# Patient Record
Sex: MALE | Race: WHITE | NOT HISPANIC OR LATINO | Employment: FULL TIME | URBAN - METROPOLITAN AREA
[De-identification: names, ages, dates, MRNs, and addresses within clinical notes are randomized per-mention and may not be internally consistent; named-entity substitution may affect disease eponyms.]

---

## 2017-01-27 ENCOUNTER — ALLSCRIPTS OFFICE VISIT (OUTPATIENT)
Dept: OTHER | Facility: OTHER | Age: 52
End: 2017-01-27

## 2017-02-22 ENCOUNTER — APPOINTMENT (INPATIENT)
Dept: NEUROLOGY | Facility: HOSPITAL | Age: 52
DRG: 101 | End: 2017-02-22
Payer: COMMERCIAL

## 2017-02-22 ENCOUNTER — HOSPITAL ENCOUNTER (INPATIENT)
Facility: HOSPITAL | Age: 52
LOS: 2 days | Discharge: LEFT BEFORE DISCHARGE | DRG: 101 | End: 2017-02-24
Attending: EMERGENCY MEDICINE | Admitting: INTERNAL MEDICINE
Payer: COMMERCIAL

## 2017-02-22 ENCOUNTER — APPOINTMENT (EMERGENCY)
Dept: RADIOLOGY | Facility: HOSPITAL | Age: 52
DRG: 101 | End: 2017-02-22
Payer: COMMERCIAL

## 2017-02-22 DIAGNOSIS — I10 HYPERTENSION: ICD-10-CM

## 2017-02-22 DIAGNOSIS — R56.9 SEIZURE (HCC): Primary | ICD-10-CM

## 2017-02-22 DIAGNOSIS — E83.42 HYPOMAGNESEMIA: ICD-10-CM

## 2017-02-22 PROBLEM — N17.9 AKI (ACUTE KIDNEY INJURY) (HCC): Status: ACTIVE | Noted: 2017-02-22

## 2017-02-22 PROBLEM — F17.200 NICOTINE DEPENDENCE: Status: ACTIVE | Noted: 2017-02-22

## 2017-02-22 PROBLEM — E78.5 DYSLIPIDEMIA: Status: ACTIVE | Noted: 2017-02-22

## 2017-02-22 LAB
ALBUMIN SERPL BCP-MCNC: 4 G/DL (ref 3.5–5)
ALP SERPL-CCNC: 55 U/L (ref 46–116)
ALT SERPL W P-5'-P-CCNC: 23 U/L (ref 12–78)
AMPHETAMINES SERPL QL SCN: NEGATIVE
ANION GAP SERPL CALCULATED.3IONS-SCNC: 13 MMOL/L (ref 4–13)
APAP SERPL-MCNC: <2 UG/ML (ref 10–30)
APTT PPP: 23 SECONDS (ref 24–36)
AST SERPL W P-5'-P-CCNC: 35 U/L (ref 5–45)
BARBITURATES UR QL: NEGATIVE
BASOPHILS # BLD AUTO: 0.1 THOUSANDS/ΜL (ref 0–0.1)
BASOPHILS NFR BLD AUTO: 1 % (ref 0–1)
BENZODIAZ UR QL: NEGATIVE
BILIRUB SERPL-MCNC: 0.4 MG/DL (ref 0.2–1)
BILIRUB UR QL STRIP: NEGATIVE
BUN SERPL-MCNC: 10 MG/DL (ref 5–25)
CALCIUM SERPL-MCNC: 7.6 MG/DL (ref 8.3–10.1)
CHLORIDE SERPL-SCNC: 101 MMOL/L (ref 100–108)
CK MB SERPL-MCNC: 2.7 NG/ML (ref 0–5)
CK MB SERPL-MCNC: <1 % (ref 0–2.5)
CK SERPL-CCNC: 366 U/L (ref 39–308)
CLARITY UR: CLEAR
CO2 SERPL-SCNC: 26 MMOL/L (ref 21–32)
COCAINE UR QL: NEGATIVE
COLOR UR: YELLOW
CREAT SERPL-MCNC: 1.52 MG/DL (ref 0.6–1.3)
EOSINOPHIL # BLD AUTO: 0.2 THOUSAND/ΜL (ref 0–0.61)
EOSINOPHIL NFR BLD AUTO: 2 % (ref 0–6)
ERYTHROCYTE [DISTWIDTH] IN BLOOD BY AUTOMATED COUNT: 15.8 % (ref 11.6–15.1)
GFR SERPL CREATININE-BSD FRML MDRD: 48 ML/MIN/1.73SQ M
GLUCOSE SERPL-MCNC: 101 MG/DL (ref 65–140)
GLUCOSE SERPL-MCNC: 89 MG/DL (ref 65–140)
GLUCOSE UR STRIP-MCNC: NEGATIVE MG/DL
HCT VFR BLD AUTO: 42.5 % (ref 42–52)
HGB BLD-MCNC: 14.1 G/DL (ref 14–18)
HGB UR QL STRIP.AUTO: NEGATIVE
INR PPP: 1.18 (ref 0.86–1.16)
KETONES UR STRIP-MCNC: NEGATIVE MG/DL
LACTATE SERPL-SCNC: 1.7 MMOL/L (ref 0.5–2)
LACTATE SERPL-SCNC: 4.3 MMOL/L (ref 0.5–2)
LEUKOCYTE ESTERASE UR QL STRIP: NEGATIVE
LIPASE SERPL-CCNC: 258 U/L (ref 73–393)
LYMPHOCYTES # BLD AUTO: 1.5 THOUSANDS/ΜL (ref 0.6–4.47)
LYMPHOCYTES NFR BLD AUTO: 20 % (ref 14–44)
MAGNESIUM SERPL-MCNC: 0.4 MG/DL (ref 1.6–2.6)
MAGNESIUM SERPL-MCNC: 2.5 MG/DL (ref 1.6–2.6)
MCH RBC QN AUTO: 30.1 PG (ref 27–31)
MCHC RBC AUTO-ENTMCNC: 33 G/DL (ref 31.4–37.4)
MCV RBC AUTO: 91 FL (ref 82–98)
METHADONE UR QL: NEGATIVE
MONOCYTES # BLD AUTO: 0.5 THOUSAND/ΜL (ref 0.17–1.22)
MONOCYTES NFR BLD AUTO: 6 % (ref 4–12)
NEUTROPHILS # BLD AUTO: 5.1 THOUSANDS/ΜL (ref 1.85–7.62)
NEUTS SEG NFR BLD AUTO: 70 % (ref 43–75)
NITRITE UR QL STRIP: NEGATIVE
NRBC BLD AUTO-RTO: 0 /100 WBCS
OPIATES UR QL SCN: POSITIVE
PCP UR QL: NEGATIVE
PH UR STRIP.AUTO: 8 [PH] (ref 5–9)
PLATELET # BLD AUTO: 231 THOUSANDS/UL (ref 130–400)
PMV BLD AUTO: 7.1 FL (ref 8.9–12.7)
POTASSIUM SERPL-SCNC: 4.8 MMOL/L (ref 3.5–5.3)
PROT SERPL-MCNC: 7.1 G/DL (ref 6.4–8.2)
PROT UR STRIP-MCNC: NEGATIVE MG/DL
PROTHROMBIN TIME: 12.5 SECONDS (ref 9.4–11.7)
RBC # BLD AUTO: 4.67 MILLION/UL (ref 4.7–6.1)
SALICYLATES SERPL-MCNC: <3 MG/DL (ref 3–20)
SODIUM SERPL-SCNC: 140 MMOL/L (ref 136–145)
SP GR UR STRIP.AUTO: 1.01 (ref 1–1.03)
THC UR QL: POSITIVE
TROPONIN I SERPL-MCNC: <0.02 NG/ML
TSH SERPL DL<=0.05 MIU/L-ACNC: 1.72 UIU/ML (ref 0.36–3.74)
UROBILINOGEN UR QL STRIP.AUTO: 0.2 E.U./DL
WBC # BLD AUTO: 7.3 THOUSAND/UL (ref 4.8–10.8)

## 2017-02-22 PROCEDURE — 96372 THER/PROPH/DIAG INJ SC/IM: CPT

## 2017-02-22 PROCEDURE — 71020 HB CHEST X-RAY 2VW FRONTAL&LATL: CPT

## 2017-02-22 PROCEDURE — 85610 PROTHROMBIN TIME: CPT | Performed by: EMERGENCY MEDICINE

## 2017-02-22 PROCEDURE — 82948 REAGENT STRIP/BLOOD GLUCOSE: CPT

## 2017-02-22 PROCEDURE — G0480 DRUG TEST DEF 1-7 CLASSES: HCPCS | Performed by: EMERGENCY MEDICINE

## 2017-02-22 PROCEDURE — 96375 TX/PRO/DX INJ NEW DRUG ADDON: CPT

## 2017-02-22 PROCEDURE — 83605 ASSAY OF LACTIC ACID: CPT | Performed by: EMERGENCY MEDICINE

## 2017-02-22 PROCEDURE — 70450 CT HEAD/BRAIN W/O DYE: CPT

## 2017-02-22 PROCEDURE — 96365 THER/PROPH/DIAG IV INF INIT: CPT

## 2017-02-22 PROCEDURE — 84484 ASSAY OF TROPONIN QUANT: CPT | Performed by: EMERGENCY MEDICINE

## 2017-02-22 PROCEDURE — 82550 ASSAY OF CK (CPK): CPT | Performed by: EMERGENCY MEDICINE

## 2017-02-22 PROCEDURE — 80329 ANALGESICS NON-OPIOID 1 OR 2: CPT | Performed by: EMERGENCY MEDICINE

## 2017-02-22 PROCEDURE — 83735 ASSAY OF MAGNESIUM: CPT | Performed by: NURSE PRACTITIONER

## 2017-02-22 PROCEDURE — 81003 URINALYSIS AUTO W/O SCOPE: CPT | Performed by: EMERGENCY MEDICINE

## 2017-02-22 PROCEDURE — 93005 ELECTROCARDIOGRAM TRACING: CPT | Performed by: EMERGENCY MEDICINE

## 2017-02-22 PROCEDURE — 80307 DRUG TEST PRSMV CHEM ANLYZR: CPT | Performed by: EMERGENCY MEDICINE

## 2017-02-22 PROCEDURE — 99285 EMERGENCY DEPT VISIT HI MDM: CPT

## 2017-02-22 PROCEDURE — 85025 COMPLETE CBC W/AUTO DIFF WBC: CPT | Performed by: EMERGENCY MEDICINE

## 2017-02-22 PROCEDURE — 36415 COLL VENOUS BLD VENIPUNCTURE: CPT | Performed by: EMERGENCY MEDICINE

## 2017-02-22 PROCEDURE — 83690 ASSAY OF LIPASE: CPT | Performed by: INTERNAL MEDICINE

## 2017-02-22 PROCEDURE — 83735 ASSAY OF MAGNESIUM: CPT | Performed by: EMERGENCY MEDICINE

## 2017-02-22 PROCEDURE — 84443 ASSAY THYROID STIM HORMONE: CPT | Performed by: EMERGENCY MEDICINE

## 2017-02-22 PROCEDURE — A9270 NON-COVERED ITEM OR SERVICE: HCPCS | Performed by: NURSE PRACTITIONER

## 2017-02-22 PROCEDURE — 85730 THROMBOPLASTIN TIME PARTIAL: CPT | Performed by: EMERGENCY MEDICINE

## 2017-02-22 PROCEDURE — 95819 EEG AWAKE AND ASLEEP: CPT

## 2017-02-22 PROCEDURE — 82553 CREATINE MB FRACTION: CPT | Performed by: EMERGENCY MEDICINE

## 2017-02-22 PROCEDURE — 96361 HYDRATE IV INFUSION ADD-ON: CPT

## 2017-02-22 PROCEDURE — 96367 TX/PROPH/DG ADDL SEQ IV INF: CPT

## 2017-02-22 PROCEDURE — 80053 COMPREHEN METABOLIC PANEL: CPT | Performed by: EMERGENCY MEDICINE

## 2017-02-22 RX ORDER — POTASSIUM CHLORIDE 1500 MG/1
20 TABLET, FILM COATED, EXTENDED RELEASE ORAL EVERY OTHER DAY
COMMUNITY
Start: 2016-10-04 | End: 2020-02-25

## 2017-02-22 RX ORDER — FENOFIBRATE 145 MG/1
145 TABLET, COATED ORAL DAILY
COMMUNITY
Start: 2012-03-06 | End: 2018-03-05 | Stop reason: SDUPTHER

## 2017-02-22 RX ORDER — ONDANSETRON 2 MG/ML
4 INJECTION INTRAMUSCULAR; INTRAVENOUS EVERY 6 HOURS PRN
Status: DISCONTINUED | OUTPATIENT
Start: 2017-02-22 | End: 2017-02-24 | Stop reason: HOSPADM

## 2017-02-22 RX ORDER — METOPROLOL SUCCINATE 100 MG/1
100 TABLET, EXTENDED RELEASE ORAL DAILY
COMMUNITY
Start: 2011-12-16 | End: 2018-03-05 | Stop reason: SDUPTHER

## 2017-02-22 RX ORDER — NICOTINE POLACRILEX 4 MG/1
20 GUM, CHEWING ORAL DAILY
Status: ON HOLD | COMMUNITY
Start: 2013-02-21 | End: 2017-02-22

## 2017-02-22 RX ORDER — LORAZEPAM 2 MG/ML
1 INJECTION INTRAMUSCULAR EVERY 4 HOURS PRN
Status: DISCONTINUED | OUTPATIENT
Start: 2017-02-22 | End: 2017-02-24 | Stop reason: HOSPADM

## 2017-02-22 RX ORDER — MORPHINE SULFATE 2 MG/ML
2 INJECTION, SOLUTION INTRAMUSCULAR; INTRAVENOUS ONCE
Status: COMPLETED | OUTPATIENT
Start: 2017-02-22 | End: 2017-02-22

## 2017-02-22 RX ORDER — ASPIRIN 81 MG/1
81 TABLET, CHEWABLE ORAL DAILY
Status: DISCONTINUED | OUTPATIENT
Start: 2017-02-23 | End: 2017-02-24 | Stop reason: HOSPADM

## 2017-02-22 RX ORDER — MAGNESIUM SULFATE HEPTAHYDRATE 40 MG/ML
2 INJECTION, SOLUTION INTRAVENOUS ONCE
Status: COMPLETED | OUTPATIENT
Start: 2017-02-22 | End: 2017-02-22

## 2017-02-22 RX ORDER — THIAMINE HYDROCHLORIDE 100 MG/ML
100 INJECTION, SOLUTION INTRAMUSCULAR; INTRAVENOUS ONCE
Status: COMPLETED | OUTPATIENT
Start: 2017-02-22 | End: 2017-02-22

## 2017-02-22 RX ORDER — MORPHINE SULFATE 4 MG/ML
4 INJECTION, SOLUTION INTRAMUSCULAR; INTRAVENOUS ONCE
Status: COMPLETED | OUTPATIENT
Start: 2017-02-22 | End: 2017-02-22

## 2017-02-22 RX ORDER — MAGNESIUM SULFATE HEPTAHYDRATE 40 MG/ML
2 INJECTION, SOLUTION INTRAVENOUS ONCE
Status: DISCONTINUED | OUTPATIENT
Start: 2017-02-23 | End: 2017-02-22

## 2017-02-22 RX ORDER — ALLOPURINOL 300 MG/1
300 TABLET ORAL DAILY
Status: DISCONTINUED | OUTPATIENT
Start: 2017-02-23 | End: 2017-02-24 | Stop reason: HOSPADM

## 2017-02-22 RX ORDER — FENOFIBRATE 145 MG/1
145 TABLET, COATED ORAL DAILY
Status: DISCONTINUED | OUTPATIENT
Start: 2017-02-23 | End: 2017-02-22

## 2017-02-22 RX ORDER — QUETIAPINE FUMARATE 50 MG/1
5 TABLET, FILM COATED ORAL
Status: ON HOLD | COMMUNITY
Start: 2012-04-16 | End: 2022-05-07 | Stop reason: ALTCHOICE

## 2017-02-22 RX ORDER — NICOTINE 21 MG/24HR
1 PATCH, TRANSDERMAL 24 HOURS TRANSDERMAL DAILY
Status: DISCONTINUED | OUTPATIENT
Start: 2017-02-22 | End: 2017-02-24 | Stop reason: HOSPADM

## 2017-02-22 RX ORDER — ACETAMINOPHEN 325 MG/1
650 TABLET ORAL EVERY 6 HOURS PRN
Status: DISCONTINUED | OUTPATIENT
Start: 2017-02-22 | End: 2017-02-24 | Stop reason: HOSPADM

## 2017-02-22 RX ORDER — AMLODIPINE BESYLATE 10 MG/1
10 TABLET ORAL
Status: DISCONTINUED | OUTPATIENT
Start: 2017-02-22 | End: 2017-02-24 | Stop reason: HOSPADM

## 2017-02-22 RX ORDER — METOPROLOL SUCCINATE 100 MG/1
100 TABLET, EXTENDED RELEASE ORAL DAILY
Status: DISCONTINUED | OUTPATIENT
Start: 2017-02-23 | End: 2017-02-24 | Stop reason: HOSPADM

## 2017-02-22 RX ORDER — MORPHINE SULFATE 2 MG/ML
2 INJECTION, SOLUTION INTRAMUSCULAR; INTRAVENOUS EVERY 6 HOURS PRN
Status: DISCONTINUED | OUTPATIENT
Start: 2017-02-22 | End: 2017-02-24 | Stop reason: HOSPADM

## 2017-02-22 RX ORDER — SODIUM CHLORIDE 9 MG/ML
75 INJECTION, SOLUTION INTRAVENOUS CONTINUOUS
Status: DISCONTINUED | OUTPATIENT
Start: 2017-02-22 | End: 2017-02-22

## 2017-02-22 RX ORDER — OXYCODONE HYDROCHLORIDE AND ACETAMINOPHEN 5; 325 MG/1; MG/1
1 TABLET ORAL EVERY 6 HOURS PRN
Status: DISCONTINUED | OUTPATIENT
Start: 2017-02-22 | End: 2017-02-24 | Stop reason: HOSPADM

## 2017-02-22 RX ORDER — PANTOPRAZOLE SODIUM 40 MG/1
40 TABLET, DELAYED RELEASE ORAL
Status: DISCONTINUED | OUTPATIENT
Start: 2017-02-23 | End: 2017-02-22

## 2017-02-22 RX ORDER — CALCIUM CARBONATE/VITAMIN D3 500-10/5ML
400 LIQUID (ML) ORAL 2 TIMES DAILY
Status: ON HOLD | COMMUNITY
Start: 2015-05-01 | End: 2022-05-07 | Stop reason: ALTCHOICE

## 2017-02-22 RX ORDER — ALLOPURINOL 300 MG/1
300 TABLET ORAL DAILY
Status: DISCONTINUED | OUTPATIENT
Start: 2017-02-22 | End: 2017-02-22

## 2017-02-22 RX ADMIN — MORPHINE SULFATE 2 MG: 2 INJECTION, SOLUTION INTRAMUSCULAR; INTRAVENOUS at 11:04

## 2017-02-22 RX ADMIN — THIAMINE HYDROCHLORIDE 100 MG: 100 INJECTION, SOLUTION INTRAMUSCULAR; INTRAVENOUS at 09:58

## 2017-02-22 RX ADMIN — SODIUM CHLORIDE 150 ML/HR: 0.9 INJECTION, SOLUTION INTRAVENOUS at 11:52

## 2017-02-22 RX ADMIN — MORPHINE SULFATE 4 MG: 4 INJECTION, SOLUTION INTRAMUSCULAR; INTRAVENOUS at 12:21

## 2017-02-22 RX ADMIN — MAGNESIUM SULFATE HEPTAHYDRATE 2 G: 40 INJECTION, SOLUTION INTRAVENOUS at 23:02

## 2017-02-22 RX ADMIN — MAGNESIUM SULFATE HEPTAHYDRATE 2 G: 40 INJECTION, SOLUTION INTRAVENOUS at 11:34

## 2017-02-22 RX ADMIN — NICOTINE 1 PATCH: 14 PATCH TRANSDERMAL at 15:28

## 2017-02-22 RX ADMIN — MAGNESIUM SULFATE HEPTAHYDRATE 2 G: 40 INJECTION, SOLUTION INTRAVENOUS at 18:24

## 2017-02-22 RX ADMIN — CALCIUM GLUCONATE 1 G: 94 INJECTION, SOLUTION INTRAVENOUS at 19:00

## 2017-02-22 RX ADMIN — AMLODIPINE BESYLATE 10 MG: 10 TABLET ORAL at 21:14

## 2017-02-22 RX ADMIN — FOLIC ACID 1 MG: 5 INJECTION, SOLUTION INTRAMUSCULAR; INTRAVENOUS; SUBCUTANEOUS at 10:03

## 2017-02-22 RX ADMIN — MORPHINE SULFATE 2 MG: 2 INJECTION, SOLUTION INTRAMUSCULAR; INTRAVENOUS at 19:42

## 2017-02-22 RX ADMIN — SODIUM CHLORIDE 150 ML/HR: 0.9 INJECTION, SOLUTION INTRAVENOUS at 15:30

## 2017-02-22 RX ADMIN — SODIUM CHLORIDE 1000 ML: 0.9 INJECTION, SOLUTION INTRAVENOUS at 09:49

## 2017-02-22 RX ADMIN — LEVETIRACETAM 1000 MG: 100 INJECTION, SOLUTION INTRAVENOUS at 10:56

## 2017-02-22 RX ADMIN — OXYCODONE HYDROCHLORIDE AND ACETAMINOPHEN 1 TABLET: 5; 325 TABLET ORAL at 15:28

## 2017-02-22 RX ADMIN — OXYCODONE HYDROCHLORIDE AND ACETAMINOPHEN 1 TABLET: 5; 325 TABLET ORAL at 21:36

## 2017-02-23 ENCOUNTER — APPOINTMENT (INPATIENT)
Dept: RADIOLOGY | Facility: HOSPITAL | Age: 52
DRG: 101 | End: 2017-02-23
Payer: COMMERCIAL

## 2017-02-23 PROBLEM — M62.82 RHABDOMYOLYSIS: Status: ACTIVE | Noted: 2017-02-23

## 2017-02-23 PROBLEM — E87.20 LACTIC ACIDOSIS: Status: ACTIVE | Noted: 2017-02-23

## 2017-02-23 PROBLEM — E87.2 LACTIC ACIDOSIS: Status: ACTIVE | Noted: 2017-02-23

## 2017-02-23 PROBLEM — E83.51 HYPOCALCEMIA: Status: ACTIVE | Noted: 2017-02-23

## 2017-02-23 LAB
ANION GAP SERPL CALCULATED.3IONS-SCNC: 11 MMOL/L (ref 4–13)
BUN SERPL-MCNC: 8 MG/DL (ref 5–25)
CALCIUM SERPL-MCNC: 7.6 MG/DL (ref 8.3–10.1)
CHLORIDE SERPL-SCNC: 101 MMOL/L (ref 100–108)
CHOLEST SERPL-MCNC: 203 MG/DL (ref 50–200)
CK MB SERPL-MCNC: 2 % (ref 0–2.5)
CK MB SERPL-MCNC: 31.2 NG/ML (ref 0–5)
CK SERPL-CCNC: 1586 U/L (ref 39–308)
CO2 SERPL-SCNC: 25 MMOL/L (ref 21–32)
CREAT SERPL-MCNC: 1.04 MG/DL (ref 0.6–1.3)
ERYTHROCYTE [DISTWIDTH] IN BLOOD BY AUTOMATED COUNT: 15.3 % (ref 11.6–15.1)
GFR SERPL CREATININE-BSD FRML MDRD: >60 ML/MIN/1.73SQ M
GLUCOSE SERPL-MCNC: 90 MG/DL (ref 65–140)
HCT VFR BLD AUTO: 41.9 % (ref 42–52)
HDLC SERPL-MCNC: 43 MG/DL (ref 40–60)
HGB BLD-MCNC: 13.8 G/DL (ref 14–18)
LDLC SERPL CALC-MCNC: 125 MG/DL (ref 0–100)
MAGNESIUM SERPL-MCNC: 1.8 MG/DL (ref 1.6–2.6)
MAGNESIUM SERPL-MCNC: 1.9 MG/DL (ref 1.6–2.6)
MCH RBC QN AUTO: 30.1 PG (ref 27–31)
MCHC RBC AUTO-ENTMCNC: 33.1 G/DL (ref 31.4–37.4)
MCV RBC AUTO: 91 FL (ref 82–98)
PLATELET # BLD AUTO: 210 THOUSANDS/UL (ref 130–400)
PMV BLD AUTO: 7.4 FL (ref 8.9–12.7)
POTASSIUM SERPL-SCNC: 3.4 MMOL/L (ref 3.5–5.3)
RBC # BLD AUTO: 4.59 MILLION/UL (ref 4.7–6.1)
SODIUM SERPL-SCNC: 137 MMOL/L (ref 136–145)
TRIGL SERPL-MCNC: 174 MG/DL
WBC # BLD AUTO: 6.9 THOUSAND/UL (ref 4.8–10.8)

## 2017-02-23 PROCEDURE — 83735 ASSAY OF MAGNESIUM: CPT | Performed by: NURSE PRACTITIONER

## 2017-02-23 PROCEDURE — 85027 COMPLETE CBC AUTOMATED: CPT | Performed by: NURSE PRACTITIONER

## 2017-02-23 PROCEDURE — A9270 NON-COVERED ITEM OR SERVICE: HCPCS | Performed by: NURSE PRACTITIONER

## 2017-02-23 PROCEDURE — G8978 MOBILITY CURRENT STATUS: HCPCS

## 2017-02-23 PROCEDURE — 82553 CREATINE MB FRACTION: CPT | Performed by: INTERNAL MEDICINE

## 2017-02-23 PROCEDURE — A9270 NON-COVERED ITEM OR SERVICE: HCPCS | Performed by: STUDENT IN AN ORGANIZED HEALTH CARE EDUCATION/TRAINING PROGRAM

## 2017-02-23 PROCEDURE — 82550 ASSAY OF CK (CPK): CPT | Performed by: INTERNAL MEDICINE

## 2017-02-23 PROCEDURE — 70553 MRI BRAIN STEM W/O & W/DYE: CPT

## 2017-02-23 PROCEDURE — A9585 GADOBUTROL INJECTION: HCPCS | Performed by: INTERNAL MEDICINE

## 2017-02-23 PROCEDURE — 97161 PT EVAL LOW COMPLEX 20 MIN: CPT

## 2017-02-23 PROCEDURE — 94760 N-INVAS EAR/PLS OXIMETRY 1: CPT

## 2017-02-23 PROCEDURE — 80048 BASIC METABOLIC PNL TOTAL CA: CPT | Performed by: NURSE PRACTITIONER

## 2017-02-23 PROCEDURE — 80061 LIPID PANEL: CPT | Performed by: NURSE PRACTITIONER

## 2017-02-23 PROCEDURE — G8979 MOBILITY GOAL STATUS: HCPCS

## 2017-02-23 PROCEDURE — 94640 AIRWAY INHALATION TREATMENT: CPT

## 2017-02-23 RX ORDER — QUETIAPINE FUMARATE 25 MG/1
50 TABLET, FILM COATED ORAL ONCE AS NEEDED
Status: COMPLETED | OUTPATIENT
Start: 2017-02-23 | End: 2017-02-23

## 2017-02-23 RX ORDER — ZOLPIDEM TARTRATE 5 MG/1
5 TABLET ORAL ONCE AS NEEDED
Status: DISCONTINUED | OUTPATIENT
Start: 2017-02-23 | End: 2017-02-23

## 2017-02-23 RX ORDER — LORAZEPAM 2 MG/ML
1 INJECTION INTRAMUSCULAR
Status: DISCONTINUED | OUTPATIENT
Start: 2017-02-23 | End: 2017-02-24 | Stop reason: HOSPADM

## 2017-02-23 RX ORDER — SODIUM CHLORIDE 9 MG/ML
150 INJECTION, SOLUTION INTRAVENOUS CONTINUOUS
Status: DISCONTINUED | OUTPATIENT
Start: 2017-02-23 | End: 2017-02-24 | Stop reason: HOSPADM

## 2017-02-23 RX ORDER — POTASSIUM CHLORIDE 20 MEQ/1
40 TABLET, EXTENDED RELEASE ORAL ONCE
Status: COMPLETED | OUTPATIENT
Start: 2017-02-23 | End: 2017-02-23

## 2017-02-23 RX ORDER — ALBUTEROL SULFATE 2.5 MG/3ML
2.5 SOLUTION RESPIRATORY (INHALATION)
Status: DISCONTINUED | OUTPATIENT
Start: 2017-02-23 | End: 2017-02-24 | Stop reason: HOSPADM

## 2017-02-23 RX ADMIN — ALBUTEROL SULFATE 2.5 MG: 2.5 SOLUTION RESPIRATORY (INHALATION) at 13:39

## 2017-02-23 RX ADMIN — ASPIRIN 81 MG 81 MG: 81 TABLET ORAL at 08:14

## 2017-02-23 RX ADMIN — Medication 800 MG: at 12:37

## 2017-02-23 RX ADMIN — POTASSIUM CHLORIDE 40 MEQ: 1500 TABLET, EXTENDED RELEASE ORAL at 10:43

## 2017-02-23 RX ADMIN — OXYCODONE HYDROCHLORIDE AND ACETAMINOPHEN 1 TABLET: 5; 325 TABLET ORAL at 08:30

## 2017-02-23 RX ADMIN — Medication 800 MG: at 22:01

## 2017-02-23 RX ADMIN — OXYCODONE HYDROCHLORIDE AND ACETAMINOPHEN 1 TABLET: 5; 325 TABLET ORAL at 17:00

## 2017-02-23 RX ADMIN — OXYCODONE HYDROCHLORIDE AND ACETAMINOPHEN 1 TABLET: 5; 325 TABLET ORAL at 23:14

## 2017-02-23 RX ADMIN — GADOBUTROL 7 ML: 604.72 INJECTION INTRAVENOUS at 09:25

## 2017-02-23 RX ADMIN — ALBUTEROL SULFATE 2.5 MG: 2.5 SOLUTION RESPIRATORY (INHALATION) at 20:41

## 2017-02-23 RX ADMIN — SODIUM CHLORIDE 150 ML/HR: 0.9 INJECTION, SOLUTION INTRAVENOUS at 12:35

## 2017-02-23 RX ADMIN — MORPHINE SULFATE 2 MG: 2 INJECTION, SOLUTION INTRAMUSCULAR; INTRAVENOUS at 12:40

## 2017-02-23 RX ADMIN — ALLOPURINOL 300 MG: 300 TABLET ORAL at 08:14

## 2017-02-23 RX ADMIN — ENOXAPARIN SODIUM 40 MG: 40 INJECTION SUBCUTANEOUS at 08:21

## 2017-02-23 RX ADMIN — METOPROLOL SUCCINATE 100 MG: 100 TABLET, FILM COATED, EXTENDED RELEASE ORAL at 08:14

## 2017-02-23 RX ADMIN — LORAZEPAM 1 MG: 2 INJECTION INTRAMUSCULAR; INTRAVENOUS at 08:14

## 2017-02-23 RX ADMIN — QUETIAPINE FUMARATE 50 MG: 25 TABLET, FILM COATED ORAL at 22:00

## 2017-02-23 RX ADMIN — Medication 1 TABLET: at 16:53

## 2017-02-23 RX ADMIN — AMLODIPINE BESYLATE 10 MG: 10 TABLET ORAL at 22:01

## 2017-02-24 VITALS
WEIGHT: 201.06 LBS | SYSTOLIC BLOOD PRESSURE: 132 MMHG | TEMPERATURE: 98.3 F | DIASTOLIC BLOOD PRESSURE: 75 MMHG | RESPIRATION RATE: 18 BRPM | HEART RATE: 80 BPM | OXYGEN SATURATION: 98 % | BODY MASS INDEX: 28.78 KG/M2 | HEIGHT: 70 IN

## 2017-02-24 LAB
ATRIAL RATE: 92 BPM
P AXIS: 36 DEGREES
PR INTERVAL: 152 MS
QRS AXIS: -1 DEGREES
QRSD INTERVAL: 72 MS
QT INTERVAL: 336 MS
QTC INTERVAL: 415 MS
T WAVE AXIS: 11 DEGREES
VENTRICULAR RATE: 92 BPM

## 2017-02-24 PROCEDURE — A9270 NON-COVERED ITEM OR SERVICE: HCPCS | Performed by: NURSE PRACTITIONER

## 2017-02-24 RX ADMIN — METOPROLOL SUCCINATE 100 MG: 100 TABLET, FILM COATED, EXTENDED RELEASE ORAL at 09:06

## 2017-02-24 RX ADMIN — Medication 1 TABLET: at 09:06

## 2017-02-24 RX ADMIN — Medication 800 MG: at 09:06

## 2017-02-24 RX ADMIN — ASPIRIN 81 MG 81 MG: 81 TABLET ORAL at 09:06

## 2017-02-24 RX ADMIN — ALLOPURINOL 300 MG: 300 TABLET ORAL at 09:06

## 2017-02-27 ENCOUNTER — GENERIC CONVERSION - ENCOUNTER (OUTPATIENT)
Dept: OTHER | Facility: OTHER | Age: 52
End: 2017-02-27

## 2017-03-17 ENCOUNTER — ALLSCRIPTS OFFICE VISIT (OUTPATIENT)
Dept: OTHER | Facility: OTHER | Age: 52
End: 2017-03-17

## 2017-03-17 DIAGNOSIS — M25.561 PAIN IN RIGHT KNEE: ICD-10-CM

## 2017-07-27 ENCOUNTER — ALLSCRIPTS OFFICE VISIT (OUTPATIENT)
Dept: OTHER | Facility: OTHER | Age: 52
End: 2017-07-27

## 2018-01-09 NOTE — MISCELLANEOUS
History of Present Illness  TCM Communication [facility]: The patient is being contacted for follow-up after hospitalization and 2/27/17 B  [facility]  AND [facility] Left message for patient to call for ZANDRA  Communication performed and completed by      Active Problems    1  Acute left otitis media (382 9) (H66 92)   2  Anxiety (300 00) (F41 9)   3  Arthropathy (716 90) (M12 9)   4  Back muscle spasm (724 8) (M62 830)   5  Backache (724 5) (M54 9)   6  Benign essential hypertension (401 1) (I10)   7  Bilateral knee pain (719 46) (M25 561,M25 562)   8  Bone Cyst (733 29)   9  Carpal tunnel syndrome, unspecified laterality (354 0) (G56 00)   10  Cervical radiculopathy (723 4) (M54 12)   11  Chronic low back pain (724 2,338 29) (M54 5,G89 29)   12  Chronic pain syndrome (338 4) (G89 4)   13  Chronic pain syndrome (338 4) (G89 4)   14  Elbow pain, unspecified laterality (719 42) (M25 529)   15  Esophageal reflux (530 81) (K21 9)   16  Essential hypertension (401 9) (I10)   17  Gout (274 9) (M10 9)   18  Hypokalemia (276 8) (E87 6)   19  Hypomagnesemia (275 2) (E83 42)   20  Impaired fasting glucose (790 21) (R73 01)   21  Ingrowing toenail (703 0) (L60 0)   22  Knee Sprain (844 9)   23  Lateral epicondylitis, unspecified laterality (726 32) (M77 10)   24  Long term current use of opiate analgesic (V58 69) (Z79 891)   25  Mixed hyperlipidemia (272 2) (E78 2)   26  Muscle cramps (729 82) (R25 2)   27  Neuralgia of right lower extremity (355 8) (G57 91)   28  Nicotine dependence (305 1) (F17 200)   29  Nocturnal muscle cramps (729 82) (R25 2)   30  Olecranon bursitis, unspecified laterality (726 33) (M70 20)   31  Open wound of lip without complication (294 03) (A66 785G)   32  Opioid Abuse - Continuous (305 51)   33  Opioid dependence (304 00) (F11 20)   34  Organic impotence (607 84) (N52 9)   35  Osteoarthritis of knee (715 36) (M17 9)   36  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)   37   Sprain, thumb (842 10) (Y49 391A)    Past Medical History    1  Abrasion On Trunk (911 0)   2  History of Acute bronchitis with bronchospasm (466 0) (J20 9)   3  Acute upper respiratory infection (465 9) (J06 9)   4  Biceps tendonitis, unspecified laterality (726 12) (M75 20)   5  Bronchiectasis (494)   6  Chronic Renal Failure (585 9)   7  Contusion Of The Knee With Intact Skin Surface (924 11)   8  Crush Injury (V760)   9  Generalized anxiety disorder (300 02) (F41 1)   10  History of acute bronchitis (V12 69) (Z87 09)   11  History of bursitis (V13 59) (Z87 39)   12  History of chest pain (V13 89) (Z87 898)   13  History of diverticulitis of colon (V12 79) (Z87 19)   14  History of viral infection (V12 09) (Z86 19)   15  History of Lateral epicondylitis, unspecified laterality (726 32) (M77 10)   16  History of Lyme disease (088 81) (A69 20)   17  Nonvenomous Insect Bite Of Trunk (911 4)   18  History of Open Wound Of The Finger (883 0)   19  History of Open Wound Of The Foot (892 0)   20  Otitis media, unspecified laterality   21  History of Pneumonia (V12 61)   22  History of Thoracic Sprain (847 1)    Surgical History    1  History of Excision Of The Olecranon Bursa   2  History of Knee Arthroscopy    Family History  Mother    1  Family history of Colon Cancer (V16 0)    Social History    · Being A Social Drinker   · History of Current Every Day Smoker (305 1)    Current Meds   1  Allopurinol 300 MG Oral Tablet; 1 every day; Therapy: 17WVA6710 to (Last Rx:03Sti7377)  Requested for: 49NSU7820 Ordered   2  AmLODIPine Besylate 10 MG Oral Tablet; 1 Every Day At Bedtime; Therapy: 53WKE4864 to (Last Rx:31Oct2016)  Requested for: 31Oct2016 Ordered   3  Atorvastatin Calcium 40 MG Oral Tablet; TAKE ONE TABLET BY MOUTH EVERY DAY AT   BEDTIME; Therapy: 13TGW4939 to (Evaluate:60Pmu2376)  Requested for: 44CNB5324; Last   Rx:78Qon6713 Ordered   4  Azithromycin 250 MG Oral Tablet;  Take 2 tablets today, then 1 tablet daily for 4 days; Therapy: 77WPN9151 to (Last TI:87ADP1527)  Requested for: 24IAU5580 Ordered   5  Fenofibrate 145 MG Oral Tablet; TAKE 1 TABLET DAILY; Therapy: 19NAK0684 to (Evaluate:28Jun2017)  Requested for: 44BNE4779; Last   Rx:30Nov2016 Ordered   6  LORazepam 1 MG Oral Tablet; take 2 hours prior to MRI; Therapy: 68SGG4529 to (Last Ada Fermo)  Requested for: 03Nqt6902 Ordered   7  Magnesium Oxide 400 (240 Mg) MG Oral Tablet; Take 1 tablet twice daily; Therapy: 46ZKD3526 to (Dhruv Mor)  Requested for: 04Oct2016; Last   Rx:46Anz8585 Ordered   8  Metoprolol Succinate  MG Oral Tablet Extended Release 24 Hour; TAKE 1 AND   1/2 TABLETS DAILY; Therapy: 91QQX4050 to (Last Rx:79Cgc8722)  Requested for: 05FED9639 Ordered   9  Omeprazole 20 MG Oral Capsule Delayed Release; TAKE ONE CAPSULE BY MOUTH   EVERY DAY; Therapy: 16Jmr0444 to (Evaluate:08Apr2017)  Requested for: 32KAX0692; Last   Rx:22Hnf9227 Ordered   10  Potassium Chloride ER 20 MEQ Oral Tablet Extended Release; TAKE TABLET Daily; Therapy: 38JCL3576 to (Last Rx:04Oct2016) Ordered   11  QUEtiapine Fumarate 50 MG Oral Tablet; One at Bedtime; Therapy: 66Wjc1929 to (Last Rx:30Nov2016)  Requested for: 28HAI5539 Ordered   12  TiZANidine HCl - 4 MG Oral Capsule; TAKE 1 CAPSULE Every twelve hours; Therapy: 28YWW9914 to (Evaluate:14Oct2016)  Requested for: 37EKH4715; Last    Rx:21Qzk1710 Ordered   13  Viagra 100 MG Oral Tablet; TAKE ONE TABLET BY MOUTH DAILY AT BEDTIME AS    DIRECTED; Therapy: 20BDX6822 to (Last Rx:30Hyg3507)  Requested for: 06MYO4501 Ordered    Allergies    1  Codeine   2  Hydrocodone-Acetaminophen CAPS   3   Penicillins    Signatures   Electronically signed by : CARMELITA Arevalo; Feb 27 2017  2:09PM EST                       (Author)    Electronically signed by : YUE Man ; Feb 27 2017  3:48PM EST                       (Author)

## 2018-01-10 NOTE — MISCELLANEOUS
Signatures   Electronically signed by : Melvin Arvizu, ; Mar  7 2016  1:45PM EST                       (Author)    Electronically signed by : YUE Bright ; Mar  7 2016  2:46PM EST                       (Author)

## 2018-01-13 VITALS
TEMPERATURE: 98.5 F | RESPIRATION RATE: 18 BRPM | SYSTOLIC BLOOD PRESSURE: 130 MMHG | BODY MASS INDEX: 29.2 KG/M2 | HEART RATE: 100 BPM | DIASTOLIC BLOOD PRESSURE: 82 MMHG | WEIGHT: 204 LBS | HEIGHT: 70 IN

## 2018-01-14 VITALS
HEIGHT: 70 IN | BODY MASS INDEX: 27.03 KG/M2 | RESPIRATION RATE: 16 BRPM | TEMPERATURE: 98.7 F | HEART RATE: 98 BPM | WEIGHT: 188.8 LBS | SYSTOLIC BLOOD PRESSURE: 122 MMHG | DIASTOLIC BLOOD PRESSURE: 70 MMHG

## 2018-01-14 VITALS
RESPIRATION RATE: 16 BRPM | HEART RATE: 72 BPM | HEIGHT: 70 IN | SYSTOLIC BLOOD PRESSURE: 110 MMHG | DIASTOLIC BLOOD PRESSURE: 70 MMHG | WEIGHT: 201 LBS | TEMPERATURE: 97.4 F | BODY MASS INDEX: 28.77 KG/M2

## 2018-01-14 NOTE — MISCELLANEOUS
Signatures   Electronically signed by : Boom Corral, ; Feb 11 2016  2:02PM EST                       (Author)    Electronically signed by : YUE Caldera ; Feb 11 2016  3:55PM EST                       (Author)
22

## 2018-01-16 NOTE — MISCELLANEOUS
Message   Recorded as Task   Date: 01/22/2016 10:09 AM, Created By: Shelbie Boeck   Task Name: Care Coordination   Assigned To: Luisa Nuñez   Regarding Patient: Eric Buchanan, Status: Active   Comment:    Shelbie Boeck - 22 Jan 2016 10:09 AM     TASK CREATED  UDT shows the presence of marijuana and alcohol  This is an opioid treatment agreement violation  Remind patient that he cannot be on these substances while taking opioid medications  Alyce Westfall - 25 Jan 2016 11:06 AM     TASK EDITED  LMOM to return call  Alyce Westfall - 28 Jan 2016 9:53 AM     TASK EDITED  2nd attempt to reach pt, lmom to return call  Alyce Westfall - 01 Feb 2016 4:54 PM     TASK EDITED  Please send an unable to reach the pt letter  Thank you    can't reach you letter sent to patients address  Active Problems    1  Anxiety (300 00) (F41 9)   2  Back muscle spasm (724 8) (M62 830)   3  Backache (724 5) (M54 9)   4  Benign essential hypertension (401 1) (I10)   5  Bilateral knee pain (719 46) (M25 561,M25 562)   6  Bone Cyst (733 29)   7  Carpal tunnel syndrome, unspecified laterality (354 0) (G56 00)   8  Cervical radiculopathy (723 4) (M54 12)   9  Chronic low back pain (724 2,338 29) (M54 5,G89 29)   10  Chronic pain syndrome (338 4) (G89 4)   11  Chronic pain syndrome (338 4) (G89 4)   12  Elbow pain, unspecified laterality (719 42) (M25 529)   13  Esophageal reflux (530 81) (K21 9)   14  Essential hypertension (401 9) (I10)   15  Gout (274 9) (M10 9)   16  Hypomagnesemia (275 2) (E83 42)   17  Impaired fasting glucose (790 21) (R73 01)   18  Ingrowing toenail (703 0) (L60 0)   19  Knee Sprain (844 9)   20  Lateral epicondylitis, unspecified laterality (726 32) (M77 10)   21  Long term current use of opiate analgesic (V58 69) (Z79 891)   22  Mixed hyperlipidemia (272 2) (E78 2)   23  Muscle cramps (729 82) (R25 2)   24  Neuralgia of right lower extremity (355 8) (G57 91)   25   Nicotine dependence (305  1) (F17 200)   26  Olecranon bursitis, unspecified laterality (726 33) (M70 20)   27  Open wound of lip without complication (095 20) (K80 483X)   28  Opioid Abuse - Continuous (305 51)   29  Opioid dependence (304 00) (F11 20)   30  Organic impotence (607 84) (N52 8)   31  Osteoarthritis of knee (715 36) (M17 9)   32  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)   33  Sprain, thumb (842 10) (S63 609A)    Current Meds   1  Allopurinol 300 MG Oral Tablet; 1 every day; Therapy: 14YBC9179 to (Last Rx:25Bmq8309)  Requested for: 23Nim4568 Ordered   2  AmLODIPine Besylate 10 MG Oral Tablet (Norvasc); 1 Every Day At Bedtime; Therapy: 55QVM8981 to (Last Trinity Mccartney)  Requested for: 84Psi5792 Ordered   3  Atorvastatin Calcium 40 MG Oral Tablet (Lipitor); TAKE ONE TABLET BY MOUTH EVERY   DAY AT BEDTIME; Therapy: 98BCI0494 to (Saurabh Yakov)  Requested for: 55Gik5605; Last   Rx:58Pgn7177 Ordered   4  Fenofibrate 145 MG Oral Tablet (Tricor); TAKE 1 TABLET DAILY; Therapy: 69NWO5516 to (Presley Norris)  Requested for: 46KPZ6530; Last   Rx:06Xzb9947 Ordered   5  LORazepam 1 MG Oral Tablet (Ativan); take 2 hours prior to MRI; Therapy: 82UKC3105 to (Last Trinity Mccartney)  Requested for: 97Mke1077 Ordered   6  Magnesium Oxide 400 (240 Mg) MG Oral Tablet; Take 1 tablet twice daily; Therapy: 60IEB1001 to (Saurabh Yakov)  Requested for: 58UVU2688; Last   Rx:54Dhz3093 Ordered   7  Metoprolol Succinate  MG Oral Tablet Extended Release 24 Hour; TAKE 1 AND   1/2 TABLETS DAILY; Therapy: 74CCF2415 to (Last Rx:58Won7954)  Requested for: 41Fla0709 Ordered   8  Omeprazole 20 MG Oral Capsule Delayed Release; TAKE ONE CAPSULE BY MOUTH   EVERY DAY; Therapy: 21Rrc0633 to (Evaluate:69Gog8609)  Requested for: 56WSW4660; Last   Rx:22Oct2015 Ordered   9  Oxycodone-Acetaminophen 5-325 MG Oral Tablet; TAKE 1 TABLET 3 times daily PRN   pain; Therapy: 27MDY6252 to (Evaluate:76Fxn6438);  Last Rx:82Xjx4613 Ordered   10  QUEtiapine Fumarate 50 MG Oral Tablet (SEROquel); One at Bedtime; Therapy: 77Dnq4054 to (Last Rx:24Cgj6607)  Requested for: 63AKG0616 Ordered   11  Viagra 100 MG Oral Tablet; TAKE ONE TABLET BY MOUTH DAILY AT BEDTIME AS    DIRECTED; Therapy: 99VCZ0959 to (Last Rx:52Dzl1445)  Requested for: 61SGK2857 Ordered    Allergies    1  Codeine   2  Hydrocodone-Acetaminophen CAPS   3   Penicillins    Signatures   Electronically signed by : Abe Swain, ; Feb 2 2016 11:19AM EST                       (Author)

## 2018-01-18 NOTE — MISCELLANEOUS
Message  I spoke with Елена Kovacs RE: critically low mag  He is presently in ER getting IV replacement      Signatures   Electronically signed by : CARMELITA Wells;  Aug 25 2016 12:55PM EST                       (Author)

## 2018-02-02 DIAGNOSIS — I10 ESSENTIAL HYPERTENSION, BENIGN: Primary | ICD-10-CM

## 2018-02-02 RX ORDER — METOPROLOL SUCCINATE 100 MG/1
100 TABLET, EXTENDED RELEASE ORAL DAILY
Qty: 90 TABLET | Refills: 0 | OUTPATIENT
Start: 2018-02-02 | End: 2018-05-03

## 2018-02-02 RX ORDER — AMLODIPINE BESYLATE 10 MG/1
10 TABLET ORAL
Refills: 0 | OUTPATIENT
Start: 2018-02-02

## 2018-02-02 RX ORDER — ATORVASTATIN CALCIUM 40 MG/1
40 TABLET, FILM COATED ORAL DAILY
Qty: 30 TABLET | Refills: 0 | OUTPATIENT
Start: 2018-02-02

## 2018-03-05 DIAGNOSIS — I10 ESSENTIAL HYPERTENSION: Primary | ICD-10-CM

## 2018-03-05 DIAGNOSIS — M10.9 GOUT, UNSPECIFIED CAUSE, UNSPECIFIED CHRONICITY, UNSPECIFIED SITE: ICD-10-CM

## 2018-03-05 DIAGNOSIS — E78.5 DYSLIPIDEMIA: ICD-10-CM

## 2018-03-05 RX ORDER — METOPROLOL SUCCINATE 100 MG/1
100 TABLET, EXTENDED RELEASE ORAL DAILY
Qty: 30 TABLET | Refills: 0 | Status: SHIPPED | OUTPATIENT
Start: 2018-03-05

## 2018-03-05 RX ORDER — ALLOPURINOL 300 MG/1
300 TABLET ORAL DAILY
Qty: 30 TABLET | Refills: 0 | Status: ON HOLD | OUTPATIENT
Start: 2018-03-05 | End: 2022-05-07 | Stop reason: ALTCHOICE

## 2018-03-05 RX ORDER — FENOFIBRATE 145 MG/1
145 TABLET, COATED ORAL DAILY
Qty: 30 TABLET | Refills: 0 | Status: SHIPPED | OUTPATIENT
Start: 2018-03-05

## 2018-03-05 NOTE — TELEPHONE ENCOUNTER
Kaylin:  Patient is completely out of his medication and his wife asked if you could just call in a month's worth  She is making an appointment with me today for his visit

## 2018-03-06 RX ORDER — AMLODIPINE BESYLATE 10 MG/1
10 TABLET ORAL
Qty: 90 TABLET | Refills: 0 | OUTPATIENT
Start: 2018-03-06

## 2020-02-25 ENCOUNTER — HOSPITAL ENCOUNTER (EMERGENCY)
Facility: HOSPITAL | Age: 55
Discharge: LEFT BEFORE DISCHARGE | End: 2020-02-25
Attending: EMERGENCY MEDICINE | Admitting: EMERGENCY MEDICINE
Payer: COMMERCIAL

## 2020-02-25 ENCOUNTER — APPOINTMENT (EMERGENCY)
Dept: RADIOLOGY | Facility: HOSPITAL | Age: 55
End: 2020-02-25
Payer: COMMERCIAL

## 2020-02-25 VITALS
HEART RATE: 90 BPM | SYSTOLIC BLOOD PRESSURE: 156 MMHG | BODY MASS INDEX: 26.89 KG/M2 | DIASTOLIC BLOOD PRESSURE: 90 MMHG | TEMPERATURE: 98.4 F | OXYGEN SATURATION: 96 % | RESPIRATION RATE: 16 BRPM | WEIGHT: 187.4 LBS

## 2020-02-25 DIAGNOSIS — R07.9 CHEST PAIN: Primary | ICD-10-CM

## 2020-02-25 LAB
ALBUMIN SERPL BCP-MCNC: 3.9 G/DL (ref 3.5–5)
ALP SERPL-CCNC: 68 U/L (ref 46–116)
ALT SERPL W P-5'-P-CCNC: 30 U/L (ref 12–78)
ANION GAP SERPL CALCULATED.3IONS-SCNC: 11 MMOL/L (ref 4–13)
APTT PPP: 26 SECONDS (ref 25–32)
AST SERPL W P-5'-P-CCNC: 39 U/L (ref 5–45)
BASOPHILS # BLD AUTO: 0.04 THOUSANDS/ΜL (ref 0–0.1)
BASOPHILS NFR BLD AUTO: 1 % (ref 0–1)
BILIRUB SERPL-MCNC: 0.4 MG/DL (ref 0.2–1)
BUN SERPL-MCNC: 11 MG/DL (ref 5–25)
CALCIUM SERPL-MCNC: 8.5 MG/DL (ref 8.3–10.1)
CHLORIDE SERPL-SCNC: 106 MMOL/L (ref 100–108)
CO2 SERPL-SCNC: 24 MMOL/L (ref 21–32)
CREAT SERPL-MCNC: 0.87 MG/DL (ref 0.6–1.3)
EOSINOPHIL # BLD AUTO: 0.05 THOUSAND/ΜL (ref 0–0.61)
EOSINOPHIL NFR BLD AUTO: 1 % (ref 0–6)
ERYTHROCYTE [DISTWIDTH] IN BLOOD BY AUTOMATED COUNT: 14.8 % (ref 11.6–15.1)
GFR SERPL CREATININE-BSD FRML MDRD: 96 ML/MIN/1.73SQ M
GLUCOSE SERPL-MCNC: 86 MG/DL (ref 65–140)
HCT VFR BLD AUTO: 46.2 % (ref 36.5–49.3)
HGB BLD-MCNC: 15.1 G/DL (ref 12–17)
IMM GRANULOCYTES # BLD AUTO: 0.02 THOUSAND/UL (ref 0–0.2)
IMM GRANULOCYTES NFR BLD AUTO: 0 % (ref 0–2)
INR PPP: 0.94 (ref 0.91–1.09)
LYMPHOCYTES # BLD AUTO: 1.43 THOUSANDS/ΜL (ref 0.6–4.47)
LYMPHOCYTES NFR BLD AUTO: 32 % (ref 14–44)
MCH RBC QN AUTO: 26.1 PG (ref 26.8–34.3)
MCHC RBC AUTO-ENTMCNC: 32.7 G/DL (ref 31.4–37.4)
MCV RBC AUTO: 80 FL (ref 82–98)
MONOCYTES # BLD AUTO: 0.46 THOUSAND/ΜL (ref 0.17–1.22)
MONOCYTES NFR BLD AUTO: 10 % (ref 4–12)
NEUTROPHILS # BLD AUTO: 2.49 THOUSANDS/ΜL (ref 1.85–7.62)
NEUTS SEG NFR BLD AUTO: 56 % (ref 43–75)
NRBC BLD AUTO-RTO: 0 /100 WBCS
PLATELET # BLD AUTO: 205 THOUSANDS/UL (ref 149–390)
PMV BLD AUTO: 9.4 FL (ref 8.9–12.7)
POTASSIUM SERPL-SCNC: 3.8 MMOL/L (ref 3.5–5.3)
PROT SERPL-MCNC: 6.9 G/DL (ref 6.4–8.2)
PROTHROMBIN TIME: 10.1 SECONDS (ref 9.8–12)
RBC # BLD AUTO: 5.79 MILLION/UL (ref 3.88–5.62)
SODIUM SERPL-SCNC: 141 MMOL/L (ref 136–145)
TROPONIN I SERPL-MCNC: <0.02 NG/ML
WBC # BLD AUTO: 4.49 THOUSAND/UL (ref 4.31–10.16)

## 2020-02-25 PROCEDURE — 99285 EMERGENCY DEPT VISIT HI MDM: CPT | Performed by: EMERGENCY MEDICINE

## 2020-02-25 PROCEDURE — 36415 COLL VENOUS BLD VENIPUNCTURE: CPT | Performed by: EMERGENCY MEDICINE

## 2020-02-25 PROCEDURE — 85730 THROMBOPLASTIN TIME PARTIAL: CPT | Performed by: EMERGENCY MEDICINE

## 2020-02-25 PROCEDURE — 80053 COMPREHEN METABOLIC PANEL: CPT | Performed by: EMERGENCY MEDICINE

## 2020-02-25 PROCEDURE — 71045 X-RAY EXAM CHEST 1 VIEW: CPT

## 2020-02-25 PROCEDURE — 85610 PROTHROMBIN TIME: CPT | Performed by: EMERGENCY MEDICINE

## 2020-02-25 PROCEDURE — 94640 AIRWAY INHALATION TREATMENT: CPT

## 2020-02-25 PROCEDURE — 94640 AIRWAY INHALATION TREATMENT: CPT | Performed by: EMERGENCY MEDICINE

## 2020-02-25 PROCEDURE — 85025 COMPLETE CBC W/AUTO DIFF WBC: CPT | Performed by: EMERGENCY MEDICINE

## 2020-02-25 PROCEDURE — 84484 ASSAY OF TROPONIN QUANT: CPT | Performed by: EMERGENCY MEDICINE

## 2020-02-25 PROCEDURE — 99285 EMERGENCY DEPT VISIT HI MDM: CPT

## 2020-02-25 PROCEDURE — 93005 ELECTROCARDIOGRAM TRACING: CPT

## 2020-02-25 RX ORDER — NITROGLYCERIN 0.4 MG/1
0.4 TABLET SUBLINGUAL ONCE
Status: COMPLETED | OUTPATIENT
Start: 2020-02-25 | End: 2020-02-25

## 2020-02-25 RX ORDER — ASPIRIN 325 MG
325 TABLET ORAL ONCE
Status: COMPLETED | OUTPATIENT
Start: 2020-02-25 | End: 2020-02-25

## 2020-02-25 RX ORDER — ALBUTEROL SULFATE 2.5 MG/3ML
2.5 SOLUTION RESPIRATORY (INHALATION) ONCE
Status: COMPLETED | OUTPATIENT
Start: 2020-02-25 | End: 2020-02-25

## 2020-02-25 RX ORDER — IPRATROPIUM BROMIDE AND ALBUTEROL SULFATE 2.5; .5 MG/3ML; MG/3ML
3 SOLUTION RESPIRATORY (INHALATION) ONCE
Status: COMPLETED | OUTPATIENT
Start: 2020-02-25 | End: 2020-02-25

## 2020-02-25 RX ADMIN — NITROGLYCERIN 1 INCH: 20 OINTMENT TOPICAL at 11:52

## 2020-02-25 RX ADMIN — ASPIRIN 325 MG: 325 TABLET, FILM COATED ORAL at 11:15

## 2020-02-25 RX ADMIN — NITROGLYCERIN 0.4 MG: 0.4 TABLET SUBLINGUAL at 11:16

## 2020-02-25 RX ADMIN — ALBUTEROL SULFATE 2.5 MG: 2.5 SOLUTION RESPIRATORY (INHALATION) at 11:52

## 2020-02-25 RX ADMIN — IPRATROPIUM BROMIDE AND ALBUTEROL SULFATE 3 ML: .5; 3 SOLUTION RESPIRATORY (INHALATION) at 11:26

## 2020-02-25 NOTE — ED PROVIDER NOTES
History  Chief Complaint   Patient presents with    Chest Pain     States started with left breast pain that radiates into back and neck   Took a baby aspirin yesterday morning  Heart attack a year ago and had a stroke at that time too  Patient smells of alcohol, states last drink last night      Patient states he has a history of an MI, still smokes  He states he was at rest last night watching TV when he developed pain in the left chest radiating to the shoulder and back  Was associated with mild shortness of breath  The patient did not seek any medical attention until this morning  He arrives awake alert still complaining of pain in the chest   He did not take any normal meds including aspirin yet  Patient states he has no stents does not think he got catheterized          Prior to Admission Medications   Prescriptions Last Dose Informant Patient Reported? Taking? Magnesium Oxide 400 MG CAPS 2/24/2020 at Unknown time  Yes Yes   Sig: Take 400 mg by mouth 2 (two) times a day   QUEtiapine (SEROquel) 50 mg tablet Not Taking at Unknown time  Yes No   Sig: Take 5 mg by mouth daily at bedtime as needed   allopurinol (ZYLOPRIM) 300 mg tablet Not Taking at Unknown time  No No   Sig: Take 1 tablet (300 mg total) by mouth daily   Patient not taking: Reported on 2/25/2020   amLODIPine (NORVASC) 10 mg tablet 2/24/2020 at Unknown time  Yes Yes   Sig: Take 10 mg by mouth daily at bedtime  aspirin 81 MG tablet 2/24/2020 at Unknown time  Yes Yes   Sig: Take 81 mg by mouth daily  fenofibrate (TRICOR) 145 mg tablet 2/24/2020 at Unknown time  No Yes   Sig: Take 1 tablet (145 mg total) by mouth daily   metoprolol succinate (TOPROL-XL) 100 mg 24 hr tablet 2/24/2020 at Unknown time  No Yes   Sig: Take 1 tablet (100 mg total) by mouth daily   omeprazole (PriLOSEC) 20 mg delayed release capsule 2/24/2020 at Unknown time  Yes Yes   Sig: Take 20 mg by mouth daily        Facility-Administered Medications: None       Past Medical History:   Diagnosis Date    Biceps tendonitis, unspecified laterality 09/17/2007    Bone cyst 11/22/2011    Bronchiectasis ([address] ) 10/02/2006    Bursitis 11/17/2005    Chest pain 04/29/2009    Chronic renal failure 11/22/2011    Diverticulitis of colon 10/09/2007    Generalized anxiety disorder 06/08/2006    GERD (gastroesophageal reflux disease)     Gout     Hyperlipidemia     Hypertension     Lateral epicondylitis, unspecified elbow     Last Assessed: 11/29/2013    Low magnesium levels     Lyme disease 11/09/2009    Pneumonia     Last Assessed: 1/7/2013       Past Surgical History:   Procedure Laterality Date    KNEE ARTHROSCOPY      Therapeutic    OLECRANON BURSA EXCISION         Family History   Problem Relation Age of Onset    Cancer Mother         Colon     I have reviewed and agree with the history as documented  E-Cigarette/Vaping    E-Cigarette Use Never User      E-Cigarette/Vaping Substances     Social History     Tobacco Use    Smoking status: Current Every Day Smoker     Packs/day: 0 50    Smokeless tobacco: Never Used    Tobacco comment: cigarette nicotine dependence   Substance Use Topics    Alcohol use: Yes     Alcohol/week: 6 0 standard drinks     Types: 6 Glasses of wine per week     Frequency: 4 or more times a week     Drinks per session: 3 or 4     Binge frequency: Monthly     Comment: per week    Drug use: No       Review of Systems   Constitutional: Negative for chills and fever  HENT: Negative for congestion and sore throat  Eyes: Negative for visual disturbance  Respiratory: Positive for shortness of breath and wheezing  Negative for cough  Cardiovascular: Positive for chest pain  Negative for palpitations and leg swelling  Gastrointestinal: Negative for abdominal pain and vomiting  Genitourinary: Negative for dysuria  Musculoskeletal: Positive for arthralgias and back pain  Skin: Negative for rash     Neurological: Negative for weakness and headaches  Hematological: Does not bruise/bleed easily  Psychiatric/Behavioral: Negative for confusion  All other systems reviewed and are negative  Physical Exam  Physical Exam   Constitutional: He is oriented to person, place, and time  He appears well-developed and well-nourished  HENT:   Head: Normocephalic and atraumatic  Eyes: EOM are normal    Neck: Normal range of motion  Neck supple  Cardiovascular: Normal rate, regular rhythm, intact distal pulses and normal pulses  No murmur heard  Pulmonary/Chest: He has wheezes in the right upper field, the right middle field, the right lower field, the left upper field, the left middle field and the left lower field  Abdominal: Soft  Bowel sounds are normal    Musculoskeletal: Normal range of motion  Right lower leg: Normal  He exhibits no edema  Left lower leg: Normal  He exhibits no edema  Neurological: He is alert and oriented to person, place, and time  Skin: Skin is warm and dry  Capillary refill takes less than 2 seconds  Psychiatric: He has a normal mood and affect  His behavior is normal    Nursing note and vitals reviewed        Vital Signs  ED Triage Vitals [02/25/20 1056]   Temperature Pulse Respirations Blood Pressure SpO2   98 4 °F (36 9 °C) 89 18 (!) 207/103 97 %      Temp Source Heart Rate Source Patient Position - Orthostatic VS BP Location FiO2 (%)   Oral Monitor Standing Left arm --      Pain Score       7           Vitals:    02/25/20 1121 02/25/20 1128 02/25/20 1135 02/25/20 1145   BP: 155/87 133/66 123/53 149/76   Pulse: 90 81 91 86   Patient Position - Orthostatic VS: Lying Sitting Sitting          Visual Acuity      ED Medications  Medications   aspirin tablet 325 mg (325 mg Oral Given 2/25/20 1115)   nitroglycerin (NITROSTAT) SL tablet 0 4 mg (0 4 mg Sublingual Given 2/25/20 1116)   ipratropium-albuterol (DUO-NEB) 0 5-2 5 mg/3 mL inhalation solution 3 mL (3 mL Nebulization Given 2/25/20 1126)   albuterol inhalation solution 2 5 mg (2 5 mg Nebulization Given 2/25/20 1152)   nitroglycerin (NITRO-BID) 2 % TD ointment 1 inch (1 inch Topical Given 2/25/20 1152)       Diagnostic Studies  Results Reviewed     Procedure Component Value Units Date/Time    Troponin I [[id number]]  (Normal) Collected:  02/25/20 1114    Lab Status:  Final result Specimen:  Blood from Arm, Right Updated:  02/25/20 1150     Troponin I <0 02 ng/mL     Comprehensive metabolic panel [[id number]] Collected:  02/25/20 1114    Lab Status:  Final result Specimen:  Blood from Arm, Right Updated:  02/25/20 1148     Sodium 141 mmol/L      Potassium 3 8 mmol/L      Chloride 106 mmol/L      CO2 24 mmol/L      ANION GAP 11 mmol/L      BUN 11 mg/dL      Creatinine 0 87 mg/dL      Glucose 86 mg/dL      Calcium 8 5 mg/dL      AST 39 U/L      ALT 30 U/L      Alkaline Phosphatase 68 U/L      Total Protein 6 9 g/dL      Albumin 3 9 g/dL      Total Bilirubin 0 40 mg/dL      eGFR 96 ml/min/1 73sq m     Narrative:       [facility] guidelines for Chronic Kidney Disease (CKD):     Stage 1 with normal or high GFR (GFR > 90 mL/min/1 73 square meters)    Stage 2 Mild CKD (GFR = 60-89 mL/min/1 73 square meters)    Stage 3A Moderate CKD (GFR = 45-59 mL/min/1 73 square meters)    Stage 3B Moderate CKD (GFR = 30-44 mL/min/1 73 square meters)    Stage 4 Severe CKD (GFR = 15-29 mL/min/1 73 square meters)    Stage 5 End Stage CKD (GFR <15 mL/min/1 73 square meters)  Note: GFR calculation is accurate only with a steady state creatinine    APTT [[id number]]  (Normal) Collected:  02/25/20 1114    Lab Status:  Final result Specimen:  Blood from Arm, Right Updated:  02/25/20 1145     PTT 26 seconds     Protime-INR [[id number]]  (Normal) Collected:  02/25/20 1114    Lab Status:  Final result Specimen:  Blood from Arm, Right Updated:  02/25/20 1145     Protime 10 1 seconds      INR 0 94    CBC and differential [[id number]]  (Abnormal) Collected:  02/25/20 1114 Lab Status:  Final result Specimen:  Blood from Arm, Right Updated:  02/25/20 1130     WBC 4 49 Thousand/uL      RBC 5 79 Million/uL      Hemoglobin 15 1 g/dL      Hematocrit 46 2 %      MCV 80 fL      MCH 26 1 pg      MCHC 32 7 g/dL      RDW 14 8 %      MPV 9 4 fL      Platelets 809 Thousands/uL      nRBC 0 /100 WBCs      Neutrophils Relative 56 %      Immat GRANS % 0 %      Lymphocytes Relative 32 %      Monocytes Relative 10 %      Eosinophils Relative 1 %      Basophils Relative 1 %      Neutrophils Absolute 2 49 Thousands/µL      Immature Grans Absolute 0 02 Thousand/uL      Lymphocytes Absolute 1 43 Thousands/µL      Monocytes Absolute 0 46 Thousand/µL      Eosinophils Absolute 0 05 Thousand/µL      Basophils Absolute 0 04 Thousands/µL                  XR chest 1 view portable   Final Result by Gabby Banks MD (02/25 1148)      No acute cardiopulmonary disease  Workstation performed: [id number]                    Procedures  ECG 12 Lead Documentation Only  Date/Time: 2/25/2020 10:55 AM  Performed by: Tran Cole MD  Authorized by: Tran Cole MD     Indications / Diagnosis:  Chest pain  ECG reviewed by me, the ED Provider: yes    Patient location:  ED  Interpretation:     Interpretation: abnormal    Rate:     ECG rate:  88    ECG rate assessment: normal    Rhythm:     Rhythm: sinus rhythm    Ectopy:     Ectopy: none    QRS:     QRS axis:  Normal    QRS intervals:  Normal  Conduction:     Conduction: abnormal      Abnormal conduction: complete LBBB    ST segments:     ST segments:  Non-specific  T waves:     T waves: normal               ED Course                               MDM  Number of Diagnoses or Management Options  Diagnosis management comments: Patient has a presentation suspicious for CAD, COPD is also likely  Will check cardiac labs, give nebs, aspirin and nitro  Patient has pain relief with nitroglycerin and his lungs sound much better    I have discussed the case with Cardiology and were planning on observation, serial enzymes, pharmacologically enhance nuclear stress test   However patient remembered he has a pressing engagement tonight of super importance  He wants to sign out against my advice  Will try to limit cigarettes  He will contact a cardiologist directly tonight or tomorrow  Patient will return if chest pain returns        Disposition  Final diagnoses:   Chest pain     Time reflects when diagnosis was documented in both MDM as applicable and the Disposition within this note     Time User Action Codes Description Comment    2/25/2020 12:36 PM Luisa Guillaume Add [R07 9] Chest pain       ED Disposition     ED Disposition Condition Date/Time Comment    ALETHA Milan Feb 25, 2020 12:38 PM Date: 2/25/2020  Patient: Gutierrez Mckeon  Admitted: 2/25/2020 10:52 AM  Attending Provider: Ju Howard MD    Gutierrez Mckeon or his authorized caregiver has made the decision for the patient to leave the emergency department against the advi ce of his attending physician  He or his authorized caregiver has been informed and understands the inherent risks, including death, and damage to the heart including heart attack  He or his authorized caregiver has decided to accept the responsibili ty for this decision  Gutierrez Mckeon and all necessary parties have been advised that he may return for further evaluation or treatment  His condition at time of discharge was 1240    Gutierrez Mckeon had current vital signs as follows:  /76    Pulse 86   Temp 98 4 °F (36 9 °C) (Oral)   Resp 12   Wt 85 kg (187 lb 6 4 oz)         Follow-up Information     Follow up With Specialties Details Why Marilu Perez MD Cardiology Schedule an appointment as soon as possible for a visit today  [address]  [phone]            Patient's Medications   Discharge Prescriptions    No medications on file     No discharge procedures on file     PDMP Review     None          ED Provider  Electronically Signed by           Kym Diggs MD  02/25/20 1725

## 2020-02-26 LAB
ATRIAL RATE: 88 BPM
P AXIS: 50 DEGREES
PR INTERVAL: 154 MS
QRS AXIS: 6 DEGREES
QRSD INTERVAL: 130 MS
QT INTERVAL: 394 MS
QTC INTERVAL: 476 MS
T WAVE AXIS: 100 DEGREES
VENTRICULAR RATE: 88 BPM

## 2020-02-26 PROCEDURE — 93010 ELECTROCARDIOGRAM REPORT: CPT | Performed by: INTERNAL MEDICINE

## 2021-08-04 ENCOUNTER — HOSPITAL ENCOUNTER (INPATIENT)
Facility: HOSPITAL | Age: 56
LOS: 5 days | Discharge: HOME HEALTH | DRG: 100 | End: 2021-08-09
Attending: SURGERY | Admitting: INTERNAL MEDICINE
Payer: COMMERCIAL

## 2021-08-04 ENCOUNTER — APPOINTMENT (EMERGENCY)
Dept: RADIOLOGY | Facility: HOSPITAL | Age: 56
DRG: 100 | End: 2021-08-04
Payer: COMMERCIAL

## 2021-08-04 DIAGNOSIS — M10.9: ICD-10-CM

## 2021-08-04 DIAGNOSIS — M25.561 RIGHT KNEE PAIN: ICD-10-CM

## 2021-08-04 DIAGNOSIS — R94.31 PROLONGED Q-T INTERVAL ON ECG: ICD-10-CM

## 2021-08-04 DIAGNOSIS — I44.7 LEFT BUNDLE BRANCH BLOCK: ICD-10-CM

## 2021-08-04 DIAGNOSIS — G92.8 TOXIC METABOLIC ENCEPHALOPATHY: Primary | ICD-10-CM

## 2021-08-04 DIAGNOSIS — G40.909 SEIZURE DISORDER (HCC): ICD-10-CM

## 2021-08-04 LAB
ABO GROUP BLD: NORMAL
ALBUMIN SERPL BCP-MCNC: 3.1 G/DL (ref 3.5–5)
ALP SERPL-CCNC: 136 U/L (ref 46–116)
ALT SERPL W P-5'-P-CCNC: 47 U/L (ref 12–78)
AMPHETAMINES SERPL QL SCN: NEGATIVE
ANION GAP SERPL CALCULATED.3IONS-SCNC: 12 MMOL/L (ref 4–13)
ANION GAP SERPL CALCULATED.3IONS-SCNC: 9 MMOL/L (ref 4–13)
APAP SERPL-MCNC: <2 UG/ML (ref 10–20)
APTT PPP: 27 SECONDS (ref 23–37)
AST SERPL W P-5'-P-CCNC: 85 U/L (ref 5–45)
BACTERIA UR QL AUTO: NORMAL /HPF
BARBITURATES UR QL: NEGATIVE
BASE EXCESS BLDA CALC-SCNC: 1 MMOL/L (ref -2–3)
BASOPHILS # BLD AUTO: 0.01 THOUSANDS/ΜL (ref 0–0.1)
BASOPHILS NFR BLD AUTO: 0 % (ref 0–1)
BENZODIAZ UR QL: POSITIVE
BILIRUB SERPL-MCNC: 0.96 MG/DL (ref 0.2–1)
BILIRUB UR QL STRIP: NEGATIVE
BLD GP AB SCN SERPL QL: NEGATIVE
BUN SERPL-MCNC: 5 MG/DL (ref 5–25)
BUN SERPL-MCNC: 7 MG/DL (ref 5–25)
CALCIUM ALBUM COR SERPL-MCNC: 8.2 MG/DL (ref 8.3–10.1)
CALCIUM SERPL-MCNC: 7.4 MG/DL (ref 8.3–10.1)
CALCIUM SERPL-MCNC: 7.5 MG/DL (ref 8.3–10.1)
CHLORIDE SERPL-SCNC: 104 MMOL/L (ref 100–108)
CHLORIDE SERPL-SCNC: 104 MMOL/L (ref 100–108)
CK MB SERPL-MCNC: 8.4 NG/ML (ref 0–5)
CK MB SERPL-MCNC: <1 % (ref 0–2.5)
CK SERPL-CCNC: 1124 U/L (ref 39–308)
CLARITY UR: CLEAR
CO2 SERPL-SCNC: 23 MMOL/L (ref 21–32)
CO2 SERPL-SCNC: 25 MMOL/L (ref 21–32)
COCAINE UR QL: NEGATIVE
COLOR UR: YELLOW
COLOR, POC: NORMAL
CREAT SERPL-MCNC: 0.72 MG/DL (ref 0.6–1.3)
CREAT SERPL-MCNC: 0.78 MG/DL (ref 0.6–1.3)
EOSINOPHIL # BLD AUTO: 0 THOUSAND/ΜL (ref 0–0.61)
EOSINOPHIL NFR BLD AUTO: 0 % (ref 0–6)
ERYTHROCYTE [DISTWIDTH] IN BLOOD BY AUTOMATED COUNT: 13.7 % (ref 11.6–15.1)
ETHANOL SERPL-MCNC: <3 MG/DL (ref 0–3)
GFR SERPL CREATININE-BSD FRML MDRD: 103 ML/MIN/1.73SQ M
GFR SERPL CREATININE-BSD FRML MDRD: 99 ML/MIN/1.73SQ M
GLUCOSE SERPL-MCNC: 102 MG/DL (ref 65–140)
GLUCOSE SERPL-MCNC: 105 MG/DL (ref 65–140)
GLUCOSE SERPL-MCNC: 107 MG/DL (ref 65–140)
GLUCOSE UR STRIP-MCNC: ABNORMAL MG/DL
HCO3 BLDA-SCNC: 25.3 MMOL/L (ref 24–30)
HCT VFR BLD AUTO: 36.7 % (ref 36.5–49.3)
HCT VFR BLD CALC: 38 % (ref 36.5–49.3)
HGB BLD-MCNC: 13.4 G/DL (ref 12–17)
HGB BLDA-MCNC: 12.9 G/DL (ref 12–17)
HGB UR QL STRIP.AUTO: ABNORMAL
HOLD SPECIMEN: NORMAL
HYALINE CASTS #/AREA URNS LPF: NORMAL /LPF
IMM GRANULOCYTES # BLD AUTO: 0.1 THOUSAND/UL (ref 0–0.2)
IMM GRANULOCYTES NFR BLD AUTO: 1 % (ref 0–2)
INR PPP: 0.91 (ref 0.84–1.19)
KETONES UR STRIP-MCNC: NEGATIVE MG/DL
LACTATE SERPL-SCNC: 1.9 MMOL/L (ref 0.5–2)
LACTATE SERPL-SCNC: 2.1 MMOL/L (ref 0.5–2)
LEUKOCYTE ESTERASE UR QL STRIP: NEGATIVE
LYMPHOCYTES # BLD AUTO: 0.4 THOUSANDS/ΜL (ref 0.6–4.47)
LYMPHOCYTES NFR BLD AUTO: 3 % (ref 14–44)
MCH RBC QN AUTO: 35.4 PG (ref 26.8–34.3)
MCHC RBC AUTO-ENTMCNC: 36.5 G/DL (ref 31.4–37.4)
MCV RBC AUTO: 97 FL (ref 82–98)
METHADONE UR QL: NEGATIVE
MONOCYTES # BLD AUTO: 1 THOUSAND/ΜL (ref 0.17–1.22)
MONOCYTES NFR BLD AUTO: 8 % (ref 4–12)
NEUTROPHILS # BLD AUTO: 10.57 THOUSANDS/ΜL (ref 1.85–7.62)
NEUTS SEG NFR BLD AUTO: 88 % (ref 43–75)
NITRITE UR QL STRIP: NEGATIVE
NON-SQ EPI CELLS URNS QL MICRO: NORMAL /HPF
NRBC BLD AUTO-RTO: 0 /100 WBCS
OPIATES UR QL SCN: NEGATIVE
OXYCODONE+OXYMORPHONE UR QL SCN: NEGATIVE
PCO2 BLD: 27 MMOL/L (ref 21–32)
PCO2 BLD: 38.9 MM HG (ref 42–50)
PCP UR QL: NEGATIVE
PH BLD: 7.42 [PH] (ref 7.3–7.4)
PH UR STRIP.AUTO: 6 [PH] (ref 4.5–8)
PHOSPHATE SERPL-MCNC: 2.8 MG/DL (ref 2.7–4.5)
PLATELET # BLD AUTO: 114 THOUSANDS/UL (ref 149–390)
PMV BLD AUTO: 10.2 FL (ref 8.9–12.7)
PO2 BLD: 47 MM HG (ref 35–45)
POTASSIUM BLD-SCNC: 2.9 MMOL/L (ref 3.5–5.3)
POTASSIUM SERPL-SCNC: 2.8 MMOL/L (ref 3.5–5.3)
POTASSIUM SERPL-SCNC: 4.8 MMOL/L (ref 3.5–5.3)
PROT SERPL-MCNC: 5.9 G/DL (ref 6.4–8.2)
PROT UR STRIP-MCNC: NEGATIVE MG/DL
PROTHROMBIN TIME: 12.3 SECONDS (ref 11.6–14.5)
RBC # BLD AUTO: 3.78 MILLION/UL (ref 3.88–5.62)
RBC #/AREA URNS AUTO: NORMAL /HPF
RH BLD: POSITIVE
SALICYLATES SERPL-MCNC: 3 MG/DL (ref 3–20)
SAO2 % BLD FROM PO2: 84 % (ref 60–85)
SODIUM BLD-SCNC: 142 MMOL/L (ref 136–145)
SODIUM SERPL-SCNC: 138 MMOL/L (ref 136–145)
SODIUM SERPL-SCNC: 139 MMOL/L (ref 136–145)
SP GR UR STRIP.AUTO: 1.01 (ref 1–1.03)
SPECIMEN EXPIRATION DATE: NORMAL
SPECIMEN SOURCE: ABNORMAL
THC UR QL: NEGATIVE
UROBILINOGEN UR QL STRIP.AUTO: 0.2 E.U./DL
WBC # BLD AUTO: 12.08 THOUSAND/UL (ref 4.31–10.16)
WBC #/AREA URNS AUTO: NORMAL /HPF

## 2021-08-04 PROCEDURE — 80179 DRUG ASSAY SALICYLATE: CPT | Performed by: SURGERY

## 2021-08-04 PROCEDURE — 96368 THER/DIAG CONCURRENT INF: CPT

## 2021-08-04 PROCEDURE — 96365 THER/PROPH/DIAG IV INF INIT: CPT

## 2021-08-04 PROCEDURE — 86900 BLOOD TYPING SEROLOGIC ABO: CPT | Performed by: SURGERY

## 2021-08-04 PROCEDURE — 70498 CT ANGIOGRAPHY NECK: CPT

## 2021-08-04 PROCEDURE — 36415 COLL VENOUS BLD VENIPUNCTURE: CPT | Performed by: SURGERY

## 2021-08-04 PROCEDURE — 84132 ASSAY OF SERUM POTASSIUM: CPT

## 2021-08-04 PROCEDURE — 80307 DRUG TEST PRSMV CHEM ANLYZR: CPT | Performed by: SURGERY

## 2021-08-04 PROCEDURE — 85025 COMPLETE CBC W/AUTO DIFF WBC: CPT | Performed by: SURGERY

## 2021-08-04 PROCEDURE — 74177 CT ABD & PELVIS W/CONTRAST: CPT

## 2021-08-04 PROCEDURE — 93005 ELECTROCARDIOGRAM TRACING: CPT

## 2021-08-04 PROCEDURE — 86850 RBC ANTIBODY SCREEN: CPT | Performed by: SURGERY

## 2021-08-04 PROCEDURE — 86901 BLOOD TYPING SEROLOGIC RH(D): CPT | Performed by: SURGERY

## 2021-08-04 PROCEDURE — 80048 BASIC METABOLIC PNL TOTAL CA: CPT | Performed by: EMERGENCY MEDICINE

## 2021-08-04 PROCEDURE — 82947 ASSAY GLUCOSE BLOOD QUANT: CPT

## 2021-08-04 PROCEDURE — 82803 BLOOD GASES ANY COMBINATION: CPT

## 2021-08-04 PROCEDURE — 80053 COMPREHEN METABOLIC PANEL: CPT | Performed by: SURGERY

## 2021-08-04 PROCEDURE — 99285 EMERGENCY DEPT VISIT HI MDM: CPT

## 2021-08-04 PROCEDURE — 96375 TX/PRO/DX INJ NEW DRUG ADDON: CPT

## 2021-08-04 PROCEDURE — 94002 VENT MGMT INPAT INIT DAY: CPT

## 2021-08-04 PROCEDURE — 94760 N-INVAS EAR/PLS OXIMETRY 1: CPT

## 2021-08-04 PROCEDURE — 5A1935Z RESPIRATORY VENTILATION, LESS THAN 24 CONSECUTIVE HOURS: ICD-10-PCS | Performed by: SURGERY

## 2021-08-04 PROCEDURE — 82553 CREATINE MB FRACTION: CPT | Performed by: SURGERY

## 2021-08-04 PROCEDURE — 83605 ASSAY OF LACTIC ACID: CPT | Performed by: SURGERY

## 2021-08-04 PROCEDURE — 99291 CRITICAL CARE FIRST HOUR: CPT | Performed by: INTERNAL MEDICINE

## 2021-08-04 PROCEDURE — 82077 ASSAY SPEC XCP UR&BREATH IA: CPT | Performed by: SURGERY

## 2021-08-04 PROCEDURE — 80143 DRUG ASSAY ACETAMINOPHEN: CPT | Performed by: SURGERY

## 2021-08-04 PROCEDURE — 83735 ASSAY OF MAGNESIUM: CPT | Performed by: STUDENT IN AN ORGANIZED HEALTH CARE EDUCATION/TRAINING PROGRAM

## 2021-08-04 PROCEDURE — 84100 ASSAY OF PHOSPHORUS: CPT | Performed by: EMERGENCY MEDICINE

## 2021-08-04 PROCEDURE — 82550 ASSAY OF CK (CPK): CPT | Performed by: SURGERY

## 2021-08-04 PROCEDURE — 81001 URINALYSIS AUTO W/SCOPE: CPT

## 2021-08-04 PROCEDURE — 85730 THROMBOPLASTIN TIME PARTIAL: CPT | Performed by: SURGERY

## 2021-08-04 PROCEDURE — 85610 PROTHROMBIN TIME: CPT | Performed by: SURGERY

## 2021-08-04 PROCEDURE — 85014 HEMATOCRIT: CPT

## 2021-08-04 PROCEDURE — 71260 CT THORAX DX C+: CPT

## 2021-08-04 PROCEDURE — 96374 THER/PROPH/DIAG INJ IV PUSH: CPT

## 2021-08-04 PROCEDURE — 70496 CT ANGIOGRAPHY HEAD: CPT

## 2021-08-04 PROCEDURE — 99291 CRITICAL CARE FIRST HOUR: CPT | Performed by: SURGERY

## 2021-08-04 PROCEDURE — 84295 ASSAY OF SERUM SODIUM: CPT

## 2021-08-04 RX ORDER — POTASSIUM CHLORIDE 14.9 MG/ML
20 INJECTION INTRAVENOUS ONCE
Status: COMPLETED | OUTPATIENT
Start: 2021-08-04 | End: 2021-08-04

## 2021-08-04 RX ORDER — MAGNESIUM SULFATE HEPTAHYDRATE 40 MG/ML
2 INJECTION, SOLUTION INTRAVENOUS ONCE
Status: COMPLETED | OUTPATIENT
Start: 2021-08-04 | End: 2021-08-05

## 2021-08-04 RX ORDER — PROPOFOL 10 MG/ML
5-50 INJECTION, EMULSION INTRAVENOUS
Status: DISCONTINUED | OUTPATIENT
Start: 2021-08-04 | End: 2021-08-05

## 2021-08-04 RX ORDER — HEPARIN SODIUM 5000 [USP'U]/ML
5000 INJECTION, SOLUTION INTRAVENOUS; SUBCUTANEOUS EVERY 8 HOURS SCHEDULED
Status: DISCONTINUED | OUTPATIENT
Start: 2021-08-04 | End: 2021-08-05

## 2021-08-04 RX ORDER — PANTOPRAZOLE SODIUM 40 MG/1
40 INJECTION, POWDER, FOR SOLUTION INTRAVENOUS
Status: DISCONTINUED | OUTPATIENT
Start: 2021-08-05 | End: 2021-08-05

## 2021-08-04 RX ORDER — FENTANYL CITRATE-0.9 % NACL/PF 10 MCG/ML
50 PLASTIC BAG, INJECTION (ML) INTRAVENOUS CONTINUOUS
Status: DISCONTINUED | OUTPATIENT
Start: 2021-08-04 | End: 2021-08-05

## 2021-08-04 RX ORDER — FENTANYL CITRATE 50 UG/ML
100 INJECTION, SOLUTION INTRAMUSCULAR; INTRAVENOUS ONCE
Status: COMPLETED | OUTPATIENT
Start: 2021-08-04 | End: 2021-08-04

## 2021-08-04 RX ORDER — MIDAZOLAM HYDROCHLORIDE 2 MG/2ML
2 INJECTION, SOLUTION INTRAMUSCULAR; INTRAVENOUS EVERY 4 HOURS PRN
Status: DISCONTINUED | OUTPATIENT
Start: 2021-08-04 | End: 2021-08-06

## 2021-08-04 RX ORDER — PROPOFOL 10 MG/ML
INJECTION, EMULSION INTRAVENOUS
Status: COMPLETED | OUTPATIENT
Start: 2021-08-04 | End: 2021-08-04

## 2021-08-04 RX ORDER — POTASSIUM CHLORIDE 20MEQ/15ML
40 LIQUID (ML) ORAL ONCE
Status: COMPLETED | OUTPATIENT
Start: 2021-08-04 | End: 2021-08-04

## 2021-08-04 RX ORDER — PROPOFOL 10 MG/ML
INJECTION, EMULSION INTRAVENOUS CODE/TRAUMA/SEDATION MEDICATION
Status: COMPLETED | OUTPATIENT
Start: 2021-08-04 | End: 2021-08-04

## 2021-08-04 RX ORDER — SODIUM CHLORIDE, SODIUM GLUCONATE, SODIUM ACETATE, POTASSIUM CHLORIDE, MAGNESIUM CHLORIDE, SODIUM PHOSPHATE, DIBASIC, AND POTASSIUM PHOSPHATE .53; .5; .37; .037; .03; .012; .00082 G/100ML; G/100ML; G/100ML; G/100ML; G/100ML; G/100ML; G/100ML
75 INJECTION, SOLUTION INTRAVENOUS CONTINUOUS
Status: DISCONTINUED | OUTPATIENT
Start: 2021-08-04 | End: 2021-08-05

## 2021-08-04 RX ORDER — FENTANYL CITRATE 50 UG/ML
50 INJECTION, SOLUTION INTRAMUSCULAR; INTRAVENOUS
Status: DISCONTINUED | OUTPATIENT
Start: 2021-08-04 | End: 2021-08-06

## 2021-08-04 RX ADMIN — Medication 50 MCG/HR: at 21:22

## 2021-08-04 RX ADMIN — PROPOFOL 20 MCG/KG/MIN: 10 INJECTION, EMULSION INTRAVENOUS at 19:38

## 2021-08-04 RX ADMIN — PROPOFOL 50 MCG/KG/MIN: 10 INJECTION, EMULSION INTRAVENOUS at 23:40

## 2021-08-04 RX ADMIN — SODIUM CHLORIDE, SODIUM LACTATE, POTASSIUM CHLORIDE, AND CALCIUM CHLORIDE 1000 ML: .6; .31; .03; .02 INJECTION, SOLUTION INTRAVENOUS at 21:04

## 2021-08-04 RX ADMIN — SODIUM CHLORIDE, SODIUM GLUCONATE, SODIUM ACETATE, POTASSIUM CHLORIDE, MAGNESIUM CHLORIDE, SODIUM PHOSPHATE, DIBASIC, AND POTASSIUM PHOSPHATE 125 ML/HR: .53; .5; .37; .037; .03; .012; .00082 INJECTION, SOLUTION INTRAVENOUS at 23:46

## 2021-08-04 RX ADMIN — PROPOFOL 30 MCG/KG/MIN: 10 INJECTION, EMULSION INTRAVENOUS at 21:00

## 2021-08-04 RX ADMIN — IOHEXOL 100 ML: 350 INJECTION, SOLUTION INTRAVENOUS at 20:20

## 2021-08-04 RX ADMIN — Medication 40 MEQ: at 23:40

## 2021-08-04 RX ADMIN — PROPOFOL 80 MG: 10 INJECTION, EMULSION INTRAVENOUS at 19:38

## 2021-08-04 RX ADMIN — POTASSIUM CHLORIDE 20 MEQ: 14.9 INJECTION, SOLUTION INTRAVENOUS at 21:35

## 2021-08-04 RX ADMIN — FENTANYL CITRATE 100 MCG: 50 INJECTION INTRAMUSCULAR; INTRAVENOUS at 20:20

## 2021-08-04 RX ADMIN — MAGNESIUM SULFATE HEPTAHYDRATE 2 G: 40 INJECTION, SOLUTION INTRAVENOUS at 23:45

## 2021-08-04 RX ADMIN — HEPARIN SODIUM 5000 UNITS: 5000 INJECTION INTRAVENOUS; SUBCUTANEOUS at 23:45

## 2021-08-04 RX ADMIN — FENTANYL CITRATE 100 MCG: 50 INJECTION INTRAMUSCULAR; INTRAVENOUS at 23:40

## 2021-08-04 NOTE — H&P
H&P Exam - Trauma   Iain Wakefield 60 y o  male MRN: [id number]  Unit/Bed#: ED 10 Encounter: [id number]    Assessment/Plan   Trauma Alert: Level A  Model of Arrival: Ambulance  Trauma Team: Attending David Garber, Residents Allyson Moraes and Fellow [facility]  Consultants: MICU    Trauma Active Problems: GCS 5 in the field, R eye nonreactive with deviated gaze    Trauma Plan:   #AMS requiring intubation in field  -Traumatic workup negative  -Will perform metabolic workup  -Will admit to MICU for further workup  -Possibly related to seizure, etoh withdrawal     Chief Complaint: Found down in bathroom    History of Present Illness   HPI:  Iain Wakefield is a 60 y o  male who presents with AMS, found down covered in feces near the toilet, GCS5 in the field, intubated for airway protection  Report of R eye nonreactive with rightward gaze deviation  Combative and agitated in the field  No other obvious signs of trauma, ingestion  Mechanism:Other: Found down    Review of Systems   Unable to perform ROS: Intubated       12-point, complete review of systems was reviewed and negative except as stated above  Historical Information   History is unobtainable from the patient due to Intubated  Efforts to obtain history included the following sources: EMS, records  No past medical history on file  No past surgical history on file  Social History   Social History     Substance and Sexual Activity   Alcohol Use Not on file     Social History     Substance and Sexual Activity   Drug Use Not on file     Social History     Tobacco Use   Smoking Status Not on file     No existing history information found  No existing history information found  There is no immunization history on file for this patient    Last Tetanus: unknown, will update   Family History: Could not obtain 2/2 intubation      Meds/Allergies   all current active meds have been reviewed, current meds:   Current Facility-Administered Medications Medication Dose Route Frequency    fentaNYL 1000 mcg in sodium chloride 0 9% 100mL infusion  50 mcg/hr Intravenous Continuous    fentanyl citrate (PF) 100 MCG/2ML 50 mcg  50 mcg Intravenous H8Q PRN    folic acid 1 mg in sodium chloride 0 9 % 50 mL IVPB  1 mg Intravenous Daily    [START ON 0/3/4262] folic acid 1 mg, thiamine (VITAMIN B1) 100 mg in sodium chloride 0 9 % 100 mL IV piggyback   Intravenous Daily    heparin (porcine) subcutaneous injection 5,000 Units  5,000 Units Subcutaneous Q8H [address]    lactated ringers bolus 1,000 mL  1,000 mL Intravenous Once    magnesium sulfate 2 g/50 mL IVPB (premix) 2 g  2 g Intravenous Once    multi-electrolyte (PLASMALYTE-A/ISOLYTE-S PH 7 4) IV solution  100 mL/hr Intravenous Continuous    potassium chloride 20 mEq IVPB (premix)  20 mEq Intravenous Once    potassium chloride oral solution 40 mEq  40 mEq Per NG Tube Once    propofol (DIPRIVAN) 1000 mg in 100 mL infusion (premix)  5-50 mcg/kg/min Intravenous Titrated    thiamine (VITAMIN B1) 100 mg in sodium chloride 0 9 % 50 mL IVPB  100 mg Intravenous Daily   , and PTA meds:   None       Not on File      PHYSICAL EXAM    PE limited by: None    Objective   Vitals:   First set: Temperature: 100 °F (37 8 °C) (08/04/21 1945)  Pulse: (!) 119 (08/04/21 1938)  Respirations: 14 (08/04/21 1938)  Blood Pressure: 151/83 (08/04/21 1938)    Primary Survey:   (A) Airway: Confirmed via ET CO2  (B) Breathing: Confirmed via auscultation  (C) Circulation: Pulses:   carotid  2/4, pedal  2/4, radial  2/4, and femoral  2/4  (D) Disabliity:  Eye Opening:   None = 1, Motor Response: Withdraws to pain = 4 and Verbal Response:  None = 1 for 6T  (E) Expose:  Completed    Secondary Survey: (Click on Physical Exam tab above)  Physical Exam  Vitals and nursing note reviewed  Constitutional:       General: He is not in acute distress  Appearance: He is ill-appearing  He is not diaphoretic  HENT:      Head: Normocephalic and atraumatic  Comments: No obvious signs of head trauma       Right Ear: There is impacted cerumen  Left Ear: There is impacted cerumen  Nose: Nose normal       Mouth/Throat:      Mouth: Mucous membranes are dry  Eyes:      General: No scleral icterus  Comments: Pupils 4 mm and symmetric, minimally reactive   Cardiovascular:      Rate and Rhythm: Regular rhythm  Tachycardia present  Pulmonary:      Effort: Pulmonary effort is normal  No respiratory distress  Breath sounds: No wheezing or rales  Abdominal:      General: Abdomen is flat  There is no distension  Tenderness: There is no abdominal tenderness  Musculoskeletal:         General: Normal range of motion  Cervical back: Normal range of motion  Left lower leg: No edema  Skin:     General: Skin is warm and dry  Comments: Covered in feces  R shin abrasion     Neurological:      Comments: GCS6T   Psychiatric:      Comments: Unable to assess         Invasive Devices     Peripheral Intravenous Line            Peripheral IV 08/04/21 Left Arm <1 day    Peripheral IV 08/04/21 Right Antecubital <1 day          Drain            NG/OG/Enteral Tube 18 Fr Left mouth <1 day    Urethral Catheter Temperature probe 16 Fr  <1 day          Airway            ETT  8 mm <1 day                Lab Results: Results: I have personally reviewed pertinent reports   , BMP/CMP:   Lab Results   Component Value Date    SODIUM 139 08/04/2021    K 2 8 (L) 08/04/2021     08/04/2021    CO2 23 08/04/2021    CO2 27 08/04/2021    BUN 7 08/04/2021    CREATININE 0 78 08/04/2021    GLUCOSE 102 08/04/2021    CALCIUM 7 5 (L) 08/04/2021    AST 85 (H) 08/04/2021    ALT 47 08/04/2021    ALKPHOS 136 (H) 08/04/2021    EGFR 99 08/04/2021   , CBC:   Lab Results   Component Value Date    WBC 12 08 (H) 08/04/2021    HGB 13 4 08/04/2021    HCT 36 7 08/04/2021    MCV 97 08/04/2021     (L) 08/04/2021    MCH 35 4 (H) 08/04/2021    MCHC 36 5 08/04/2021    RDW 13 7 08/04/2021    MPV 10 2 08/04/2021    NRBC 0 08/04/2021   , and Coagulation:   Lab Results   Component Value Date    INR 0 91 08/04/2021     Imaging/EKG Studies: FAST: Trace pericardial effusion, otherwise negative  Other Studies:     No results found      Code Status: Level 1 - Full Code  Advance Directive and Living Will:      Power of :    POLST:

## 2021-08-05 ENCOUNTER — APPOINTMENT (INPATIENT)
Dept: NON INVASIVE DIAGNOSTICS | Facility: HOSPITAL | Age: 56
DRG: 100 | End: 2021-08-05
Payer: COMMERCIAL

## 2021-08-05 PROBLEM — G92.8 TOXIC METABOLIC ENCEPHALOPATHY: Status: ACTIVE | Noted: 2021-08-05

## 2021-08-05 PROBLEM — F10.11 HISTORY OF ALCOHOL ABUSE: Chronic | Status: ACTIVE | Noted: 2021-08-05

## 2021-08-05 PROBLEM — G92.8 TOXIC METABOLIC ENCEPHALOPATHY: Status: RESOLVED | Noted: 2021-08-05 | Resolved: 2021-08-05

## 2021-08-05 PROBLEM — R94.31 PROLONGED Q-T INTERVAL ON ECG: Status: ACTIVE | Noted: 2021-08-05

## 2021-08-05 PROBLEM — G40.909 SEIZURE DISORDER (HCC): Chronic | Status: ACTIVE | Noted: 2021-08-05

## 2021-08-05 PROBLEM — I61.9 ICH (INTRACEREBRAL HEMORRHAGE) (HCC): Status: ACTIVE | Noted: 2021-08-05

## 2021-08-05 PROBLEM — G93.41 ACUTE METABOLIC ENCEPHALOPATHY: Status: ACTIVE | Noted: 2021-08-05

## 2021-08-05 PROBLEM — Z86.79 HISTORY OF CEREBRAL HEMORRHAGE: Chronic | Status: ACTIVE | Noted: 2021-08-05

## 2021-08-05 PROBLEM — M62.82 RHABDOMYOLYSIS: Status: ACTIVE | Noted: 2021-08-05

## 2021-08-05 PROBLEM — G40.919 BREAKTHROUGH SEIZURE (HCC): Status: ACTIVE | Noted: 2021-08-05

## 2021-08-05 LAB
ABO GROUP BLD: NORMAL
ALBUMIN SERPL BCP-MCNC: 2.4 G/DL (ref 3.5–5)
ALBUMIN SERPL BCP-MCNC: 2.6 G/DL (ref 3.5–5)
ALP SERPL-CCNC: 105 U/L (ref 46–116)
ALP SERPL-CCNC: 110 U/L (ref 46–116)
ALT SERPL W P-5'-P-CCNC: 38 U/L (ref 12–78)
ALT SERPL W P-5'-P-CCNC: 40 U/L (ref 12–78)
ANION GAP SERPL CALCULATED.3IONS-SCNC: 11 MMOL/L (ref 4–13)
ANION GAP SERPL CALCULATED.3IONS-SCNC: 13 MMOL/L (ref 4–13)
ANION GAP SERPL CALCULATED.3IONS-SCNC: 6 MMOL/L (ref 4–13)
ARTERIAL PATENCY WRIST A: YES
AST SERPL W P-5'-P-CCNC: 76 U/L (ref 5–45)
AST SERPL W P-5'-P-CCNC: 77 U/L (ref 5–45)
ATRIAL RATE: 90 BPM
BASE EXCESS BLDA CALC-SCNC: 2.9 MMOL/L
BASOPHILS # BLD AUTO: 0.02 THOUSANDS/ΜL (ref 0–0.1)
BASOPHILS NFR BLD AUTO: 0 % (ref 0–1)
BILIRUB SERPL-MCNC: 1.2 MG/DL (ref 0.2–1)
BILIRUB SERPL-MCNC: 1.29 MG/DL (ref 0.2–1)
BUN SERPL-MCNC: 4 MG/DL (ref 5–25)
BUN SERPL-MCNC: 5 MG/DL (ref 5–25)
BUN SERPL-MCNC: 5 MG/DL (ref 5–25)
CALCIUM ALBUM COR SERPL-MCNC: 7.8 MG/DL (ref 8.3–10.1)
CALCIUM ALBUM COR SERPL-MCNC: 8 MG/DL (ref 8.3–10.1)
CALCIUM SERPL-MCNC: 6.5 MG/DL (ref 8.3–10.1)
CALCIUM SERPL-MCNC: 6.7 MG/DL (ref 8.3–10.1)
CALCIUM SERPL-MCNC: 6.9 MG/DL (ref 8.3–10.1)
CHLORIDE SERPL-SCNC: 103 MMOL/L (ref 100–108)
CHLORIDE SERPL-SCNC: 104 MMOL/L (ref 100–108)
CHLORIDE SERPL-SCNC: 106 MMOL/L (ref 100–108)
CK MB SERPL-MCNC: 6.6 NG/ML (ref 0–5)
CK MB SERPL-MCNC: <1 % (ref 0–2.5)
CK SERPL-CCNC: 1394 U/L (ref 39–308)
CO2 SERPL-SCNC: 25 MMOL/L (ref 21–32)
CO2 SERPL-SCNC: 25 MMOL/L (ref 21–32)
CO2 SERPL-SCNC: 26 MMOL/L (ref 21–32)
CREAT SERPL-MCNC: 0.49 MG/DL (ref 0.6–1.3)
CREAT SERPL-MCNC: 0.54 MG/DL (ref 0.6–1.3)
CREAT SERPL-MCNC: 0.61 MG/DL (ref 0.6–1.3)
EOSINOPHIL # BLD AUTO: 0.03 THOUSAND/ΜL (ref 0–0.61)
EOSINOPHIL NFR BLD AUTO: 0 % (ref 0–6)
ERYTHROCYTE [DISTWIDTH] IN BLOOD BY AUTOMATED COUNT: 14.6 % (ref 11.6–15.1)
GFR SERPL CREATININE-BSD FRML MDRD: 110 ML/MIN/1.73SQ M
GFR SERPL CREATININE-BSD FRML MDRD: 116 ML/MIN/1.73SQ M
GFR SERPL CREATININE-BSD FRML MDRD: 120 ML/MIN/1.73SQ M
GLUCOSE SERPL-MCNC: 100 MG/DL (ref 65–140)
GLUCOSE SERPL-MCNC: 82 MG/DL (ref 65–140)
GLUCOSE SERPL-MCNC: 86 MG/DL (ref 65–140)
HCO3 BLDA-SCNC: 26.6 MMOL/L (ref 22–28)
HCT VFR BLD AUTO: 33.9 % (ref 36.5–49.3)
HGB BLD-MCNC: 12.4 G/DL (ref 12–17)
HOROWITZ INDEX BLDA+IHG-RTO: 40 MM[HG]
IMM GRANULOCYTES # BLD AUTO: 0.17 THOUSAND/UL (ref 0–0.2)
IMM GRANULOCYTES NFR BLD AUTO: 2 % (ref 0–2)
LYMPHOCYTES # BLD AUTO: 1.34 THOUSANDS/ΜL (ref 0.6–4.47)
LYMPHOCYTES NFR BLD AUTO: 15 % (ref 14–44)
MAGNESIUM SERPL-MCNC: 1.8 MG/DL (ref 1.6–2.6)
MAGNESIUM SERPL-MCNC: 2 MG/DL (ref 1.6–2.6)
MAGNESIUM SERPL-MCNC: 2.5 MG/DL (ref 1.6–2.6)
MCH RBC QN AUTO: 37.1 PG (ref 26.8–34.3)
MCHC RBC AUTO-ENTMCNC: 36.6 G/DL (ref 31.4–37.4)
MCV RBC AUTO: 102 FL (ref 82–98)
MONOCYTES # BLD AUTO: 0.51 THOUSAND/ΜL (ref 0.17–1.22)
MONOCYTES NFR BLD AUTO: 6 % (ref 4–12)
NEUTROPHILS # BLD AUTO: 6.74 THOUSANDS/ΜL (ref 1.85–7.62)
NEUTS SEG NFR BLD AUTO: 77 % (ref 43–75)
NRBC BLD AUTO-RTO: 0 /100 WBCS
O2 CT BLDA-SCNC: 16.5 ML/DL (ref 16–23)
OXYHGB MFR BLDA: 96.6 % (ref 94–97)
P AXIS: 60 DEGREES
PCO2 BLDA: 37.6 MM HG (ref 36–44)
PEEP RESPIRATORY: 6 CM[H2O]
PH BLDA: 7.47 [PH] (ref 7.35–7.45)
PHOSPHATE SERPL-MCNC: 2.5 MG/DL (ref 2.7–4.5)
PHOSPHATE SERPL-MCNC: 2.6 MG/DL (ref 2.7–4.5)
PLATELET # BLD AUTO: 97 THOUSANDS/UL (ref 149–390)
PMV BLD AUTO: 10.8 FL (ref 8.9–12.7)
PO2 BLDA: 92 MM HG (ref 75–129)
POTASSIUM SERPL-SCNC: 2.8 MMOL/L (ref 3.5–5.3)
POTASSIUM SERPL-SCNC: 3.5 MMOL/L (ref 3.5–5.3)
POTASSIUM SERPL-SCNC: 3.5 MMOL/L (ref 3.5–5.3)
PR INTERVAL: 148 MS
PROT SERPL-MCNC: 5.1 G/DL (ref 6.4–8.2)
PROT SERPL-MCNC: 5.2 G/DL (ref 6.4–8.2)
QRS AXIS: 34 DEGREES
QRSD INTERVAL: 130 MS
QT INTERVAL: 464 MS
QTC INTERVAL: 567 MS
RBC # BLD AUTO: 3.34 MILLION/UL (ref 3.88–5.62)
RH BLD: POSITIVE
SODIUM SERPL-SCNC: 138 MMOL/L (ref 136–145)
SODIUM SERPL-SCNC: 139 MMOL/L (ref 136–145)
SODIUM SERPL-SCNC: 142 MMOL/L (ref 136–145)
SPECIMEN SOURCE: ABNORMAL
T WAVE AXIS: 94 DEGREES
TROPONIN I SERPL-MCNC: 0.09 NG/ML
VENT AC: 12
VENT- AC: AC
VENTRICULAR RATE: 90 BPM
VT SETTING VENT: 400 ML
WBC # BLD AUTO: 8.81 THOUSAND/UL (ref 4.31–10.16)

## 2021-08-05 PROCEDURE — 84100 ASSAY OF PHOSPHORUS: CPT | Performed by: EMERGENCY MEDICINE

## 2021-08-05 PROCEDURE — 80053 COMPREHEN METABOLIC PANEL: CPT | Performed by: EMERGENCY MEDICINE

## 2021-08-05 PROCEDURE — 36600 WITHDRAWAL OF ARTERIAL BLOOD: CPT

## 2021-08-05 PROCEDURE — 80053 COMPREHEN METABOLIC PANEL: CPT | Performed by: STUDENT IN AN ORGANIZED HEALTH CARE EDUCATION/TRAINING PROGRAM

## 2021-08-05 PROCEDURE — 99291 CRITICAL CARE FIRST HOUR: CPT | Performed by: INTERNAL MEDICINE

## 2021-08-05 PROCEDURE — 94003 VENT MGMT INPAT SUBQ DAY: CPT

## 2021-08-05 PROCEDURE — 93010 ELECTROCARDIOGRAM REPORT: CPT | Performed by: INTERNAL MEDICINE

## 2021-08-05 PROCEDURE — 80048 BASIC METABOLIC PNL TOTAL CA: CPT | Performed by: EMERGENCY MEDICINE

## 2021-08-05 PROCEDURE — 84100 ASSAY OF PHOSPHORUS: CPT | Performed by: STUDENT IN AN ORGANIZED HEALTH CARE EDUCATION/TRAINING PROGRAM

## 2021-08-05 PROCEDURE — 87040 BLOOD CULTURE FOR BACTERIA: CPT | Performed by: PHYSICIAN ASSISTANT

## 2021-08-05 PROCEDURE — 94760 N-INVAS EAR/PLS OXIMETRY 1: CPT

## 2021-08-05 PROCEDURE — NC001 PR NO CHARGE: Performed by: INTERNAL MEDICINE

## 2021-08-05 PROCEDURE — 93306 TTE W/DOPPLER COMPLETE: CPT

## 2021-08-05 PROCEDURE — 85025 COMPLETE CBC W/AUTO DIFF WBC: CPT | Performed by: EMERGENCY MEDICINE

## 2021-08-05 PROCEDURE — 83735 ASSAY OF MAGNESIUM: CPT | Performed by: STUDENT IN AN ORGANIZED HEALTH CARE EDUCATION/TRAINING PROGRAM

## 2021-08-05 PROCEDURE — 83735 ASSAY OF MAGNESIUM: CPT | Performed by: EMERGENCY MEDICINE

## 2021-08-05 PROCEDURE — 99291 CRITICAL CARE FIRST HOUR: CPT | Performed by: PSYCHIATRY & NEUROLOGY

## 2021-08-05 PROCEDURE — C9113 INJ PANTOPRAZOLE SODIUM, VIA: HCPCS | Performed by: EMERGENCY MEDICINE

## 2021-08-05 PROCEDURE — 84484 ASSAY OF TROPONIN QUANT: CPT | Performed by: STUDENT IN AN ORGANIZED HEALTH CARE EDUCATION/TRAINING PROGRAM

## 2021-08-05 PROCEDURE — 82550 ASSAY OF CK (CPK): CPT | Performed by: EMERGENCY MEDICINE

## 2021-08-05 PROCEDURE — 87086 URINE CULTURE/COLONY COUNT: CPT | Performed by: PHYSICIAN ASSISTANT

## 2021-08-05 PROCEDURE — 82805 BLOOD GASES W/O2 SATURATION: CPT | Performed by: EMERGENCY MEDICINE

## 2021-08-05 PROCEDURE — 82553 CREATINE MB FRACTION: CPT | Performed by: EMERGENCY MEDICINE

## 2021-08-05 PROCEDURE — 99232 SBSQ HOSP IP/OBS MODERATE 35: CPT | Performed by: SURGERY

## 2021-08-05 PROCEDURE — 93005 ELECTROCARDIOGRAM TRACING: CPT

## 2021-08-05 RX ORDER — LORAZEPAM 2 MG/ML
0.5 INJECTION INTRAMUSCULAR ONCE AS NEEDED
Status: DISCONTINUED | OUTPATIENT
Start: 2021-08-05 | End: 2021-08-09 | Stop reason: HOSPADM

## 2021-08-05 RX ORDER — LEVETIRACETAM 750 MG/1
1500 TABLET ORAL EVERY 12 HOURS SCHEDULED
Status: DISCONTINUED | OUTPATIENT
Start: 2021-08-05 | End: 2021-08-09 | Stop reason: HOSPADM

## 2021-08-05 RX ORDER — LORAZEPAM 2 MG/ML
1 INJECTION INTRAMUSCULAR ONCE AS NEEDED
Status: COMPLETED | OUTPATIENT
Start: 2021-08-05 | End: 2021-08-07

## 2021-08-05 RX ORDER — MAGNESIUM SULFATE HEPTAHYDRATE 40 MG/ML
2 INJECTION, SOLUTION INTRAVENOUS ONCE
Status: COMPLETED | OUTPATIENT
Start: 2021-08-05 | End: 2021-08-05

## 2021-08-05 RX ORDER — POTASSIUM CHLORIDE 20MEQ/15ML
40 LIQUID (ML) ORAL ONCE
Status: COMPLETED | OUTPATIENT
Start: 2021-08-05 | End: 2021-08-05

## 2021-08-05 RX ORDER — SODIUM CHLORIDE, SODIUM GLUCONATE, SODIUM ACETATE, POTASSIUM CHLORIDE, MAGNESIUM CHLORIDE, SODIUM PHOSPHATE, DIBASIC, AND POTASSIUM PHOSPHATE .53; .5; .37; .037; .03; .012; .00082 G/100ML; G/100ML; G/100ML; G/100ML; G/100ML; G/100ML; G/100ML
1000 INJECTION, SOLUTION INTRAVENOUS ONCE
Status: COMPLETED | OUTPATIENT
Start: 2021-08-05 | End: 2021-08-05

## 2021-08-05 RX ORDER — LEVETIRACETAM 750 MG/1
1500 TABLET ORAL EVERY 12 HOURS SCHEDULED
Qty: 120 TABLET | Refills: 1 | Status: ON HOLD | OUTPATIENT
Start: 2021-08-05 | End: 2022-05-10 | Stop reason: SDUPTHER

## 2021-08-05 RX ADMIN — LEVETIRACETAM 1500 MG: 750 TABLET ORAL at 23:32

## 2021-08-05 RX ADMIN — PROPOFOL 50 MCG/KG/MIN: 10 INJECTION, EMULSION INTRAVENOUS at 07:40

## 2021-08-05 RX ADMIN — FENTANYL CITRATE 50 MCG: 50 INJECTION INTRAMUSCULAR; INTRAVENOUS at 06:49

## 2021-08-05 RX ADMIN — FOLIC ACID 1 MG: 5 INJECTION, SOLUTION INTRAMUSCULAR; INTRAVENOUS; SUBCUTANEOUS at 08:01

## 2021-08-05 RX ADMIN — POTASSIUM CHLORIDE 40 MEQ: 20 SOLUTION ORAL at 08:01

## 2021-08-05 RX ADMIN — SODIUM CHLORIDE, SODIUM GLUCONATE, SODIUM ACETATE, POTASSIUM CHLORIDE, MAGNESIUM CHLORIDE, SODIUM PHOSPHATE, DIBASIC, AND POTASSIUM PHOSPHATE 125 ML/HR: .53; .5; .37; .037; .03; .012; .00082 INJECTION, SOLUTION INTRAVENOUS at 08:21

## 2021-08-05 RX ADMIN — THIAMINE HYDROCHLORIDE 500 MG: 100 INJECTION, SOLUTION INTRAMUSCULAR; INTRAVENOUS at 01:24

## 2021-08-05 RX ADMIN — POTASSIUM PHOSPHATE, MONOBASIC AND POTASSIUM PHOSPHATE, DIBASIC 30 MMOL: 224; 236 INJECTION, SOLUTION, CONCENTRATE INTRAVENOUS at 09:20

## 2021-08-05 RX ADMIN — THIAMINE HYDROCHLORIDE 500 MG: 100 INJECTION, SOLUTION INTRAMUSCULAR; INTRAVENOUS at 09:49

## 2021-08-05 RX ADMIN — PROPOFOL 50 MCG/KG/MIN: 10 INJECTION, EMULSION INTRAVENOUS at 03:44

## 2021-08-05 RX ADMIN — FOLIC ACID 1 MG: 5 INJECTION, SOLUTION INTRAMUSCULAR; INTRAVENOUS; SUBCUTANEOUS at 00:55

## 2021-08-05 RX ADMIN — POTASSIUM PHOSPHATE, MONOBASIC AND POTASSIUM PHOSPHATE, DIBASIC 30 MMOL: 224; 236 INJECTION, SOLUTION, CONCENTRATE INTRAVENOUS at 16:12

## 2021-08-05 RX ADMIN — ENOXAPARIN SODIUM 40 MG: 40 INJECTION SUBCUTANEOUS at 11:44

## 2021-08-05 RX ADMIN — LEVETIRACETAM 2000 MG: 100 INJECTION, SOLUTION INTRAVENOUS at 15:22

## 2021-08-05 RX ADMIN — HEPARIN SODIUM 5000 UNITS: 5000 INJECTION INTRAVENOUS; SUBCUTANEOUS at 05:55

## 2021-08-05 RX ADMIN — SODIUM CHLORIDE, SODIUM GLUCONATE, SODIUM ACETATE, POTASSIUM CHLORIDE, MAGNESIUM CHLORIDE, SODIUM PHOSPHATE, DIBASIC, AND POTASSIUM PHOSPHATE 1000 ML: .53; .5; .37; .037; .03; .012; .00082 INJECTION, SOLUTION INTRAVENOUS at 10:13

## 2021-08-05 RX ADMIN — MAGNESIUM SULFATE HEPTAHYDRATE 2 G: 40 INJECTION, SOLUTION INTRAVENOUS at 07:44

## 2021-08-05 RX ADMIN — PANTOPRAZOLE SODIUM 40 MG: 40 INJECTION, POWDER, FOR SOLUTION INTRAVENOUS at 08:01

## 2021-08-05 RX ADMIN — THIAMINE HYDROCHLORIDE 500 MG: 100 INJECTION, SOLUTION INTRAMUSCULAR; INTRAVENOUS at 17:18

## 2021-08-05 NOTE — DISCHARGE INSTRUCTIONS
Generalized Tonic Clonic Seizures   WHAT YOU NEED TO KNOW:   A generalized tonic-clonic seizure may also be called a grand mal seizure  A seizure means an abnormal area in your brain sometimes sends bursts of electrical activity  A generalized seizure affects both sides of your brain  Tonic and clonic are phases that happen during the seizure  The tonic phase causes your muscles to become stiff  You lose consciousness and may fall down  The clonic phase causes convulsions (repeated muscle contractions)  A seizure may last from a few seconds up to 3 minutes  It is an emergency if it lasts longer than 5 minutes  DISCHARGE INSTRUCTIONS:   Call your local emergency number (911 in the ), or have someone else call, for any of the following:   · This is the first seizure you have ever had  · You have trouble breathing or feeling alert after a seizure  · The seizure lasts longer than 5 minutes  · You had a seizure in water, such as in a swimming pool or hot tub  · You have diabetes or are pregnant and had a seizure  Call your doctor if:   · You have a second seizure within 24 hours of the first      · You are injured during a seizure  · You feel you are not able to cope with your condition  · Your seizures start to happen more often  · You are confused longer than usual after a seizure  · You are planning to get pregnant or are currently pregnant  · You have questions or concerns about your condition or care  Medicines:   · Medicines  may be given to treat certain health conditions  You may need antiepileptic medicine if your seizures are caused by epilepsy  You may need medicine daily to prevent seizures or during a seizure to stop it  Do not stop taking your medicine unless directed by your healthcare provider  · Take your medicine as directed  Contact your healthcare provider if you think your medicine is not helping or if you have side effects   Tell him of her if you are allergic to any medicine  Keep a list of the medicines, vitamins, and herbs you take  Include the amounts, and when and why you take them  Bring the list or the pill bottles to follow-up visits  Carry your medicine list with you in case of an emergency  What you can do to prevent a tonic-clonic seizure: You may not be able to prevent every seizure  The following can help you manage triggers that may make a seizure start:  · Take antiseizure medicine every day at the same time  This will also help prevent medicine side effects  Set an alarm to help remind you to take your medicine every day  If you are a woman, talk to your provider about family planning while you are taking this medicine  · Manage stress  Stress can be a trigger for epilepsy  Exercise can help you reduce stress  Talk to your healthcare provider about exercise that is safe for you  Illness can be a form of stress  Eat a variety of healthy foods and drink plenty of liquids during an illness  Talk to your healthcare provider about other ways to manage stress  · Set a regular sleep schedule  A lack of sleep can trigger a seizure  Try to go to sleep and wake up at the same time every day  Keep your bedroom quiet and dark  Talk to your healthcare provider if you are having trouble sleeping  · Limit or do not drink alcohol as directed  Alcohol can trigger a seizure, especially if you drink a large amount at one time  A drink of alcohol is 12 ounces of beer, 1½ ounces of liquor, or 5 ounces of wine  Talk to your healthcare provider about a safe amount of alcohol for you  Your provider may recommend that you do not drink any alcohol  Tell him or her if you need help to quit drinking  What you can do to manage tonic-clonic seizures: The following can help you manage the seizures if you have more than one:  · Keep a seizure diary  This can help you find your triggers and avoid them   Possible triggers include illness, lack of sleep, hormone changes, alcohol, drugs, lights, and stress  Write down the dates of your seizures, where you were, and what you were doing  Include how you felt before and after  · Record any auras you have before a seizure  An aura is a sign that you are about to have a seizure  Auras happen before certain types of seizures that are in only 1 part of the brain  The aura may happen seconds before a seizure, or up to an hour before  You may feel, see, hear, or smell something  Examples include part of your body becoming hot  You may see a flash of light or hear something  You may have anxiety or déjà vu  If you have an aura, include it in your seizure diary  · Create a care plan  Tell family, friends, and coworkers about your epilepsy  Give them instructions that tell them how they can keep you safe if you have a seizure  · Ask what safety precautions you should take  Talk with your healthcare provider about driving  You may not be able to drive until you are seizure-free for a period of time  You will need to check the law where you live  Also talk to your healthcare provider about swimming and bathing  You may drown or develop life-threatening heart or lung damage if you have a seizure in water  · Carry medical alert identification  Wear medical alert jewelry or carry a card that says you have tonic-clonic seizures  Ask your healthcare provider where to get these items  How others can keep you safe during a seizure:  Give the following instructions to family, friends, and coworkers:  · Do not panic  · Do not hold me down or put anything in my mouth  · Gently guide me to the floor or a soft surface  · Place me on my side to help prevent me from swallowing saliva or vomit  · Protect me from injury  Remove sharp or hard objects from the area surrounding me, or cushion my head  · Loosen the clothing around my head and neck  · Time how long my seizure lasts   Call 911 if my seizure lasts longer than 5 minutes or if I have a second seizure  · Stay with me until my seizure ends  Let me rest until I am fully awake  · Perform CPR if I stop breathing or you cannot feel my pulse  · Do not give me anything to eat or drink until I am fully awake  Follow up with your doctor or neurologist as directed: If you take antiseizure medicine, you will need blood tests to check the level in your blood  The medicine may need to be changed or adjusted  Write down your questions so you remember to ask them during your visits  © Copyright [employer] 2021 Information is for End User's use only and may not be sold, redistributed or otherwise used for commercial purposes  All illustrations and images included in CareNotes® are the copyrighted property of [employer]  or Jagdeep Bingham  The above information is an  only  It is not intended as medical advice for individual conditions or treatments  Talk to your doctor, nurse or pharmacist before following any medical regimen to see if it is safe and effective for you

## 2021-08-05 NOTE — ASSESSMENT & PLAN NOTE
· Suspected alcohol withdraw, possible seizure like activity at home, unwitnessed   · Continue on CIWA protocol  · Continue Neuro Checks  · No evidence of traumatic injury to contribute to encephalopathy, No acute intracranial abnormality on CT  · Management per [facility]

## 2021-08-05 NOTE — RESPIRATORY THERAPY NOTE
RT Protocol Note  Mckayla Quiles 60 y o  male MRN: [id number]  Unit/Bed#: ED 10 Encounter: [id number]    Assessment    Active Problems:    * No active hospital problems  *      Home Pulmonary Medications:  none       No past medical history on file  Social History     Socioeconomic History    Marital status: Single     Spouse name: Not on file    Number of children: Not on file    Years of education: Not on file    Highest education level: Not on file   Occupational History    Not on file   Tobacco Use    Smoking status: Not on file   Substance and Sexual Activity    Alcohol use: Not on file    Drug use: Not on file    Sexual activity: Not on file   Other Topics Concern    Not on file   Social History Narrative    Not on file     Social Determinants of Health     Financial Resource Strain:     Difficulty of Paying Living Expenses:    Food Insecurity:     Worried About Running Out of Food in the Last Year:     [address] in the Last Year:    Transportation Needs:     Lack of Transportation (Medical):      Lack of Transportation (Non-Medical):    Physical Activity:     Days of Exercise per Week:     Minutes of Exercise per Session:    Stress:     Feeling of Stress :    Social Connections:     Frequency of Communication with Friends and Family:     Frequency of Social Gatherings with Friends and Family:     Attends [employer] Services:     Active Member of Clubs or Organizations:     Attends Club or Organization Meetings:     Marital Status:    Intimate Partner Violence:     Fear of Current or Ex-Partner:     Emotionally Abused:     Physically Abused:     Sexually Abused:        Subjective         Objective    Physical Exam:   Assessment Type: Assess only  General Appearance: Unresponsive  Respiratory Pattern: Normal  Chest Assessment: Chest expansion symmetrical  Bilateral Breath Sounds: Diminished    Vitals:  Blood pressure 148/89, pulse 90, temperature 100 °F (37 8 °C), temperature source Tympanic, resp  rate 14, weight 93 kg (205 lb 0 4 oz), SpO2 100 %  Imaging and other studies: I have personally reviewed pertinent reports  Plan    Respiratory Plan: Vent/NIV/HFNC        Resp Comments:  pt placed on g5 ventilator upon arrival to ER Rm 10   pt placed on respiratory protocol at this time for ventilator management  pt has no pulmonary history  or medications listed at this time  will continue to monitor per protocol

## 2021-08-05 NOTE — PROGRESS NOTES
followed up with Pt's daughter, Virgil Gavin and her Bonifacio Durant in [facility]  Accompanied Doctor to provide medical update  Brought Paloma and Russell to Pt's room  They visited briefly and returned to [facility] Wait Room while they await further medical updates  08/04/21 2000   Clinical Encounter Type   Visited With Patient not available;Family; Health care provider   Crisis Visit ED; Trauma

## 2021-08-05 NOTE — PLAN OF CARE
Problem: MOBILITY - ADULT  Goal: Maintain or return to baseline ADL function  Description: INTERVENTIONS:  -  Assess patient's ability to carry out ADLs; assess patient's baseline for ADL function and identify physical deficits which impact ability to perform ADLs (bathing, care of mouth/teeth, toileting, grooming, dressing, etc )  - Assess/evaluate cause of self-care deficits   - Assess range of motion  - Assess patient's mobility; develop plan if impaired  - Assess patient's need for assistive devices and provide as appropriate  - Encourage maximum independence but intervene and supervise when necessary  - Involve family in performance of ADLs  - Assess for home care needs following discharge   - Consider OT consult to assist with ADL evaluation and planning for discharge  - Provide patient education as appropriate  Outcome: Progressing  Goal: Maintains/Returns to pre admission functional level  Description: INTERVENTIONS:  - Perform BMAT or MOVE assessment daily    - Set and communicate daily mobility goal to care team and patient/family/caregiver  - Collaborate with rehabilitation services on mobility goals if consulted  - Reposition patient every 2 hours    - Out of bed for toileting  - Record patient progress and toleration of activity level   Outcome: Progressing     Problem: Potential for Falls  Goal: Patient will remain free of falls  Description: INTERVENTIONS:  - Educate patient/family on patient safety including physical limitations  - Instruct patient to call for assistance with activity   - Consult OT/PT to assist with strengthening/mobility   - Keep Call bell within reach  - Keep bed low and locked with side rails adjusted as appropriate  - Keep care items and personal belongings within reach  - Initiate and maintain comfort rounds  - Apply yellow socks and bracelet for high fall risk patients  - Consider moving patient to room near nurses station  Outcome: Progressing

## 2021-08-05 NOTE — DISCHARGE INSTR - AVS FIRST PAGE
You are advised to follow-up with neurology  Follow-up information has been provided to you  Your prescribed prednisone 40 mg daily for 3 more days  This is for gout    For pain in her joints she can take Tylenol or ibuprofen over-the-counter 1 tablet every 6 hours as needed for 4 days  In case of recurring pain please call your PCP  Your cap trial has been refilled    Please  this medication from the pharmacy

## 2021-08-05 NOTE — CONSULTS
Consultation - Neurology   María Irving 60 y o  male MRN: [id number]  Unit/Bed#: MICU 10 Encounter: [id number]    Addendum:   Total time spent today 180 minutes  Greater than 50% of total time was spent with the patient and / or family counseling and / or coordination of care  A description of the counseling / coordination of care: speaking with and evaluating patient on several occasions, speaking with fiance and sister about current and prior seizure events including prior hospitalizations, speaking with primary care office, initial neurologist and current neurologist about prior EEG studies, prior hospitalizations and AEDs; and discussing plan of care with Critical care team        Assessment/Plan     Breakthrough seizure [facility])  Assessment & Plan    60 y o  male with a history of seizure disorder noncompliant with Keppra 1500mg bid, right posterior temporal IPH, SAH, prior Cdiff x 2, alcohol abuse and heavy tobacco use who presented via EMS with AMS after being found down at home by his daughter minimally responsive covered in feces near the toilet  GCS 5 in the field  Patient was reported to have R gaze deviation with a nonreactive R pupil and was combative and agitated  Patient was intubated for airway protection  He was extubated this morning and initially appeared postictal  He appears to be slowly improving,     I evaluated the patient twice today  by several hours  He appears to be becoming more lucid; however he continues to not know the date, and cannot state names of the month  His remaining neuro exam; however, is nonfocal  I suspect his post-ictal state is slowly resolving  The ICU team and I reviewed the below recommendations with the patient, including that he is not to drive  Patient refusing MRI due to calustophobia regardless of being given benzos  He is also refusing any other additional imaging and refusing remaining in the hospital overnight stating that he plans to leave today  He states he is going to leave [place] and acknowledges that he understands the risks of developing worsened medical conditions or even death  Patient also informed of the importance of taking his AED as prescribed and following up with his outpatient neurologist    If patient does eventually agree to remain inpatient, then proceed with the following recommendations  If he leaves AMA, please be sure to again emphasize AED compliance and prescribe Keppra 1500mg bid with 1 month refill  Results:  - CTA H/N negative for acute intracranial abnormality, chronic lacunar infarct noted in the basal ganglia, 40% stenosis of the right ICA at the bulb  Negative LV or flow-limiting stenosis  - CT C spine, CT C/A/P all grossly unremarkable  - Notable Labs: WBC 12, lactic acid of 2 1, CK of 1124, and K 2 8, AST elevated to 85 with a normal ALT  - Ethanol level negative  - UDS positive for benzos but no benzos given by EMS according to ICU team - Prolonged QT noted of EKG  Plan:  - Keppra 2g bolus given  - Initiate maintenance with Keppra 1500mg bid to start this evening  - Of note, Keppra may not be the best medication for this patient long term in the setting of his known aggression; however due to his chronic alcohol use and prior noncompliance other AEDs may also be problematic  After conversation with Dr Efrain Castro, she would like to start with Keppra for now and his AED regimen can be switched at a later time  - Ativan prn for motor seizure > 2minutes  - Seizure precautions   - Monitor patient at least overnight  - Would recommend MRI brain w wo contrast, as this was previously recommended for further evaluation following his last MRI at the end of 2020  If patient changes his mind and agrees, will need a decent dose of a benzo prior   - If patient does not return to baseline or demonstrates fluctuating exam, would recommend vEEG  - [place] form will be completed  Patient was informed he is not to drive  * Toxic metabolic encephalopathy  Assessment & Plan  - Postictal state  - See above    Seizure disorder [facility])  Assessment & Plan  - Chronic seizures on and off over 1 5 years  - Previously prescribed Keppra 1500mg bid  - Noncompliant   - History of subclinical and clinical epileptic seizures captured on vEEG in 8/2020  - 3/2021 routine EEG x 2 WNL    History of cerebral hemorrhage  Assessment & Plan  - 8/2020    History of alcohol abuse  Assessment & Plan  - Unclear last drink  - CIWA protocol    Prolonged Q-T interval on ECG  Assessment & Plan  - Management as per ICU  - Avoid QT prolonging drugs    Patient should follow-up with outpatient neurologist Dr Denilson Miranda (neurology office within the [facility] orthopedics: [phone] ext [phone]) within 2-4 weeks after discharge  History of Present Illness     Reason for Consult / Principal Problem: Seizure/Encephalopathy  Hx and PE limited by: Poor historian/postictal state  HPI: Joaquina Rodriguez is a 60 y o  male with a history of seizure disorder noncompliant with Keppra 1500mg bid, alcohol abuse and heavy tobacco use who presented with AMS after being found down at home by his daughter minimally responsive covered in feces near the toilet  GCS 5 in the field  Patient was reported to have R gaze deviation with a nonreactive R pupil and was combative and agitated  Patient was intubated for airway protection  CTH, CT C spine, CT C/A/P all grossly unremarkable  Labs were most notable for leukocytosis of 12, lactic acid of 2 1, CK of 1124, and K 2 8  Ethanol level negative  UDS positive for benzos but no benzos given by EMS  Additionally AST elevated to 85 with a normal ALT  Additionally prolonged QT noted of EKG  No AEDs or benzos reported to have been given since arrival     In reviewing [place] [address] records the patient was admitted in 2/2017 with seizure  MRI brain w wo contrast and EEG at that time were both unremarkable       We were able to speak with Dr Wu Good ([phone]) with [facility] in [address]  who saw the patient in 8/2020 during a 3 week hospitalization for seizure as well as later that month in follow-up  According to Dr Danae Rodgers the patient was reported to have a seziure followed by head injury and subsequently was noted to have a right posterior temporal IPH with a small SDH  vEEG at that time revealed subclinical seizures x2 and clinical sz x1  Seizure focus was described to me as "one of them right posterior temporal and another one more midline " Patient was ultimately discharged on Keppra 1500mg bid  Patient then transferred care to Dr Lynette Watkins (neurology office within the practice of [facility] orthopedics: [phone] ext [phone]) and last saw him on 1/18/21  Based on records from him office, patient has been having seizures "off and on for about 1 year and a half " He mentioned the above hospitalization and also noted that in 11/2020 he had a SAH  Prior to this office visit he had an MRI brain w wo contrast which revealed the following:  · Abnormal signal with gyriform and curvilinear enhancement in the posterior right temporal lobe bordering right occipital   This is concerning for area of previous trauma or old insult but can be seen with early chronic changes in of infarct  Continued follow-up is suggested in 3 months  · Underlying atrophy with white matter changes likely reflecting small vessel ischemic disease   · No acute infarct demonstrated  Repeat MRI brain was recommended 3 months after but was not completed  - Routine EEG done prior to 1/18/21 reported to have mild right sided slowing  - 3/3/21 Routine EEG: Normal  - 3/9/21 Routine EEG: WNL    Additionally I was able to obtain records from the PCP, Dr Jhony Xiong [phone] whose notes indicate that the patient was hospitalized from 3/8-3/11/21 for seizures as well   During that hospitalization he was found to have a second recurrence of Cdiff  Patient has not seen neurology since that time  Today on exam, patient is supine in bed complaining of generalized muscle soreness  He also reported CP shortly after my exam  He is oriented to person, and [facility] but otherwise is confused  He doesn't have memory of yesterday's events and possibly not the day before either  Patient's fiance Roberta Celestin and sister Yeimi Bhakta are present at bedside  Roberta Celestin appears very upset about the situation, as well as upset with the patient  Roberta Celestin did inform providers that during a prior hospitalization patient was noncompliant and became very aggressive "punching nurses" as well as ripping out IVs     Inpatient consult to Neurology  Consult performed by: Diaz Cole PA-C  Consult ordered by: Kae Jackson DO          Review of Systems   HENT: Positive for hearing loss (  Initially patient reported that this was new however during 2nd interaction, patient reported that this had resolved)  Tongue bite   Cardiovascular: Positive for chest pain  Musculoskeletal: Positive for myalgias (diffuse)  Psychiatric/Behavioral:        Irritable   All other systems reviewed and are negative  A 12 point ROS was completed and other than the above mentioned complaints in the HPI, all remaining systems were negative  Historical Information   No past medical history on file  No past surgical history on file  Social History   Social History     Substance and Sexual Activity   Alcohol Use Not on file     Social History     Substance and Sexual Activity   Drug Use Not on file     No existing history information found  No existing history information found  Social History     Tobacco Use   Smoking Status Not on file     Family History: No family history on file  Review of previous medical records was completed       Meds/Allergies   all current active meds have been reviewed    No Known Allergies    Objective   Vitals:Blood pressure 114/69, pulse 90, temperature 99 7 °F (37 6 °C), resp  rate 18, height 6' 1" (1 854 m), weight 78 1 kg (172 lb 2 9 oz), SpO2 96 %  ,Body mass index is 22 72 kg/m²  Intake/Output Summary (Last 24 hours) at 8/5/2021 1719  Last data filed at 8/5/2021 1714  Gross per 24 hour   Intake 5225 71 ml   Output 2940 ml   Net 2285 71 ml       Invasive Devices: Invasive Devices     Peripheral Intravenous Line            Peripheral IV 08/04/21 Right Antecubital <1 day    Peripheral IV 08/05/21 Left Forearm <1 day    Peripheral IV 08/05/21 Left Wrist <1 day    Peripheral IV 08/05/21 Right;Ventral (anterior) Forearm <1 day                Physical Exam  Constitutional:       General: He is not in acute distress  Appearance: Normal appearance  He is well-developed  He is not ill-appearing or toxic-appearing  HENT:      Head: Normocephalic and atraumatic  Mouth/Throat:      Comments: +Right lateral tongue bite, bottom lip bite  Eyes:      General: No scleral icterus  Right eye: No discharge  Left eye: No discharge  Extraocular Movements: Extraocular movements intact and EOM normal       Conjunctiva/sclera: Conjunctivae normal       Pupils: Pupils are equal, round, and reactive to light  Cardiovascular:      Rate and Rhythm: Normal rate and regular rhythm  Pulmonary:      Effort: Pulmonary effort is normal  No respiratory distress  Breath sounds: No stridor  Musculoskeletal:         General: Tenderness (diffuse) present  No deformity  Cervical back: Normal range of motion and neck supple  Lymphadenopathy:      Cervical: No cervical adenopathy  Skin:     General: Skin is warm and dry  Findings: No erythema or rash  Neurological:      Mental Status: He is alert  Coordination: Finger-Nose-Finger Test normal    Psychiatric:         Speech: Speech normal        Neurologic Exam     Mental Status   Follows 2 step commands     Attention: normal  Concentration: normal    Speech: speech is normal (No aphasia or dysarthria)  Level of consciousness: alert  General knowledge: fair  Able to name object  Normal comprehension  I evaluated the patient's mental status on 2 occasions  Initially, patient was oriented to himself, [facility] and new he was here secondary to seizure  He incorrectly states it was July 1921 but then corrected himself stating 2021  On the 2nd occasion, patient oriented to person, a hospital in [facility] and situation  He again stated July, but could not recall the year at all  On both occasions, patient did not know date  On the 2nd occasion he also did not know what date came after July  He was also unable to name all 12 months  When discussing the patient's seizures as well as his other medical issues, patient acknowledged that he understood that he could develop a serious medical issue or die if he were to leave the hospital against medical advice  Despite this he still plans to leave this evening against medical advice  Cranial Nerves     CN II   Visual acuity: (Normal, able to count fingers with both eyes independently)  Right visual field deficit: none  Left visual field deficit: none     CN III, IV, VI   Pupils are equal, round, and reactive to light  Extraocular motions are normal    Nystagmus: none   Conjugate gaze: present    CN V   Facial sensation intact  CN VII   Facial expression full, symmetric  CN VIII   Hearing: impaired    CN XI   CN XI normal      CN XII   Tongue deviation: none    Motor Exam   Muscle bulk: normal  Overall muscle tone: normal  Right arm pronator drift: absent  Left arm pronator drift: absent  Strength 5/5 however patient required increased cuing due to generalized myalgias/pain limiting his effort  Sensory Exam   Light touch normal    Temeprature intact and symmetric throughout       Gait, Coordination, and Reflexes     Coordination   Finger to nose coordination: normal    Tremor   Resting tremor: absent    Reflexes   Right plantar: normal  Left plantar: normal  Right ankle clonus: absent  Left pendular knee jerk: absent  DTRs: 2 throughout except 3+ left patellar and trace to absent in bilateral achilles       Lab Results: I have personally reviewed pertinent reports  Imaging Studies: I have personally reviewed pertinent films in PACS  EKG, Pathology, and Other Studies: I have personally reviewed pertinent reports      VTE Prophylaxis: Enoxaparin (Lovenox)    Code Status: Level 1 - Full Code

## 2021-08-05 NOTE — H&P
H&P Exam - [address] [facility] Kwabena 60 y o  male MRN: [id number]  Unit/Bed#: MICU 10 Encounter: [id number]      -------------------------------------------------------------------------------------------------------------  Chief Complaint:  Altered mental status, suspected seizure    History of Present Illness   HX and PE limited by:  Patient intubated and sedated  Darren Pedraza is a 60 y o  male who presents as level A trauma alert after he was found down at home by daughter unresponsive  He was intubated in the field for airway protection  Per report, initial GCS in the Trauma [place] was 5  Patient also found to have nonreactive right pupil with deviated gaze  CT head, C-spine, chest, abdomen, and pelvis were performed which were grossly unremarkable  Encephalopathy workup was ordered by the trauma team which demonstrated mild leukocytosis with white blood cell count of 12, lactic acid of 2 1, CK of 1,124, and normal ethanol level  CMP demonstrated hypokalemia with a potassium of 2 8, patient's AST also elevated at 85 with normal ALT  Patient was given thiamine, folic acid, and a total of 60 mEq of potassium prior to my evaluation  Patient was also started on propofol and fentanyl for sedation  Daughter states that patient does have a history of seizures likely caused by alcohol withdrawal   To her knowledge, patient has not been drinking recently  She states when she came home from work today, found the patient unresponsive on the bathroom floor incontinent of feces and minimally responsive  She states he is not currently on any anti epileptics  She also believes he may have had a prior stroke although she is unable to recall any further details      History obtained from EMS, daughter  Patient unable to provide history secondary to intubation and sedation  -------------------------------------------------------------------------------------------------------------  Assessment and Plan:    Neuro:    Diagnosis:  Acute encephalopathy, suspected alcohol withdrawal seizure, alcohol abuse  o Plan:   o CIWA protocol, continue with propofol for sedation  o Seizure precautions  o Frequent neuro checks  o Daily cam ICU  o Current GCS: 7T   Localizes to pain, opens eyes to pain  o Continue thiamine and folic acid   Diagnosis:  Analgesia and sedation  o Plan:   o Continue with propofol and fentanyl for sedation, p r n  Fentanyl and Versed      CV:    Diagnosis:  History of hypertension, hyperlipidemia  o Plan:   o Telemetry monitoring  o Will need to confirm home medications in the a m        Pulm:   Diagnosis:  Acute respiratory failure in the setting of encephalopathy and likely seizure  o Plan:   o Respiratory protocol  o AC VC:  12/400/6/100%  o Wean as tolerated      GI:    Diagnosis: GERD  o Plan:   o Protonix daily      :    Diagnosis:  No active issues  o Plan:  Maintain Lopez catheter for strict I's and O's      F/E/N:    NPO   Isolyte at 125   Hypokalemia:  Initial potassium 2 8, he received 60 mEq of potassium  o Will give 2 g of magnesium  o Will repeat metabolic panel and check phosphorus  · Slightly elevated lactic acid of 2 1 - likely secondary to seizure, continue IV hydration and recheck    Heme/Onc:    Diagnosis:  No active issues  o Plan:   o Heparin for DVT prophylaxis    Endo:    Diagnosis:  No active issues      ID:    Diagnosis:  No active issues  o Plan:   o Continue to monitor for infection  o Leukocytosis likely reactive in the setting of likely seizure      MSK/Skin:   · Diagnosis:  Rhabdomyolysis  · Initial CK 1,124  · Continue IV hydration and recheck CK   Diagnosis:  No active issues  o Plan:   o Frequent repositioning  o PT/OT      Disposition: Admit to Critical Care   Code Status: Level 1 - Full Code  --------------------------------------------------------------------------------------------------------------  Review of Systems    Review of systems was unable to be performed secondary to Altered mental status, patient intubated and sedated    Physical Exam  Vitals and nursing note reviewed  Constitutional:       General: He is in acute distress  Appearance: He is ill-appearing  Comments: Exam limited as patient is intubated and sedated   HENT:      Head: Normocephalic  Right Ear: External ear normal       Left Ear: External ear normal       Nose: Nose normal    Eyes:      Conjunctiva/sclera: Conjunctivae normal       Pupils: Pupils are equal, round, and reactive to light  Cardiovascular:      Rate and Rhythm: Normal rate and regular rhythm  Pulses: Normal pulses  Pulmonary:      Comments: Intubated, mechanical breath sounds  Abdominal:      General: Abdomen is flat  Tenderness: There is no abdominal tenderness  There is no guarding or rebound  Musculoskeletal:         General: No swelling, deformity or signs of injury  Skin:     General: Skin is warm and dry  Neurological:      Comments: Patient intubated and sedated, localizes to pain and opens eyes to pain       --------------------------------------------------------------------------------------------------------------  Vitals:   Vitals:    08/05/21 0000 08/05/21 0100 08/05/21 0107 08/05/21 0200   BP: 119/84 121/79  138/92   Pulse: 76 68  64   Resp:       Temp: 99 8 °F (37 7 °C) 98 6 °F (37 °C)  97 9 °F (36 6 °C)   TempSrc: Bladder      SpO2: 100% 100%  100%   Weight:       Height:   6' 1" (1 854 m)      Temp  Min: 97 9 °F (36 6 °C)  Max: 100 °F (37 8 °C)  IBW (Ideal Body Weight): 79 9 kg  Height: 6' 1" (185 4 cm)  Body mass index is 22 72 kg/m²        Laboratory and Diagnostics:  Results from last 7 days   Lab Units 08/04/21 1951 08/04/21 1943   WBC Thousand/uL 12 08*  --    HEMOGLOBIN g/dL 13 4  --    I STAT HEMOGLOBIN g/dl  --  12 9   HEMATOCRIT % 36 7  --    HEMATOCRIT, ISTAT %  --  38   PLATELETS Thousands/uL 114*  --    NEUTROS PCT % 88*  --    MONOS PCT % 8  -- Results from last 7 days   Lab Units 08/04/21 2238 08/04/21 1951 08/04/21 1943   SODIUM mmol/L 138 139  --    POTASSIUM mmol/L 4 8 2 8*  --    CHLORIDE mmol/L 104 104  --    CO2 mmol/L 25 23  --    CO2, I-STAT mmol/L  --   --  27   ANION GAP mmol/L 9 12  --    BUN mg/dL 5 7  --    CREATININE mg/dL 0 72 0 78  --    CALCIUM mg/dL 7 4* 7 5*  --    GLUCOSE RANDOM mg/dL 105 107  --    ALT U/L  --  47  --    AST U/L  --  85*  --    ALK PHOS U/L  --  136*  --    ALBUMIN g/dL  --  3 1*  --    TOTAL BILIRUBIN mg/dL  --  0 96  --      Results from last 7 days   Lab Units 08/04/21 2238   PHOSPHORUS mg/dL 2 8      Results from last 7 days   Lab Units 08/04/21 1951   INR  0 91   PTT seconds 27          Results from last 7 days   Lab Units 08/04/21 2238 08/04/21 1951   LACTIC ACID mmol/L 1 9 2 1*     ABG:    VBG:          Micro:        Imaging: I have personally reviewed pertinent reports  and I have personally reviewed pertinent films in PACS    Historical Information   No past medical history on file  No past surgical history on file  Social History   Social History     Substance and Sexual Activity   Alcohol Use Not on file     Social History     Substance and Sexual Activity   Drug Use Not on file     Social History     Tobacco Use   Smoking Status Not on file       Family History:   No family history on file    I have reviewed this patient's family history and commented on sigificant items within the HPI      Medications:  Current Facility-Administered Medications   Medication Dose Route Frequency    fentaNYL 1000 mcg in sodium chloride 0 9% 100mL infusion  50 mcg/hr Intravenous Continuous    fentanyl citrate (PF) 100 MCG/2ML 50 mcg  50 mcg Intravenous J0N PRN    folic acid 1 mg in sodium chloride 0 9 % 50 mL IVPB  1 mg Intravenous Daily    heparin (porcine) subcutaneous injection 5,000 Units  5,000 Units Subcutaneous Q8H [facility]    midazolam (VERSED) injection 2 mg  2 mg Intravenous Q4H PRN    multi-electrolyte (PLASMALYTE-A/ISOLYTE-S PH 7 4) IV solution  125 mL/hr Intravenous Continuous    pantoprazole (PROTONIX) injection 40 mg  40 mg Intravenous Q24H [facility]    propofol (DIPRIVAN) 1000 mg in 100 mL infusion (premix)  5-50 mcg/kg/min Intravenous Titrated    thiamine (VITAMIN B1) 500 mg in sodium chloride 0 9 % 50 mL IVPB  500 mg Intravenous Q8H     Home medications:  None     Allergies:  No Known Allergies  ------------------------------------------------------------------------------------------------------------  Advance Directive and Living Will:      Power of :    POLST:    ------------------------------------------------------------------------------------------------------------  Anticipated Length of Stay is > 2 midnights    Care Time Delivered:   Upon my evaluation, this patient had a high probability of imminent or life-threatening deterioration due to seizures, respiratory failure, which required my direct attention, intervention, and personal management  I have personally provided 32 minutes (9:10 to 9:42) of critical care time, exclusive of procedures, teaching, family meetings, and any prior time recorded by providers other than myself  Tom Rutledge MD        Portions of the record may have been created with voice recognition software  Occasional wrong word or "sound a like" substitutions may have occurred due to the inherent limitations of voice recognition software    Read the chart carefully and recognize, using context, where substitutions have occurred
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
## 2021-08-05 NOTE — PROGRESS NOTES
Medication Reconciliation  [address] [facility] [phone]    Medications:  Gabapentin 300mg 2 capsules TID  Naltrexone 50mg 1 tablet daily  Pravastatin 40mg daily  Zoloft 25mg 3 tablets daily  Pantoprazole 40mg daily  Keppra 750mg 2 tablets BID (Last filled 4/25/21 1 month supply prescribed by Dr Vandana Wells)  MagOx 1 tablet BID    Spoke with Hailey Perales at [address] [place] [address] office of Dr Vandana Wells () and requested a callback to Ayleen West
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
## 2021-08-05 NOTE — PHYSICAL THERAPY NOTE
Physical Therapy Cancellation Note    PT orders received chart review completed  Pt is currently intubated/sedated and not appropriate to participate in skilled PT at this time  PT will follow and eval as medically appropriate       08/05/21 0900   Note Type   Cancel Reasons Intubated/sedated     Umer Cassidy PT
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
## 2021-08-05 NOTE — ASSESSMENT & PLAN NOTE
60 y o  male with a history of seizure disorder noncompliant with Keppra 1500mg bid, right posterior temporal IPH, SAH, prior Cdiff x 2, alcohol abuse and heavy tobacco use who presented via EMS on 8/4/21 with AMS after being found down at home by his daughter minimally responsive covered in feces near the toilet  GCS 5 in the field  Patient was reported to have R gaze deviation with a nonreactive R pupil and was combative and agitated  Patient was intubated for airway protection  He was extubated on 8/5 and initially appeared postictal, though this slowly improved throughout the day  Neuro exam 08/07/2021  Patient oriented x 4   No focal neurologic deficits, motor exam is limited in right upper extremity and right lower extremity due to pain  MRI brain with and without contrast completed  no further inpatient neurologic recommendations  Pt endorses not taking home keppra as prescribed, likely cause of breakthrough seizure  Discussed with pt at length importance of adhering to taking keppra as prescribed  Pt notes understanding and compliance with plan  Results:  - CTA H/N negative for acute intracranial abnormality, chronic lacunar infarct noted in the basal ganglia, 40% stenosis of the right ICA at the bulb  Negative LV or flow-limiting stenosis  - CT C spine, CT C/A/P all grossly unremarkable  -MRI brain with and without contrast 08/07/2021: No acute infarction, intracranial hemorrhage or mass effect  Moderate, chronic microangiopathy on this motion degraded study  Small, chronic infarction with evidence of prior, chronic hemorrhagic components in the posterior inferior aspect of the right temporal lobe  - R elbow Xray pending  - Notable Labs: CK of 6068  - Ethanol level negative  - UDS positive for benzos but no benzos given by EMS according to ICU team - Prolonged QT noted of EKG       Plan:  - Keppra 2g bolus given 8/5/21  - Continue maintenance with Keppra 1500mg bid  · When providing prescription for patient please ensure that there is at least 1 refill on script  - Of note, [address] may not be the best medication for this patient long term in the setting of his known aggression; however due to his chronic alcohol use and prior noncompliance other AEDs may also be problematic  After conversation with Dr Emelia Cid 8/5/21-8/6/21, she would like to start with Keppra for now and his AED regimen can be switched at a later time  - Ativan prn for motor seizure > 2minutes  - Seizure precautions   - MRI brain w wo contrast completed 08/07/2021  - Alberto Dot form completed 08/05/2021  Patient was informed he is not to drive

## 2021-08-05 NOTE — ASSESSMENT & PLAN NOTE
- Chronic seizures on and off over 1 5 years  - Previously prescribed Keppra 1500mg bid  - Noncompliant   - History of subclinical and clinical epileptic seizures captured on vEEG in 8/2020  - 3/2021 routine EEG x 2 WNL
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
## 2021-08-05 NOTE — OCCUPATIONAL THERAPY NOTE
OT CANCEL NOTE    OT orders received  Chart reviewed  Pt is currently intubated/sedated, not following commands and not appropriate to engage in skilled OT services at this time  Will hold initial OT evaluation  Will continue to follow pt on caseload and see pt when medically stable and as clinically appropriate         08/05/21 0854   Note Type   Cancel Reasons Intubated/sedated       Michelle Santamaria MS, OTR/L

## 2021-08-05 NOTE — ED PROCEDURE NOTE
Procedure  POC FAST US    Date/Time: 8/4/2021 8:00 PM  Performed by: Twila Santos MD  Authorized by: Twila Santos MD     Patient location:  ED  Other Assisting Provider: Yes (comment)    Procedure details:     Exam Type:  Diagnostic    Indications: blunt abdominal trauma      Assess for:  Hemothorax, intra-abdominal fluid, pericardial effusion and pneumothorax    Technique: FAST      Views obtained:  Heart - Pericardial sac, RUQ - Irvin's Pouch, LUQ - Splenorenal space and Suprapubic - Pouch of Eliu    Image quality: diagnostic      Image availability:  Images available in PACS  FAST Findings:     RUQ (Hepatorenal) free fluid: absent      LUQ (Splenorenal) free fluid: absent      Suprapubic free fluid: absent      Cardiac wall motion: identified      Pericardial effusion: trace    Interpretation:     Impressions: indeterminate      Positive findings: fluid in pericardial space    Comments:      Neg FAST other than trace pericardial effusion                      Twila Santos MD  08/04/21 3465

## 2021-08-05 NOTE — ASSESSMENT & PLAN NOTE
? Initial CK 1,124, downtrending  ? Continue IV hydration   ? Baseline renal function  ?  Management per [facility]

## 2021-08-06 ENCOUNTER — APPOINTMENT (INPATIENT)
Dept: RADIOLOGY | Facility: HOSPITAL | Age: 56
DRG: 100 | End: 2021-08-06
Payer: COMMERCIAL

## 2021-08-06 PROBLEM — D69.6 THROMBOCYTOPENIA (HCC): Status: ACTIVE | Noted: 2021-08-06

## 2021-08-06 PROBLEM — Z72.0 TOBACCO ABUSE: Status: ACTIVE | Noted: 2021-08-06

## 2021-08-06 PROBLEM — F32.A DEPRESSION: Status: ACTIVE | Noted: 2021-08-06

## 2021-08-06 PROBLEM — E78.5 HYPERLIPIDEMIA: Status: ACTIVE | Noted: 2021-08-06

## 2021-08-06 PROBLEM — K21.9 GERD (GASTROESOPHAGEAL REFLUX DISEASE): Status: ACTIVE | Noted: 2021-08-06

## 2021-08-06 PROBLEM — I44.7 LEFT BUNDLE BRANCH BLOCK: Status: ACTIVE | Noted: 2021-08-06

## 2021-08-06 PROBLEM — I10 HYPERTENSION: Status: ACTIVE | Noted: 2021-08-06

## 2021-08-06 LAB
ALBUMIN SERPL BCP-MCNC: 2.7 G/DL (ref 3.5–5)
ANION GAP SERPL CALCULATED.3IONS-SCNC: 8 MMOL/L (ref 4–13)
ANION GAP SERPL CALCULATED.3IONS-SCNC: 9 MMOL/L (ref 4–13)
ATRIAL RATE: 102 BPM
ATRIAL RATE: 81 BPM
ATRIAL RATE: 83 BPM
ATRIAL RATE: 84 BPM
ATRIAL RATE: 85 BPM
ATRIAL RATE: 87 BPM
BACTERIA UR CULT: NORMAL
BASOPHILS # BLD AUTO: 0.03 THOUSANDS/ΜL (ref 0–0.1)
BASOPHILS NFR BLD AUTO: 0 % (ref 0–1)
BUN SERPL-MCNC: 6 MG/DL (ref 5–25)
BUN SERPL-MCNC: 7 MG/DL (ref 5–25)
CALCIUM ALBUM COR SERPL-MCNC: 8 MG/DL (ref 8.3–10.1)
CALCIUM SERPL-MCNC: 7 MG/DL (ref 8.3–10.1)
CALCIUM SERPL-MCNC: 7.1 MG/DL (ref 8.3–10.1)
CHLORIDE SERPL-SCNC: 102 MMOL/L (ref 100–108)
CHLORIDE SERPL-SCNC: 99 MMOL/L (ref 100–108)
CHOLEST SERPL-MCNC: 177 MG/DL (ref 50–200)
CK MB SERPL-MCNC: 3.9 NG/ML (ref 0–5)
CK MB SERPL-MCNC: 4 NG/ML (ref 0–5)
CK MB SERPL-MCNC: <1 % (ref 0–2.5)
CK MB SERPL-MCNC: <1 % (ref 0–2.5)
CK SERPL-CCNC: 4143 U/L (ref 39–308)
CK SERPL-CCNC: 4294 U/L (ref 39–308)
CO2 SERPL-SCNC: 27 MMOL/L (ref 21–32)
CO2 SERPL-SCNC: 27 MMOL/L (ref 21–32)
CREAT SERPL-MCNC: 0.53 MG/DL (ref 0.6–1.3)
CREAT SERPL-MCNC: 0.59 MG/DL (ref 0.6–1.3)
EOSINOPHIL # BLD AUTO: 0.06 THOUSAND/ΜL (ref 0–0.61)
EOSINOPHIL NFR BLD AUTO: 1 % (ref 0–6)
ERYTHROCYTE [DISTWIDTH] IN BLOOD BY AUTOMATED COUNT: 14.3 % (ref 11.6–15.1)
GFR SERPL CREATININE-BSD FRML MDRD: 112 ML/MIN/1.73SQ M
GFR SERPL CREATININE-BSD FRML MDRD: 117 ML/MIN/1.73SQ M
GLUCOSE SERPL-MCNC: 114 MG/DL (ref 65–140)
GLUCOSE SERPL-MCNC: 75 MG/DL (ref 65–140)
HCT VFR BLD AUTO: 33.4 % (ref 36.5–49.3)
HDLC SERPL-MCNC: 62 MG/DL
HGB BLD-MCNC: 11.8 G/DL (ref 12–17)
IMM GRANULOCYTES # BLD AUTO: 0.07 THOUSAND/UL (ref 0–0.2)
IMM GRANULOCYTES NFR BLD AUTO: 1 % (ref 0–2)
LDLC SERPL CALC-MCNC: 83 MG/DL (ref 0–100)
LYMPHOCYTES # BLD AUTO: 0.82 THOUSANDS/ΜL (ref 0.6–4.47)
LYMPHOCYTES NFR BLD AUTO: 11 % (ref 14–44)
MAGNESIUM SERPL-MCNC: 1.8 MG/DL (ref 1.6–2.6)
MCH RBC QN AUTO: 35.4 PG (ref 26.8–34.3)
MCHC RBC AUTO-ENTMCNC: 35.3 G/DL (ref 31.4–37.4)
MCV RBC AUTO: 100 FL (ref 82–98)
MONOCYTES # BLD AUTO: 0.71 THOUSAND/ΜL (ref 0.17–1.22)
MONOCYTES NFR BLD AUTO: 10 % (ref 4–12)
NEUTROPHILS # BLD AUTO: 5.82 THOUSANDS/ΜL (ref 1.85–7.62)
NEUTS SEG NFR BLD AUTO: 77 % (ref 43–75)
NRBC BLD AUTO-RTO: 0 /100 WBCS
P AXIS: 28 DEGREES
P AXIS: 35 DEGREES
P AXIS: 39 DEGREES
P AXIS: 47 DEGREES
P AXIS: 48 DEGREES
P AXIS: 59 DEGREES
PHOSPHATE SERPL-MCNC: 2.4 MG/DL (ref 2.7–4.5)
PLATELET # BLD AUTO: 98 THOUSANDS/UL (ref 149–390)
PMV BLD AUTO: 10.2 FL (ref 8.9–12.7)
POTASSIUM SERPL-SCNC: 3 MMOL/L (ref 3.5–5.3)
POTASSIUM SERPL-SCNC: 3.4 MMOL/L (ref 3.5–5.3)
PR INTERVAL: 133 MS
PR INTERVAL: 138 MS
PR INTERVAL: 146 MS
PR INTERVAL: 150 MS
PR INTERVAL: 154 MS
PR INTERVAL: 158 MS
QRS AXIS: 14 DEGREES
QRS AXIS: 19 DEGREES
QRS AXIS: 24 DEGREES
QRS AXIS: 26 DEGREES
QRS AXIS: 32 DEGREES
QRS AXIS: 49 DEGREES
QRSD INTERVAL: 133 MS
QRSD INTERVAL: 133 MS
QRSD INTERVAL: 138 MS
QRSD INTERVAL: 138 MS
QRSD INTERVAL: 142 MS
QRSD INTERVAL: 142 MS
QT INTERVAL: 329 MS
QT INTERVAL: 350 MS
QT INTERVAL: 471 MS
QT INTERVAL: 504 MS
QT INTERVAL: 504 MS
QT INTERVAL: 538 MS
QTC INTERVAL: 417 MS
QTC INTERVAL: 429 MS
QTC INTERVAL: 567 MS
QTC INTERVAL: 586 MS
QTC INTERVAL: 596 MS
QTC INTERVAL: 633 MS
RBC # BLD AUTO: 3.33 MILLION/UL (ref 3.88–5.62)
SODIUM SERPL-SCNC: 134 MMOL/L (ref 136–145)
SODIUM SERPL-SCNC: 138 MMOL/L (ref 136–145)
T WAVE AXIS: 101 DEGREES
T WAVE AXIS: 103 DEGREES
T WAVE AXIS: 194 DEGREES
T WAVE AXIS: 212 DEGREES
T WAVE AXIS: 73 DEGREES
T WAVE AXIS: 88 DEGREES
TRIGL SERPL-MCNC: 160 MG/DL
TROPONIN I SERPL-MCNC: 0.08 NG/ML
TROPONIN I SERPL-MCNC: 0.09 NG/ML
VENTRICULAR RATE: 102 BPM
VENTRICULAR RATE: 81 BPM
VENTRICULAR RATE: 83 BPM
VENTRICULAR RATE: 84 BPM
VENTRICULAR RATE: 85 BPM
VENTRICULAR RATE: 87 BPM
WBC # BLD AUTO: 7.51 THOUSAND/UL (ref 4.31–10.16)

## 2021-08-06 PROCEDURE — 99254 IP/OBS CNSLTJ NEW/EST MOD 60: CPT | Performed by: INTERNAL MEDICINE

## 2021-08-06 PROCEDURE — 93010 ELECTROCARDIOGRAM REPORT: CPT | Performed by: INTERNAL MEDICINE

## 2021-08-06 PROCEDURE — NC001 PR NO CHARGE: Performed by: INTERNAL MEDICINE

## 2021-08-06 PROCEDURE — 82553 CREATINE MB FRACTION: CPT | Performed by: STUDENT IN AN ORGANIZED HEALTH CARE EDUCATION/TRAINING PROGRAM

## 2021-08-06 PROCEDURE — 93005 ELECTROCARDIOGRAM TRACING: CPT

## 2021-08-06 PROCEDURE — 82550 ASSAY OF CK (CPK): CPT | Performed by: STUDENT IN AN ORGANIZED HEALTH CARE EDUCATION/TRAINING PROGRAM

## 2021-08-06 PROCEDURE — 97163 PT EVAL HIGH COMPLEX 45 MIN: CPT

## 2021-08-06 PROCEDURE — 83735 ASSAY OF MAGNESIUM: CPT | Performed by: PHYSICIAN ASSISTANT

## 2021-08-06 PROCEDURE — 73564 X-RAY EXAM KNEE 4 OR MORE: CPT

## 2021-08-06 PROCEDURE — 99232 SBSQ HOSP IP/OBS MODERATE 35: CPT | Performed by: PSYCHIATRY & NEUROLOGY

## 2021-08-06 PROCEDURE — 97166 OT EVAL MOD COMPLEX 45 MIN: CPT

## 2021-08-06 PROCEDURE — 80048 BASIC METABOLIC PNL TOTAL CA: CPT | Performed by: STUDENT IN AN ORGANIZED HEALTH CARE EDUCATION/TRAINING PROGRAM

## 2021-08-06 PROCEDURE — 84484 ASSAY OF TROPONIN QUANT: CPT | Performed by: EMERGENCY MEDICINE

## 2021-08-06 PROCEDURE — 80061 LIPID PANEL: CPT | Performed by: STUDENT IN AN ORGANIZED HEALTH CARE EDUCATION/TRAINING PROGRAM

## 2021-08-06 PROCEDURE — 85025 COMPLETE CBC W/AUTO DIFF WBC: CPT | Performed by: PHYSICIAN ASSISTANT

## 2021-08-06 PROCEDURE — 99232 SBSQ HOSP IP/OBS MODERATE 35: CPT | Performed by: INTERNAL MEDICINE

## 2021-08-06 PROCEDURE — 80069 RENAL FUNCTION PANEL: CPT | Performed by: PHYSICIAN ASSISTANT

## 2021-08-06 PROCEDURE — 93306 TTE W/DOPPLER COMPLETE: CPT | Performed by: INTERNAL MEDICINE

## 2021-08-06 RX ORDER — LIDOCAINE 50 MG/G
1 PATCH TOPICAL ONCE
Status: COMPLETED | OUTPATIENT
Start: 2021-08-06 | End: 2021-08-06

## 2021-08-06 RX ORDER — FOLIC ACID 1 MG/1
1 TABLET ORAL DAILY
Status: DISCONTINUED | OUTPATIENT
Start: 2021-08-07 | End: 2021-08-09 | Stop reason: HOSPADM

## 2021-08-06 RX ORDER — NICOTINE 21 MG/24HR
14 PATCH, TRANSDERMAL 24 HOURS TRANSDERMAL DAILY PRN
Status: DISCONTINUED | OUTPATIENT
Start: 2021-08-06 | End: 2021-08-09 | Stop reason: HOSPADM

## 2021-08-06 RX ORDER — MUSCLE RUB CREAM 100; 150 MG/G; MG/G
CREAM TOPICAL 4 TIMES DAILY PRN
Status: DISCONTINUED | OUTPATIENT
Start: 2021-08-06 | End: 2021-08-09 | Stop reason: HOSPADM

## 2021-08-06 RX ORDER — ALBUTEROL SULFATE 90 UG/1
2 AEROSOL, METERED RESPIRATORY (INHALATION) EVERY 4 HOURS PRN
Status: DISCONTINUED | OUTPATIENT
Start: 2021-08-06 | End: 2021-08-09 | Stop reason: HOSPADM

## 2021-08-06 RX ORDER — LANOLIN ALCOHOL/MO/W.PET/CERES
100 CREAM (GRAM) TOPICAL DAILY
Status: DISCONTINUED | OUTPATIENT
Start: 2021-08-07 | End: 2021-08-09 | Stop reason: HOSPADM

## 2021-08-06 RX ORDER — ACETAMINOPHEN 325 MG/1
650 TABLET ORAL EVERY 6 HOURS PRN
Status: DISCONTINUED | OUTPATIENT
Start: 2021-08-06 | End: 2021-08-09 | Stop reason: HOSPADM

## 2021-08-06 RX ORDER — POTASSIUM CHLORIDE 20 MEQ/1
40 TABLET, EXTENDED RELEASE ORAL ONCE
Status: COMPLETED | OUTPATIENT
Start: 2021-08-06 | End: 2021-08-06

## 2021-08-06 RX ORDER — MAGNESIUM SULFATE HEPTAHYDRATE 40 MG/ML
2 INJECTION, SOLUTION INTRAVENOUS ONCE
Status: COMPLETED | OUTPATIENT
Start: 2021-08-06 | End: 2021-08-06

## 2021-08-06 RX ORDER — SODIUM CHLORIDE, SODIUM GLUCONATE, SODIUM ACETATE, POTASSIUM CHLORIDE, MAGNESIUM CHLORIDE, SODIUM PHOSPHATE, DIBASIC, AND POTASSIUM PHOSPHATE .53; .5; .37; .037; .03; .012; .00082 G/100ML; G/100ML; G/100ML; G/100ML; G/100ML; G/100ML; G/100ML
1000 INJECTION, SOLUTION INTRAVENOUS ONCE
Status: COMPLETED | OUTPATIENT
Start: 2021-08-06 | End: 2021-08-06

## 2021-08-06 RX ORDER — SODIUM CHLORIDE, SODIUM GLUCONATE, SODIUM ACETATE, POTASSIUM CHLORIDE, MAGNESIUM CHLORIDE, SODIUM PHOSPHATE, DIBASIC, AND POTASSIUM PHOSPHATE .53; .5; .37; .037; .03; .012; .00082 G/100ML; G/100ML; G/100ML; G/100ML; G/100ML; G/100ML; G/100ML
150 INJECTION, SOLUTION INTRAVENOUS CONTINUOUS
Status: DISCONTINUED | OUTPATIENT
Start: 2021-08-06 | End: 2021-08-07

## 2021-08-06 RX ORDER — FAMOTIDINE 20 MG/1
20 TABLET, FILM COATED ORAL 2 TIMES DAILY
Status: DISCONTINUED | OUTPATIENT
Start: 2021-08-06 | End: 2021-08-09 | Stop reason: HOSPADM

## 2021-08-06 RX ORDER — GABAPENTIN 100 MG/1
100 CAPSULE ORAL ONCE
Status: COMPLETED | OUTPATIENT
Start: 2021-08-06 | End: 2021-08-06

## 2021-08-06 RX ADMIN — LEVETIRACETAM 1500 MG: 750 TABLET ORAL at 09:05

## 2021-08-06 RX ADMIN — FAMOTIDINE 20 MG: 20 TABLET ORAL at 17:07

## 2021-08-06 RX ADMIN — POTASSIUM PHOSPHATE, MONOBASIC AND POTASSIUM PHOSPHATE, DIBASIC 30 MMOL: 224; 236 INJECTION, SOLUTION, CONCENTRATE INTRAVENOUS at 07:54

## 2021-08-06 RX ADMIN — ACETAMINOPHEN 650 MG: 325 TABLET, FILM COATED ORAL at 08:12

## 2021-08-06 RX ADMIN — FOLIC ACID 1 MG: 5 INJECTION, SOLUTION INTRAMUSCULAR; INTRAVENOUS; SUBCUTANEOUS at 09:30

## 2021-08-06 RX ADMIN — GABAPENTIN 100 MG: 100 CAPSULE ORAL at 22:11

## 2021-08-06 RX ADMIN — LEVETIRACETAM 1500 MG: 750 TABLET ORAL at 22:11

## 2021-08-06 RX ADMIN — ENOXAPARIN SODIUM 40 MG: 40 INJECTION SUBCUTANEOUS at 09:05

## 2021-08-06 RX ADMIN — SODIUM CHLORIDE, SODIUM GLUCONATE, SODIUM ACETATE, POTASSIUM CHLORIDE, MAGNESIUM CHLORIDE, SODIUM PHOSPHATE, DIBASIC, AND POTASSIUM PHOSPHATE 1000 ML: .53; .5; .37; .037; .03; .012; .00082 INJECTION, SOLUTION INTRAVENOUS at 11:58

## 2021-08-06 RX ADMIN — THIAMINE HYDROCHLORIDE 500 MG: 100 INJECTION, SOLUTION INTRAMUSCULAR; INTRAVENOUS at 09:55

## 2021-08-06 RX ADMIN — SODIUM CHLORIDE, SODIUM GLUCONATE, SODIUM ACETATE, POTASSIUM CHLORIDE, MAGNESIUM CHLORIDE, SODIUM PHOSPHATE, DIBASIC, AND POTASSIUM PHOSPHATE 150 ML/HR: .53; .5; .37; .037; .03; .012; .00082 INJECTION, SOLUTION INTRAVENOUS at 20:34

## 2021-08-06 RX ADMIN — THIAMINE HYDROCHLORIDE 500 MG: 100 INJECTION, SOLUTION INTRAMUSCULAR; INTRAVENOUS at 01:48

## 2021-08-06 RX ADMIN — ACETAMINOPHEN 650 MG: 325 TABLET, FILM COATED ORAL at 17:07

## 2021-08-06 RX ADMIN — MENTHOL, METHYL SALICYLATE: 10; 15 CREAM TOPICAL at 15:50

## 2021-08-06 RX ADMIN — LIDOCAINE 2 PATCH: 50 PATCH TOPICAL at 09:05

## 2021-08-06 RX ADMIN — FAMOTIDINE 20 MG: 20 TABLET ORAL at 09:05

## 2021-08-06 RX ADMIN — MAGNESIUM SULFATE HEPTAHYDRATE 2 G: 40 INJECTION, SOLUTION INTRAVENOUS at 09:30

## 2021-08-06 RX ADMIN — SODIUM CHLORIDE, SODIUM GLUCONATE, SODIUM ACETATE, POTASSIUM CHLORIDE, MAGNESIUM CHLORIDE, SODIUM PHOSPHATE, DIBASIC, AND POTASSIUM PHOSPHATE 150 ML/HR: .53; .5; .37; .037; .03; .012; .00082 INJECTION, SOLUTION INTRAVENOUS at 13:25

## 2021-08-06 RX ADMIN — POTASSIUM CHLORIDE 40 MEQ: 1500 TABLET, EXTENDED RELEASE ORAL at 17:07

## 2021-08-06 NOTE — UTILIZATION REVIEW
Initial Clinical Review    Admission: Date/Time/Statement:   Admission Orders (From admission, onward)     Ordered        08/04/21 2139  Inpatient Admission  Once                   Orders Placed This Encounter   Procedures    Inpatient Admission     Standing Status:   Standing     Number of Occurrences:   1     Order Specific Question:   Level of Care     Answer:   Critical Care [15]     Order Specific Question:   Estimated length of stay     Answer:   More than 2 Midnights     Order Specific Question:   Certification     Answer:   I certify that inpatient services are medically necessary for this patient for a duration of greater than two midnights  See H&P and MD Progress Notes for additional information about the patient's course of treatment  ED Arrival Information     Expected Arrival Acuity    - 8/4/2021 19:35 Immediate         Means of arrival Escorted by Service Admission type    Ambulance [address] complaint    trauma        Chief Complaint   Patient presents with    Trauma     patient sound down by neighbor, per ems GCS of 5 prior to interventions  intubated prior to arrival       Initial Presentation:    60 y o  male,  To ER from home via EMS,  Admitted IP status,  Critical Level of care  For Encephalopathy Workup  (suspect seizure 2/2 noncompliance w/AED keppra)   correction of electrolyte dyscrasias and management of  Elevated CK and troponins    Daughter came home form work today,  Found pt unresponsive on the bathroom floor incontinent of feces and minimally responsive  H/o seizures, noncompliant w/Keppra and not under neurologist care  eurologists    IN ER:  Started on propofol and fentanyl for sedation while on mechanical ventilation  (intubated by EMS for airway protection)    Initial GCS 5 w/nonreactive right pupil with deviated gaze; No current seizure activity     CT head, C-spine, chest, abdomen, and pelvis were performed which were grossly unremarkable  Labs revealed  Wbc 12,  lactic acid of 2 1, CK of 1,124, and normal ethanol level  CMP demonstrated hypokalemia with a potassium of 2 8, patient's AST also elevated at 85 with normal ALT  Will wean off vent as tolerated  (extubated 8/5  @  1000)  Low threshold for cvEEG but will hold for now;    Load w/Keppra, initiate Vit B's supplementation,  Aggressively replete electrolytes; CIWA protocol  (serial assessment for s/s alcohol withdrawal w/prn ativan)  Phenobarb as needed    Fluid resuscitation- trend CK    8/5 @  1700   After the patient's extubation earlier this morning, he became more lucid as the day went on  Eventually, the patient was oriented x4 and competent  For the entire afternoon, the patient stated that he wanted to leave against medical advice  He refused to comply with blood work, even when the patient was reporting chest pain  He eventually signed paperwork to leave AMA     About 1-2 hours after the patient signed his paperwork and discharge documents were handed to him, he decided that it was in his best interest to stay  The patient will be started on Keppra 1500 mg twice daily after his 2 g load earlier today  Will order an MRI with and without contrast with Ativan for claustrophobia    Date:    8/6     Day 2:   Overnight, the patient had a troponin leak (suspect  2/2 seizure & "down time" )  No  acute ischemic changes on ECG  desats into 80's while sleeping, suspect 2/2 smoking,  Given supplemental oxygen 2L NC prn      TTE pending  MRI brain pending  Cont assessments for alcohol withdrawal;  Cont telemetry monitoring - QTC "stable"  Not increasing  Pt is  A/o x3,  GCS 15,  Tolerating po diet       ED Triage Vitals   Temperature Pulse Respirations Blood Pressure SpO2   08/04/21 1945 08/04/21 1938 08/04/21 1938 08/04/21 1938 08/04/21 1938   100 °F (37 8 °C) (!) 119 14 151/83 100 %      Temp Source Heart Rate Source Patient Position - Orthostatic VS BP Location FiO2 (%)   08/04/21 1945 08/04/21 1938 08/04/21 1938 08/05/21 1600 08/05/21 0200   Tympanic Monitor Lying Left arm 90      Pain Score       08/04/21 2340       Med Not Given for Pain - for MAR use only          Wt Readings from Last 1 Encounters:   08/06/21 77 5 kg (170 lb 13 7 oz)     Additional Vital Signs:    08/04/21 2045  --  90  13  176/106Abnormal  100 %   08/04/21 2000  --  97  14  112/63 100 %   08/04/21 19:45:02  100 °F   102  14  98/61 100 %     08/06/21 0746  98 5 °F   86  --  153/80 --   08/06/21 0600  --  80  12  160/80 94 %       08/05/21 1400  99 7 °F   80 14 119/74 96 %  --   08/05/21 1100  100 °F  90 15 117/65 97 %  --   08/05/21 1000  EXTUBATED   99 7 °F  102 18 116/69 98 %  room air          Pertinent Labs/Diagnostic Test Results:   8/5  EKG -  Nsr, lbbb  8/5  ECHO -  LVEF  65%  VENTRICULAR SEPTUM: There was mild dyssynergic motion  These changes are consistent with LBBB  8/5  cxr  No acute cardiopulmonary disease within limitations of supine imaging  8/4  CTA Head/neck -  Approximate 40 percent stenosis of the right internal carotid artery at the bulb  No large vessel flow limiting stenosis  No signs of injury within the head or neck  8/4  CTAP - No acute injury within the chest, abdomen or pelvis             Results from last 7 days   Lab Units 08/06/21  0529 08/05/21  0530 08/04/21 1951 08/04/21  1943   WBC Thousand/uL 7 51 8 81 12 08*  --    HEMOGLOBIN g/dL 11 8* 12 4 13 4  --    I STAT HEMOGLOBIN g/dl  --   --   --  12 9   HEMATOCRIT % 33 4* 33 9* 36 7  --    HEMATOCRIT, ISTAT %  --   --   --  38   PLATELETS Thousands/uL 98* 97* 114*  --    NEUTROS ABS Thousands/µL 5 82 6 74 10 57*  --          Results from last 7 days   Lab Units 08/06/21  0529 08/05/21  1901 08/05/21  1228 08/05/21  0530 08/04/21  2238   SODIUM mmol/L 138 138 142 139 138   POTASSIUM mmol/L 3 0* 3 5 3 5 2 8* 4 8   CHLORIDE mmol/L 102 106 104 103 104   CO2 mmol/L 27 26 25 25 25   ANION GAP mmol/L 9 6 13 11 9   BUN mg/dL 6 4* 5 5 5   CREATININE mg/dL 0 53* 0 61 0 54* 0 49* 0 72   EGFR ml/min/1 73sq m 117 110 116 120 103   CALCIUM mg/dL 7 0* 6 7* 6 9* 6 5* 7 4*   MAGNESIUM mg/dL 1 8 2 0 2 5 1 8  --    PHOSPHORUS mg/dL 2 4*  --  2 6* 2 5* 2 8     Results from last 7 days   Lab Units 08/06/21  0529 08/05/21  1228 08/05/21  0530 08/04/21 1951   AST U/L  --  76* 77* 85*   ALT U/L  --  38 40 47   ALK PHOS U/L  --  110 105 136*   TOTAL PROTEIN g/dL  --  5 2* 5 1* 5 9*   ALBUMIN g/dL 2 7* 2 6* 2 4* 3 1*   TOTAL BILIRUBIN mg/dL  --  1 20* 1 29* 0 96         Results from last 7 days   Lab Units 08/06/21  0529 08/05/21  1901 08/05/21  1228 08/05/21  0530 08/04/21 2238 08/04/21 1951   GLUCOSE RANDOM mg/dL 75 86 82 100 105 107     Results from last 7 days   Lab Units 08/05/21  0626   PH ART  7 468*   PCO2 ART mm Hg 37 6   PO2 ART mm Hg 92 0   HCO3 ART mmol/L 26 6   BASE EXC ART mmol/L 2 9   O2 CONTENT ART mL/dL 16 5   O2 HGB, ARTERIAL % 96 6   ABG SOURCE  Radial, Left         Results from last 7 days   Lab Units 08/04/21  1943   PH, BRANDON I-STAT  7 423*   PCO2, BRANDON ISTAT mm HG 38 9*   PO2, BRANDON ISTAT mm HG 47 0*   HCO3, BRANDON ISTAT mmol/L 25 3   I STAT BASE EXC mmol/L 1   I STAT O2 SAT % 84     Results from last 7 days   Lab Units 08/06/21  0846 08/05/21  0530 08/04/21 1951   CK TOTAL U/L 4,143* 1,394* 1,124*   CK MB INDEX %  --  <1 0 <1 0   CK MB ng/mL  --  6 6* 8 4*     Results from last 7 days   Lab Units 08/06/21  0529 08/05/21  2328 08/05/21  1514   TROPONIN I ng/mL 0 09* 0 08* 0 09*         Results from last 7 days   Lab Units 08/04/21  1951   PROTIME seconds 12 3   INR  0 91   PTT seconds 27             Results from last 7 days   Lab Units 08/04/21  2238 08/04/21 1951   LACTIC ACID mmol/L 1 9 2 1*     Results from last 7 days   Lab Units 08/04/21 2110 08/04/21  2104   CLARITY UA   --  Clear   COLOR UA  see chart Yellow   SPEC GRAV UA   --  1 010   PH UA   --  6 0   GLUCOSE UA mg/dl  --  100 (1/10%)*   KETONES UA mg/dl --  Negative   BLOOD UA   --  Trace*   PROTEIN UA mg/dl  --  Negative   NITRITE UA   --  Negative   BILIRUBIN UA   --  Negative   UROBILINOGEN UA E U /dl  --  0 2   LEUKOCYTES UA   --  Negative   WBC UA /hpf  --  None Seen   RBC UA /hpf  --  None Seen   BACTERIA UA /hpf  --  None Seen   EPITHELIAL CELLS WET PREP /hpf  --  None Seen     Results from last 7 days   Lab Units 08/04/21 2058   AMPH/METH  Negative   BARBITURATE UR  Negative   BENZODIAZEPINE UR  Positive*   COCAINE UR  Negative   METHADONE URINE  Negative   OPIATE UR  Negative   PCP UR  Negative   THC UR  Negative     Results from last 7 days   Lab Units 08/04/21 2000 08/04/21  1951   ETHANOL LVL mg/dL <3  --    ACETAMINOPHEN LVL ug/mL  --  <2*   SALICYLATE LVL mg/dL  --  3       Results from last 7 days   Lab Units 08/05/21  1229   BLOOD CULTURE  Received in Microbiology Lab  Culture in Progress  ED Treatment:   Medication Administration from 08/04/2021 1923 to 08/04/2021 2249       Date/Time Order Dose Route Action     08/04/2021 1938 propofol (DIPRIVAN) 200 MG/20ML bolus injection 80 mg Intravenous Given     08/04/2021 1938 propofol (DIPRIVAN) 200 MG/20ML bolus injection 20 mcg/kg/min Intravenous New Bag     08/04/2021 2104 lactated ringers bolus 1,000 mL 1,000 mL Intravenous New Bag     08/04/2021 2020 fentanyl citrate (PF) 100 MCG/2ML 100 mcg 100 mcg Intravenous Given     08/04/2021 2122 fentaNYL 1000 mcg in sodium chloride 0 9% 100mL infusion 50 mcg/hr Intravenous New Bag     08/04/2021 2100 propofol (DIPRIVAN) 1000 mg in 100 mL infusion (premix) 30 mcg/kg/min Intravenous New Bag     08/04/2021 2135 potassium chloride 20 mEq IVPB (premix) 20 mEq Intravenous New Bag        No past medical history on file  Present on Admission:   (Resolved) Toxic metabolic encephalopathy   Prolonged Q-T interval on ECG   Rhabdomyolysis   Seizure disorder (HCC)   History of alcohol abuse      Admitting Diagnosis: Trauma [T14 90XA]  Age/Sex: 60 y o  male  Admission Orders:   ICU w/continuous cardiopumonary monitoring on mechanical ventilation/wean as tolerated; Consult case mgt and neurology;   PT/OT;  MRI brain, ECHO,  Daily wgt;  I/O q 2 hr;  Neuro cks q 4 hr;  CIWA assessments q 4 hr & PRN;  dyspghagia assessment prior to po intake    8/4   ONE TIME MEDS      @ 2340  PO Kcl 40 meq   @ 2345  IV Mg 2 g     8/5  @  0744  IV Mg 2g  @  0800  IV Protonix 40 mg ,  Po Kcl 40 meq  @  0920  IV Kphos 30 mmol   @  1013  1 L IV Isolyte infusion  @  1522   IV Keppra 2ooo mg infusion  @  1612   IV Kphos  30 mmol     8/6  @  0800  IV K phos        8/6   Scheduled Medications:  enoxaparin, 40 mg, Subcutaneous, Q24H LUCIA  famotidine, 20 mg, Oral, BID  folic acid IVPB, 1 mg, Intravenous, Daily  levETIRAcetam, 1,500 mg, Oral, Q12H LUCIA  lidocaine, 1 patch, Topical, Once  thiamine, 500 mg, Intravenous, Q8H    Continuous IV Infusions:     PRN Meds:  acetaminophen, 650 mg, Oral, Q6H PRN  albuterol, 2 puff, Inhalation, Q4H PRN  fentanyl citrate (PF), 50 mcg, Intravenous, Q1H PRN  LORazepam, 0 5 mg, Intravenous, Once PRN  LORazepam, 1 mg, Intravenous, Once PRN  menthol-methyl salicylate, , Apply externally, 4x Daily PRN  midazolam, 2 mg, Intravenous, Q4H PRN        Network Utilization Review Department  ATTENTION: Please call with any questions or concerns to [phone] and carefully listen to the prompts so that you are directed to the right person  All voicemails are confidential   [facility] [place] all requests for admission clinical reviews, approved or denied determinations and any other requests to dedicated fax number below belonging to the campus where the patient is receiving treatment   List of dedicated fax numbers for the Facilities:  [address] DENIALS (Administrative/Medical Necessity) [phone]   [address] [place] [address] (Maternity/NICU/Pediatrics) 261 [facility] [address] Michael Jimenez [id number] [place] [address] [address] [facility] [address] [place] [id number] [phone]

## 2021-08-06 NOTE — UTILIZATION REVIEW
Inpatient Admission Authorization Request   NOTIFICATION OF INPATIENT ADMISSION/INPATIENT AUTHORIZATION REQUEST   SERVICING FACILITY:   [facility]  Address: [address] [place] [address], [address] [place] [address]  Tax ID: [id number]  NPI: [id number]  Place of Service: Inpatient [address] [facility] Code: 24     ATTENDING PROVIDER:  Attending Name and NPI#: Jean Marie Sarabia Md [[id number]]  Address: [address] [place] [address], [address] [place] [address]  Phone: [phone]     UTILIZATION REVIEW CONTACT:  Isaac Hwang Utilization   Network Utilization Review Department  Phone: [phone]  Fax: [phone]  Email: [email or web address]     PHYSICIAN ADVISORY SERVICES:  FOR [id number] REVIEW - MEDICAL NECESSITY DENIAL  Phone: [phone]  Fax: [phone]  Email: [email or web address]     TYPE OF REQUEST:  Inpatient Status     ADMISSION INFORMATION:  ADMISSION DATE/TIME: 8/4/21  9:38 PM  PATIENT DIAGNOSIS CODE/DESCRIPTION:  Trauma [T14 90XA]  DISCHARGE DATE/TIME: No discharge date for patient encounter  DISCHARGE DISPOSITION (IF DISCHARGED): Final discharge disposition not confirmed     IMPORTANT INFORMATION:  Please contact the Isaac Hwang directly with any questions or concerns regarding this request  Department voicemails are confidential     Send requests for admission clinical reviews, concurrent reviews, approvals, and administrative denials due to lack of clinical to fax [phone]

## 2021-08-06 NOTE — ASSESSMENT & PLAN NOTE
Patient states that his last drink was about a week ago  He denies a history of heavy alcohol abuse; however, his family states that he has been an alcoholic  Patient is on naltrexone as an outpatient to decreased cravings  CT chest abdomen pelvis revealed hepatomegaly and steatosis      Plan:  · Stress abstinence  · CIWA protocol

## 2021-08-06 NOTE — CONSULTS
Reason for Consult / Principal Problem: Prolonged QT    Physician Requesting Consult:  Sherry Galeazzi, MD    Cardiologist: Dr Sima Barnes and Plan      Current Problem List   Principal Problem:    Seizure disorder [facility])  Active Problems:    Prolonged Q-T interval on ECG    Rhabdomyolysis    Breakthrough seizure ([facility] [address] )    History of cerebral hemorrhage    History of alcohol abuse    Hypertension    Hyperlipidemia    Tobacco abuse    GERD (gastroesophageal reflux disease)    Depression    Left bundle branch block    Thrombocytopenia (HCC)    Assessment/Plan:    1  Prolonged QT and LBBB  Patient noted to have prolonged QT and LBBB on recent EKG  Patient also has electrolyte abnormalities presenting with a K of 2 8, now 3 4  ·  QT is most likely prolonged due to widened QRS complex in LBBB  · Continue maintaining electrolytes  · Ischemic workup is not appropriate at this time, will follow outpatient with home cardiologist      Subjective     CC: EKG changes    HPI: Jessica Velásquez 60y o  year old male who presents with EKG changes noted as a LBBB and QT prolongation  Patient was found unresponsive most likely due to seizure yesterday  Patient ahd an Echo done which showed EF 65%, moderate hypokinesis of the basal-mid anteroseptal, basal-mid inferoseptal, and basal-mid inferior wall,  wall thickness  moderately increased, and moderate assymetrical hypertrophy of the septum  Patient has been having electrolyte abnormalities since presentation      Denies chest pain, shortness of breath, palpitations, orthopnea, PND, pedal edema, syncope, presyncope, diaphoresis, nausea/vomiting      Remainder of ROS done and negative    Recent Results (from the past 96541 hour(s))   ECG 12 lead   Result Value    Ventricular Rate 85    Atrial Rate 85    NH Interval 150    QRSD Interval 133    QT Interval 350    QTC Interval 417    P Axis 35    QRS Axis 32    T Wave Axis 194    Narrative     Poor data quality, interpretation may be adversely affected  Normal sinus rhythm  Left atrial enlargement  Left bundle branch block  Abnormal ECG  When compared with ECG of 06-AUG-2021 06:37,  Nonspecific T wave abnormality now evident in Inferior leads  T wave inversion more evident in Anterolateral leads  QT has shortened  Confirmed by Kristyn Toscano ([id number]) on 2021 1:40:49 PM   Echo complete with contrast if indicated    Narrative    [facility] [address] [place] [address]  [phone]    Transthoracic Echocardiogram  2D, M-mode, Doppler, and Color Doppler    Study date:  05-Aug-2021    Patient: Jake Ward  MR number: [id number]  Account number: [de-identified]  : 1965  Age: 60 years  Gender: Male  Status: Inpatient  Location: Bedside  Height: 73 in  Weight: 171 6 lb  BP: 114/ 69 mmHg    Indications: Prolonged Q-T Interval    Diagnoses: R94 31 - Abnormal electrocardiogram [ECG] [EKG]    Sonographer:  Farnaz Zapata RDCS  Referring Physician:  Tayla Trotter DO  Group:  [facility]  Cardiology Fellow: Asfi Kelley MD  Interpreting Physician:  Remi Olivas DO    SUMMARY    SUMMARY:  Asymmetric hypertrophy of septum consider cardiac MRI to exclude HCM  LEFT VENTRICLE:  Systolic function was normal  Ejection fraction was estimated to be 65 %  There was moderate hypokinesis of the basal-mid anteroseptal, basal-mid inferoseptal, and basal-mid inferior wall(s)  Wall thickness was moderately increased  There was moderate assymetrical hypertrophy of the septum  Doppler parameters were consistent with abnormal left ventricular relaxation (grade 1 diastolic dysfunction)  VENTRICULAR SEPTUM:  There was mild dyssynergic motion  These changes are consistent with LBBB  TRICUSPID VALVE:  There was trace regurgitation  Pulmonary artery systolic pressure was within the normal range (26 mmHg)      HISTORY: PRIOR HISTORY: Alcohol Abuse, Seizures    PROCEDURE: The procedure was performed at the bedside  This was a routine study  The transthoracic approach was used  The study included complete 2D imaging, M-mode, complete spectral Doppler, and color Doppler  The heart rate was 90 bpm,  at the start of the study  Images were obtained from the parasternal, apical, subcostal, and suprasternal notch acoustic windows  Echocardiographic views were limited due to lung interference  Image quality was adequate  LEFT VENTRICLE: Size was normal  Systolic function was normal  Ejection fraction was estimated to be 65 %  There was moderate hypokinesis of the basal-mid anteroseptal, basal-mid inferoseptal, and basal-mid inferior wall(s)  Wall thickness  was moderately increased  There was moderate assymetrical hypertrophy of the septum  DOPPLER: Doppler parameters were consistent with abnormal left ventricular relaxation (grade 1 diastolic dysfunction)  VENTRICULAR SEPTUM: There was mild dyssynergic motion  These changes are consistent with LBBB  RIGHT VENTRICLE: The size was normal  Systolic function was normal     LEFT ATRIUM: Size was normal     RIGHT ATRIUM: Size was normal     MITRAL VALVE: Valve structure was normal  There was normal leaflet separation  DOPPLER: The transmitral velocity was within the normal range  There was no evidence for stenosis  There was trace regurgitation  AORTIC VALVE: The valve was trileaflet  Leaflets exhibited normal thickness, mild calcification, and normal cuspal separation  DOPPLER: Transaortic velocity was within the normal range  There was no evidence for stenosis  There was no  regurgitation  TRICUSPID VALVE: The valve structure was normal  There was normal leaflet separation  DOPPLER: The transtricuspid velocity was within the normal range  There was no evidence for stenosis  There was trace regurgitation  Pulmonary artery  systolic pressure was within the normal range (26 mmHg)   Estimated peak PA pressure was 26 mmHg  PULMONIC VALVE: Leaflets exhibited normal thickness, no calcification, and normal cuspal separation  DOPPLER: The transpulmonic velocity was within the normal range  There was no regurgitation  PERICARDIUM: There was no pericardial effusion  AORTA: The root exhibited normal size (3 3 cm)  The ascending aorta was normal in size (3 6 cm)  SYSTEMIC VEINS: IVC: The inferior vena cava was not well visualized  SYSTEM MEASUREMENT TABLES    2D  %FS: 40 09 %  Ao Diam: 3 28 cm  Ao asc: 3 58 cm  EDV(Teich): 59 59 ml  EF(Teich): 71 55 %  ESV(Teich): 16 95 ml  IVSd: 1 95 cm  LA Diam: 3 94 cm  LAAs A4C: 10 78 cm2  LAESV A-L A4C: 22 53 ml  LAESV MOD A4C: 20 27 ml  LALs A4C: 4 37 cm  LVEDV MOD A4C: 69 11 ml  LVEF MOD A4C: 66 94 %  LVESV MOD A4C: 22 85 ml  LVIDd: 3 74 cm  LVIDs: 2 24 cm  LVLd A4C: 8 66 cm  LVLs A4C: 6 91 cm  LVPWd: 0 96 cm  RAEDV A-L: 26 33 ml  RAEDV MOD: 24 78 ml  RALd: 4 15 cm  RVIDd: 3 28 cm  SV MOD A4C: 46 27 ml  SV(Teich): 42 64 ml    CW  AV Env  Ti: 268 7 ms  AV VTI: 27 83 cm  AV Vmax: 1 37 m/s  AV Vmean: 1 04 m/s  AV maxP 56 mmHg  AV meanP 77 mmHg  TR Vmax: 2 29 m/s  TR maxP 02 mmHg    MM  TAPSE: 2 28 cm    PW  LVOT Env  Ti: 269 46 ms  LVOT VTI: 21 75 cm  LVOT Vmax: 1 18 m/s  LVOT Vmean: 0 81 m/s  LVOT maxP 56 mmHg  LVOT meanPG: 3 01 mmHg    [facility] Accredited Echocardiography Laboratory    Prepared and electronically signed by    Meli Foss DO  Signed 06-Aug-2021 10:26:07       Family History: non-contributory  Historical Information   No past medical history on file  No past surgical history on file  Social History   Social History     Substance and Sexual Activity   Alcohol Use Not on file     Social History     Substance and Sexual Activity   Drug Use Not on file     Social History     Tobacco Use   Smoking Status Not on file     Family History: No family history on file      Review of Systems:  Review of Systems        Scheduled Meds:  Current Facility-Administered Medications   Medication Dose Route Frequency Provider Last Rate    acetaminophen  650 mg Oral Q6H PRN Elwyn Reining, DO      albuterol  2 puff Inhalation Q4H PRN Elwyn Reining, DO      enoxaparin  40 mg Subcutaneous Q24H [address] Cirilo Murillo, DO      famotidine  20 mg Oral BID Elwyn Reining, DO      fentanyl citrate (PF)  50 mcg Intravenous Q1H PRN Elwyn Reining, DO      folic acid IVPB  1 mg Intravenous Daily Cirilo Murillo, DO 1 mg (21 0930)    levETIRAcetam  1,500 mg Oral Q12H [address] Cirilo Murillo, DO      lidocaine  1 patch Topical Once [employer], DO      LORazepam  0 5 mg Intravenous Once PRN Elwyn Reining, DO      LORazepam  1 mg Intravenous Once PRN Elwyn Reining, DO      menthol-methyl salicylate   Apply externally 4x Daily PRN Elwyn Reining, DO      multi-electrolyte  150 mL/hr Intravenous Continuous Cirilo Murillo,  mL/hr (21 1325)    nicotine  14 mg Transdermal Daily PRN [employer], DO      thiamine  500 mg Intravenous Q8H Cirilo Murillo,  mg (21 0955)     Continuous Infusions:multi-electrolyte, 150 mL/hr, Last Rate: 150 mL/hr (21 1325)      PRN Meds:   acetaminophen    albuterol    fentanyl citrate (PF)    LORazepam    LORazepam    menthol-methyl salicylate    nicotine  all current active meds have been reviewed    No Known Allergies    Objective   Vitals: Temp (24hrs), Av 6 °F (37 °C), Min:97 9 °F (36 6 °C), Max:99 5 °F (37 5 °C)  Current: Temperature: 98 5 °F (36 9 °C)  Patient Vitals for the past 24 hrs:   BP Temp Temp src Pulse Resp SpO2 Weight   21 1400 124/85 -- -- 92 15 -- --   21 1200 107/71 -- -- 82 -- -- --   21 1100 110/70 -- -- 86 13 91 % --   21 1000 125/67 -- -- 88 13 95 % --   21 0800 157/82 -- -- 84 22 95 % --   21 0746 153/80 98 5 °F (36 9 °C) Oral 86 -- -- --   21 0600 160/80 -- -- 80 12 94 % 77 5 kg (170 lb 13 7 oz)   21 0500 165/83 -- -- 82 14 94 % --   21 0402 -- 97 9 °F (36 6 °C) -- -- -- -- --   08/06/21 0300 156/84 -- -- 84 12 95 % --   08/06/21 0200 152/86 -- -- 82 13 93 % --   08/06/21 0100 144/84 -- -- 78 15 90 % --   08/06/21 0000 155/86 99 5 °F (37 5 °C) -- 82 18 94 % --   08/05/21 2300 143/79 -- -- 78 15 94 % --   08/05/21 2200 148/87 -- -- 82 14 97 % --   08/05/21 2100 127/81 -- -- 82 13 95 % --   08/05/21 2000 130/72 -- -- 88 15 95 % --   08/05/21 1900 135/87 -- -- -- -- -- --   08/05/21 1600 114/69 -- -- 90 18 96 % --    Body mass index is 22 54 kg/m²  Orthostatic Blood Pressures      Most Recent Value   Blood Pressure  124/85 filed at 08/06/2021 1400   Patient Position - Orthostatic VS  Standing filed at 08/04/2021 2045              Invasive Devices     None                 Physical Exam:    General:  AO x3, no acute distress  Cardiac:  S1-S2 normal  No murmurs, rubs or gallops  Lungs:  Clear to auscultation bilaterally, no wheezing or crackles    Abdomen:  Soft nontender nondistended, positive bowel sounds  Extremities:  Warm, well perfused, pulses palpable, no ulcers or rashes, no pedal edema  Neuro: Grossly nonfocal  Psych:  Normal affect      Lab Results:   Results from last 7 days   Lab Units 08/06/21  0529 08/05/21  0530 08/04/21 1951 08/04/21  1943   WBC Thousand/uL 7 51 8 81 12 08*  --    HEMOGLOBIN g/dL 11 8* 12 4 13 4  --    I STAT HEMOGLOBIN g/dl  --   --   --  12 9   HEMATOCRIT % 33 4* 33 9* 36 7  --    HEMATOCRIT, ISTAT %  --   --   --  38   PLATELETS Thousands/uL 98* 97* 114*  --    NEUTROS PCT % 77* 77* 88*  --    MONOS PCT % 10 6 8  --       Results from last 7 days   Lab Units 08/06/21  1148 08/06/21  0529 08/05/21  2328 08/05/21  1901 08/05/21  1514 08/05/21  1228 08/05/21  0530 08/04/21 2238 08/04/21 1951 08/04/21  1943   SODIUM mmol/L 134* 138  --  138  --  142 139 138 139  --    POTASSIUM mmol/L 3 4* 3 0*  --  3 5  --  3 5 2 8* 4 8 2 8*  --    CHLORIDE mmol/L 99* 102  --  106  --  104 103 104 104  --    CO2 mmol/L 27 27  --  26 --  25 25 25 23  --    CO2, I-STAT mmol/L  --   --   --   --   --   --   --   --   --  27   BUN mg/dL 7 6  --  4*  --  5 5 5 7  --    CREATININE mg/dL 0 59* 0 53*  --  0 61  --  0 54* 0 49* 0 72 0 78  --    CALCIUM mg/dL 7 1* 7 0*  --  6 7*  --  6 9* 6 5* 7 4* 7 5*  --    ALK PHOS U/L  --   --   --   --   --  110 105  --  136*  --    ALT U/L  --   --   --   --   --  38 40  --  47  --    AST U/L  --   --   --   --   --  76* 77*  --  85*  --    GLUCOSE, ISTAT mg/dl  --   --   --   --   --   --   --   --   --  102   TROPONIN I ng/mL  --  0 09* 0 08*  --  0 09*  --   --   --   --   --    MAGNESIUM mg/dL  --  1 8  --  2 0  --  2 5 1 8  --   --   --    PHOSPHORUS mg/dL  --  2 4*  --   --   --  2 6* 2 5* 2 8  --   --    INR   --   --   --   --   --   --   --   --  0 91  --    PTT seconds  --   --   --   --   --   --   --   --  27  --    EGFR ml/min/1 73sq m 112 117  --  110  --  116 120 103 99  --      Results from last 7 days   Lab Units 08/04/21  1951   INR  0 91   PTT seconds 27     Results from last 7 days   Lab Units 08/04/21  2238   LACTIC ACID mmol/L 1 9     Results from last 7 days   Lab Units 08/06/21  0529 08/05/21  2328 08/05/21  1514   TROPONIN I ng/mL 0 09* 0 08* 0 09*     No results found for: PHART, VZR4XOO, PO2ART, EFW5SDM, V7RRTLMC, BEART, SOURCE  No components found for: HIV1X2  No results found for: HAV, HEPAIGM, HEPBIGM, HEPBCAB, HBEAG, HEPCAB  No results found for: SPEP, UPEP No results found for: HGBA1C  No results found for: CHOL   Lab Results   Component Value Date    HDL 62 08/06/2021      Lab Results   Component Value Date    LDLCALC 83 08/06/2021      Lab Results   Component Value Date    TRIG 160 (H) 08/06/2021     No components found for: PROCAL          Imaging: I have personally reviewed pertinent reports

## 2021-08-06 NOTE — ASSESSMENT & PLAN NOTE
Present on admission, required intubation  Patient extubated on 08/05  Now oriented x4      Plan:  · Resolved

## 2021-08-06 NOTE — ASSESSMENT & PLAN NOTE
Patient's platelets 97 on arrival, 98 today  Likely in the setting of alcohol abuse, and hepatomegaly/steatosis seen on CT imaging      Plan:  · Continue to monitor with daily CBC  · Encouraged alcohol cessation

## 2021-08-06 NOTE — ASSESSMENT & PLAN NOTE
Patient on pravastatin 40 mg daily at home  Unknown compliance  Lipid panel obtained on 08/06 revealed total cholesterol 177, triglycerides 160, HDL 62, LDL 83      Plan:  · Consider restarting statin therapy

## 2021-08-06 NOTE — NURSING NOTE
For transfer to 712  Report given to Adan ESQUIVEL  All due meds given  For MRI brain  MRI screening form done  Vital signs stable  Still c/o right knee pain  Xray ordered

## 2021-08-06 NOTE — PHYSICAL THERAPY NOTE
Physical Therapy Evaluation     Patient's Name: Christian Barba    Admitting Diagnosis  Trauma [T14 [id number]]    Problem List  Patient Active Problem List   Diagnosis    Prolonged Q-T interval on ECG    Rhabdomyolysis    Breakthrough seizure ([address] )    Seizure disorder ([facility] [address] )    History of cerebral hemorrhage    History of alcohol abuse       Past Medical History  No past medical history on file  Past Surgical History  No past surgical history on file  08/06/21 1040   PT Last Visit   PT Visit Date 08/06/21   Note Type   Note type Evaluation   Pain Assessment   Pain Assessment Tool 0-10   Pain Score Worst Possible Pain   Pain Location/Orientation Orientation: Left; Location: Leg;Other (Comment)  (elbow)   Hospital Pain Intervention(s) Repositioned   Home Living   Type of Home Other (Comment)  (trailer)   Home Layout One level   Bathroom Shower/Tub Walk-in shower   Prior Function   Level of [place] Independent with ADLs and functional mobility   Lives With Significant other   Receives Help From Family;Friend(s)  (local and supportive daughter)   ADL Assistance Independent   IADLs Independent   Vocational Full time employment  ([employer] )   Restrictions/Precautions   [place] Precautions Per Order No   Other Precautions Pain; Fall Risk;Telemetry;Multiple lines; Bed Alarm; Chair Alarm;Cognitive; Impulsive   General   Family/Caregiver Present No   Cognition   Orientation Level Oriented X4   RLE Assessment   RLE Assessment WNL   LLE Assessment   LLE Assessment   (grossly 3-/5 w inc pain and decreased willingness to move)   Coordination   Movements are Fluid and Coordinated 0   Coordination and Movement Description slow and guarded with L sided pain   Bed Mobility   Supine to Sit 4  Minimal assistance   Additional items Assist x 1; Increased time required;LE management   Sit to Supine Unable to assess   Additional Comments Pt left resting in chair, call bell in reach, chair alarm active   Transfers   Sit to Stand 3  Moderate assistance   Additional items Assist x 1   Stand to Sit 3  Moderate assistance   Additional items Assist x 1   Ambulation/Elevation   Gait pattern Excessively slow;Decreased L stance;Decreased foot clearance; Foward flexed; Short stride   Gait Assistance 3  Moderate assist   Additional items Assist x 1   Assistive Device Other (Comment)  (HHA)   Distance 5'   Balance   Static Sitting Fair -   Dynamic Sitting Poor +   Static Standing Poor   Ambulatory Poor   Endurance Deficit   Endurance Deficit Yes   Endurance Deficit Description limited by pain   Activity Tolerance   Activity Tolerance Patient limited by pain; Patient limited by fatigue   Medical Staff Made Aware Spoke to OT for D/C planning   Nurse Made Aware yes, nsg gave clearance to work with patient   Assessment   Prognosis Good   Problem List Decreased strength;Decreased endurance; Impaired balance;Decreased range of motion;Decreased mobility; Decreased coordination;Decreased safety awareness;Pain;Orthopedic restrictions;Decreased skin integrity; Decreased cognition; Impaired judgement   Assessment Pt is 60 y o  male seen for PT evaluation s/p admit to [facility] on [facility] w/ Seizure disorder ([address] ), r/o withdraw seizure, rhabdomyolysis  PT consulted to assess pt's functional mobility and d/c needs  Order placed for PT eval and tx  Comorbidities affecting pt's physical performance at time of assessment include: ETOH abuse, seizures, GERD, depression, R posterior temporal IPH, SAH, C-diff  PTA, pt was ambulates unrestricted distances and all terrain, has 2 ORALIA and works full time  Personal factors affecting pt at time of IE include: stairs to enter home, inability to ambulate household distances, limited home support, positive fall history, impulsivity, depression, inability to perform current job functions, unable to perform physical activity, limited insight into impairments, inability to perform IADLs and inability to perform ADLs  Please find objective findings from PT assessment regarding body systems outlined above with impairments and limitations including weakness, decreased ROM, impaired balance, decreased endurance, impaired coordination, gait deviations, pain, decreased activity tolerance, decreased functional mobility tolerance, decreased safety awareness, impaired judgement, fall risk, orthopedic restrictions, SOB upon exertion, decreased skin integrity and decreased cognition  Pt required LLE management during bed mobility with increased pain and weakness  Required increased A for standing with deficits in strength and balance  Ambulated with severely antalgic gait  The following objective measures performed on IE also reveal limitations: The patient's AM-PAC Basic Mobility Inpatient Short Form Raw Score is 14, Standardized Score is 35 55  A standardized score less than 42 9 suggests the patient may benefit from discharge to post-acute rehabilitation services  Please also refer to the recommendation of the Physical Therapist for safe discharge planning  Pt's clinical presentation is currently unstable/unpredictable seen in pt's presentation of critical care monitoring  Pt to benefit from continued PT tx to address deficits as defined above and maximize level of functional independent mobility and consistency  From PT/mobility standpoint, recommendation at time of d/c would be post acute rehabilitation services pending progress in order to facilitate return to PLOF  If pain is better controlled may progress to achieve goals to return home with increased support  Barriers to Discharge Inaccessible home environment;Decreased caregiver support   Goals   Patient Goals To decrease pain   STG Expiration Date 08/18/21   Short Term Goal #1 1  Complete bed mobility and transfers I to decrease need for caregiver in home   2  Ambulate 300' I to complete household and community mobility without A  3  Improve dynamic balance to good to decrease need for UE support during ambulation  4  Be educated & demonstate 2 steps to be able to enter home without A  Plan   Treatment/Interventions OT; Spoke to case management;Spoke to nursing;Gait training;Bed mobility; Patient/family training; Endurance training;LE strengthening/ROM; Functional transfer training   Recommendation   PT Discharge Recommendation Post acute rehabilitation services   Equipment Recommended   (TBD)   PT - OK to Discharge Yes  (if to rehab at this time)   [address] in Bed Without Bedrails 3   Lying on Back to Sitting on Edge of Flat Bed 3   Moving Bed to Chair 2   Standing Up From Chair 2   Walk in Room 2   Climb 3-5 Stairs 2   Basic Mobility Inpatient Raw Score 14   Basic Mobility Standardized Score 35 55         Bre Morrow, PT

## 2021-08-06 NOTE — PLAN OF CARE
Problem: OCCUPATIONAL THERAPY ADULT  Goal: Performs self-care activities at highest level of function for planned discharge setting  See evaluation for individualized goals  Description: Treatment Interventions: ADL retraining, Functional transfer training, UE strengthening/ROM, Endurance training, Cognitive reorientation, Patient/family training, Equipment evaluation/education, Compensatory technique education, Continued evaluation, Energy conservation, Activityengagement          See flowsheet documentation for full assessment, interventions and recommendations  Note: Limitation: Decreased ADL status, Decreased UE strength, Decreased Safe judgement during ADL, Decreased cognition, Decreased endurance, Decreased high-level ADLs, Decreased self-care trans  Prognosis: Fair  Assessment: Pt is a 59 y/o male seen for OT eval s/p adm to B after being found down @ home by dght, unresponsive  Pt is dx'd w/ seizure disorder  Pt  has no past medical history on file  Pt with active OT orders and up with assistance  orders  Pt lives with his S O in [facility] [place] w/ 2 ORALIA  Pt was I w/  ADLS and IADLS, drove, & required no use of DME PTA  Pt is currently demonstrating the following occupational deficits: Min A UB ADLS, Mod A LB ADLS, Min A bed mobility, Mod A functional mobility and transfers w/ HHA  These deficits that are impacting pt's baseline areas of occupation are a result of the following impairments: pain, endurance, activity tolerance, functional mobility, forward functional reach, balance, trunk control, functional standing tolerance, unsupportive home environment, decreased I w/ ADLS/IADLS, strength, ROM, cognitive impairments, decreased safety awareness and decreased insight into deficits  The following Occupational Performance Areas to address include: bathing/shower, toilet hygiene, dressing, medication management, socialization, health maintenance, functional mobility, community mobility, clothing management, household maintenance and job performance/volunteering  Recommend STR upon D/C   Pt to continue to benefit from acute immediate OT services to address the following goals 3-5x/week to  w/in 10-14 days:      OT Discharge Recommendation: Post acute rehabilitation services (if pt happens to refuse; would recommend formal cog eval)  OT - OK to Discharge: Yes (when medically stable)     Zenaida Zaman MS, OTR/L
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
## 2021-08-06 NOTE — ASSESSMENT & PLAN NOTE
Patient with a QTC of greater than 600 on arrival   Also in the setting of electrolyte abnormalities  QTC still prolonged despite correction  Possibly falsely widening given the patient's left bundle branch block and QRS duration      Plan:  · Replenish electrolytes as needed  · Cardiology consult  · Possible ischemic workup warranted

## 2021-08-06 NOTE — OCCUPATIONAL THERAPY NOTE
Occupational Therapy Evaluation     Patient Name: Darren Pedraza  WIHGF'R Date: 8/6/2021  Problem List  Principal Problem:    Seizure disorder [facility])  Active Problems:    Prolonged Q-T interval on ECG    Rhabdomyolysis    Breakthrough seizure ([address] )    History of cerebral hemorrhage    History of alcohol abuse    Hypertension    Hyperlipidemia    Tobacco abuse    GERD (gastroesophageal reflux disease)    Depression    Left bundle branch block    Thrombocytopenia (HCC)    Past Medical History  No past medical history on file  Past Surgical History  No past surgical history on file  08/06/21 1047   OT Last Visit   OT Visit Date 08/06/21   Note Type   Note type Evaluation   Restrictions/Precautions   Weight Bearing Precautions Per Order No   Other Precautions Cognitive; Impulsive; Chair Alarm; Bed Alarm;Multiple lines;Telemetry; Fall Risk;Pain   Pain Assessment   Pain Assessment Tool Pain Assessment not indicated - pt denies pain   Pain Score Worst Possible Pain   Pain Location/Orientation Orientation: Right;Other (Comment)  (elbow & groin)   Pain Onset/Description Descriptor: Aching;Descriptor: Discomfort   Patient's Stated Pain Goal No pain   Hospital Pain Intervention(s) Repositioned; Ambulation/increased activity; Emotional support   Home Living   Type of Home Other (Comment)  (trailer w/ 2 ORALIA)   Home Layout One level   Bathroom Shower/Tub Walk-in shower   Bathroom Toilet Standard   Bathroom Equipment Grab bars in [facility]   (denies any)   Prior Function   Level of [place] Independent with ADLs and functional mobility   Lives With Significant other   Receives Help From Family;Friend(s)  (local dght)   ADL Assistance Independent   IADLs Independent   Falls in the last 6 months 0   Vocational Full time employment   Lifestyle   Autonomy I w/ ADLS/IADLS, transfers and functional mobility PTA   Reciprocal Relationships Pt lives w/ his S O who works during the day; local [employer] who also works Service to Others Pt works full time as a leslie   Rodolfo Enjoys riding [address] ([place]) [address]  5  [address]  Supervision/Setup   [address]  [address] 3  Moderate Assistance   [address] 4  [address] 3  Moderate [address]  3  Moderate [address] 3  Moderate Assistance   Functional Deficit Steadying; Impulsive;Verbal cueing;Supervision/safety; Increased time to complete   Bed Mobility   Supine to Sit 4  Minimal assistance   Additional items Assist x 1;HOB elevated; Increased time required;Verbal cues;LE management   Sit to Supine Unable to assess   Additional Comments Sat EOB w/ Fair sitting balance/trunk control   Transfers   Sit to Stand 3  Moderate assistance   Additional items Assist x 1; Increased time required;Verbal cues   Stand to Sit 3  Moderate assistance   Additional items Assist x 1; Increased time required;Verbal cues   Additional Comments HHA used   Functional Mobility   Functional Mobility 3  Moderate assistance   Additional Comments Pt took few small steps from EOB to chair w/ Mod A x1; HHA used  VC for safety  limited by pain/fatigue   Additional items Hand hold assistance   Balance   Static Sitting Fair -   Dynamic Sitting Poor +   Static Standing Poor   Dynamic Standing Poor   Ambulatory Poor   Activity Tolerance   Activity Tolerance Patient limited by fatigue;Patient limited by pain   Medical Staff Made Aware PT   Nurse Made Aware yes   RUE Assessment   RUE Assessment X  (decreased AROM 2/2 pain in elbow)   LUE Assessment   LUE Assessment WFL   Hand Function   Gross Motor Coordination   (impaired RUE 2/2 elbow pain; LUE WFL)   Fine Motor Coordination Functional   Cognition   Overall Cognitive Status Impaired   Arousal/Participation Responsive; Cooperative Attention Attends with cues to redirect   Orientation Level Oriented X4   Memory Decreased recall of recent events   Following Commands Follows one step commands without difficulty   Comments Pt is cooperative; intermittently has been agitated over the course of his current hospital stay  Has decreased safety awareness and understanding of deficits  Recommend continued formal cognitive assessement to assist w/ safe D/C planning   Assessment   Limitation Decreased ADL status; Decreased UE strength;Decreased Safe judgement during ADL;Decreased cognition;Decreased endurance;Decreased high-level ADLs; Decreased self-care trans   Prognosis Fair   Assessment Pt is a 61 y/o male seen for OT eval s/p adm to B after being found down @ home by dght, unresponsive  Pt is dx'd w/ seizure disorder  Pt  has no past medical history on file  Pt with active OT orders and up with assistance  orders  Pt lives with his S O in [facility] w/ 2 ORALIA  Pt was I w/  ADLS and IADLS, drove, & required no use of DME PTA  Pt is currently demonstrating the following occupational deficits: Min A UB ADLS, Mod A LB ADLS, Min A bed mobility, Mod A functional mobility and transfers w/ HHA  These deficits that are impacting pt's baseline areas of occupation are a result of the following impairments: pain, endurance, activity tolerance, functional mobility, forward functional reach, balance, trunk control, functional standing tolerance, unsupportive home environment, decreased I w/ ADLS/IADLS, strength, ROM, cognitive impairments, decreased safety awareness and decreased insight into deficits  The following Occupational Performance Areas to address include: bathing/shower, toilet hygiene, dressing, medication management, socialization, health maintenance, functional mobility, community mobility, clothing management, household maintenance and job performance/volunteering  Recommend STR upon D/C   Pt to continue to benefit from acute immediate OT services to address the following goals 3-5x/week to  w/in 10-14 days:    Goals   Patient Goals to have less pain and be independent again   LTG Time Frame 10-14   Long Term Goal #1 see below listed goals   Plan   Treatment Interventions ADL retraining;Functional transfer training;UE strengthening/ROM; Endurance training;Cognitive reorientation;Patient/family training;Equipment evaluation/education; Compensatory technique education;Continued evaluation; Energy conservation; Activityengagement   Goal Expiration Date 21   OT Frequency 3-5x/wk   Recommendation   OT Discharge Recommendation Post acute rehabilitation services  (if pt happens to refuse; would recommend formal cog eval)   OT - OK to Discharge Yes  (when medically stable)   Additional Comments  The patient's raw score on the AM-PAC Daily Activity inpatient short form is 18, standardized score is 38 66, less than 39 4  Patients at this level are likely to benefit from discharge to post-acute rehabilitation services  Please refer to the recommendation of the Occupational Therapist for safe discharge planning   Additional Comments 2 Pt seen as a co-evaluation due to the patient's co-morbidities, clinically unstable presentation, and present impairments which are a regression from the patient's baseline     AM-PAC Daily Activity Inpatient   Lower Body Dressing 2   Bathing 2   Toileting 3   Upper Body Dressing 3   Grooming 4   Eating 4   Daily Activity Raw Score 18   Daily Activity Standardized Score (Calc for Raw Score >=11) 38 66   AM-PAC Applied Cognition Inpatient   Following a Speech/Presentation 3   Understanding Ordinary Conversation 4   Taking Medications 4   Remembering Where Things Are Placed or Put Away 3   Remembering List of 4-5 Errands 3   Taking Care of Complicated Tasks 2   Applied Cognition Raw Score 19   Applied Cognition Standardized Score 39 77        GOALS    1) Pt will improve activity tolerance to G for 30 min txment sessions for increase engagement in functional tasks  2) Pt will complete UB/LB dressing/self care w/ mod I using adaptive device and DME as needed  3) Pt will complete bathing w/ Mod I w/ use of AE and DME as needed  4) Pt will complete toileting w/ mod I w/ G hygiene/thoroughness using DME as needed  5) Pt will improve functional transfers to Mod I on/off all surfaces using DME as needed w/ G balance/safety   6) Pt will improve functional mobility during ADL/IADL/leisure tasks to Mod I using DME as needed w/ G balance/safety   7) Pt will participate in simulated IADL management task to increase independence to Mod I w/ G safety and endurance  8) Pt will be attentive 100% of the time during ongoing functional and formal cognitive assessment w/ G participation to assist w/ safe d/c planning/recommendations  9) Pt will demonstrate G carryover of pt/caregiver education and training as appropriate w/o cues w/ good tolerance to increase safety during functional tasks  10) Pt will demonstrate 100% carryover of energy conservation techniques t/o functional I/ADL/leisure tasks w/o cues s/p skilled education to increase endurance during functional tasks     Eusebio Ya MS, OTR/L

## 2021-08-06 NOTE — ASSESSMENT & PLAN NOTE
Acute encephalopathy secondary to seizures also in the setting of alcohol abuse  Patient states that his last drink was about a week ago  He does have a history of alcohol withdrawal seizures      Plan:  · Patient extubated on 08/05  · Initially going to leaving AMA, but decided stay  · He has a history of chronic seizures on and off for the past 1 5 years that have been documented, per the patient he has been having this for 5-6 years  · History of subclinical and clinical epileptic seizures captured on video EEG in August of 2020  · Patient has been non compliant with home Keppra 1500 mg twice daily  · He has been lost to follow-up to least 2 neurologist (Dr Eric Pathak and Dr Merline Listen in [place])  · The last time he filled Everlaw was in April of 2021, with likely only taking 1 750 mg pill daily  · Plan for MRI brain today, sedation ordered  · Video EEG if the patient declines  · CIWA protocol  · Seizure precautions  · Neurochecks

## 2021-08-06 NOTE — CASE MANAGEMENT
Pt is not a <30 day readmission or current bundle  CM met pt bedside to introduce self/CM role and begin discharge planning  Pt lives with SO in a rancher style house that has 2 ORALIA  Pt independent with ADLs PTA  Ambulating independently  DME: none  Pt was working prior to admission but does not drive  No history of VNA, STR, inpatient MH treatment or D/A treatment  CM spoke with patient regarding ETOH  Pt states he drinks 3-4 beers daily and does not consider his drinking to be problematic at this time  CM educated pt on HOST program and offered HOST referral- patient declined  Pt states preferred d/c plan is to go home  [facility] [address] [facility] [facility]  PCP: Dr Berlin Santamaria  AD/LW: None and declined information  CM to follow for d/c planning  CM reviewed d/c planning process including the following: identifying help at home, patient preference for d/c planning needs, Discharge Lounge, [facility] Meds to Bed program, availability of treatment team to discuss questions or concerns patient and/or family may have regarding understanding medications and recognizing signs and symptoms once discharged  CM also encouraged patient to follow up with all recommended appointments after discharge  Patient advised of importance for patient and family to participate in managing patients medical well being  children

## 2021-08-06 NOTE — ASSESSMENT & PLAN NOTE
Patient on pantoprazole as an outpatient  Did not restart medication in the setting of electrolyte abnormalities      Plan:  · Initiate famotidine 20 mg twice daily
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
## 2021-08-06 NOTE — PROGRESS NOTES
Daily Progress Note - [facility] GEORGE Yuan 60 y o  male MRN: [id number]  Unit/Bed#: MICU 10 Encounter: [id number]        ----------------------------------------------------------------------------------------  HPI/24hr events: Patient is a 60 y o  M with a PMHx of seizure disorder, right posterior temporal IPH, SAH, C diff x2, alcohol abuse on naltrexone with a history of alcohol withdrawal seizures, heavy tobacco abuse, depression, and GERD that presented on 8/4 via EMS after being found down by his daughter minimally responsive by the toilet covered in his own feces and urine  The patient was intubated in the field for airway protection  He was reported to have a R gaze and nonreactive R pupil  He was sedated due to agitation  He was extubated the morning following his arrival  He was also found to have a prolonged QTc with LBBB and multiple electrolyte abnormalities  He was suspected to be in a post-ictal state for a majority of that morning, but then became more lucid as the day went on  As he became more oriented, he became more agitated and belligerent  He stated that he wanted to leave multiple times despite Neurology and [address] [place] [address] the patient that he would be at risk for death given that he did not have his seizure or his QTc prolongation with LBBB worked up  The patient ended up signing AMA documentation, but then proceeded to rescind those thoughts about an hour later and decided to stay  He stated that it was in his best interest  He was loaded with 2g of keppra and started on 1500 mg maintenance dosing  TTE pending  MRI brain pending  Overnight, the patient had a troponin leak that peaked at 0 09  No acute ischemic changes on ECG  Attributed to him being found down and his seizure activity  He desaturated into the 80's last night while sleeping and required 2L NC   Likely secondary to his extensive smoking history  ---------------------------------------------------------------------------------------  SUBJECTIVE  Patient was seen and examined  He was laying comfortably in bed  He is not as agitated as yesterday  He states that he is willing to be compliant with further workup  Complaining of R elbow and R knee pain  Review of Systems   Constitutional: Negative for activity change, chills and fever  HENT: Negative for congestion, facial swelling, hearing loss, mouth sores, nosebleeds and postnasal drip  Eyes: Negative for discharge and itching  Respiratory: Negative for cough, shortness of breath and wheezing  Cardiovascular: Negative for chest pain and palpitations  Gastrointestinal: Negative for abdominal distention, abdominal pain, constipation, diarrhea and nausea  Genitourinary: Negative for difficulty urinating and dysuria  Musculoskeletal: Negative for arthralgias and myalgias  Skin: Negative for rash  Neurological: Negative for dizziness and headaches  Psychiatric/Behavioral: Negative for agitation and confusion  Review of systems was reviewed and negative unless stated above in HPI/24-hour events   ---------------------------------------------------------------------------------------  Assessment and Plan:    Neuro:   · Diagnosis:  Acute encephalopathy, suspected alcohol withdrawal seizure vs epilepsy, alcohol abuse  ? Plan: Patient extubated on 8/5 and now oriented x4  ? Patient was going to leave AMA but decided to stay  ? Patient has a history of chronic seizures on and off over the past 1 5 years  ? History of subclincal and clinical epileptic seizures captured on vEEG in 8/2020  ? 3/2021 routine EEG x 2 WNL  ? Patient has been noncompliant with home keppra 1500 mg BID  ? He has been lost to follow-up to at least 2 neurologists  ? Neurology following, loaded with 2 g keppra and started on 1 5 mg BID  ? Plan for MRI brain today, will need sedation  ?  vEEG if patient declines ? [facility] protocol  ? Consider restarting patient's home naltrexone  ? Last drink was 1 week ago  ? Seizure precautions  ? Frequent neuro checks  ? Continue thiamine and folic acid  · Diagnosis:  Analgesia and sedation  ? Plan: Patient extubated on 8/5  ? Current not requiring sedation  ? Tylenol PRN for mild/moderate pain        CV:   · Diagnosis: Prolonged QTc  · Plan: Uncertain etiology  · Patient had multiple electrolyte abnormalities on arrival  · As high as 630  · LBBB present on ECG  · Unknown ischemic workup  · TTE 8/5 showed an EF of 65% with moderate hypokinesis of the basal-mid anteroseptal, basal-mid inferoseptal, and basal-mid inferior walls with G1DD  · Diagnosis: History of hypertension, hyperlipidemia  ? Plan: Telemetry monitoring  ? Patient has not been on a statin or anti-hypertensive  ? Consider introducing ACE/ARB, -160  ? Lipid panel        Pulm:  · Diagnosis:  Acute respiratory failure in the setting of encephalopathy and likely seizure  ? Plan: extubated on 8/5  · Diagnosis: Tobacco abuse  · Plan: ~25 py history  · Unknown PFTs  · Albuterol as needed          GI:   · Diagnosis: GERD  ? Plan: Protonix discontinued in the setting of electrolyte imbalance  ? Famotidine 20 mg BID        :   · Diagnosis:  No active issues  ? Plan:  Lopez catheter discontinued 8/5  ? Monitor I/Os  ? Cr 0 53        F/E/N:   · Fluids: Bolus 1 L isolyte  · E: Hypokalemia, hypomagnesia, hypophosphatemia  · Given 30 mmol of K phos this AM  · Given 2 g of mag sulfate this AM  · N: Cardiac diet     Heme/Onc:   · Diagnosis:  No active issues  ? Plan: Lovenox for DVT ppx     Endo:   · Diagnosis:  No active issues        ID:   · Diagnosis: No active issues  ? Plan: Continue to monitor for infection  ? F/u blood cultures  ? Doubt infection          MSK/Skin:   · Diagnosis:  Rhabdomyolysis  ? Initial CK 1,124, >4K this morning  ? Patient given fluids on arrival, continue  · Diagnosis: Joint pain  ?  Plan: Lidocaine, tylenol, bengay  ? PT/OT        Disposition: Transfer to Stepdown Level 2  Code Status: Level 1 - Full Code  ---------------------------------------------------------------------------------------  ICU CORE MEASURES    Prophylaxis   VTE Pharmacologic Prophylaxis: Enoxaparin (Lovenox)  VTE Mechanical Prophylaxis: sequential compression device  Stress Ulcer Prophylaxis: Famotidine PO    ABCDE Protocol (if indicated)  Plan to perform spontaneous awakening trial today? Not applicable  Plan to perform spontaneous breathing trial today? Not applicable  Obvious barriers to extubation? Not applicable  CAM-ICU: Negative    Invasive Devices Review  Invasive Devices     Peripheral Intravenous Line            Peripheral IV 21 Left;Ventral (anterior) Forearm <1 day              Can any invasive devices be discontinued today? Not applicable  ---------------------------------------------------------------------------------------  OBJECTIVE    Vitals   Vitals:    21 0000 21 0100 21 0200 21 0300   BP: 155/86 144/84 152/86 156/84   Pulse: 82 78 82 84   Resp: 18 15 13 12   Temp: 99 5 °F (37 5 °C)      TempSrc:       SpO2: 94% 90% 93% 95%   Weight:       Height:         Temp (24hrs), Av 5 °F (37 5 °C), Min:99 °F (37 2 °C), Max:100 °F (37 8 °C)  Current: Temperature: 99 5 °F (37 5 °C)  HR: 80  BP: 160/80  RR: 12  SpO2: 94%    Respiratory:  SpO2: SpO2: 94 %       Invasive/non-invasive ventilation settings   Respiratory    Lab Data (Last 4 hours)    None         O2/Vent Data (Last 4 hours)    None                Physical Exam  Constitutional:       General: He is not in acute distress  HENT:      Head: Normocephalic and atraumatic  Right Ear: External ear normal       Left Ear: External ear normal       Nose: Nose normal       Mouth/Throat:      Mouth: Mucous membranes are moist       Pharynx: Oropharynx is clear        Comments: Poor dentition  Eyes:      Extraocular Movements: Extraocular movements intact  Pupils: Pupils are equal, round, and reactive to light  Cardiovascular:      Rate and Rhythm: Normal rate and regular rhythm  Pulses: Normal pulses  Pulmonary:      Effort: Pulmonary effort is normal       Breath sounds: Wheezing present  Abdominal:      General: Bowel sounds are normal  There is no distension  Palpations: Abdomen is soft  Tenderness: There is no abdominal tenderness  Musculoskeletal:      Right lower leg: No edema  Left lower leg: No edema  Comments: Decreased ROM of R elbow and R knee   Skin:     General: Skin is warm and dry  Capillary Refill: Capillary refill takes less than 2 seconds  Neurological:      General: No focal deficit present  Mental Status: He is alert     Psychiatric:         Mood and Affect: Mood normal          Behavior: Behavior normal          Laboratory and Diagnostics:  Results from last 7 days   Lab Units 08/05/21  0530 08/04/21 1951 08/04/21 1943   WBC Thousand/uL 8 81 12 08*  --    HEMOGLOBIN g/dL 12 4 13 4  --    I STAT HEMOGLOBIN g/dl  --   --  12 9   HEMATOCRIT % 33 9* 36 7  --    HEMATOCRIT, ISTAT %  --   --  38   PLATELETS Thousands/uL 97* 114*  --    NEUTROS PCT % 77* 88*  --    MONOS PCT % 6 8  --      Results from last 7 days   Lab Units 08/05/21  1901 08/05/21  1228 08/05/21  0530 08/04/21  2238 08/04/21 1951 08/04/21 1943   SODIUM mmol/L 138 142 139 138 139  --    POTASSIUM mmol/L 3 5 3 5 2 8* 4 8 2 8*  --    CHLORIDE mmol/L 106 104 103 104 104  --    CO2 mmol/L 26 25 25 25 23  --    CO2, I-STAT mmol/L  --   --   --   --   --  27   ANION GAP mmol/L 6 13 11 9 12  --    BUN mg/dL 4* 5 5 5 7  --    CREATININE mg/dL 0 61 0 54* 0 49* 0 72 0 78  --    CALCIUM mg/dL 6 7* 6 9* 6 5* 7 4* 7 5*  --    GLUCOSE RANDOM mg/dL 86 82 100 105 107  --    ALT U/L  --  38 40  --  47  --    AST U/L  --  76* 77*  --  85*  --    ALK PHOS U/L  --  110 105  --  136*  --    ALBUMIN g/dL  --  2 6* 2 4*  --  3 1*  --    TOTAL BILIRUBIN mg/dL  --  1 20* 1 29*  --  0 96  --      Results from last 7 days   Lab Units 08/05/21  1901 08/05/21  1228 08/05/21  0530 08/04/21  2238   MAGNESIUM mg/dL 2 0 2 5 1 8  --    PHOSPHORUS mg/dL  --  2 6* 2 5* 2 8      Results from last 7 days   Lab Units 08/04/21  1951   INR  0 91   PTT seconds 27      Results from last 7 days   Lab Units 08/05/21  2328 08/05/21  1514   TROPONIN I ng/mL 0 08* 0 09*     Results from last 7 days   Lab Units 08/04/21  2238 08/04/21  1951   LACTIC ACID mmol/L 1 9 2 1*     ABG:  Results from last 7 days   Lab Units 08/05/21  0626   PH ART  7 468*   PCO2 ART mm Hg 37 6   PO2 ART mm Hg 92 0   HCO3 ART mmol/L 26 6   BASE EXC ART mmol/L 2 9   ABG SOURCE  Radial, Left     VBG:  Results from last 7 days   Lab Units 08/05/21  0626   ABG SOURCE  Radial, Left           Micro  Results from last 7 days   Lab Units 08/05/21  1229   BLOOD CULTURE  Received in Microbiology Lab  Culture in Progress  EKG: Prolonged QTc and LBBB  Imaging: I have personally reviewed pertinent reports  Intake and Output  I/O       08/04 0701 - 08/05 0700 08/05 0701 - 08/06 0700    I V  (mL/kg) 1043 6 (13 4) 2523 4 (32 3)    NG/GT 60 60    IV Piggyback 1150 700    Total Intake(mL/kg) 2253 6 (28 9) 3283 4 (42)    Urine (mL/kg/hr) 1560 1055 (0 6)    Emesis/NG output  700    Stool  0    Total Output 1560 1755    Net +693 6 +1528 4          Unmeasured Stool Occurrence  2 x        UOP: 28 9 ml/hr     Height and Weights   Height: 6' 1" (185 4 cm)  IBW (Ideal Body Weight): 79 9 kg  Body mass index is 22 72 kg/m²  Weight (last 2 days)     Date/Time   Weight    08/04/21 2140   78 1 (172 18)    08/04/21 19:45:02   93 (205 03)                Nutrition       Diet Orders   (From admission, onward)             Start     Ordered    08/05/21 1853  Diet Cardiovascular; Cardiac  Diet effective now     Question Answer Comment   Diet Type Cardiovascular    Cardiac Cardiac    RD to adjust diet per protocol?  No 08/05/21 1854                  Active Medications  Scheduled Meds:  Current Facility-Administered Medications   Medication Dose Route Frequency Provider Last Rate    enoxaparin  40 mg Subcutaneous Q24H [address] Lul Jenkins PA-C      fentanyl citrate (PF)  50 mcg Intravenous Q1H PRN Sidney Euceda MD      folic acid IVPB  1 mg Intravenous Daily Duyen Birch MD Stopped (08/05/21 0832)    levETIRAcetam  1,500 mg Oral Q12H [address] [place], [place]      LORazepam  0 5 mg Intravenous Once PRN [place] Codington, DO      LORazepam  1 mg Intravenous Once PRN [place] Codington, DO      midazolam  2 mg Intravenous Q4H PRN Sidney Euceda MD      thiamine  500 mg Intravenous Q8H Bjorn Hair MD Stopped (08/06/21 5218)     Continuous Infusions:     PRN Meds:   fentanyl citrate (PF), 50 mcg, Q1H PRN  LORazepam, 0 5 mg, Once PRN  LORazepam, 1 mg, Once PRN  midazolam, 2 mg, Q4H PRN        Allergies   No Known Allergies  ---------------------------------------------------------------------------------------  Advance Directive and Living Will:      Power of :    POLST:    ---------------------------------------------------------------------------------------  Care Time Delivered:   45 minutes    Theelhamis Codington, DO      Portions of the record may have been created with voice recognition software  Occasional wrong word or "sound a like" substitutions may have occurred due to the inherent limitations of voice recognition software    Read the chart carefully and recognize, using context, where substitutions have occurred

## 2021-08-06 NOTE — ASSESSMENT & PLAN NOTE
Patient on pravastatin 40 mg daily at home  Unknown compliance  Lipid panel obtained on 08/06 revealed total cholesterol 177, triglycerides 160, HDL 62, LDL 83
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
## 2021-08-06 NOTE — ASSESSMENT & PLAN NOTE
Patient with systolic blood pressures ranging from the 110s up to 160  He is on no home antihypertensive medication      Plan:  · Continue to watch blood pressures for now  · Low threshold for starting antihypertensive agent, especially in the setting of his recently discovered heart dysfunction

## 2021-08-06 NOTE — ASSESSMENT & PLAN NOTE
Patient found down by EMS  CK on arrival 1394  CK today 4134  Patient was initially receiving fluids, but stopped because he started to eat      Continue IV fluids  Will repeat CK levels

## 2021-08-06 NOTE — ASSESSMENT & PLAN NOTE
Patient with a left bundle branch block on ECG  Unknown cardiac history other than hyperlipidemia hypertension  Patient is unsure of his cardiac history  Plan:  · Cardiology consulted-no ischemic workup while inpatient    Advised to follow up as outpatient with Cardiology and further workup of thereon

## 2021-08-06 NOTE — CASE MANAGEMENT
Pt confused  CM called pt's SO Kalpana ([phone]) to introduce self/role with dcp  [place]  states her aunt is in the hospital and she is unable to speak at this time, will call CM back later this morning  CM to follow

## 2021-08-06 NOTE — ASSESSMENT & PLAN NOTE
Patient still currently smoking  Approximately a 25 pack year history (half pack per day for 50 years)      Plan:  · Tobacco cessation counseling  · Nicotine patch daily

## 2021-08-06 NOTE — ASSESSMENT & PLAN NOTE
Acute encephalopathy secondary to seizures also in the setting of alcohol abuse  Patient states that his last drink was about a week ago  He does have a history of alcohol withdrawal seizures      Plan:  · Patient extubated on 08/05  · Initially going to leaving AMA, but decided stay  · He has a history of chronic seizures on and off for the past 1 5 years that have been documented, per the patient he has been having this for 5-6 years  · History of subclinical and clinical epileptic seizures captured on video EEG in August of 2020  · Patient had a routine EEG on 03/2021 that was within normal limits  · Patient has been non compliant with home Keppra 1500 mg twice daily  · He has been lost to follow-up to least 2 neurologist (Dr Isabella Bear and Dr Chava Garnica in [place])  · The last time he filled [address] was in April of 2021, with likely only taking 1 750 mg pill daily  · Neurology following  · Plan for MRI brain today, sedation ordered  · Video EEG if the patient declines  · CIWA protocol  · Seizure precautions  · Neurochecks

## 2021-08-06 NOTE — PROGRESS NOTES
Progress Note - Neurology   Alonso Blackwell 60 y o  male [id number]  Unit/Bed#: MICU 10/MICU 10    Assessment/Plan:  Breakthrough seizure [facility])  Assessment & Plan    60 y o  male with a history of seizure disorder noncompliant with Keppra 1500mg bid, right posterior temporal IPH, SAH, prior Cdiff x 2, alcohol abuse and heavy tobacco use who presented via EMS on 8/4/21 with AMS after being found down at home by his daughter minimally responsive covered in feces near the toilet  GCS 5 in the field  Patient was reported to have R gaze deviation with a nonreactive R pupil and was combative and agitated  Patient was intubated for airway protection  He was extubated on 8/5 and initially appeared postictal, though this slowly improved throughout the day  Last night, patient had planed to leave AMA however due to not having a ride he agreed to stay  Currently he agrees to proceed with repeat MRI with Ativan  Neuro exam today is improved from yesterday  Patient oriented x 4 and improving with responses to basic cognition questions  No focal neurologic deficits  I suspect patient is back to baseline  If MRI stable and patient continues to be clear without confusion, no further inpatient neurologic recommendations  Results:  - CTA H/N negative for acute intracranial abnormality, chronic lacunar infarct noted in the basal ganglia, 40% stenosis of the right ICA at the bulb  Negative LV or flow-limiting stenosis  - CT C spine, CT C/A/P all grossly unremarkable  - Notable Labs: WBC 12, lactic acid of 2 1, CK of 1124, and K 2 8, AST elevated to 85 with a normal ALT  - Ethanol level negative  - UDS positive for benzos but no benzos given by EMS according to ICU team - Prolonged QT noted of EKG  Plan:  - Keppra 2g bolus given  - Continue maintenance with Keppra 1500mg bid to start this evening  · When providing prescription for patient please ensure that there is at least 1 refill on script    - Of note, Keppra may not be the best medication for this patient long term in the setting of his known aggression; however due to his chronic alcohol use and prior noncompliance other AEDs may also be problematic  After conversation with Dr Hunter Rubi, she would like to start with Keppra for now and his AED regimen can be switched at a later time  - Ativan prn for motor seizure > 2minutes  - Seizure precautions   - Monitor patient at least overnight   - MRI brain w wo contrast ordered - due to significant claustrophobia, patient will need a decent dose of a benzo prior   - If patient does not return to baseline or demonstrates fluctuating exam, would recommend vEEG  - Alberto Dot form will be completed  Patient was informed he is not to drive  Toxic metabolic encephalopathy-resolved as of 8/5/2021  Assessment & Plan  - Postictal state  - See above    * Seizure disorder [facility])  Assessment & Plan  - Chronic seizures on and off over 1 5 years  - Previously prescribed Keppra 1500mg bid  - Noncompliant   - History of subclinical and clinical epileptic seizures captured on vEEG in 8/2020  - 3/2021 routine EEG x 2 WNL    History of cerebral hemorrhage  Assessment & Plan  - 8/2020    History of alcohol abuse  Assessment & Plan  - Unclear last drink  - CIWA protocol    Prolonged Q-T interval on ECG  Assessment & Plan  - Management as per ICU  - Avoid QT prolonging drugs    Patient should follow-up with outpatient neurologist Dr Sonya Carl (neurology office within the [facility] orthopedics: [phone] ext [phone]) within 2-4 weeks after discharge  Subjective:   Patient was alert and fully oriented, as well as calm and cooperative  He reported continued right arm/elbow pain as well as R groin pain, in addition to diffuse myalgias  No new neurologic deficits and no seizure activity reported  No past medical history on file  No past surgical history on file  No family history on file    Social History     Socioeconomic History    Marital status: Single     Spouse name: Not on file    Number of children: Not on file    Years of education: Not on file    Highest education level: Not on file   Occupational History    Not on file   Tobacco Use    Smoking status: Not on file   Substance and Sexual Activity    Alcohol use: Not on file    Drug use: Not on file    Sexual activity: Not on file   Other Topics Concern    Not on file   Social History Narrative    Not on file     Social Determinants of Health     Financial Resource Strain:     Difficulty of Paying Living Expenses:    Food Insecurity:     Worried About Running Out of Food in the Last Year:     [address] in the Last Year:    Transportation Needs:     Lack of Transportation (Medical):  Lack of Transportation (Non-Medical):    Physical Activity:     Days of Exercise per Week:     Minutes of Exercise per Session:    Stress:     Feeling of Stress :    Social Connections:     Frequency of Communication with Friends and Family:     Frequency of Social Gatherings with Friends and Family:     Attends [employer] Services:     Active Member of Clubs or Organizations:     Attends Club or Organization Meetings:     Marital Status:    Intimate Partner Violence:     Fear of Current or Ex-Partner:     Emotionally Abused:     Physically Abused:     Sexually Abused:        Medications: Reviewed in detail by me  ROS: Review of Systems   HENT: Negative for hearing loss  Cardiovascular: Negative for chest pain (resolved)  Musculoskeletal: Positive for arthralgias (R elbow, R groin) and myalgias  Neurological: Positive for seizures  A 12 point ROS was completed and other than the above mentioned, all remaining systems were negative       Vitals: /71   Pulse 82   Temp 98 5 °F (36 9 °C) (Oral)   Resp 13   Ht 6' 1" (1 854 m)   Wt 77 5 kg (170 lb 13 7 oz)   SpO2 91%   BMI 22 54 kg/m²     Physical Exam:   Physical Exam  Constitutional: General: He is not in acute distress  Appearance: Normal appearance  He is well-developed  He is not ill-appearing, toxic-appearing or diaphoretic  HENT:      Head: Normocephalic and atraumatic  Eyes:      General: No scleral icterus  Right eye: No discharge  Left eye: No discharge  Extraocular Movements: Extraocular movements intact and EOM normal       Conjunctiva/sclera: Conjunctivae normal       Pupils: Pupils are equal, round, and reactive to light  Pulmonary:      Effort: Pulmonary effort is normal  No respiratory distress  Breath sounds: No stridor  Musculoskeletal:         General: Tenderness (R sided) present  No deformity  Cervical back: Normal range of motion and neck supple  Comments: ROM limited by pain   Skin:     General: Skin is warm and dry  Findings: No erythema or rash  Neurological:      Mental Status: He is alert  Psychiatric:         Speech: Speech normal        Neurologic Exam     Mental Status   Follows 1 step commands  Attention: decreased  Speech: speech is normal (No dysarthria or aphasia)  Alert, oriented x4  When stating months of the year, patient missed May and for December  Able to state number of quarters in a dollar 76  Is able to spell world forwards but not backwards  Cranial Nerves     CN II   Visual acuity: normal (grossly)  Right visual field deficit: none  Left visual field deficit: none     CN III, IV, VI   Pupils are equal, round, and reactive to light  Extraocular motions are normal    Nystagmus: none   Conjugate gaze: present    CN VII   Facial expression full, symmetric  CN VIII   Hearing: intact (grossly)    CN XII   Tongue deviation: none    Motor Exam   Muscle bulk: normal  Overall muscle tone: normal  Right arm pronator drift: absent  Left arm pronator drift: absent  Strength testing limited by pain   LUE/LLE WNL, RUE/RLE: 5/5 except unable to fully test proximal strength due to pain (at least a 4+/5-)  Sensory Exam   Light touch normal        Labs: Reviewed in detail by me  Imaging: I have personally reviewed pertinent imaging and PACS reports  VTE Prophylaxis: Enoxaparin (Lovenox)    Total time spent today 25 minutes  Greater than 50% of total time was spent with the patient and / or family counseling and / or coordination of care  A description of the counseling / coordination of care: Speaking with patient and reviewing events prior to admission, seizure history, antiepileptic drug history, inpatient plan of care and outpatient recommendations with regards to AED and neurology follow-up    Additionally updated Critical Care team

## 2021-08-06 NOTE — QUICK NOTE
Patient's significant other was called and updated  All questions answered  Also, patient reporting increasing knee pain  Tylenol, lidocaine, and analgesia gel given  Will investigate with a knee XR      Michelle Mcleod DO, [address]  PGY-2, Internal Medicine  MICU  [facility] [place] [place]

## 2021-08-06 NOTE — ASSESSMENT & PLAN NOTE
Patient with a left bundle branch block on ECG  Unknown cardiac history other than hyperlipidemia hypertension  Patient is unsure of his cardiac history      Plan:  · Cardiology consulted  · Will possibly need ischemic workup

## 2021-08-06 NOTE — ASSESSMENT & PLAN NOTE
Patient with systolic blood pressures ranging from the 110s up to 160  He is on no home antihypertensive medication    Follow-up with primary care physician

## 2021-08-06 NOTE — ASSESSMENT & PLAN NOTE
Patient's initial presentation was likely secondary to breakthrough seizure in the setting of noncompliance      Plan:  · As above

## 2021-08-06 NOTE — PLAN OF CARE
Problem: PHYSICAL THERAPY ADULT  Goal: Performs mobility at highest level of function for planned discharge setting  See evaluation for individualized goals  Description: Treatment/Interventions: OT, Spoke to case management, Spoke to nursing, Gait training, Bed mobility, Patient/family training, Endurance training, LE strengthening/ROM, Functional transfer training  Equipment Recommended:  (TBD)       See flowsheet documentation for full assessment, interventions and recommendations  8/6/2021 1147 by Calvin Dupree PT  Note: Prognosis: Good  Problem List: Decreased strength, Decreased endurance, Impaired balance, Decreased range of motion, Decreased mobility, Decreased coordination, Decreased safety awareness, Pain, Orthopedic restrictions, Decreased skin integrity, Decreased cognition, Impaired judgement  Assessment: Pt is 60 y o  male seen for PT evaluation s/p admit to [facility] on 8/4/2021 w/ Seizure disorder ([facility] [address] ), r/o withdraw seizure, rhabdomyolysis  PT consulted to assess pt's functional mobility and d/c needs  Order placed for PT eval and tx  Comorbidities affecting pt's physical performance at time of assessment include: ETOH abuse, seizures, GERD, depression, R posterior temporal IPH, SAH, C-diff  PTA, pt was ambulates unrestricted distances and all terrain, has 2 ORALIA and works full time  Personal factors affecting pt at time of IE include: stairs to enter home, inability to ambulate household distances, limited home support, positive fall history, impulsivity, depression, inability to perform current job functions, unable to perform physical activity, limited insight into impairments, inability to perform IADLs and inability to perform ADLs   Please find objective findings from PT assessment regarding body systems outlined above with impairments and limitations including weakness, decreased ROM, impaired balance, decreased endurance, impaired coordination, gait deviations, pain, decreased activity tolerance, decreased functional mobility tolerance, decreased safety awareness, impaired judgement, fall risk, orthopedic restrictions, SOB upon exertion, decreased skin integrity and decreased cognition  Pt required LLE management during bed mobility with increased pain and weakness  Required increased A for standing with deficits in strength and balance  Ambulated with severely antalgic gait  The following objective measures performed on IE also reveal limitations: The patient's AM-PAC Basic Mobility Inpatient Short Form Raw Score is 14, Standardized Score is 35 55  A standardized score less than 42 9 suggests the patient may benefit from discharge to post-acute rehabilitation services  Please also refer to the recommendation of the Physical Therapist for safe discharge planning  Pt's clinical presentation is currently unstable/unpredictable seen in pt's presentation of critical care monitoring  Pt to benefit from continued PT tx to address deficits as defined above and maximize level of functional independent mobility and consistency  From PT/mobility standpoint, recommendation at time of d/c would be post acute rehabilitation services pending progress in order to facilitate return to PLOF  If pain is better controlled may progress to achieve goals to return home with increased support  Barriers to Discharge: Inaccessible home environment, Decreased caregiver support        PT Discharge Recommendation: Post acute rehabilitation services     PT - OK to Discharge: Yes (if to rehab at this time)    See flowsheet documentation for full assessment

## 2021-08-07 ENCOUNTER — APPOINTMENT (INPATIENT)
Dept: RADIOLOGY | Facility: HOSPITAL | Age: 56
DRG: 100 | End: 2021-08-07
Payer: COMMERCIAL

## 2021-08-07 PROBLEM — M25.561 RIGHT KNEE PAIN: Status: ACTIVE | Noted: 2021-08-07

## 2021-08-07 LAB
ALBUMIN SERPL BCP-MCNC: 2.3 G/DL (ref 3.5–5)
ANION GAP SERPL CALCULATED.3IONS-SCNC: 8 MMOL/L (ref 4–13)
APPEARANCE FLD: ABNORMAL
BASOPHILS # BLD AUTO: 0.02 THOUSANDS/ΜL (ref 0–0.1)
BASOPHILS NFR BLD AUTO: 0 % (ref 0–1)
BUN SERPL-MCNC: 9 MG/DL (ref 5–25)
CALCIUM ALBUM COR SERPL-MCNC: 8.3 MG/DL (ref 8.3–10.1)
CALCIUM SERPL-MCNC: 6.9 MG/DL (ref 8.3–10.1)
CHLORIDE SERPL-SCNC: 102 MMOL/L (ref 100–108)
CK MB SERPL-MCNC: 1.8 NG/ML (ref 0–5)
CK MB SERPL-MCNC: 2.4 NG/ML (ref 0–5)
CK MB SERPL-MCNC: <1 % (ref 0–2.5)
CK MB SERPL-MCNC: <1 % (ref 0–2.5)
CK SERPL-CCNC: 6068 U/L (ref 39–308)
CK SERPL-CCNC: 6361 U/L (ref 39–308)
CO2 SERPL-SCNC: 25 MMOL/L (ref 21–32)
COLOR FLD: YELLOW
CREAT SERPL-MCNC: 0.52 MG/DL (ref 0.6–1.3)
CRYSTALS SNV QL MICRO: NORMAL
EOSINOPHIL # BLD AUTO: 0.02 THOUSAND/ΜL (ref 0–0.61)
EOSINOPHIL NFR BLD AUTO: 0 % (ref 0–6)
EOSINOPHIL NFR SNV MANUAL: 1 %
ERYTHROCYTE [DISTWIDTH] IN BLOOD BY AUTOMATED COUNT: 14.1 % (ref 11.6–15.1)
GFR SERPL CREATININE-BSD FRML MDRD: 118 ML/MIN/1.73SQ M
GLUCOSE SERPL-MCNC: 84 MG/DL (ref 65–140)
HCT VFR BLD AUTO: 29.5 % (ref 36.5–49.3)
HGB BLD-MCNC: 10.6 G/DL (ref 12–17)
IMM GRANULOCYTES # BLD AUTO: 0.06 THOUSAND/UL (ref 0–0.2)
IMM GRANULOCYTES NFR BLD AUTO: 1 % (ref 0–2)
LYMPHOCYTES # BLD AUTO: 0.81 THOUSANDS/ΜL (ref 0.6–4.47)
LYMPHOCYTES # SNV MANUAL: 1 %
LYMPHOCYTES NFR BLD AUTO: 12 % (ref 14–44)
MAGNESIUM SERPL-MCNC: 1.1 MG/DL (ref 1.6–2.6)
MAGNESIUM SERPL-MCNC: 1.9 MG/DL (ref 1.6–2.6)
MCH RBC QN AUTO: 36.4 PG (ref 26.8–34.3)
MCHC RBC AUTO-ENTMCNC: 35.9 G/DL (ref 31.4–37.4)
MCV RBC AUTO: 101 FL (ref 82–98)
MONOCYTES # BLD AUTO: 0.85 THOUSAND/ΜL (ref 0.17–1.22)
MONOCYTES NFR BLD AUTO: 12 % (ref 4–12)
MONONUC CELLS NFR SNV MANUAL: 7 %
NEUTROPHILS # BLD AUTO: 5.13 THOUSANDS/ΜL (ref 1.85–7.62)
NEUTROPHILS NFR SNV MANUAL: 91 %
NEUTS SEG NFR BLD AUTO: 75 % (ref 43–75)
NRBC BLD AUTO-RTO: 0 /100 WBCS
PHOSPHATE SERPL-MCNC: 2.4 MG/DL (ref 2.7–4.5)
PHOSPHATE SERPL-MCNC: 2.5 MG/DL (ref 2.7–4.5)
PLATELET # BLD AUTO: 119 THOUSANDS/UL (ref 149–390)
PMV BLD AUTO: 10.2 FL (ref 8.9–12.7)
POTASSIUM SERPL-SCNC: 3.1 MMOL/L (ref 3.5–5.3)
RBC # BLD AUTO: 2.91 MILLION/UL (ref 3.88–5.62)
RBC # SNV MANUAL: 2000 /UL (ref 0–10)
SITE: ABNORMAL
SODIUM SERPL-SCNC: 135 MMOL/L (ref 136–145)
TOTAL CELLS COUNTED SPEC: 100
WBC # BLD AUTO: 6.89 THOUSAND/UL (ref 4.31–10.16)
WBC # FLD MANUAL: 4269 /UL (ref 0–200)

## 2021-08-07 PROCEDURE — 83735 ASSAY OF MAGNESIUM: CPT | Performed by: STUDENT IN AN ORGANIZED HEALTH CARE EDUCATION/TRAINING PROGRAM

## 2021-08-07 PROCEDURE — 73070 X-RAY EXAM OF ELBOW: CPT

## 2021-08-07 PROCEDURE — 99232 SBSQ HOSP IP/OBS MODERATE 35: CPT | Performed by: INTERNAL MEDICINE

## 2021-08-07 PROCEDURE — 89050 BODY FLUID CELL COUNT: CPT | Performed by: ORTHOPAEDIC SURGERY

## 2021-08-07 PROCEDURE — 84100 ASSAY OF PHOSPHORUS: CPT | Performed by: STUDENT IN AN ORGANIZED HEALTH CARE EDUCATION/TRAINING PROGRAM

## 2021-08-07 PROCEDURE — G1004 CDSM NDSC: HCPCS

## 2021-08-07 PROCEDURE — 82553 CREATINE MB FRACTION: CPT | Performed by: INTERNAL MEDICINE

## 2021-08-07 PROCEDURE — 70551 MRI BRAIN STEM W/O DYE: CPT

## 2021-08-07 PROCEDURE — 99232 SBSQ HOSP IP/OBS MODERATE 35: CPT | Performed by: PSYCHIATRY & NEUROLOGY

## 2021-08-07 PROCEDURE — 87070 CULTURE OTHR SPECIMN AEROBIC: CPT | Performed by: ORTHOPAEDIC SURGERY

## 2021-08-07 PROCEDURE — 82550 ASSAY OF CK (CPK): CPT | Performed by: INTERNAL MEDICINE

## 2021-08-07 PROCEDURE — 87205 SMEAR GRAM STAIN: CPT | Performed by: ORTHOPAEDIC SURGERY

## 2021-08-07 PROCEDURE — 89060 EXAM SYNOVIAL FLUID CRYSTALS: CPT | Performed by: ORTHOPAEDIC SURGERY

## 2021-08-07 PROCEDURE — 85025 COMPLETE CBC W/AUTO DIFF WBC: CPT | Performed by: STUDENT IN AN ORGANIZED HEALTH CARE EDUCATION/TRAINING PROGRAM

## 2021-08-07 PROCEDURE — 80069 RENAL FUNCTION PANEL: CPT | Performed by: STUDENT IN AN ORGANIZED HEALTH CARE EDUCATION/TRAINING PROGRAM

## 2021-08-07 PROCEDURE — 89051 BODY FLUID CELL COUNT: CPT | Performed by: ORTHOPAEDIC SURGERY

## 2021-08-07 RX ORDER — POTASSIUM CHLORIDE 20 MEQ/1
40 TABLET, EXTENDED RELEASE ORAL 2 TIMES DAILY
Status: COMPLETED | OUTPATIENT
Start: 2021-08-07 | End: 2021-08-07

## 2021-08-07 RX ORDER — SODIUM CHLORIDE, SODIUM LACTATE, POTASSIUM CHLORIDE, CALCIUM CHLORIDE 600; 310; 30; 20 MG/100ML; MG/100ML; MG/100ML; MG/100ML
200 INJECTION, SOLUTION INTRAVENOUS CONTINUOUS
Status: DISCONTINUED | OUTPATIENT
Start: 2021-08-07 | End: 2021-08-09 | Stop reason: HOSPADM

## 2021-08-07 RX ADMIN — ACETAMINOPHEN 650 MG: 325 TABLET, FILM COATED ORAL at 12:39

## 2021-08-07 RX ADMIN — POTASSIUM & SODIUM PHOSPHATES POWDER PACK 280-160-250 MG 1 PACKET: 280-160-250 PACK at 17:19

## 2021-08-07 RX ADMIN — FAMOTIDINE 20 MG: 20 TABLET ORAL at 08:22

## 2021-08-07 RX ADMIN — LEVETIRACETAM 1500 MG: 750 TABLET ORAL at 20:56

## 2021-08-07 RX ADMIN — FAMOTIDINE 20 MG: 20 TABLET ORAL at 17:19

## 2021-08-07 RX ADMIN — POTASSIUM CHLORIDE 40 MEQ: 1500 TABLET, EXTENDED RELEASE ORAL at 12:39

## 2021-08-07 RX ADMIN — ENOXAPARIN SODIUM 40 MG: 40 INJECTION SUBCUTANEOUS at 08:21

## 2021-08-07 RX ADMIN — FOLIC ACID 1 MG: 1 TABLET ORAL at 08:22

## 2021-08-07 RX ADMIN — SODIUM CHLORIDE, SODIUM LACTATE, POTASSIUM CHLORIDE, AND CALCIUM CHLORIDE 200 ML/HR: .6; .31; .03; .02 INJECTION, SOLUTION INTRAVENOUS at 12:17

## 2021-08-07 RX ADMIN — THIAMINE HCL TAB 100 MG 100 MG: 100 TAB at 08:22

## 2021-08-07 RX ADMIN — SODIUM CHLORIDE, SODIUM GLUCONATE, SODIUM ACETATE, POTASSIUM CHLORIDE, MAGNESIUM CHLORIDE, SODIUM PHOSPHATE, DIBASIC, AND POTASSIUM PHOSPHATE 150 ML/HR: .53; .5; .37; .037; .03; .012; .00082 INJECTION, SOLUTION INTRAVENOUS at 08:14

## 2021-08-07 RX ADMIN — SODIUM CHLORIDE, SODIUM LACTATE, POTASSIUM CHLORIDE, AND CALCIUM CHLORIDE 200 ML/HR: .6; .31; .03; .02 INJECTION, SOLUTION INTRAVENOUS at 20:56

## 2021-08-07 RX ADMIN — LEVETIRACETAM 1500 MG: 750 TABLET ORAL at 08:22

## 2021-08-07 RX ADMIN — ACETAMINOPHEN 650 MG: 325 TABLET, FILM COATED ORAL at 23:54

## 2021-08-07 RX ADMIN — POTASSIUM CHLORIDE 40 MEQ: 1500 TABLET, EXTENDED RELEASE ORAL at 17:19

## 2021-08-07 RX ADMIN — LORAZEPAM 1 MG: 2 INJECTION INTRAMUSCULAR; INTRAVENOUS at 09:06

## 2021-08-07 RX ADMIN — POTASSIUM PHOSPHATE, MONOBASIC AND POTASSIUM PHOSPHATE, DIBASIC 9 MMOL: 224; 236 INJECTION, SOLUTION, CONCENTRATE INTRAVENOUS at 10:33

## 2021-08-07 RX ADMIN — SODIUM CHLORIDE, SODIUM LACTATE, POTASSIUM CHLORIDE, AND CALCIUM CHLORIDE 1000 ML: .6; .31; .03; .02 INJECTION, SOLUTION INTRAVENOUS at 12:17

## 2021-08-07 NOTE — PROGRESS NOTES
Progress Note - Neurology   Paul Barrera 60 y o  male [id number]  Unit/Bed#: [facility] 712/[facility] 36-26    Assessment/Plan:    Breakthrough seizure [facility])  Assessment & Plan    60 y o  male with a history of seizure disorder noncompliant with Keppra 1500mg bid, right posterior temporal IPH, SAH, prior Cdiff x 2, alcohol abuse and heavy tobacco use who presented via EMS on 8/4/21 with AMS after being found down at home by his daughter minimally responsive covered in feces near the toilet  GCS 5 in the field  Patient was reported to have R gaze deviation with a nonreactive R pupil and was combative and agitated  Patient was intubated for airway protection  He was extubated on 8/5 and initially appeared postictal, though this slowly improved throughout the day  Neuro exam 08/07/2021  Patient oriented x 4   No focal neurologic deficits, motor exam is limited in right upper extremity and right lower extremity due to pain  MRI brain with and without contrast completed  no further inpatient neurologic recommendations  Pt endorses not taking home keppra as prescribed, likely cause of breakthrough seizure  Discussed with pt at length importance of adhering to taking keppra as prescribed  Pt notes understanding and compliance with plan  Results:  - CTA H/N negative for acute intracranial abnormality, chronic lacunar infarct noted in the basal ganglia, 40% stenosis of the right ICA at the bulb  Negative LV or flow-limiting stenosis  - CT C spine, CT C/A/P all grossly unremarkable  -MRI brain with and without contrast 08/07/2021: No acute infarction, intracranial hemorrhage or mass effect  Moderate, chronic microangiopathy on this motion degraded study  Small, chronic infarction with evidence of prior, chronic hemorrhagic components in the posterior inferior aspect of the right temporal lobe  - R elbow Xray pending  - Notable Labs: CK of 6068  - Ethanol level negative     - UDS positive for benzos but no benzos given by EMS according to ICU team - Prolonged QT noted of EKG  Plan:  - Keppra 2g bolus given 8/5/21  - Continue maintenance with Keppra 1500mg bid  · When providing prescription for patient please ensure that there is at least 1 refill on script  - Of note, Rand Armani may not be the best medication for this patient long term in the setting of his known aggression; however due to his chronic alcohol use and prior noncompliance other AEDs may also be problematic  After conversation with Dr Ne Butler 8/5/21-8/6/21, she would like to start with Keppra for now and his AED regimen can be switched at a later time  - Ativan prn for motor seizure > 2minutes  - Seizure precautions   - MRI brain w wo contrast completed 08/07/2021  - [place] form completed 08/05/2021  Patient was informed he is not to drive  * Seizure disorder [facility])  Assessment & Plan  - Chronic seizures on and off over 1 5 years  - Previously prescribed Keppra 1500mg bid  - Noncompliant   - History of subclinical and clinical epileptic seizures captured on vEEG in 8/2020  - 3/2021 routine EEG x 2 WNL    History of alcohol abuse  Assessment & Plan  - Unclear last drink  - CIWA protocol    History of cerebral hemorrhage  Assessment & Plan  - 8/2020    Prolonged Q-T interval on ECG  Assessment & Plan  - Management as per ICU  - Avoid QT prolonging drugs    Toxic metabolic encephalopathy-resolved as of 8/5/2021  Assessment & Plan  - Postictal state  - See above      Patient should follow-up with outpatient neurologist Dr Ingrid Hein (neurology office within the [facility] orthopedics: [phone] ext [phone]) within 2-4 weeks after discharge  Subjective:   Patient awake, oriented, and cooperative today  Patient notes continued pain in right elbow and right lower extremity  Patient states that he must have hit his right side during 1 of his seizure episodes  Pt denies any seizure warnings prior to seizures   Pt does not remember any episodes of seizures in the past, only what people have told him what happens during his seizure episodes  Patient denies seizure episodes overnight  Patient denies headaches, numbness tingling, chest pain, shortness of breath, nausea, vomiting, difficulty urinating, and difficulty moving bowels  Patient notes lightheadedness and dizziness when 1st getting up (at baseline) but subsides once patient is standing for a few seconds  Patient notes weakness in his right upper extremity and right lower extremity due to limited range of motion/strength due to pain  No past medical history on file  No past surgical history on file  No family history on file  Social History     Socioeconomic History    Marital status: Single     Spouse name: Not on file    Number of children: Not on file    Years of education: Not on file    Highest education level: Not on file   Occupational History    Not on file   Tobacco Use    Smoking status: Not on file   Substance and Sexual Activity    Alcohol use: Not on file    Drug use: Not on file    Sexual activity: Not on file   Other Topics Concern    Not on file   Social History Narrative    Not on file     Social Determinants of Health     Financial Resource Strain:     Difficulty of Paying Living Expenses:    Food Insecurity:     Worried About Running Out of Food in the Last Year:     [address] in the Last Year:    Transportation Needs:     Lack of Transportation (Medical):      Lack of Transportation (Non-Medical):    Physical Activity:     Days of Exercise per Week:     Minutes of Exercise per Session:    Stress:     Feeling of Stress :    Social Connections:     Frequency of Communication with Friends and Family:     Frequency of Social Gatherings with Friends and Family:     Attends Sikhism Services:     Active Member of Clubs or Organizations:     Attends Club or Organization Meetings:     Marital Status:    Intimate Partner Violence:     Fear of Current or Ex-Partner:     Emotionally Abused:     Physically Abused:     Sexually Abused:      No existing history information found  No existing history information found  Medications: All current active meds have been reviewed and current meds:  Scheduled Meds:  Current Facility-Administered Medications   Medication Dose Route Frequency Provider Last Rate    acetaminophen  650 mg Oral Q6H PRN Cecelia Siddiqui MD      albuterol  2 puff Inhalation Q4H PRN Cecelia Siddiqui MD      enoxaparin  40 mg Subcutaneous Q24H Edilberto Felipe MD      famotidine  20 mg Oral BID Cecelia Siddiqui MD      folic acid  1 mg Oral Daily Cecelia Siddiqui MD      Gadobutrol  9 mL Intravenous Once in imaging Dl Carver DO      lactated ringers  200 mL/hr Intravenous Continuous Brenda Gomez  mL/hr (08/07/21 1217)    levETIRAcetam  1,500 mg Oral Q12H Edilberto Felipe MD      LORazepam  0 5 mg Intravenous Once PRN Cecelia Siddiqui MD      menthol-methyl salicylate   Apply externally 4x Daily PRN Cecelia Siddiqui MD      nicotine  14 mg Transdermal Daily PRN Cecelia Siddiqui MD      potassium chloride  40 mEq Oral BID Brenda Gomez MD      potassium-sodium phosphates  1 packet Oral Once Brenda Gomez MD      thiamine  100 mg Oral Daily Cecelia Siddiqui MD       Continuous Infusions:lactated ringers, 200 mL/hr, Last Rate: 200 mL/hr (08/07/21 1217)      PRN Meds:   acetaminophen    albuterol    Gadobutrol    LORazepam    menthol-methyl salicylate    nicotine       ROS:   Review of Systems   Constitutional: Negative for appetite change and fever  HENT: Negative for congestion and sore throat  Eyes: Negative for pain and discharge  Respiratory: Negative for cough and shortness of breath  Cardiovascular: Negative for chest pain and palpitations  Gastrointestinal: Negative for nausea and vomiting  Genitourinary: Negative for difficulty urinating and dysuria     Musculoskeletal: Positive for arthralgias (Right elbow, right lower extremity), gait problem ( due to right lower extremity pain) and joint swelling  Neurological: Positive for dizziness ( 1 for standing, then subsides), weakness ( strength limited in right upper extremity right lower extremity due to pain) and light-headedness ( 1 for standing, then subsides)  Negative for facial asymmetry, speech difficulty, numbness and headaches  Seizures:  none overnight  Psychiatric/Behavioral: Negative for agitation and confusion  Vitals:   /92   Pulse 100   Temp 98 3 °F (36 8 °C)   Resp 20   Ht 6' 1" (1 854 m)   Wt 77 5 kg (170 lb 13 7 oz)   SpO2 97%   BMI 22 54 kg/m²     Physical Exam:   Physical Exam  Vitals and nursing note reviewed  Constitutional:       General: He is not in acute distress  Appearance: He is not diaphoretic  HENT:      Head: Normocephalic  Nose: Nose normal  No congestion or rhinorrhea  Mouth/Throat:      Mouth: Mucous membranes are moist       Pharynx: Oropharynx is clear  No oropharyngeal exudate or posterior oropharyngeal erythema  Comments: R tongue laceration, healing  Eyes:      General: No scleral icterus  Right eye: No discharge  Left eye: No discharge  Extraocular Movements: Extraocular movements intact and EOM normal       Conjunctiva/sclera: Conjunctivae normal       Pupils: Pupils are equal, round, and reactive to light  Cardiovascular:      Rate and Rhythm: Normal rate  Pulmonary:      Effort: Pulmonary effort is normal    Musculoskeletal:         General: Swelling (R elbow, R knee) present  Right lower leg: No edema  Left lower leg: No edema  Skin:     Findings: Bruising (R elbow) present  Neurological:      Mental Status: He is alert and oriented to person, place, and time  Coordination: Finger-nose-finger test: Unable to examine in right upper extremity due to pain, no ataxia noted with left upper extremity        Deep Tendon Reflexes: Reflex Scores:       Bicep reflexes are 2+ on the right side and 2+ on the left side  Brachioradialis reflexes are 2+ on the right side and 2+ on the left side  Patellar reflexes are 2+ on the right side and 2+ on the left side  Psychiatric:         Speech: Speech normal       Comments: Pleasant cooperative during exam       Neurologic Exam     Mental Status   Oriented to person, place, and time  Oriented to person  Oriented to place  Oriented to time  Oriented to year and month  Follows 2 step commands  Speech: speech is normal   Level of consciousness: alert  Able to perform simple calculations (quarter+nickel+dime)  Able to name object (Glove, glasses, button)  -able to name current president, unable to name previous president     Cranial Nerves     CN II   Visual fields full to confrontation  Visual acuity: normal  Right visual field deficit: none  Left visual field deficit: none     CN III, IV, VI   Pupils are equal, round, and reactive to light  Extraocular motions are normal    Right pupil: Size: 2 mm  Reactivity: sluggish  Consensual response: intact  Accommodation: intact  Left pupil: Size: 2 mm  Reactivity: sluggish  Consensual response: intact  Accommodation: intact  CN III: no CN III palsy  CN VI: no CN VI palsy  Nystagmus: none     CN V   Facial sensation intact  Right facial sensation deficit: none  Left facial sensation deficit: none    CN VII   Facial expression full, symmetric     Right facial weakness: none  Left facial weakness: none    CN VIII   CN VIII normal    Hearing: intact    CN IX, X   CN IX normal    CN X normal    Palate: symmetric    CN XI   CN XI normal    Right sternocleidomastoid strength: normal  Left sternocleidomastoid strength: normal  Right trapezius strength: normal  Left trapezius strength: normal    CN XII   CN XII normal    Tongue: not atrophic  Fasciculations: absent  Tongue deviation: none    Motor Exam   Muscle bulk: normal  Right arm pronator drift: absent  Left arm pronator drift: absent    Strength   Strength 5/5 except as noted  Unable to examine right upper extremity strength and right lower extremity strength due to pain  - strength 5/5 bilaterally     Sensory Exam   Light touch normal    -temperature sensation intact x4 extremities     Gait, Coordination, and Reflexes     Gait  Gait: (Deferred for patient safety)    Coordination   Finger-nose-finger test: Unable to examine in right upper extremity due to pain, no ataxia noted with left upper extremity  Tremor   Resting tremor: absent  Intention tremor: absent    Reflexes   Right brachioradialis: 2+  Left brachioradialis: 2+  Right biceps: 2+  Left biceps: 2+  Right patellar: 2+  Left patellar: 2+  Right ankle clonus: absent  Left pendular knee jerk: absent          Labs: I have personally reviewed pertinent reports     Recent Results (from the past 24 hour(s))   Renal function panel    Collection Time: 08/07/21  5:30 AM   Result Value Ref Range    Albumin 2 3 (L) 3 5 - 5 0 g/dL    Calcium 6 9 (L) 8 3 - 10 1 mg/dL    Corrected Calcium 8 3 8 3 - 10 1 mg/dL    Phosphorus 2 4 (L) 2 7 - 4 5 mg/dL    Glucose 84 65 - 140 mg/dL    BUN 9 5 - 25 mg/dL    Creatinine 0 52 (L) 0 60 - 1 30 mg/dL    Sodium 135 (L) 136 - 145 mmol/L    Potassium 3 1 (L) 3 5 - 5 3 mmol/L    Chloride 102 100 - 108 mmol/L    CO2 25 21 - 32 mmol/L    ANION GAP 8 4 - 13 mmol/L    eGFR 118 ml/min/1 73sq m   CBC and differential    Collection Time: 08/07/21  5:30 AM   Result Value Ref Range    WBC 6 89 4 31 - 10 16 Thousand/uL    RBC 2 91 (L) 3 88 - 5 62 Million/uL    Hemoglobin 10 6 (L) 12 0 - 17 0 g/dL    Hematocrit 29 5 (L) 36 5 - 49 3 %     (H) 82 - 98 fL    MCH 36 4 (H) 26 8 - 34 3 pg    MCHC 35 9 31 4 - 37 4 g/dL    RDW 14 1 11 6 - 15 1 %    MPV 10 2 8 9 - 12 7 fL    Platelets 553 (L) 594 - 390 Thousands/uL    nRBC 0 /100 WBCs    Neutrophils Relative 75 43 - 75 %    Immat GRANS % 1 0 - 2 %    Lymphocytes Relative 12 (L) 14 - 44 %    Monocytes Relative 12 4 - 12 %    Eosinophils Relative 0 0 - 6 %    Basophils Relative 0 0 - 1 %    Neutrophils Absolute 5 13 1 85 - 7 62 Thousands/µL    Immature Grans Absolute 0 06 0 00 - 0 20 Thousand/uL    Lymphocytes Absolute 0 81 0 60 - 4 47 Thousands/µL    Monocytes Absolute 0 85 0 17 - 1 22 Thousand/µL    Eosinophils Absolute 0 02 0 00 - 0 61 Thousand/µL    Basophils Absolute 0 02 0 00 - 0 10 Thousands/µL   Magnesium    Collection Time: 08/07/21  5:30 AM   Result Value Ref Range    Magnesium 1 9 1 6 - 2 6 mg/dL   Phosphorus    Collection Time: 08/07/21  5:30 AM   Result Value Ref Range    Phosphorus 2 5 (L) 2 7 - 4 5 mg/dL   CK (with reflex to MB)    Collection Time: 08/07/21  5:30 AM   Result Value Ref Range    Total CK 6,068 (H) 39 - 308 U/L   CKMB    Collection Time: 08/07/21  5:30 AM   Result Value Ref Range    CK-MB Index <1 0 0 0 - 2 5 %    CK-MB 2 4 0 0 - 5 0 ng/mL       Imaging: I have personally reviewed pertinent imaging in PACS, including MRI brain with without contrast 08/07/2021,  and I have personally reviewed PACS reports  EKG, Pathology, and Other Studies: I have personally reviewed pertinent reports  EKG 08/06/2021      VTE Prophylaxis: Enoxaparin (Lovenox)      Counseling / Coordination of Care  Total time spent today 30 minutes  Greater than 50% of total time was spent with the patient and/or family counseling and/or coordination of care  A description of the counseling/coordination of care:  Patient was seen and evaluated  Discussed with attending  Chart reviewed thoroughly including laboratory and imaging studies    Plan of care discussed with patient and primary team

## 2021-08-07 NOTE — QUICK NOTE
Patient's repeat total CK levels at 6068  Previously levels on 08/06/2021-- 4000      Will increase the fluid rate to 200 cc per hour  CK levels at p m        Statins are on hold

## 2021-08-07 NOTE — PLAN OF CARE
Problem: MOBILITY - ADULT  Goal: Maintain or return to baseline ADL function  Description: INTERVENTIONS:  -  Assess patient's ability to carry out ADLs; assess patient's baseline for ADL function and identify physical deficits which impact ability to perform ADLs (bathing, care of mouth/teeth, toileting, grooming, dressing, etc )  - Assess/evaluate cause of self-care deficits   - Assess range of motion  - Assess patient's mobility; develop plan if impaired  - Assess patient's need for assistive devices and provide as appropriate  - Encourage maximum independence but intervene and supervise when necessary  - Involve family in performance of ADLs  - Assess for home care needs following discharge   - Consider OT consult to assist with ADL evaluation and planning for discharge  - Provide patient education as appropriate  Outcome: Progressing  Goal: Maintains/Returns to pre admission functional level  Description: INTERVENTIONS:  - Perform BMAT or MOVE assessment daily    - Set and communicate daily mobility goal to care team and patient/family/caregiver  - Collaborate with rehabilitation services on mobility goals if consulted  - Perform Range of Motion 3 times a day  - Reposition patient every 3 hours    - Dangle patient 3 times a day  - Stand patient 3 times a day  - Ambulate patient 3 times a day  - Out of bed to chair 3 times a day   - Out of bed for meals 3 times a day  - Out of bed for toileting  - Record patient progress and toleration of activity level   Outcome: Progressing     Problem: Potential for Falls  Goal: Patient will remain free of falls  Description: INTERVENTIONS:  - Educate patient/family on patient safety including physical limitations  - Instruct patient to call for assistance with activity   - Consult OT/PT to assist with strengthening/mobility   - Keep Call bell within reach  - Keep bed low and locked with side rails adjusted as appropriate  - Keep care items and personal belongings within reach  - Initiate and maintain comfort rounds  - Make Fall Risk Sign visible to staff  - Offer Toileting every 2 Hours, in advance of need  - Initiate/Maintain bedalarm  - Obtain necessary fall risk management equipment:   - Apply yellow socks and bracelet for high fall risk patients  - Consider moving patient to room near nurses station  Outcome: Progressing     Problem: Prexisting or High Potential for Compromised Skin Integrity  Goal: Skin integrity is maintained or improved  Description: INTERVENTIONS:  - Identify patients at risk for skin breakdown  - Assess and monitor skin integrity  - Assess and monitor nutrition and hydration status  - Monitor labs   - Assess for incontinence   - Turn and reposition patient  - Assist with mobility/ambulation  - Relieve pressure over bony prominences  - Avoid friction and shearing  - Provide appropriate hygiene as needed including keeping skin clean and dry  - Evaluate need for skin moisturizer/barrier cream  - Collaborate with interdisciplinary team   - Patient/family teaching  - Consider wound care consult   Outcome: Progressing
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
## 2021-08-07 NOTE — PROGRESS NOTES
Cardiology Progress Note - Tiarra Wolfe 60 y o  male MRN: [id number]    Unit/Bed#: [facility] 712-01 Encounter: [id number]      Assessment/Recommendations:  1  Seizure disorder:  As per primary team   2  Prolonged QT interval on EKG: In the setting of left bundle-branch block, the QT interval is difficult to measure  Traditional measurements are not reliable  3  Left bundle-branch block:  Unknown if this is new or old  Eventual ischemic evaluation as an outpatient  4  Rhabdomyolysis:  Volume resuscitation as per primary team   5  History of alcohol abuse:  Management as per primary team   6  Hypertension:  Blood pressures remain elevated while it patient is going through withdrawal   Continue to follow and add antihypertensive medications if needed  7  Hyperlipidemia:  Not currently on statin therapy  8  Stable from cardiac standpoint, please call with questions  Subjective:   Patient seen and examined  No significant events overnight   ; pertinent negatives - chest pain, chest pressure/discomfort, dyspnea, irregular heart beat, near-syncope and palpitations  Objective:     Vitals: Blood pressure 170/90, pulse 96, temperature 98 3 °F (36 8 °C), resp  rate 20, height 6' 1" (1 854 m), weight 77 5 kg (170 lb 13 7 oz), SpO2 93 %  , Body mass index is 22 54 kg/m² ,   Orthostatic Blood Pressures      Most Recent Value   Blood Pressure  170/90 filed at 08/07/2021 0826   Patient Position - Orthostatic VS  Standing filed at 08/04/2021 2045            Intake/Output Summary (Last 24 hours) at 8/7/2021 0920  Last data filed at 8/7/2021 0814  Gross per 24 hour   Intake 2872 5 ml   Output 200 ml   Net 2672 5 ml       TELE: No significant arrhythmias seen      Physical Exam:    GEN: Tiarra Wolfe appears well, alert and oriented x 3, pleasant and cooperative   HEENT: pupils equal, round, and reactive to light; extraocular muscles intact  NECK: supple, no carotid bruits   HEART: regular rhythm, normal S1 and S2, no murmurs, clicks, gallops or rubs   LUNGS: clear to auscultation bilaterally; no wheezes, rales, or rhonchi   ABDOMEN: normal bowel sounds, soft, no tenderness, no distention  EXTREMITIES: peripheral pulses normal; no clubbing, cyanosis, or edema  NEURO: no focal findings   SKIN: normal without suspicious lesions on exposed skin    Medications:      Current Facility-Administered Medications:     acetaminophen (TYLENOL) tablet 650 mg, 650 mg, Oral, Q6H PRN, Aurelio Saha MD, 650 mg at 08/06/21 1707    albuterol (PROVENTIL HFA,VENTOLIN HFA) inhaler 2 puff, 2 puff, Inhalation, Q4H PRN, Aurelio Saha MD    enoxaparin (LOVENOX) subcutaneous injection 40 mg, 40 mg, Subcutaneous, Q24H [address], Aurelio Saha MD, 40 mg at 08/07/21 6791    famotidine (PEPCID) tablet 20 mg, 20 mg, Oral, BID, Aurelio Saha MD, 20 mg at 54/69/12 6893    folic acid (FOLVITE) tablet 1 mg, 1 mg, Oral, Daily, Aurelio Saha MD, 1 mg at 08/07/21 7686    levETIRAcetam (KEPPRA) tablet 1,500 mg, 1,500 mg, Oral, Q12H [address], Aurelio Saha MD, 1,500 mg at 08/07/21 9663    LORazepam (ATIVAN) injection 0 5 mg, 0 5 mg, Intravenous, Once PRN, Aurelio Saha MD    menthol-methyl salicylate (BENGAY) 61-78 % cream, , Apply externally, 4x Daily PRN, Aurelio Saha MD, Given at 08/06/21 1550    multi-electrolyte (PLASMALYTE-A/ISOLYTE-S PH 7 4) IV solution, 150 mL/hr, Intravenous, Continuous, Aurelio Saha MD, Last Rate: 150 mL/hr at 08/07/21 0814, 150 mL/hr at 08/07/21 0814    nicotine (NICODERM CQ) 14 mg/24hr TD 24 hr patch 14 mg, 14 mg, Transdermal, Daily PRN, uArelio Saha MD    potassium phosphate 9 mmol in sodium chloride 0 9 % 100 mL Infusion, 9 mmol, Intravenous, Once, Mike Mullins MD    thiamine tablet 100 mg, 100 mg, Oral, Daily, Aurelio Saha MD, 100 mg at 08/07/21 5625     Labs & Results:    Results from last 7 days   Lab Units 08/07/21  0530 08/06/21  1549 08/06/21  0846 08/06/21  0529 08/05/21  2328 08/05/21  1514   CK TOTAL U/L 6,068* 4,294* 4,143*  --   --   --    TROPONIN I ng/mL  --   --   --  0 09* 0 08* 0 09*   CK MB INDEX % <1 0 <1 0 <1 0  --   --   --      Results from last 7 days   Lab Units 08/07/21 0530 08/06/21 0529 08/05/21  0530   WBC Thousand/uL 6 89 7 51 8 81   HEMOGLOBIN g/dL 10 6* 11 8* 12 4   HEMATOCRIT % 29 5* 33 4* 33 9*   PLATELETS Thousands/uL 119* 98* 97*     Results from last 7 days   Lab Units 08/06/21  0846   TRIGLYCERIDES mg/dL 160*   HDL mg/dL 62     Results from last 7 days   Lab Units 08/07/21  0530 08/06/21  1148 08/06/21  0529 08/05/21  1228 08/05/21  0530 08/04/21 1951 08/04/21  1943   POTASSIUM mmol/L 3 1* 3 4* 3 0* 3 5 2 8* 2 8*  --    CHLORIDE mmol/L 102 99* 102 104 103 104  --    CO2 mmol/L 25 27 27 25 25 23  --    CO2, I-STAT mmol/L  --   --   --   --   --   --  27   BUN mg/dL 9 7 6 5 5 7  --    CREATININE mg/dL 0 52* 0 59* 0 53* 0 54* 0 49* 0 78  --    CALCIUM mg/dL 6 9* 7 1* 7 0* 6 9* 6 5* 7 5*  --    ALK PHOS U/L  --   --   --  110 105 136*  --    ALT U/L  --   --   --  38 40 47  --    AST U/L  --   --   --  76* 77* 85*  --    GLUCOSE, ISTAT mg/dl  --   --   --   --   --   --  102     Results from last 7 days   Lab Units 08/04/21 1951   INR  0 91   PTT seconds 27     Results from last 7 days   Lab Units 08/07/21 0530 08/06/21  0529 08/05/21  1901   MAGNESIUM mg/dL 1 9 1 8 2 0       Echo: personally reviewed - EF 65% with moderate asymmetrical hypertrophy of the septum  Septal wall motion abnormality with underlying conduction abnormality      EKG personally reviewed by Melanie Gr MD

## 2021-08-07 NOTE — ASSESSMENT & PLAN NOTE
Patient reported to have right knee pain  On examination-right knee appears to be swollen as compared to left, tenderness to palpation-warm to touch, likely underlying effusion  Low concerns for septic arthritis for now given normal white count and no fevers  Orthopedics have been consulted-patient would probably require tapping of the effusion and further workup to rule out septic arthritis  Will hold off antibiotics for now    If the knees tap, will send for culture, Gram stain, cell count

## 2021-08-08 PROBLEM — M10.9 ACUTE GOUT OF MULTIPLE SITES: Status: ACTIVE | Noted: 2021-08-07

## 2021-08-08 LAB
ALBUMIN SERPL BCP-MCNC: 2.3 G/DL (ref 3.5–5)
ALP SERPL-CCNC: 178 U/L (ref 46–116)
ALT SERPL W P-5'-P-CCNC: 74 U/L (ref 12–78)
ANION GAP SERPL CALCULATED.3IONS-SCNC: 6 MMOL/L (ref 4–13)
AST SERPL W P-5'-P-CCNC: 240 U/L (ref 5–45)
BASOPHILS # BLD AUTO: 0.02 THOUSANDS/ΜL (ref 0–0.1)
BASOPHILS NFR BLD AUTO: 0 % (ref 0–1)
BILIRUB SERPL-MCNC: 1.34 MG/DL (ref 0.2–1)
BUN SERPL-MCNC: 4 MG/DL (ref 5–25)
CALCIUM ALBUM COR SERPL-MCNC: 9.1 MG/DL (ref 8.3–10.1)
CALCIUM SERPL-MCNC: 7.7 MG/DL (ref 8.3–10.1)
CHLORIDE SERPL-SCNC: 105 MMOL/L (ref 100–108)
CK MB SERPL-MCNC: 1 NG/ML (ref 0–5)
CK MB SERPL-MCNC: <1 % (ref 0–2.5)
CK SERPL-CCNC: 4623 U/L (ref 39–308)
CO2 SERPL-SCNC: 23 MMOL/L (ref 21–32)
CREAT SERPL-MCNC: 0.46 MG/DL (ref 0.6–1.3)
EOSINOPHIL # BLD AUTO: 0.03 THOUSAND/ΜL (ref 0–0.61)
EOSINOPHIL NFR BLD AUTO: 1 % (ref 0–6)
ERYTHROCYTE [DISTWIDTH] IN BLOOD BY AUTOMATED COUNT: 14 % (ref 11.6–15.1)
GFR SERPL CREATININE-BSD FRML MDRD: 124 ML/MIN/1.73SQ M
GLUCOSE SERPL-MCNC: 85 MG/DL (ref 65–140)
GRAM STN SPEC: NORMAL
HCT VFR BLD AUTO: 30.9 % (ref 36.5–49.3)
HGB BLD-MCNC: 10.7 G/DL (ref 12–17)
IMM GRANULOCYTES # BLD AUTO: 0.04 THOUSAND/UL (ref 0–0.2)
IMM GRANULOCYTES NFR BLD AUTO: 1 % (ref 0–2)
LYMPHOCYTES # BLD AUTO: 0.75 THOUSANDS/ΜL (ref 0.6–4.47)
LYMPHOCYTES NFR BLD AUTO: 13 % (ref 14–44)
MCH RBC QN AUTO: 35.9 PG (ref 26.8–34.3)
MCHC RBC AUTO-ENTMCNC: 34.6 G/DL (ref 31.4–37.4)
MCV RBC AUTO: 104 FL (ref 82–98)
MONOCYTES # BLD AUTO: 0.97 THOUSAND/ΜL (ref 0.17–1.22)
MONOCYTES NFR BLD AUTO: 16 % (ref 4–12)
NEUTROPHILS # BLD AUTO: 4.1 THOUSANDS/ΜL (ref 1.85–7.62)
NEUTS SEG NFR BLD AUTO: 69 % (ref 43–75)
NRBC BLD AUTO-RTO: 0 /100 WBCS
PLATELET # BLD AUTO: 133 THOUSANDS/UL (ref 149–390)
PMV BLD AUTO: 10.4 FL (ref 8.9–12.7)
POTASSIUM SERPL-SCNC: 3.7 MMOL/L (ref 3.5–5.3)
PROT SERPL-MCNC: 5.2 G/DL (ref 6.4–8.2)
RBC # BLD AUTO: 2.98 MILLION/UL (ref 3.88–5.62)
SODIUM SERPL-SCNC: 134 MMOL/L (ref 136–145)
URATE SERPL-MCNC: 4.9 MG/DL (ref 4.2–8)
WBC # BLD AUTO: 5.91 THOUSAND/UL (ref 4.31–10.16)

## 2021-08-08 PROCEDURE — 82553 CREATINE MB FRACTION: CPT | Performed by: INTERNAL MEDICINE

## 2021-08-08 PROCEDURE — 84550 ASSAY OF BLOOD/URIC ACID: CPT | Performed by: INTERNAL MEDICINE

## 2021-08-08 PROCEDURE — 85025 COMPLETE CBC W/AUTO DIFF WBC: CPT | Performed by: INTERNAL MEDICINE

## 2021-08-08 PROCEDURE — 82550 ASSAY OF CK (CPK): CPT | Performed by: INTERNAL MEDICINE

## 2021-08-08 PROCEDURE — NC001 PR NO CHARGE: Performed by: INTERNAL MEDICINE

## 2021-08-08 PROCEDURE — 80053 COMPREHEN METABOLIC PANEL: CPT | Performed by: INTERNAL MEDICINE

## 2021-08-08 PROCEDURE — 99254 IP/OBS CNSLTJ NEW/EST MOD 60: CPT | Performed by: ORTHOPAEDIC SURGERY

## 2021-08-08 PROCEDURE — NC001 PR NO CHARGE: Performed by: ORTHOPAEDIC SURGERY

## 2021-08-08 RX ORDER — PREDNISONE 20 MG/1
40 TABLET ORAL DAILY
Status: DISCONTINUED | OUTPATIENT
Start: 2021-08-08 | End: 2021-08-09 | Stop reason: HOSPADM

## 2021-08-08 RX ORDER — OXYCODONE HYDROCHLORIDE 5 MG/1
5 TABLET ORAL ONCE
Status: COMPLETED | OUTPATIENT
Start: 2021-08-08 | End: 2021-08-08

## 2021-08-08 RX ORDER — ALLOPURINOL 300 MG/1
300 TABLET ORAL DAILY
Status: DISCONTINUED | OUTPATIENT
Start: 2021-08-08 | End: 2021-08-09

## 2021-08-08 RX ADMIN — SODIUM CHLORIDE, SODIUM LACTATE, POTASSIUM CHLORIDE, AND CALCIUM CHLORIDE 200 ML/HR: .6; .31; .03; .02 INJECTION, SOLUTION INTRAVENOUS at 06:59

## 2021-08-08 RX ADMIN — LEVETIRACETAM 1500 MG: 750 TABLET ORAL at 21:24

## 2021-08-08 RX ADMIN — SODIUM CHLORIDE, SODIUM LACTATE, POTASSIUM CHLORIDE, AND CALCIUM CHLORIDE 200 ML/HR: .6; .31; .03; .02 INJECTION, SOLUTION INTRAVENOUS at 01:43

## 2021-08-08 RX ADMIN — FAMOTIDINE 20 MG: 20 TABLET ORAL at 10:17

## 2021-08-08 RX ADMIN — FOLIC ACID 1 MG: 1 TABLET ORAL at 10:17

## 2021-08-08 RX ADMIN — OXYCODONE HYDROCHLORIDE 5 MG: 5 TABLET ORAL at 18:13

## 2021-08-08 RX ADMIN — ALLOPURINOL 300 MG: 300 TABLET ORAL at 14:33

## 2021-08-08 RX ADMIN — SODIUM CHLORIDE, SODIUM LACTATE, POTASSIUM CHLORIDE, AND CALCIUM CHLORIDE 200 ML/HR: .6; .31; .03; .02 INJECTION, SOLUTION INTRAVENOUS at 21:27

## 2021-08-08 RX ADMIN — FAMOTIDINE 20 MG: 20 TABLET ORAL at 18:13

## 2021-08-08 RX ADMIN — THIAMINE HCL TAB 100 MG 100 MG: 100 TAB at 10:17

## 2021-08-08 RX ADMIN — LEVETIRACETAM 1500 MG: 750 TABLET ORAL at 10:17

## 2021-08-08 RX ADMIN — ACETAMINOPHEN 650 MG: 325 TABLET, FILM COATED ORAL at 10:17

## 2021-08-08 RX ADMIN — PREDNISONE 40 MG: 20 TABLET ORAL at 10:17

## 2021-08-08 RX ADMIN — ENOXAPARIN SODIUM 40 MG: 40 INJECTION SUBCUTANEOUS at 10:16

## 2021-08-08 NOTE — PROGRESS NOTES
Aspiration results:   · WBC 4K  · +Monosodium Urate Crystals  · RBC 2K  · Final Culture pending      A/P:   61 yo male with gouty attack on multiple joints       WBAT RLE, WBAT RUE  Recommend medical management of gout  Recommend outpatient follow up with PCP or referral to Rheumatology  Will continue to follow final aspiration cultures peripherally  Signing off
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
## 2021-08-08 NOTE — ASSESSMENT & PLAN NOTE
Patient's platelets 97 on arrival, 133 today  Likely in the setting of alcohol abuse, and hepatomegaly/steatosis seen on CT imaging      Plan:  · Continue to monitor with daily CBC  · Encouraged alcohol cessation

## 2021-08-08 NOTE — ASSESSMENT & PLAN NOTE
Acute encephalopathy secondary to seizures also in the setting of alcohol abuse  Patient states that his last drink was about a week ago  He does have a history of alcohol withdrawal seizures  Noncompliant with Keppra  MRI brain 8/7:  No acute infarction, intracranial hemorrhage or mass effect  Moderate, chronic microangiopathic changes  Small, chronic infarction with evidence of prior, chronic hemorrhagic components in the posterior inferior aspect of the right temporal lobe      Plan:  · Patient extubated on 08/05  · Initially going to leaving AMA, but decided stay  · He has a history of chronic seizures on and off for the past 1 5 years that have been documented, per the patient he has been having this for 5-6 years  · History of subclinical and clinical epileptic seizures captured on video EEG in August of 2020  · Patient has been non compliant with home Keppra 1500 mg twice daily  · He has been lost to follow-up to least 2 neurologist (Dr Elzbieta Salinas and Dr Efrain Balbuena in [address] [place] [address])  · The last time he filled Jean Claude [place] was in April of 2021, with likely only taking 1 750 mg pill daily  · Video EEG if the patient declines  · CIWA protocol  · Seizure precautions  · Neurochecks

## 2021-08-08 NOTE — ASSESSMENT & PLAN NOTE
Patient found down by EMS  CK on arrival 1394  CK peaked at 6 3K  CK downtrending at 4 6 K 8/8/21, now at 1 7K 8/9/21     Continue IV fluids LR @ 200mL/hr  Will repeat CK levels

## 2021-08-08 NOTE — ASSESSMENT & PLAN NOTE
Patient with systolic blood pressures ranging from the 110s up to 160  He is on no home antihypertensive medication    Suspect elevated blood pressures from alcohol withdrawal     Follow-up with primary care physician

## 2021-08-08 NOTE — QUICK NOTE
Notified by nurse of concern for possible V-tach  QRS slightly wide on telemetry with HR at that time in 110s, in the setting of hypokalemia (K 3 1) that was repleted today  VSS (/91, HR 90s, SpO2 mid to high 90s on RA)  On exam, patient resting comfortably in bed  Denies chest pain, palpitations, shortness of breath, lightheadedness, dizziness, numbness/tingling in any extremities  +S1, +S2, no murmurs on exam   Lungs clear to auscultation bilaterally  Reviewed prior ECGs during this admission, all of which showed similar wide QRS complexes  Advised patient to notify his nurse if he becomes symptomatic

## 2021-08-08 NOTE — CONSULTS
Orthopedics   Rodney Car 60 y o  male MRN: [id number]  Unit/Bed#: [facility] 712-01      Chief Complaint:   right knee and right elbow pain    HPI:   60 y o male complaining of right knee and elbow pain  Pain is worse with motion and palpation improves at rest  Pain in these areas has been present for a while but were exacerbated after his seizure episodes  He works as a leslie  He smokes 1 PPD> He has hx of gout, takes allopurinol  He is admitted for encephalopathy and seizures work-up  Denies recent fevers or chills  Review Of Systems:   · Skin: Normal  · Neuro: See HPI  · Musculoskeletal: See HPI  · 14 point review of systems negative except as stated above     Past Medical History:   No past medical history on file  Past Surgical History:   No past surgical history on file  Family History:  Family history reviewed and non-contributory  No family history on file  Social History:  Social History     Socioeconomic History    Marital status: Single     Spouse name: Not on file    Number of children: Not on file    Years of education: Not on file    Highest education level: Not on file   Occupational History    Not on file   Tobacco Use    Smoking status: Not on file   Substance and Sexual Activity    Alcohol use: Not on file    Drug use: Not on file    Sexual activity: Not on file   Other Topics Concern    Not on file   Social History Narrative    Not on file     Social Determinants of Health     Financial Resource Strain:     Difficulty of Paying Living Expenses:    Food Insecurity:     Worried About Running Out of Food in the Last Year:     [address] in the Last Year:    Transportation Needs:     Lack of Transportation (Medical):      Lack of Transportation (Non-Medical):    Physical Activity:     Days of Exercise per Week:     Minutes of Exercise per Session:    Stress:     Feeling of Stress :    Social Connections:     Frequency of Communication with Friends and Family:  Frequency of Social Gatherings with Friends and Family:     Attends [employer] Services:     Active Member of Clubs or Organizations:     Attends Club or Organization Meetings:     Marital Status:    Intimate Partner Violence:     Fear of Current or Ex-Partner:     Emotionally Abused:     Physically Abused:     Sexually Abused:         Allergies:   No Known Allergies        Labs:  0   Lab Value Date/Time    HCT 29 5 (L) 08/07/2021 0530    HCT 33 4 (L) 08/06/2021 0529    HCT 33 9 (L) 08/05/2021 0530    HGB 10 6 (L) 08/07/2021 0530    HGB 11 8 (L) 08/06/2021 0529    HGB 12 4 08/05/2021 0530    INR 0 91 08/04/2021 1951    WBC 6 89 08/07/2021 0530    WBC 7 51 08/06/2021 0529    WBC 8 81 08/05/2021 0530       Meds:    Current Facility-Administered Medications:     acetaminophen (TYLENOL) tablet 650 mg, 650 mg, Oral, Q6H PRN, Soco Ibarra MD, 650 mg at 08/07/21 1239    albuterol (PROVENTIL HFA,VENTOLIN HFA) inhaler 2 puff, 2 puff, Inhalation, Q4H PRN, Soco Ibarra MD    enoxaparin (LOVENOX) subcutaneous injection 40 mg, 40 mg, Subcutaneous, Q24H [facility], Soco Ibarra MD, 40 mg at 08/07/21 6306    famotidine (PEPCID) tablet 20 mg, 20 mg, Oral, BID, Soco Ibarra MD, 20 mg at 39/66/22 3333    folic acid (FOLVITE) tablet 1 mg, 1 mg, Oral, Daily, Soco Ibarra MD, 1 mg at 08/07/21 5876    Gadobutrol injection (SINGLE-DOSE) SOLN 9 mL, 9 mL, Intravenous, Once in imaging, Rocio Gore DO    lactated ringers infusion, 200 mL/hr, Intravenous, Continuous, Kareen Tillman MD, Last Rate: 200 mL/hr at 08/07/21 2056, 200 mL/hr at 08/07/21 2056    levETIRAcetam (KEPPRA) tablet 1,500 mg, 1,500 mg, Oral, Q12H [facility], Soco Ibarra MD, 1,500 mg at 08/07/21 2056    LORazepam (ATIVAN) injection 0 5 mg, 0 5 mg, Intravenous, Once PRN, Soco Ibarra MD    menthol-methyl salicylate (BENGAY) 83-28 % cream, , Apply externally, 4x Daily PRN, Soco Ibarra MD, Given at 08/06/21 1550    nicotine (NICODERM CQ) 14 mg/24hr TD 24 hr patch 14 mg, 14 mg, Transdermal, Daily PRN, Hellen Wilcox MD    thiamine tablet 100 mg, 100 mg, Oral, Daily, Hellen Wilcox MD, 100 mg at 08/07/21 5691    Blood Culture:   Lab Results   Component Value Date    BLOODCX No Growth at 48 hrs  08/05/2021       Wound Culture:   No results found for: WOUNDCULT    Ins and Outs:  I/O last 24 hours: In: 7559 [P O :200; I V :1580; IV Piggyback:1000]  Out: 950 [Urine:950]          Physical Exam:   /92   Pulse 83   Temp 98 3 °F (36 8 °C)   Resp 20   Ht 6' 1" (1 854 m)   Wt 77 5 kg (170 lb 13 7 oz)   SpO2 97%   BMI 22 54 kg/m²   Gen: Alert and oriented to person, place, time  HEENT: EOMI, eyes clear, moist mucus membranes, hearing intact  Respiratory: Bilateral chest rise  No audible wheezing found  Cardiovascular: Regular Rate and Rhythm  Abdomen: soft nontender/nondistended  Musculoskeletal: right lower extremity  · Skin intact  · Tender to palpation over knee globally  · Large effusion  · Can perform straight leg raise, but very weak  · Stable to varus/valgus stress  · Sensation grossly intact dp/sp/tib/paulette/saph distributions  · Knee ROM from 10-45 degrees  · 5/5 strength to ankle dorsi/plantar flexion, EHL/FHL  · 2+ DP pulse    Musculoskeletal of:   -Skin intact around the elbow  -Some warmth noted over the posterior aspect of the elbow   -Elbow range of motion from 30 to 100° limited by pain  -Small elbow effusion palpable   -Sensation intact to radial/median/ulnar nerve distributions    Radiology:   I personally reviewed the films  X-rays right knee shows no evidence of fracture or dislocations, there is chondrocalcinosis present  There is a lesion in the proximal tibia which appears to be an enchondroma  X-rays right elbow shows no evidence of fracture or dislocations    There is some joint space narrowing    Procedure- Orthopedics   Alonso Blackwell 60 y o  male MRN: [id number]  Unit/Bed#: [facility] 712-01    Procedure: right knee aspiration    After sterile preparation of the skin overlying the knee local anesthetic was provided with 5cc of 1% lidocaine  An 18 gauge needle was then  inserted via a superior lateral portal   Approx 80cc of thick clear yellow fluid was aspirated and sent for gram stain, culture, synovial WBC/RBC, and crystals  Sterile dressing was then applied  Pt tolerated the procedure well and was neurovascularly intact both pre and post procedure  Osbaldo Javier MD      _*_*_*_*_*_*_*_*_*_*_*_*_*_*_*_*_*_*_*_*_*_*_*_*_*_*_*_*_*_*_*_*_*_*_*_*_*_*_*_*_*    Assessment:  60 y o  male with right knee and elbow effusions likely secondary to gout attack       Plan:   · Weight bearing as tolerated  right lower extremity  · PT  · Pain control  · Follow aspiration labs  · If there is any evidence of infection in aspiration labs, patient will likely require I&D  · Dispo: Ortho will follow    Osbaldo Javier MD

## 2021-08-08 NOTE — ASSESSMENT & PLAN NOTE
Patient reported to have right knee pain  On examination-right knee appears to be swollen as compared to left, tenderness to palpation-warm to touch, likely underlying effusion  Low concerns for septic arthritis for now given normal white count and no fevers  Right knee fluid studies:  Consistent with gout  · G stain: No bacteria seen on Gram stain,   · Crystal study: monosodium urate crystals  · RBC count:  2000  · WBC count:  4269 (91% N)  Uric acid 4 9    Plan:  · Prednisone 40 mg daily until symptom resolution and then taper over 7 days    · Restart home allopurinol 300 mg daily

## 2021-08-09 VITALS
BODY MASS INDEX: 25.27 KG/M2 | HEART RATE: 77 BPM | HEIGHT: 73 IN | TEMPERATURE: 98 F | DIASTOLIC BLOOD PRESSURE: 100 MMHG | SYSTOLIC BLOOD PRESSURE: 140 MMHG | WEIGHT: 190.7 LBS | RESPIRATION RATE: 19 BRPM | OXYGEN SATURATION: 97 %

## 2021-08-09 LAB
ANION GAP SERPL CALCULATED.3IONS-SCNC: 5 MMOL/L (ref 4–13)
BASOPHILS # BLD AUTO: 0.02 THOUSANDS/ΜL (ref 0–0.1)
BASOPHILS NFR BLD AUTO: 0 % (ref 0–1)
BUN SERPL-MCNC: 5 MG/DL (ref 5–25)
CALCIUM SERPL-MCNC: 8.6 MG/DL (ref 8.3–10.1)
CHLORIDE SERPL-SCNC: 105 MMOL/L (ref 100–108)
CK MB SERPL-MCNC: 1.8 NG/ML (ref 0–5)
CK MB SERPL-MCNC: <1 % (ref 0–2.5)
CK SERPL-CCNC: 1725 U/L (ref 39–308)
CO2 SERPL-SCNC: 25 MMOL/L (ref 21–32)
CREAT SERPL-MCNC: 0.46 MG/DL (ref 0.6–1.3)
EOSINOPHIL # BLD AUTO: 0.05 THOUSAND/ΜL (ref 0–0.61)
EOSINOPHIL NFR BLD AUTO: 1 % (ref 0–6)
ERYTHROCYTE [DISTWIDTH] IN BLOOD BY AUTOMATED COUNT: 13.5 % (ref 11.6–15.1)
GFR SERPL CREATININE-BSD FRML MDRD: 124 ML/MIN/1.73SQ M
GLUCOSE SERPL-MCNC: 84 MG/DL (ref 65–140)
HCT VFR BLD AUTO: 31.6 % (ref 36.5–49.3)
HGB BLD-MCNC: 10.9 G/DL (ref 12–17)
IMM GRANULOCYTES # BLD AUTO: 0.06 THOUSAND/UL (ref 0–0.2)
IMM GRANULOCYTES NFR BLD AUTO: 1 % (ref 0–2)
LYMPHOCYTES # BLD AUTO: 0.96 THOUSANDS/ΜL (ref 0.6–4.47)
LYMPHOCYTES NFR BLD AUTO: 15 % (ref 14–44)
MCH RBC QN AUTO: 35.5 PG (ref 26.8–34.3)
MCHC RBC AUTO-ENTMCNC: 34.5 G/DL (ref 31.4–37.4)
MCV RBC AUTO: 103 FL (ref 82–98)
MONOCYTES # BLD AUTO: 0.91 THOUSAND/ΜL (ref 0.17–1.22)
MONOCYTES NFR BLD AUTO: 14 % (ref 4–12)
NEUTROPHILS # BLD AUTO: 4.64 THOUSANDS/ΜL (ref 1.85–7.62)
NEUTS SEG NFR BLD AUTO: 69 % (ref 43–75)
NRBC BLD AUTO-RTO: 0 /100 WBCS
PLATELET # BLD AUTO: 169 THOUSANDS/UL (ref 149–390)
PMV BLD AUTO: 10.4 FL (ref 8.9–12.7)
POTASSIUM SERPL-SCNC: 3.6 MMOL/L (ref 3.5–5.3)
RBC # BLD AUTO: 3.07 MILLION/UL (ref 3.88–5.62)
SODIUM SERPL-SCNC: 135 MMOL/L (ref 136–145)
WBC # BLD AUTO: 6.64 THOUSAND/UL (ref 4.31–10.16)

## 2021-08-09 PROCEDURE — 82550 ASSAY OF CK (CPK): CPT | Performed by: STUDENT IN AN ORGANIZED HEALTH CARE EDUCATION/TRAINING PROGRAM

## 2021-08-09 PROCEDURE — 80048 BASIC METABOLIC PNL TOTAL CA: CPT | Performed by: STUDENT IN AN ORGANIZED HEALTH CARE EDUCATION/TRAINING PROGRAM

## 2021-08-09 PROCEDURE — 85025 COMPLETE CBC W/AUTO DIFF WBC: CPT | Performed by: STUDENT IN AN ORGANIZED HEALTH CARE EDUCATION/TRAINING PROGRAM

## 2021-08-09 PROCEDURE — 82553 CREATINE MB FRACTION: CPT | Performed by: STUDENT IN AN ORGANIZED HEALTH CARE EDUCATION/TRAINING PROGRAM

## 2021-08-09 RX ORDER — PREDNISONE 20 MG/1
40 TABLET ORAL DAILY
Qty: 6 TABLET | Refills: 0 | Status: SHIPPED | OUTPATIENT
Start: 2021-08-10 | End: 2021-08-13

## 2021-08-09 RX ORDER — OXYCODONE HYDROCHLORIDE 5 MG/1
5 TABLET ORAL EVERY 4 HOURS PRN
Status: DISCONTINUED | OUTPATIENT
Start: 2021-08-09 | End: 2021-08-09 | Stop reason: HOSPADM

## 2021-08-09 RX ADMIN — THIAMINE HCL TAB 100 MG 100 MG: 100 TAB at 08:59

## 2021-08-09 RX ADMIN — ENOXAPARIN SODIUM 40 MG: 40 INJECTION SUBCUTANEOUS at 08:59

## 2021-08-09 RX ADMIN — LEVETIRACETAM 1500 MG: 750 TABLET ORAL at 08:59

## 2021-08-09 RX ADMIN — SODIUM CHLORIDE, SODIUM LACTATE, POTASSIUM CHLORIDE, AND CALCIUM CHLORIDE 200 ML/HR: .6; .31; .03; .02 INJECTION, SOLUTION INTRAVENOUS at 02:50

## 2021-08-09 RX ADMIN — OXYCODONE HYDROCHLORIDE 5 MG: 5 TABLET ORAL at 11:41

## 2021-08-09 RX ADMIN — ACETAMINOPHEN 650 MG: 325 TABLET, FILM COATED ORAL at 09:01

## 2021-08-09 RX ADMIN — FOLIC ACID 1 MG: 1 TABLET ORAL at 08:59

## 2021-08-09 RX ADMIN — FAMOTIDINE 20 MG: 20 TABLET ORAL at 08:59

## 2021-08-09 RX ADMIN — PREDNISONE 40 MG: 20 TABLET ORAL at 08:59

## 2021-08-09 NOTE — CASE MANAGEMENT
[facility] of [address] is unable to accept  Referral placed to [facility]  Pt reported that he has been unable to get a hold of family to transport home today  CM informed that we can provide transport  [facility]NA inquired about PCP, pt reported Dr Gaby Forte

## 2021-08-09 NOTE — RESTORATIVE TECHNICIAN NOTE
Restorative Technician Note      Patient Name: David Gabriel     Note Type: Mobility  Patient Position Upon Consult: Bedside chair  Activity Performed: Ambulated;Dangled;Stood  Assistive Device: Other (Comment) (A x1)  Education Provided: Yes (Educated/encouraged pt to ambulate with assistance 3-4 x's/day )  Patient Position at End of Consult: Bedside chair; All needs within reach;Bed/Chair alarm activated  Ina PULIDO, Restorative Technician, [employer]

## 2021-08-09 NOTE — CASE MANAGEMENT
Additional referral placed to [facility] for PT/OT  Agency will review and will update CM  HOST reported that pt requested a return call around 5pm  Agency will f/u with pt OP  Pt is medically stable for discharge today, family unable to provide transportation  CM will continue to follow  CM set up a lyft ride with [facility] dispatcher Balta Muñoz for 4:50pm  Lyft waiver signed  RN informed

## 2021-08-09 NOTE — PLAN OF CARE
Problem: MOBILITY - ADULT  Goal: Maintain or return to baseline ADL function  Description: INTERVENTIONS:  -  Assess patient's ability to carry out ADLs; assess patient's baseline for ADL function and identify physical deficits which impact ability to perform ADLs (bathing, care of mouth/teeth, toileting, grooming, dressing, etc )  - Assess/evaluate cause of self-care deficits   - Assess range of motion  - Assess patient's mobility; develop plan if impaired  - Assess patient's need for assistive devices and provide as appropriate  - Encourage maximum independence but intervene and supervise when necessary  - Involve family in performance of ADLs  - Assess for home care needs following discharge   - Consider OT consult to assist with ADL evaluation and planning for discharge  - Provide patient education as appropriate  Outcome: Progressing  Goal: Maintains/Returns to pre admission functional level  Description: INTERVENTIONS:  - Perform BMAT or MOVE assessment daily    - Set and communicate daily mobility goal to care team and patient/family/caregiver  - Collaborate with rehabilitation services on mobility goals if consulted  - Perform Range of Motion 3 times a day  - Reposition patient every 2 hours    - Dangle patient 3 times a day  - Stand patient 3 times a day  - Ambulate patient 3 times a day  - Out of bed to chair 3 times a day   - Out of bed for meals 3 times a day  - Out of bed for toileting  - Record patient progress and toleration of activity level   Outcome: Progressing     Problem: Potential for Falls  Goal: Patient will remain free of falls  Description: INTERVENTIONS:  - Educate patient/family on patient safety including physical limitations  - Instruct patient to call for assistance with activity   - Consult OT/PT to assist with strengthening/mobility   - Keep Call bell within reach  - Keep bed low and locked with side rails adjusted as appropriate  - Keep care items and personal belongings within reach  - Initiate and maintain comfort rounds  - Make Fall Risk Sign visible to staff  - Offer Toileting every 2 Hours, in advance of need  - Initiate/Maintain bed alarm    - Apply yellow socks and bracelet for high fall risk patients  - Consider moving patient to room near nurses station  Outcome: Progressing     Problem: Prexisting or High Potential for Compromised Skin Integrity  Goal: Skin integrity is maintained or improved  Description: INTERVENTIONS:  - Identify patients at risk for skin breakdown  - Assess and monitor skin integrity  - Assess and monitor nutrition and hydration status  - Monitor labs   - Assess for incontinence   - Turn and reposition patient  - Assist with mobility/ambulation  - Relieve pressure over bony prominences  - Avoid friction and shearing  - Provide appropriate hygiene as needed including keeping skin clean and dry  - Evaluate need for skin moisturizer/barrier cream  - Collaborate with interdisciplinary team   - Patient/family teaching  - Consider wound care consult   Outcome: Progressing

## 2021-08-09 NOTE — CASE MANAGEMENT
Referral placed to [facility] of [address]  Cm will continue to follow  CM received a TC from Aracelis Murray with HOST who reported that she will be contacting pt shortly
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
## 2021-08-09 NOTE — CASE MANAGEMENT
CM met with pt at bedside  Pt reported that he does not recall denying HOST services, CM asked if he would like a referral placed to them prior to discharge, pt was agreeable  CM informed pt of therapy rec for STR, pt declined rehab at this time and reported that he would prefer VNA  No preference for a VNA agency, CM will place a referral in [facility]  SANTI received a TC from [facility] who requested that H&P, facesheet and med list be faxed to [phone]  Wife to transport upon discharge

## 2021-08-10 LAB — BACTERIA BLD CULT: NORMAL

## 2021-08-10 NOTE — UTILIZATION REVIEW
Notification of Discharge   This is a Notification of Discharge from our facility [address]  Please be advised that this patient has been discharge from our facility  Below you will find the admission and discharge date and time including the patients disposition  UTILIZATION REVIEW CONTACT:  [employer] [place]  Utilization   Network Utilization Review Department  Phone: [phone] x carefully listen to the prompts  All voicemails are confidential   Email: [email or web address]     PHYSICIAN ADVISORY SERVICES:  FOR AASR-RF-RBJF REVIEW - MEDICAL NECESSITY DENIAL  Phone: [phone]  Fax: [phone]  Email: [email or web address]     PRESENTATION DATE: 8/4/2021  7:35 PM  OBERVATION ADMISSION DATE:   INPATIENT ADMISSION DATE: 8/4/21  9:38 PM   DISCHARGE DATE: 8/9/2021  4:50 PM  DISPOSITION: Home with [place] with [address] INFORMATION:  Send all requests for admission clinical reviews, approved or denied determinations and any other requests to dedicated fax number below belonging to the campus where the patient is receiving treatment   List of dedicated fax numbers:  [address] DENIALS (Administrative/Medical Necessity) [phone]   [address] (Maternity/NICU/Pediatrics) [phone]   Jolie Sharma [phone]   Maximo Freeman [phone]   Vanessa Severin [phone]   [facility] [address] [place] [address] [phone]   [facility]  [phone]   [address] [place] [address], [address]  [address] [facility] [address] [facility] [phone]

## 2021-08-11 LAB
BACTERIA SPEC BFLD CULT: NO GROWTH
GRAM STN SPEC: NORMAL
GRAM STN SPEC: NORMAL

## 2021-09-17 ENCOUNTER — TELEPHONE (OUTPATIENT)
Dept: NEUROLOGY | Facility: CLINIC | Age: 56
End: 2021-09-17

## 2021-09-17 NOTE — TELEPHONE ENCOUNTER
1ST ATTEMPT Called 3 times, number just goes busy  If pt calls in see if pt is being seen with another Neurologist or needs to be scheduled for a HFU appt with our office  Mailing letter to pt's home  SLB/SZ, Encephalopathy/[employer]    NOTE FROM CHART:  Reason for Consult / Principal Problem: Seizure/Encephalopathy  Patient should follow-up with outpatient neurologist Dr Lynette Watkins (neurology office within the [facility] orthopedics: [phone] ext [phone]) within 2-4 weeks after discharge

## 2021-10-11 ENCOUNTER — HOSPITAL ENCOUNTER (EMERGENCY)
Facility: HOSPITAL | Age: 56
Discharge: HOME | End: 2021-10-12
Attending: EMERGENCY MEDICINE
Payer: COMMERCIAL

## 2021-10-11 VITALS
SYSTOLIC BLOOD PRESSURE: 133 MMHG | RESPIRATION RATE: 16 BRPM | WEIGHT: 200 LBS | OXYGEN SATURATION: 95 % | BODY MASS INDEX: 27.89 KG/M2 | TEMPERATURE: 97.5 F | HEART RATE: 72 BPM | DIASTOLIC BLOOD PRESSURE: 85 MMHG

## 2021-10-11 DIAGNOSIS — Z00.8 ENCOUNTER FOR PSYCHOLOGICAL EVALUATION: Primary | ICD-10-CM

## 2021-10-11 PROCEDURE — 99284 EMERGENCY DEPT VISIT MOD MDM: CPT

## 2021-10-11 PROCEDURE — 99284 EMERGENCY DEPT VISIT MOD MDM: CPT | Performed by: EMERGENCY MEDICINE

## 2022-04-13 ENCOUNTER — APPOINTMENT (EMERGENCY)
Dept: RADIOLOGY | Facility: HOSPITAL | Age: 57
End: 2022-04-13
Payer: COMMERCIAL

## 2022-04-13 ENCOUNTER — HOSPITAL ENCOUNTER (EMERGENCY)
Facility: HOSPITAL | Age: 57
Discharge: HOME | End: 2022-04-13
Attending: EMERGENCY MEDICINE | Admitting: EMERGENCY MEDICINE
Payer: COMMERCIAL

## 2022-04-13 VITALS
HEIGHT: 71 IN | RESPIRATION RATE: 16 BRPM | SYSTOLIC BLOOD PRESSURE: 119 MMHG | WEIGHT: 199.3 LBS | DIASTOLIC BLOOD PRESSURE: 67 MMHG | OXYGEN SATURATION: 97 % | HEART RATE: 68 BPM | TEMPERATURE: 97.5 F | BODY MASS INDEX: 27.9 KG/M2

## 2022-04-13 DIAGNOSIS — H61.23 BILATERAL HEARING LOSS DUE TO CERUMEN IMPACTION: ICD-10-CM

## 2022-04-13 DIAGNOSIS — F10.929 ACUTE ALCOHOL INTOXICATION (HCC): Primary | ICD-10-CM

## 2022-04-13 LAB
ALBUMIN SERPL BCP-MCNC: 3.3 G/DL (ref 3.5–5)
ALP SERPL-CCNC: 111 U/L (ref 46–116)
ALT SERPL W P-5'-P-CCNC: 33 U/L (ref 12–78)
AMPHETAMINES SERPL QL SCN: NEGATIVE
ANION GAP SERPL CALCULATED.3IONS-SCNC: 11 MMOL/L (ref 4–13)
APTT PPP: 29 SECONDS (ref 23–37)
AST SERPL W P-5'-P-CCNC: 22 U/L (ref 5–45)
BARBITURATES UR QL: NEGATIVE
BASOPHILS # BLD AUTO: 0.03 THOUSANDS/ΜL (ref 0–0.1)
BASOPHILS NFR BLD AUTO: 0 % (ref 0–1)
BENZODIAZ UR QL: NEGATIVE
BILIRUB SERPL-MCNC: 0.37 MG/DL (ref 0.2–1)
BILIRUB UR QL STRIP: NEGATIVE
BUN SERPL-MCNC: 10 MG/DL (ref 5–25)
CALCIUM ALBUM COR SERPL-MCNC: 9.8 MG/DL (ref 8.3–10.1)
CALCIUM SERPL-MCNC: 9.2 MG/DL (ref 8.3–10.1)
CHLORIDE SERPL-SCNC: 100 MMOL/L (ref 100–108)
CLARITY UR: CLEAR
CO2 SERPL-SCNC: 27 MMOL/L (ref 21–32)
COCAINE UR QL: NEGATIVE
COLOR UR: YELLOW
CREAT SERPL-MCNC: 1.13 MG/DL (ref 0.6–1.3)
EOSINOPHIL # BLD AUTO: 0.18 THOUSAND/ΜL (ref 0–0.61)
EOSINOPHIL NFR BLD AUTO: 2 % (ref 0–6)
ERYTHROCYTE [DISTWIDTH] IN BLOOD BY AUTOMATED COUNT: 13.1 % (ref 11.6–15.1)
ETHANOL SERPL-MCNC: 252 MG/DL (ref 0–3)
GFR SERPL CREATININE-BSD FRML MDRD: 70 ML/MIN/1.73SQ M
GLUCOSE SERPL-MCNC: 96 MG/DL (ref 65–140)
GLUCOSE UR STRIP-MCNC: NEGATIVE MG/DL
HCT VFR BLD AUTO: 38.3 % (ref 36.5–49.3)
HGB BLD-MCNC: 13 G/DL (ref 12–17)
HGB UR QL STRIP.AUTO: NEGATIVE
IMM GRANULOCYTES # BLD AUTO: 0.05 THOUSAND/UL (ref 0–0.2)
IMM GRANULOCYTES NFR BLD AUTO: 1 % (ref 0–2)
INR PPP: 0.91 (ref 0.84–1.19)
KETONES UR STRIP-MCNC: NEGATIVE MG/DL
LEUKOCYTE ESTERASE UR QL STRIP: NEGATIVE
LYMPHOCYTES # BLD AUTO: 2.33 THOUSANDS/ΜL (ref 0.6–4.47)
LYMPHOCYTES NFR BLD AUTO: 26 % (ref 14–44)
MAGNESIUM SERPL-MCNC: 1.7 MG/DL (ref 1.6–2.6)
MCH RBC QN AUTO: 28.8 PG (ref 26.8–34.3)
MCHC RBC AUTO-ENTMCNC: 33.9 G/DL (ref 31.4–37.4)
MCV RBC AUTO: 85 FL (ref 82–98)
METHADONE UR QL: NEGATIVE
MONOCYTES # BLD AUTO: 0.79 THOUSAND/ΜL (ref 0.17–1.22)
MONOCYTES NFR BLD AUTO: 9 % (ref 4–12)
NEUTROPHILS # BLD AUTO: 5.46 THOUSANDS/ΜL (ref 1.85–7.62)
NEUTS SEG NFR BLD AUTO: 62 % (ref 43–75)
NITRITE UR QL STRIP: NEGATIVE
NRBC BLD AUTO-RTO: 0 /100 WBCS
OPIATES UR QL SCN: NEGATIVE
OXYCODONE+OXYMORPHONE UR QL SCN: POSITIVE
PCP UR QL: NEGATIVE
PH UR STRIP.AUTO: 5.5 [PH]
PLATELET # BLD AUTO: 201 THOUSANDS/UL (ref 149–390)
PMV BLD AUTO: 9.3 FL (ref 8.9–12.7)
POTASSIUM SERPL-SCNC: 3.9 MMOL/L (ref 3.5–5.3)
PROT SERPL-MCNC: 7.2 G/DL (ref 6.4–8.2)
PROT UR STRIP-MCNC: NEGATIVE MG/DL
PROTHROMBIN TIME: 12.1 SECONDS (ref 11.6–14.5)
RBC # BLD AUTO: 4.52 MILLION/UL (ref 3.88–5.62)
SODIUM SERPL-SCNC: 138 MMOL/L (ref 136–145)
SP GR UR STRIP.AUTO: <=1.005 (ref 1–1.03)
THC UR QL: NEGATIVE
UROBILINOGEN UR QL STRIP.AUTO: 0.2 E.U./DL
WBC # BLD AUTO: 8.84 THOUSAND/UL (ref 4.31–10.16)

## 2022-04-13 PROCEDURE — 99284 EMERGENCY DEPT VISIT MOD MDM: CPT | Performed by: EMERGENCY MEDICINE

## 2022-04-13 PROCEDURE — 70450 CT HEAD/BRAIN W/O DYE: CPT

## 2022-04-13 PROCEDURE — 85730 THROMBOPLASTIN TIME PARTIAL: CPT | Performed by: EMERGENCY MEDICINE

## 2022-04-13 PROCEDURE — 81003 URINALYSIS AUTO W/O SCOPE: CPT | Performed by: EMERGENCY MEDICINE

## 2022-04-13 PROCEDURE — 83735 ASSAY OF MAGNESIUM: CPT | Performed by: EMERGENCY MEDICINE

## 2022-04-13 PROCEDURE — 85610 PROTHROMBIN TIME: CPT | Performed by: EMERGENCY MEDICINE

## 2022-04-13 PROCEDURE — G1004 CDSM NDSC: HCPCS

## 2022-04-13 PROCEDURE — 99285 EMERGENCY DEPT VISIT HI MDM: CPT

## 2022-04-13 PROCEDURE — 36415 COLL VENOUS BLD VENIPUNCTURE: CPT | Performed by: EMERGENCY MEDICINE

## 2022-04-13 PROCEDURE — 80053 COMPREHEN METABOLIC PANEL: CPT | Performed by: EMERGENCY MEDICINE

## 2022-04-13 PROCEDURE — 80307 DRUG TEST PRSMV CHEM ANLYZR: CPT | Performed by: EMERGENCY MEDICINE

## 2022-04-13 PROCEDURE — 85025 COMPLETE CBC W/AUTO DIFF WBC: CPT | Performed by: EMERGENCY MEDICINE

## 2022-04-13 PROCEDURE — 82077 ASSAY SPEC XCP UR&BREATH IA: CPT | Performed by: EMERGENCY MEDICINE

## 2022-04-13 NOTE — ED PROVIDER NOTES
History  Chief Complaint   Patient presents with    Generalized Body Aches     patient brought by ems for reported generalized body pain for past day ; Furthermore patient endorses having blurred vision since yesterday  Patient endorses drinkning '3 or 4' vodka oranges prior to arrival  Hard of hearing at time of triage, answers appropriately   Blurred Vision     Pt in the ER with c/o generalized myalgias x 1 day  He also c/o decreased vision and hearing for a few days  He is visibly intoxicated with slurred speech at the time of my initial eval  He states that his last drink was tonight -vodka/orange juice, and he typically drinks 3-4 daily  History provided by:  Patient  History limited by:  Acuity of condition   used: No        Prior to Admission Medications   Prescriptions Last Dose Informant Patient Reported? Taking? Magnesium Oxide 400 MG CAPS   Yes No   Sig: Take 400 mg by mouth 2 (two) times a day   QUEtiapine (SEROquel) 50 mg tablet   Yes No   Sig: Take 5 mg by mouth daily at bedtime as needed   allopurinol (ZYLOPRIM) 300 mg tablet   No No   Sig: Take 1 tablet (300 mg total) by mouth daily   Patient not taking: Reported on 2/25/2020   amLODIPine (NORVASC) 10 mg tablet   Yes No   Sig: Take 10 mg by mouth daily at bedtime  aspirin 81 MG tablet   Yes No   Sig: Take 81 mg by mouth daily  fenofibrate (TRICOR) 145 mg tablet   No No   Sig: Take 1 tablet (145 mg total) by mouth daily   levETIRAcetam (KEPPRA) 750 mg tablet   No No   Sig: Take 2 tablets (1,500 mg total) by mouth every 12 (twelve) hours   metoprolol succinate (TOPROL-XL) 100 mg 24 hr tablet   No No   Sig: Take 1 tablet (100 mg total) by mouth daily   omeprazole (PriLOSEC) 20 mg delayed release capsule   Yes No   Sig: Take 20 mg by mouth daily        Facility-Administered Medications: None       Past Medical History:   Diagnosis Date    Biceps tendonitis, unspecified laterality 09/17/2007    Bone cyst 11/22/2011  Bronchiectasis ([facility] [address] ) 10/02/2006    Bursitis 11/17/2005    Chest pain 04/29/2009    Chronic renal failure 11/22/2011    Diverticulitis of colon 10/09/2007    Generalized anxiety disorder 06/08/2006    GERD (gastroesophageal reflux disease)     Gout     Hyperlipidemia     Hypertension     Lateral epicondylitis, unspecified elbow     Last Assessed: 11/29/2013    Low magnesium levels     Lyme disease 11/09/2009    Pneumonia     Last Assessed: 1/7/2013       Past Surgical History:   Procedure Laterality Date    KNEE ARTHROSCOPY      Therapeutic    OLECRANON BURSA EXCISION         Family History   Problem Relation Age of Onset    Cancer Mother         Colon     I have reviewed and agree with the history as documented  E-Cigarette/Vaping    E-Cigarette Use Never User      E-Cigarette/Vaping Substances     Social History     Tobacco Use    Smoking status: Current Every Day Smoker     Packs/day: 0 50    Smokeless tobacco: Never Used    Tobacco comment: cigarette nicotine dependence   Vaping Use    Vaping Use: Never used   Substance Use Topics    Alcohol use: Yes     Alcohol/week: 6 0 standard drinks     Types: 6 Glasses of wine per week     Comment: per week    Drug use: No       Review of Systems   Constitutional: Negative for chills and fever  Respiratory: Negative for cough, shortness of breath and wheezing  Cardiovascular: Negative for chest pain and palpitations  Gastrointestinal: Negative for abdominal pain, constipation, diarrhea, nausea and vomiting  Genitourinary: Negative for dysuria, flank pain, hematuria and urgency  Musculoskeletal: Positive for myalgias  Negative for back pain  Skin: Negative for color change and rash  All other systems reviewed and are negative  Physical Exam  Physical Exam  Vitals and nursing note reviewed  Constitutional:       Appearance: He is well-developed  HENT:      Head: Normocephalic and atraumatic        Right Ear: Tympanic membrane, ear canal and external ear normal       Left Ear: Tympanic membrane, ear canal and external ear normal    Eyes:      Extraocular Movements: Extraocular movements intact  Conjunctiva/sclera: Conjunctivae normal       Pupils: Pupils are equal, round, and reactive to light  Cardiovascular:      Rate and Rhythm: Normal rate and regular rhythm  Heart sounds: Normal heart sounds  Pulmonary:      Effort: Pulmonary effort is normal       Breath sounds: Normal breath sounds  Abdominal:      General: Bowel sounds are normal  There is no distension  Palpations: Abdomen is soft  There is no mass  Tenderness: There is no abdominal tenderness  There is no guarding or rebound  Skin:     General: Skin is warm and dry  Capillary Refill: Capillary refill takes less than 2 seconds  Neurological:      Mental Status: He is alert and oriented to person, place, and time  Psychiatric:         Behavior: Behavior normal          Thought Content:  Thought content normal          Judgment: Judgment normal          Vital Signs  ED Triage Vitals [04/13/22 0023]   Temperature Pulse Respirations Blood Pressure SpO2   97 5 °F (36 4 °C) 73 16 126/90 98 %      Temp Source Heart Rate Source Patient Position - Orthostatic VS BP Location FiO2 (%)   Oral Monitor Lying Right arm --      Pain Score       10 - Worst Possible Pain           Vitals:    04/13/22 0023 04/13/22 0154 04/13/22 0630   BP: 126/90 124/61 119/67   Pulse: 73 80 68   Patient Position - Orthostatic VS: Lying Lying Lying         Visual Acuity      ED Medications  Medications - No data to display    Diagnostic Studies  Results Reviewed     Procedure Component Value Units Date/Time    Rapid drug screen, urine [[id number]]  (Abnormal) Collected: 04/13/22 0153    Lab Status: Final result Specimen: Urine, Clean Catch Updated: 04/13/22 0217     Amph/Meth UR Negative     Barbiturate Ur Negative     Benzodiazepine Urine Negative     Cocaine Urine Negative     Methadone Urine Negative     Opiate Urine Negative     PCP Ur Negative     THC Urine Negative     Oxycodone Urine Positive    Narrative:      Presumptive report  If requested, specimen will be sent to reference lab for confirmation  FOR MEDICAL PURPOSES ONLY  IF CONFIRMATION NEEDED PLEASE CONTACT THE LAB WITHIN 5 DAYS      Drug Screen Cutoff Levels:  AMPHETAMINE/METHAMPHETAMINES  1000 ng/mL  BARBITURATES     200 ng/mL  BENZODIAZEPINES     200 ng/mL  COCAINE      300 ng/mL  METHADONE      300 ng/mL  OPIATES      300 ng/mL  PHENCYCLIDINE     25 ng/mL  THC       50 ng/mL  OXYCODONE      100 ng/mL    UA (URINE) with reflex to Scope [[id number]] Collected: 04/13/22 0153    Lab Status: Final result Specimen: Urine, Clean Catch Updated: 04/13/22 0204     Color, UA Yellow     Clarity, UA Clear     Specific Gravity, UA <=1 005     pH, UA 5 5     Leukocytes, UA Negative     Nitrite, UA Negative     Protein, UA Negative mg/dl      Glucose, UA Negative mg/dl      Ketones, UA Negative mg/dl      Urobilinogen, UA 0 2 E U /dl      Bilirubin, UA Negative     Blood, UA Negative    Protime-INR [[id number]]  (Normal) Collected: 04/13/22 0044    Lab Status: Final result Specimen: Blood from Arm, Left Updated: 04/13/22 0112     Protime 12 1 seconds      INR 0 91    APTT [[id number]]  (Normal) Collected: 04/13/22 0044    Lab Status: Final result Specimen: Blood from Arm, Left Updated: 04/13/22 0112     PTT 29 seconds     Comprehensive metabolic panel [[id number]]  (Abnormal) Collected: 04/13/22 0044    Lab Status: Final result Specimen: Blood from Arm, Left Updated: 04/13/22 0110     Sodium 138 mmol/L      Potassium 3 9 mmol/L      Chloride 100 mmol/L      CO2 27 mmol/L      ANION GAP 11 mmol/L      BUN 10 mg/dL      Creatinine 1 13 mg/dL      Glucose 96 mg/dL      Calcium 9 2 mg/dL      Corrected Calcium 9 8 mg/dL      AST 22 U/L      ALT 33 U/L      Alkaline Phosphatase 111 U/L      Total Protein 7 2 g/dL      Albumin 3 3 g/dL      Total Bilirubin 0 37 mg/dL      eGFR 70 ml/min/1 73sq m     Narrative:      National Kidney Disease Foundation guidelines for Chronic Kidney Disease (CKD):     Stage 1 with normal or high GFR (GFR > 90 mL/min/1 73 square meters)    Stage 2 Mild CKD (GFR = 60-89 mL/min/1 73 square meters)    Stage 3A Moderate CKD (GFR = 45-59 mL/min/1 73 square meters)    Stage 3B Moderate CKD (GFR = 30-44 mL/min/1 73 square meters)    Stage 4 Severe CKD (GFR = 15-29 mL/min/1 73 square meters)    Stage 5 End Stage CKD (GFR <15 mL/min/1 73 square meters)  Note: GFR calculation is accurate only with a steady state creatinine    Magnesium [[id number]]  (Normal) Collected: 04/13/22 0044    Lab Status: Final result Specimen: Blood from Arm, Left Updated: 04/13/22 0110     Magnesium 1 7 mg/dL     Ethanol [[id number]]  (Abnormal) Collected: 04/13/22 0044    Lab Status: Final result Specimen: Blood from Arm, Left Updated: 04/13/22 0108     Ethanol Lvl 252 mg/dL     CBC and differential [[id number]] Collected: 04/13/22 0044    Lab Status: Final result Specimen: Blood from Arm, Left Updated: 04/13/22 0050     WBC 8 84 Thousand/uL      RBC 4 52 Million/uL      Hemoglobin 13 0 g/dL      Hematocrit 38 3 %      MCV 85 fL      MCH 28 8 pg      MCHC 33 9 g/dL      RDW 13 1 %      MPV 9 3 fL      Platelets 485 Thousands/uL      nRBC 0 /100 WBCs      Neutrophils Relative 62 %      Immat GRANS % 1 %      Lymphocytes Relative 26 %      Monocytes Relative 9 %      Eosinophils Relative 2 %      Basophils Relative 0 %      Neutrophils Absolute 5 46 Thousands/µL      Immature Grans Absolute 0 05 Thousand/uL      Lymphocytes Absolute 2 33 Thousands/µL      Monocytes Absolute 0 79 Thousand/µL      Eosinophils Absolute 0 18 Thousand/µL      Basophils Absolute 0 03 Thousands/µL                  CT head without contrast   Final Result by Oanh Ogden MD (04/13 1186)   Addendum 1 of 1 by Oanh Ogden MD (04/13 1949)   ADDENDUM:      Findings concur with preliminary report provided by [facility]  Final      No acute intracranial abnormality  Stable microangiopathic changes and chronic infarcts  Workstation performed: [id number]                    Procedures  Procedures         ED Course                                       SBIRT 20yo+      Most Recent Value   SBIRT (24 yo +)    In order to provide better care to our patients, we are screening all of our patients for alcohol and drug use  Would it be okay to ask you these screening questions? No Filed at: 04/13/2022 0038                    MDM  Number of Diagnoses or Management Options  Acute alcohol intoxication ([facility] [address] ): new and requires workup  Bilateral hearing loss due to cerumen impaction: new and requires workup     Amount and/or Complexity of Data Reviewed  Clinical lab tests: ordered and reviewed  Tests in the radiology section of CPT®: ordered and reviewed    Risk of Complications, Morbidity, and/or Mortality  Presenting problems: high  Diagnostic procedures: high  Management options: high    Patient Progress  Patient progress: improved      Disposition  Final diagnoses:   Acute alcohol intoxication ([facility] [address] )   Bilateral hearing loss due to cerumen impaction     Time reflects when diagnosis was documented in both MDM as applicable and the Disposition within this note     Time User Action Codes Description Comment    4/13/2022  6:24 AM Elijah Maxcy Add [F10 929] Acute alcohol intoxication ([facility] [address] )     4/13/2022  6:24 AM Elijah Maxcy Add [H61 23] Bilateral hearing loss due to cerumen impaction       ED Disposition     ED Disposition Condition Date/Time Comment    Discharge Stable Wed Apr 13, 2022  6:24 AM Erin Wynn discharge to home/self care              Follow-up Information     Follow up With Specialties Details Why Contact Info Additional Information    [facility] Schedule an appointment as soon as possible for a visit in 2 days for follow up [address] [facility] [address]  [address] [facility] [place] [address], [address] [place], [place], [address], [phone]          Discharge Medication List as of 4/13/2022  6:38 AM      CONTINUE these medications which have CHANGED    Details   carbamide peroxide (DEBROX) 6 5 % otic solution Administer 5 drops into ears 2 (two) times a day, Starting Wed 4/13/2022, Normal         CONTINUE these medications which have NOT CHANGED    Details   allopurinol (ZYLOPRIM) 300 mg tablet Take 1 tablet (300 mg total) by mouth daily, Starting Mon 3/5/2018, Normal      amLODIPine (NORVASC) 10 mg tablet Take 10 mg by mouth daily at bedtime  , Until Discontinued, Historical Med      aspirin 81 MG tablet Take 81 mg by mouth daily  , Until Discontinued, Historical Med      fenofibrate (TRICOR) 145 mg tablet Take 1 tablet (145 mg total) by mouth daily, Starting Mon 3/5/2018, Normal      levETIRAcetam (KEPPRA) 750 mg tablet Take 2 tablets (1,500 mg total) by mouth every 12 (twelve) hours, Starting Thu 8/5/2021, Normal      Magnesium Oxide 400 MG CAPS Take 400 mg by mouth 2 (two) times a day, Starting 5/1/2015, Until Discontinued, Historical Med      metoprolol succinate (TOPROL-XL) 100 mg 24 hr tablet Take 1 tablet (100 mg total) by mouth daily, Starting Mon 3/5/2018, Normal      omeprazole (PriLOSEC) 20 mg delayed release capsule Take 20 mg by mouth daily  , Until Discontinued, Historical Med      QUEtiapine (SEROquel) 50 mg tablet Take 5 mg by mouth daily at bedtime as needed, Starting 4/16/2012, Until Discontinued, Historical Med             No discharge procedures on file      PDMP Review     None          ED Provider  Electronically Signed by           Pro Rider DO  04/14/22 5429

## 2022-04-13 NOTE — ED NOTES
Patient provided with turkey sandwhich and PO fluids  Offers no complaints and appears to be in no distress        Hoa Atkinson [place]  04/13/22 5504

## 2022-04-13 NOTE — ED NOTES
Patient sleeping with lights dimmed in room; no distress noted        Mauricio Alston [facility]  04/13/22 9609

## 2022-04-13 NOTE — ED NOTES
Patient sleeping with lights dimmed in room; no distress noted  Call light within reach        [facility] [address] [facility]  04/13/22 6838

## 2022-04-13 NOTE — DISCHARGE INSTRUCTIONS
Return to the ER for further concerns or worsening symptoms  Follow up with your primary care physician and ENT in 1-2 days  Take medication as prescribed

## 2022-05-07 ENCOUNTER — HOSPITAL ENCOUNTER (INPATIENT)
Facility: HOSPITAL | Age: 57
LOS: 3 days | Discharge: HOME | DRG: 565 | End: 2022-05-12
Attending: EMERGENCY MEDICINE | Admitting: INTERNAL MEDICINE
Payer: COMMERCIAL

## 2022-05-07 ENCOUNTER — APPOINTMENT (EMERGENCY)
Dept: RADIOLOGY | Facility: HOSPITAL | Age: 57
DRG: 565 | End: 2022-05-07
Payer: COMMERCIAL

## 2022-05-07 DIAGNOSIS — I44.7 LEFT BUNDLE BRANCH BLOCK: ICD-10-CM

## 2022-05-07 DIAGNOSIS — N17.9 AKI (ACUTE KIDNEY INJURY) (HCC): ICD-10-CM

## 2022-05-07 DIAGNOSIS — S02.2XXA CLOSED FRACTURE OF NASAL BONE, INITIAL ENCOUNTER: ICD-10-CM

## 2022-05-07 DIAGNOSIS — R55 SYNCOPAL EPISODES: ICD-10-CM

## 2022-05-07 DIAGNOSIS — R40.20 LOSS OF CONSCIOUSNESS (HCC): Primary | ICD-10-CM

## 2022-05-07 DIAGNOSIS — M62.82 RHABDOMYOLYSIS: ICD-10-CM

## 2022-05-07 DIAGNOSIS — G40.909 SEIZURE DISORDER (HCC): Chronic | ICD-10-CM

## 2022-05-07 PROBLEM — R65.10 SIRS (SYSTEMIC INFLAMMATORY RESPONSE SYNDROME) (HCC): Status: ACTIVE | Noted: 2022-05-07

## 2022-05-07 LAB
2HR DELTA HS TROPONIN: 5 NG/L
ALBUMIN SERPL BCP-MCNC: 4.1 G/DL (ref 3.5–5)
ALP SERPL-CCNC: 114 U/L (ref 46–116)
ALT SERPL W P-5'-P-CCNC: 36 U/L (ref 12–78)
AMPHETAMINES SERPL QL SCN: NEGATIVE
ANION GAP SERPL CALCULATED.3IONS-SCNC: 15 MMOL/L (ref 4–13)
AST SERPL W P-5'-P-CCNC: 61 U/L (ref 5–45)
BACTERIA UR QL AUTO: NORMAL /HPF
BARBITURATES UR QL: NEGATIVE
BASOPHILS # BLD AUTO: 0.02 THOUSANDS/ΜL (ref 0–0.1)
BASOPHILS NFR BLD AUTO: 0 % (ref 0–1)
BENZODIAZ UR QL: NEGATIVE
BILIRUB SERPL-MCNC: 0.59 MG/DL (ref 0.2–1)
BILIRUB UR QL STRIP: NEGATIVE
BUN SERPL-MCNC: 13 MG/DL (ref 5–25)
CALCIUM SERPL-MCNC: 9.5 MG/DL (ref 8.3–10.1)
CARDIAC TROPONIN I PNL SERPL HS: 39 NG/L
CARDIAC TROPONIN I PNL SERPL HS: 44 NG/L
CHLORIDE SERPL-SCNC: 103 MMOL/L (ref 100–108)
CK MB SERPL-MCNC: 9.2 NG/ML (ref 0–5)
CK MB SERPL-MCNC: <1 % (ref 0–2.5)
CK SERPL-CCNC: 2710 U/L (ref 39–308)
CLARITY UR: CLEAR
CO2 SERPL-SCNC: 23 MMOL/L (ref 21–32)
COCAINE UR QL: NEGATIVE
COLOR UR: COLORLESS
CREAT SERPL-MCNC: 1.49 MG/DL (ref 0.6–1.3)
EOSINOPHIL # BLD AUTO: 0 THOUSAND/ΜL (ref 0–0.61)
EOSINOPHIL NFR BLD AUTO: 0 % (ref 0–6)
ERYTHROCYTE [DISTWIDTH] IN BLOOD BY AUTOMATED COUNT: 14.6 % (ref 11.6–15.1)
ETHANOL SERPL-MCNC: <3 MG/DL (ref 0–3)
FLUAV RNA RESP QL NAA+PROBE: NEGATIVE
FLUBV RNA RESP QL NAA+PROBE: NEGATIVE
GFR SERPL CREATININE-BSD FRML MDRD: 50 ML/MIN/1.73SQ M
GLUCOSE SERPL-MCNC: 115 MG/DL (ref 65–140)
GLUCOSE UR STRIP-MCNC: NEGATIVE MG/DL
HCT VFR BLD AUTO: 45.7 % (ref 36.5–49.3)
HGB BLD-MCNC: 15.9 G/DL (ref 12–17)
HGB UR QL STRIP.AUTO: ABNORMAL
IMM GRANULOCYTES # BLD AUTO: 0.05 THOUSAND/UL (ref 0–0.2)
IMM GRANULOCYTES NFR BLD AUTO: 0 % (ref 0–2)
KETONES UR STRIP-MCNC: NEGATIVE MG/DL
LEUKOCYTE ESTERASE UR QL STRIP: NEGATIVE
LIPASE SERPL-CCNC: 73 U/L (ref 73–393)
LYMPHOCYTES # BLD AUTO: 0.74 THOUSANDS/ΜL (ref 0.6–4.47)
LYMPHOCYTES NFR BLD AUTO: 5 % (ref 14–44)
MAGNESIUM SERPL-MCNC: 1.8 MG/DL (ref 1.6–2.6)
MCH RBC QN AUTO: 29.5 PG (ref 26.8–34.3)
MCHC RBC AUTO-ENTMCNC: 34.8 G/DL (ref 31.4–37.4)
MCV RBC AUTO: 85 FL (ref 82–98)
METHADONE UR QL: NEGATIVE
MONOCYTES # BLD AUTO: 0.88 THOUSAND/ΜL (ref 0.17–1.22)
MONOCYTES NFR BLD AUTO: 6 % (ref 4–12)
NEUTROPHILS # BLD AUTO: 13.53 THOUSANDS/ΜL (ref 1.85–7.62)
NEUTS SEG NFR BLD AUTO: 89 % (ref 43–75)
NITRITE UR QL STRIP: NEGATIVE
NON-SQ EPI CELLS URNS QL MICRO: NORMAL /HPF
NRBC BLD AUTO-RTO: 0 /100 WBCS
OPIATES UR QL SCN: NEGATIVE
OXYCODONE+OXYMORPHONE UR QL SCN: NEGATIVE
PCP UR QL: NEGATIVE
PH UR STRIP.AUTO: 6 [PH]
PLATELET # BLD AUTO: 190 THOUSANDS/UL (ref 149–390)
PMV BLD AUTO: 9.6 FL (ref 8.9–12.7)
POTASSIUM SERPL-SCNC: 4 MMOL/L (ref 3.5–5.3)
PROT SERPL-MCNC: 7.3 G/DL (ref 6.4–8.2)
PROT UR STRIP-MCNC: NEGATIVE MG/DL
RBC # BLD AUTO: 5.39 MILLION/UL (ref 3.88–5.62)
RBC #/AREA URNS AUTO: NORMAL /HPF
RSV RNA RESP QL NAA+PROBE: NEGATIVE
SARS-COV-2 RNA RESP QL NAA+PROBE: NEGATIVE
SODIUM SERPL-SCNC: 141 MMOL/L (ref 136–145)
SP GR UR STRIP.AUTO: 1.01 (ref 1–1.03)
THC UR QL: NEGATIVE
UROBILINOGEN UR QL STRIP.AUTO: 0.2 E.U./DL
WBC # BLD AUTO: 15.22 THOUSAND/UL (ref 4.31–10.16)
WBC #/AREA URNS AUTO: NORMAL /HPF

## 2022-05-07 PROCEDURE — 99285 EMERGENCY DEPT VISIT HI MDM: CPT | Performed by: EMERGENCY MEDICINE

## 2022-05-07 PROCEDURE — 99220 PR INITIAL OBSERVATION CARE/DAY 70 MINUTES: CPT

## 2022-05-07 PROCEDURE — 96365 THER/PROPH/DIAG IV INF INIT: CPT

## 2022-05-07 PROCEDURE — 72125 CT NECK SPINE W/O DYE: CPT

## 2022-05-07 PROCEDURE — 83690 ASSAY OF LIPASE: CPT | Performed by: EMERGENCY MEDICINE

## 2022-05-07 PROCEDURE — 71260 CT THORAX DX C+: CPT

## 2022-05-07 PROCEDURE — 82550 ASSAY OF CK (CPK): CPT | Performed by: EMERGENCY MEDICINE

## 2022-05-07 PROCEDURE — 80053 COMPREHEN METABOLIC PANEL: CPT | Performed by: EMERGENCY MEDICINE

## 2022-05-07 PROCEDURE — 74177 CT ABD & PELVIS W/CONTRAST: CPT

## 2022-05-07 PROCEDURE — 84484 ASSAY OF TROPONIN QUANT: CPT

## 2022-05-07 PROCEDURE — 93005 ELECTROCARDIOGRAM TRACING: CPT

## 2022-05-07 PROCEDURE — 36415 COLL VENOUS BLD VENIPUNCTURE: CPT | Performed by: EMERGENCY MEDICINE

## 2022-05-07 PROCEDURE — 82553 CREATINE MB FRACTION: CPT | Performed by: EMERGENCY MEDICINE

## 2022-05-07 PROCEDURE — 70486 CT MAXILLOFACIAL W/O DYE: CPT

## 2022-05-07 PROCEDURE — 83735 ASSAY OF MAGNESIUM: CPT | Performed by: EMERGENCY MEDICINE

## 2022-05-07 PROCEDURE — 96361 HYDRATE IV INFUSION ADD-ON: CPT

## 2022-05-07 PROCEDURE — 82077 ASSAY SPEC XCP UR&BREATH IA: CPT | Performed by: EMERGENCY MEDICINE

## 2022-05-07 PROCEDURE — 85025 COMPLETE CBC W/AUTO DIFF WBC: CPT | Performed by: EMERGENCY MEDICINE

## 2022-05-07 PROCEDURE — 0241U HB NFCT DS VIR RESP RNA 4 TRGT: CPT | Performed by: EMERGENCY MEDICINE

## 2022-05-07 PROCEDURE — 99285 EMERGENCY DEPT VISIT HI MDM: CPT

## 2022-05-07 PROCEDURE — 70450 CT HEAD/BRAIN W/O DYE: CPT

## 2022-05-07 PROCEDURE — G1004 CDSM NDSC: HCPCS

## 2022-05-07 PROCEDURE — 81001 URINALYSIS AUTO W/SCOPE: CPT | Performed by: EMERGENCY MEDICINE

## 2022-05-07 PROCEDURE — 84484 ASSAY OF TROPONIN QUANT: CPT | Performed by: EMERGENCY MEDICINE

## 2022-05-07 PROCEDURE — 80307 DRUG TEST PRSMV CHEM ANLYZR: CPT | Performed by: EMERGENCY MEDICINE

## 2022-05-07 RX ORDER — METOPROLOL SUCCINATE 100 MG/1
100 TABLET, EXTENDED RELEASE ORAL DAILY
Status: DISCONTINUED | OUTPATIENT
Start: 2022-05-08 | End: 2022-05-12 | Stop reason: HOSPADM

## 2022-05-07 RX ORDER — LEVETIRACETAM 500 MG/1
1500 TABLET ORAL EVERY 12 HOURS SCHEDULED
Status: DISCONTINUED | OUTPATIENT
Start: 2022-05-08 | End: 2022-05-12 | Stop reason: HOSPADM

## 2022-05-07 RX ORDER — ONDANSETRON 2 MG/ML
4 INJECTION INTRAMUSCULAR; INTRAVENOUS EVERY 6 HOURS PRN
Status: DISCONTINUED | OUTPATIENT
Start: 2022-05-07 | End: 2022-05-12 | Stop reason: HOSPADM

## 2022-05-07 RX ORDER — HEPARIN SODIUM 5000 [USP'U]/ML
5000 INJECTION, SOLUTION INTRAVENOUS; SUBCUTANEOUS EVERY 8 HOURS SCHEDULED
Status: DISCONTINUED | OUTPATIENT
Start: 2022-05-07 | End: 2022-05-12 | Stop reason: HOSPADM

## 2022-05-07 RX ORDER — ACETAMINOPHEN 325 MG/1
650 TABLET ORAL EVERY 6 HOURS PRN
Status: DISCONTINUED | OUTPATIENT
Start: 2022-05-07 | End: 2022-05-12 | Stop reason: HOSPADM

## 2022-05-07 RX ORDER — SODIUM CHLORIDE 9 MG/ML
150 INJECTION, SOLUTION INTRAVENOUS CONTINUOUS
Status: DISCONTINUED | OUTPATIENT
Start: 2022-05-07 | End: 2022-05-11

## 2022-05-07 RX ORDER — AMLODIPINE BESYLATE 10 MG/1
10 TABLET ORAL
Status: DISCONTINUED | OUTPATIENT
Start: 2022-05-07 | End: 2022-05-12 | Stop reason: HOSPADM

## 2022-05-07 RX ORDER — FENOFIBRATE 145 MG/1
145 TABLET, COATED ORAL DAILY
Status: DISCONTINUED | OUTPATIENT
Start: 2022-05-08 | End: 2022-05-12 | Stop reason: HOSPADM

## 2022-05-07 RX ORDER — MORPHINE SULFATE 4 MG/ML
4 INJECTION, SOLUTION INTRAMUSCULAR; INTRAVENOUS EVERY 4 HOURS PRN
Status: DISCONTINUED | OUTPATIENT
Start: 2022-05-07 | End: 2022-05-08

## 2022-05-07 RX ORDER — PANTOPRAZOLE SODIUM 40 MG/1
40 TABLET, DELAYED RELEASE ORAL
Status: DISCONTINUED | OUTPATIENT
Start: 2022-05-08 | End: 2022-05-12 | Stop reason: HOSPADM

## 2022-05-07 RX ORDER — ASPIRIN 81 MG/1
81 TABLET, CHEWABLE ORAL DAILY
Status: DISCONTINUED | OUTPATIENT
Start: 2022-05-08 | End: 2022-05-12 | Stop reason: HOSPADM

## 2022-05-07 RX ADMIN — LEVETIRACETAM 1500 MG: 100 INJECTION, SOLUTION INTRAVENOUS at 17:25

## 2022-05-07 RX ADMIN — SODIUM CHLORIDE 150 ML/HR: 0.9 INJECTION, SOLUTION INTRAVENOUS at 18:05

## 2022-05-07 RX ADMIN — AMLODIPINE BESYLATE 10 MG: 10 TABLET ORAL at 22:19

## 2022-05-07 RX ADMIN — IOHEXOL 100 ML: 350 INJECTION, SOLUTION INTRAVENOUS at 16:59

## 2022-05-07 RX ADMIN — SODIUM CHLORIDE 1000 ML: 0.9 INJECTION, SOLUTION INTRAVENOUS at 15:35

## 2022-05-07 RX ADMIN — MORPHINE SULFATE 4 MG: 4 INJECTION INTRAVENOUS at 21:03

## 2022-05-07 NOTE — ED NOTES
Patient's legs and feet covered with dried up stool  Multiple abrasions on knees and toes       Aditi Mcdonald RN  05/07/22 0615

## 2022-05-07 NOTE — ED PROVIDER NOTES
History  Chief Complaint   Patient presents with    Loss of Consciousness     patient reports going to [facility] 2 separate times, LOC x 2 while sitting on toilet bowl, 2 unwittness falls, reports half body in a tub upon awakening  Sister called EMS  Patient presents for evaluation of syncope vs seizure  Patient states he was on the toliet using the bathroom when he woke up on the ground  Has a history of seizures  States this happened twice and when his sister came and found him she called 44  Patient complains of headache, back pain, and rib pain  Denies numbness or weakness  Denies any prodrome of symptoms prior to LOC  Patient arrives covered in feces  Patient denies alcohol use stating last use was Dec despite a visit last month in the ER for alcohol intoxication  Denies drug use  History provided by:  Patient   used: No        Prior to Admission Medications   Prescriptions Last Dose Informant Patient Reported? Taking? amLODIPine (NORVASC) 10 mg tablet 5/7/2022 at Unknown time  Yes Yes   Sig: Take 10 mg by mouth daily at bedtime  fenofibrate (TRICOR) 145 mg tablet Unknown at Unknown time  No No   Sig: Take 1 tablet (145 mg total) by mouth daily   levETIRAcetam (KEPPRA) 750 mg tablet 5/6/2022 at Unknown time  No Yes   Sig: Take 2 tablets (1,500 mg total) by mouth every 12 (twelve) hours   metoprolol succinate (TOPROL-XL) 100 mg 24 hr tablet 5/6/2022 at Unknown time  No Yes   Sig: Take 1 tablet (100 mg total) by mouth daily   omeprazole (PriLOSEC) 20 mg delayed release capsule 5/6/2022 at Unknown time  Yes Yes   Sig: Take 20 mg by mouth daily        Facility-Administered Medications: None       Past Medical History:   Diagnosis Date    Biceps tendonitis, unspecified laterality 09/17/2007    Bone cyst 11/22/2011    Bronchiectasis ([address] ) 10/02/2006    Bursitis 11/17/2005    Chest pain 04/29/2009    Chronic renal failure 11/22/2011    Diverticulitis of colon 10/09/2007    Generalized anxiety disorder 06/08/2006    GERD (gastroesophageal reflux disease)     Gout     Hyperlipidemia     Hypertension     Lateral epicondylitis, unspecified elbow     Last Assessed: 11/29/2013    Low magnesium levels     Lyme disease 11/09/2009    Pneumonia     Last Assessed: 1/7/2013       Past Surgical History:   Procedure Laterality Date    KNEE ARTHROSCOPY      Therapeutic    OLECRANON BURSA EXCISION         Family History   Problem Relation Age of Onset    Cancer Mother         Colon     I have reviewed and agree with the history as documented  E-Cigarette/Vaping    E-Cigarette Use Never User      E-Cigarette/Vaping Substances     Social History     Tobacco Use    Smoking status: Current Every Day Smoker     Packs/day: 0 50    Smokeless tobacco: Never Used    Tobacco comment: cigarette nicotine dependence   Vaping Use    Vaping Use: Never used   Substance Use Topics    Alcohol use: Yes     Alcohol/week: 6 0 standard drinks     Types: 6 Glasses of wine per week     Comment: per week    Drug use: No       Review of Systems   Constitutional: Negative for chills and fever  HENT: Negative for ear pain and sore throat  Eyes: Negative for pain and visual disturbance  Respiratory: Negative for cough and shortness of breath  Cardiovascular: Negative for chest pain and palpitations  Gastrointestinal: Negative for abdominal pain and vomiting  Genitourinary: Negative for dysuria and hematuria  Musculoskeletal: Positive for back pain  Negative for arthralgias and neck pain  Skin: Negative for color change and rash  Neurological: Positive for syncope and headaches  Negative for seizures, weakness and numbness  All other systems reviewed and are negative  Physical Exam  Physical Exam  Vitals and nursing note reviewed  Constitutional:       General: He is not in acute distress    HENT:      Head:        Right Ear: External ear normal       Left Ear: External ear normal  Nose: Nose normal       Mouth/Throat:      Mouth: Mucous membranes are dry  Pharynx: Oropharynx is clear  Eyes:      General: No scleral icterus  Extraocular Movements: Extraocular movements intact  Conjunctiva/sclera: Conjunctivae normal       Pupils: Pupils are equal, round, and reactive to light  Cardiovascular:      Rate and Rhythm: Normal rate and regular rhythm  Pulses: Normal pulses  Pulmonary:      Effort: Pulmonary effort is normal  No respiratory distress  Breath sounds: Normal breath sounds  Abdominal:      General: Abdomen is flat  Bowel sounds are normal  There is no distension  Palpations: Abdomen is soft  Tenderness: There is no abdominal tenderness  There is no guarding or rebound  Musculoskeletal:         General: Tenderness present  No deformity  Normal range of motion  Arms:    Skin:     Capillary Refill: Capillary refill takes less than 2 seconds  Neurological:      General: No focal deficit present  Mental Status: He is alert and oriented to person, place, and time  Cranial Nerves: No cranial nerve deficit  Sensory: No sensory deficit  Motor: No weakness           Vital Signs  ED Triage Vitals   Temperature Pulse Respirations Blood Pressure SpO2   05/07/22 1514 05/07/22 1514 05/07/22 1514 05/07/22 1514 05/07/22 1514   98 5 °F (36 9 °C) 87 20 163/85 97 %      Temp Source Heart Rate Source Patient Position - Orthostatic VS BP Location FiO2 (%)   05/07/22 1514 05/07/22 1514 05/07/22 1514 05/07/22 1514 --   Oral Monitor Lying Right arm       Pain Score       05/07/22 2021       10 - Worst Possible Pain           Vitals:    05/08/22 1130 05/08/22 1200 05/08/22 1415 05/08/22 1511   BP:    114/75   Pulse: 87 80 67 73   Patient Position - Orthostatic VS:             Visual Acuity      ED Medications  Medications   sodium chloride 0 9 % infusion (150 mL/hr Intravenous New Bag 5/8/22 1945)   amLODIPine (NORVASC) tablet 10 mg (10 mg Oral Given 5/7/22 2219)   aspirin chewable tablet 81 mg (81 mg Oral Given 5/8/22 1000)   fenofibrate (TRICOR) tablet 145 mg (145 mg Oral Given 5/8/22 1000)   levETIRAcetam (KEPPRA) tablet 1,500 mg (1,500 mg Oral Given 5/8/22 1000)   metoprolol succinate (TOPROL-XL) 24 hr tablet 100 mg (100 mg Oral Given 5/8/22 1000)   pantoprazole (PROTONIX) EC tablet 40 mg (40 mg Oral Given 5/8/22 0543)   acetaminophen (TYLENOL) tablet 650 mg (has no administration in time range)   ondansetron (ZOFRAN) injection 4 mg (has no administration in time range)   heparin (porcine) subcutaneous injection 5,000 Units (5,000 Units Subcutaneous Given 5/8/22 1341)   oxyCODONE-acetaminophen (PERCOCET) 5-325 mg per tablet 1 tablet (has no administration in time range)   HYDROmorphone (DILAUDID) injection 1 mg (has no administration in time range)   sodium chloride 0 9 % bolus 1,000 mL (0 mL Intravenous Stopped 5/7/22 1803)   iohexol (OMNIPAQUE) 350 MG/ML injection (SINGLE-DOSE) 100 mL (100 mL Intravenous Given 5/7/22 1659)   levETIRAcetam (KEPPRA) 1,500 mg in sodium chloride 0 9 % 100 mL IVPB (0 mg Intravenous Stopped 5/7/22 1803)       Diagnostic Studies  Results Reviewed     Procedure Component Value Units Date/Time    HS Troponin I 4hr [[id number]]  (Abnormal) Collected: 05/07/22 2339    Lab Status: Final result Specimen: Blood from Arm, Left Updated: 05/08/22 0025     hs TnI 4hr 52 ng/L      Delta 4hr hsTnI 13 ng/L     HS Troponin I 2hr [[id number]]  (Normal) Collected: 05/07/22 1735    Lab Status: Final result Specimen: Blood from Arm, Right Updated: 05/07/22 1804     hs TnI 2hr 44 ng/L      Delta 2hr hsTnI 5 ng/L     CKMB [[id number]]  (Abnormal) Collected: 05/07/22 1532    Lab Status: Final result Specimen: Blood from Arm, Right Updated: 05/07/22 1759     CK-MB Index <1 0 %      CK-MB 9 2 ng/mL     Urine Microscopic [[id number]]  (Normal) Collected: 05/07/22 1730    Lab Status: Final result Specimen: Urine, Clean Catch Updated: 05/07/22 1757     RBC, UA 2-4 /hpf      WBC, UA None Seen /hpf      Epithelial Cells None Seen /hpf      Bacteria, UA None Seen /hpf     Rapid drug screen, urine [[id number]]  (Normal) Collected: 05/07/22 1730    Lab Status: Final result Specimen: Urine, Clean Catch Updated: 05/07/22 1751     Amph/Meth UR Negative     Barbiturate Ur Negative     Benzodiazepine Urine Negative     Cocaine Urine Negative     Methadone Urine Negative     Opiate Urine Negative     PCP Ur Negative     THC Urine Negative     Oxycodone Urine Negative    Narrative:      FOR MEDICAL PURPOSES ONLY  IF CONFIRMATION NEEDED PLEASE CONTACT THE LAB WITHIN 5 DAYS  Drug Screen Cutoff Levels:  AMPHETAMINE/METHAMPHETAMINES  1000 ng/mL  BARBITURATES     200 ng/mL  BENZODIAZEPINES     200 ng/mL  COCAINE      300 ng/mL  METHADONE      300 ng/mL  OPIATES      300 ng/mL  PHENCYCLIDINE     25 ng/mL  THC       50 ng/mL  OXYCODONE      100 ng/mL    COVID/FLU/RSV - 2 hour TAT [[id number]]  (Normal) Collected: 05/07/22 1704    Lab Status: Final result Specimen: Nares from Nose Updated: 05/07/22 1748     SARS-CoV-2 Negative     INFLUENZA A PCR Negative     INFLUENZA B PCR Negative     RSV PCR Negative    Narrative:      FOR PEDIATRIC PATIENTS - copy/paste COVID Guidelines URL to browser: [email or web address]  [email or web address]    SARS-CoV-2 assay is a Nucleic Acid Amplification assay intended for the  qualitative detection of nucleic acid from SARS-CoV-2 in nasopharyngeal  swabs  Results are for the presumptive identification of SARS-CoV-2 RNA  Positive results are indicative of infection with SARS-CoV-2, the virus  causing COVID-19, but do not rule out bacterial infection or co-infection  with other viruses  Laboratories within the [place] and its  territories are required to report all positive results to the appropriate  public health authorities   Negative results do not preclude SARS-CoV-2  infection and should not be used as the sole basis for treatment or other  patient management decisions  Negative results must be combined with  clinical observations, patient history, and epidemiological information  This test has not been FDA cleared or approved  This test has been authorized by FDA under an Emergency Use Authorization  (EUA)  This test is only authorized for the duration of time the  declaration that circumstances exist justifying the authorization of the  emergency use of an in vitro diagnostic tests for detection of SARS-CoV-2  virus and/or diagnosis of COVID-19 infection under section 564(b)(1) of  the Act, 21 U  S C  108MEG-7(V)(6), unless the authorization is terminated  or revoked sooner  The test has been validated but independent review by FDA  and CLIA is pending  Test performed using [employer]: This RT-PCR assay targets N2,  a region unique to SARS-CoV-2   A conserved region in the E-gene was chosen  for pan-Sarbecovirus detection which includes SARS-CoV-2     UA (URINE) with reflex to Scope [[id number]]  (Abnormal) Collected: 05/07/22 1730    Lab Status: Final result Specimen: Urine, Clean Catch Updated: 05/07/22 1742     Color, UA Colorless     Clarity, UA Clear     Specific Gravity, UA 1 010     pH, UA 6 0     Leukocytes, UA Negative     Nitrite, UA Negative     Protein, UA Negative mg/dl      Glucose, UA Negative mg/dl      Ketones, UA Negative mg/dl      Urobilinogen, UA 0 2 E U /dl      Bilirubin, UA Negative     Blood, UA Moderate    CK Total with Reflex CKMB [[id number]]  (Abnormal) Collected: 05/07/22 1532    Lab Status: Final result Specimen: Blood from Arm, Right Updated: 05/07/22 1625     Total CK 2,710 U/L     Lipase [[id number]]  (Normal) Collected: 05/07/22 1532    Lab Status: Final result Specimen: Blood from Arm, Right Updated: 05/07/22 1625     Lipase 73 u/L     Magnesium [[id number]]  (Normal) Collected: 05/07/22 1532    Lab Status: Final result Specimen: Blood from Arm, Right Updated: 05/07/22 1625     Magnesium 1 8 mg/dL     HS Troponin 0hr (reflex protocol) [[id number]]  (Normal) Collected: 05/07/22 1532    Lab Status: Final result Specimen: Blood from Arm, Right Updated: 05/07/22 1604     hs TnI 0hr 39 ng/L     Comprehensive metabolic panel [[id number]]  (Abnormal) Collected: 05/07/22 1532    Lab Status: Final result Specimen: Blood from Arm, Right Updated: 05/07/22 1556     Sodium 141 mmol/L      Potassium 4 0 mmol/L      Chloride 103 mmol/L      CO2 23 mmol/L      ANION GAP 15 mmol/L      BUN 13 mg/dL      Creatinine 1 49 mg/dL      Glucose 115 mg/dL      Calcium 9 5 mg/dL      AST 61 U/L      ALT 36 U/L      Alkaline Phosphatase 114 U/L      Total Protein 7 3 g/dL      Albumin 4 1 g/dL      Total Bilirubin 0 59 mg/dL      eGFR 50 ml/min/1 73sq m     Narrative:      [place] guidelines for Chronic Kidney Disease (CKD):     Stage 1 with normal or high GFR (GFR > 90 mL/min/1 73 square meters)    Stage 2 Mild CKD (GFR = 60-89 mL/min/1 73 square meters)    Stage 3A Moderate CKD (GFR = 45-59 mL/min/1 73 square meters)    Stage 3B Moderate CKD (GFR = 30-44 mL/min/1 73 square meters)    Stage 4 Severe CKD (GFR = 15-29 mL/min/1 73 square meters)    Stage 5 End Stage CKD (GFR <15 mL/min/1 73 square meters)  Note: GFR calculation is accurate only with a steady state creatinine    Ethanol [[id number]]  (Normal) Collected: 05/07/22 1532    Lab Status: Final result Specimen: Blood from Arm, Right Updated: 05/07/22 1553     Ethanol Lvl <3 mg/dL     CBC and differential [[id number]]  (Abnormal) Collected: 05/07/22 1532    Lab Status: Final result Specimen: Blood from Arm, Right Updated: 05/07/22 1538     WBC 15 22 Thousand/uL      RBC 5 39 Million/uL      Hemoglobin 15 9 g/dL      Hematocrit 45 7 %      MCV 85 fL      MCH 29 5 pg      MCHC 34 8 g/dL      RDW 14 6 %      MPV 9 6 fL      Platelets 974 Thousands/uL      nRBC 0 /100 WBCs      Neutrophils Relative 89 %      Immat GRANS % 0 %      Lymphocytes Relative 5 %      Monocytes Relative 6 %      Eosinophils Relative 0 %      Basophils Relative 0 %      Neutrophils Absolute 13 53 Thousands/µL      Immature Grans Absolute 0 05 Thousand/uL      Lymphocytes Absolute 0 74 Thousands/µL      Monocytes Absolute 0 88 Thousand/µL      Eosinophils Absolute 0 00 Thousand/µL      Basophils Absolute 0 02 Thousands/µL                  XR foot 3+ vw right   Final Result by Salma Velasquez MD (05/08 1130)         1  Soft tissue swelling medial to the head of the 1st metatarsal    2   Small bony densities medial to the head of the 1st metatarsal may represent small avulsion-type fractures  The study was marked in EPIC for significant notification  Workstation performed: [id number]         CT head without contrast   Final Result by Elena Lagos MD (05/07 1719)      No acute intracranial abnormality  Chronic, nonemergent findings above  Workstation performed: [id number]         CT cervical spine without contrast   Final Result by Elena Lagos MD (05/07 1733)      No cervical spine fracture or traumatic malalignment  Workstation performed: [id number]         CT chest abdomen pelvis w contrast   Final Result by Elena Lagos MD (05/07 0386)      No acute abnormalities in the chest, abdomen or pelvis  Chronic and nonemergent findings above  Workstation performed: [id number]         CT facial bones without contrast   Final Result by Elena Lagos MD (05/07 1727)      Nasal bone fracture              Workstation performed: [id number]                    Procedures  Procedures         ED Course                                             MDM  Number of Diagnoses or Management Options  DORA (acute kidney injury) ([address] )  Closed fracture of nasal bone, initial encounter  Loss of consciousness ([address] )  Rhabdomyolysis  Diagnosis management comments: Pulse ox 97% on RA indicating adequate oxygenation Amount and/or Complexity of Data Reviewed  Clinical lab tests: ordered and reviewed  Tests in the radiology section of CPT®: ordered and reviewed  Decide to obtain previous medical records or to obtain history from someone other than the patient: yes  Review and summarize past medical records: yes  Discuss the patient with other providers: yes    Patient Progress  Patient progress: stable      Disposition  Final diagnoses:   Loss of consciousness ([facility] )   [facility]   Rhabdomyolysis   Closed fracture of nasal bone, initial encounter     Time reflects when diagnosis was documented in both MDM as applicable and the Disposition within this note     Time User Action Codes Description Comment    5/7/2022  4:56 PM Chai [address]ers [address] [R40 20] Loss of consciousness ([facility] )     5/7/2022  4:56 PM Dora Jeffers Add [N17 9] DORA (acute kidney injury) [facility] )     5/7/2022  4:56 PM Veronica [place] Add [M62 82] Rhabdomyolysis     5/7/2022  6:05 PM Chai Jeffers [address] [S02  2XXA] Closed fracture of nasal bone, initial encounter     5/7/2022  7:42 PM Anastasia Paulino Add [R55] Syncopal episodes     5/7/2022  7:42 PM Anastasia Paulino Add [S51 819] Seizure disorder [facility])       ED Disposition     ED Disposition Condition Date/Time Comment    Admit Stable Sat May 7, 2022  6:07 PM Case was discussed with Dr Vishal Flores and the patient's admission status was agreed to be Admission Status: observation status to the service of Dr Vishal Flores  Follow-up Information    None         Current Discharge Medication List      CONTINUE these medications which have NOT CHANGED    Details   amLODIPine (NORVASC) 10 mg tablet Take 10 mg by mouth daily at bedtime        levETIRAcetam (KEPPRA) 750 mg tablet Take 2 tablets (1,500 mg total) by mouth every 12 (twelve) hours  Qty: 120 tablet, Refills: 1    Associated Diagnoses: Seizure disorder (HCC)      metoprolol succinate (TOPROL-XL) 100 mg 24 hr tablet Take 1 tablet (100 mg total) by mouth daily  Qty: 30 tablet, Refills: 0    Associated Diagnoses: Essential hypertension      omeprazole (PriLOSEC) 20 mg delayed release capsule Take 20 mg by mouth daily  fenofibrate (TRICOR) 145 mg tablet Take 1 tablet (145 mg total) by mouth daily  Qty: 30 tablet, Refills: 0    Associated Diagnoses: Dyslipidemia         STOP taking these medications       aspirin 81 MG tablet Comments:   Reason for Stopping:               No discharge procedures on file      PDMP Review     None          ED Provider  Electronically Signed by           Indra Arroyo DO  05/08/22 8577

## 2022-05-07 NOTE — ED PROCEDURE NOTE
PROCEDURE  ECG 12 Lead Documentation Only    Date/Time: 5/7/2022 3:30 PM  Performed by: Tay Virk DO  Authorized by: Tay Virk DO     ECG reviewed by me, the ED Provider: yes    Patient location:  ED  Previous ECG:     Previous ECG:  Compared to current    Similarity:  No change  Interpretation:     Interpretation: abnormal    Rate:     ECG rate:  89    ECG rate assessment: normal    Rhythm:     Rhythm: sinus rhythm    Ectopy:     Ectopy: none    Conduction:     Conduction: abnormal      Abnormal conduction: complete LBBB           Tay Virk DO  05/07/22 1530

## 2022-05-07 NOTE — Clinical Note
Case was discussed with Dr Sulaiman Blue and the patient's admission status was agreed to be Admission Status: inpatient status to the service of Dr Sulaiman Blue

## 2022-05-08 ENCOUNTER — APPOINTMENT (OUTPATIENT)
Dept: RADIOLOGY | Facility: HOSPITAL | Age: 57
DRG: 565 | End: 2022-05-08
Payer: COMMERCIAL

## 2022-05-08 LAB
4HR DELTA HS TROPONIN: 13 NG/L
ANION GAP SERPL CALCULATED.3IONS-SCNC: 9 MMOL/L (ref 4–13)
ATRIAL RATE: 97 BPM
BASOPHILS # BLD AUTO: 0.02 THOUSANDS/ΜL (ref 0–0.1)
BASOPHILS NFR BLD AUTO: 0 % (ref 0–1)
BUN SERPL-MCNC: 12 MG/DL (ref 5–25)
CALCIUM SERPL-MCNC: 8.6 MG/DL (ref 8.3–10.1)
CARDIAC TROPONIN I PNL SERPL HS: 52 NG/L
CHLORIDE SERPL-SCNC: 107 MMOL/L (ref 100–108)
CK MB SERPL-MCNC: 6.3 NG/ML (ref 0–5)
CK MB SERPL-MCNC: <1 % (ref 0–2.5)
CK SERPL-CCNC: 6718 U/L (ref 39–308)
CO2 SERPL-SCNC: 24 MMOL/L (ref 21–32)
CREAT SERPL-MCNC: 0.87 MG/DL (ref 0.6–1.3)
EOSINOPHIL # BLD AUTO: 0.01 THOUSAND/ΜL (ref 0–0.61)
EOSINOPHIL NFR BLD AUTO: 0 % (ref 0–6)
ERYTHROCYTE [DISTWIDTH] IN BLOOD BY AUTOMATED COUNT: 14.6 % (ref 11.6–15.1)
GFR SERPL CREATININE-BSD FRML MDRD: 94 ML/MIN/1.73SQ M
GLUCOSE P FAST SERPL-MCNC: 105 MG/DL (ref 65–99)
GLUCOSE SERPL-MCNC: 105 MG/DL (ref 65–140)
HCT VFR BLD AUTO: 38.8 % (ref 36.5–49.3)
HGB BLD-MCNC: 13.5 G/DL (ref 12–17)
IMM GRANULOCYTES # BLD AUTO: 0.05 THOUSAND/UL (ref 0–0.2)
IMM GRANULOCYTES NFR BLD AUTO: 0 % (ref 0–2)
LYMPHOCYTES # BLD AUTO: 1.52 THOUSANDS/ΜL (ref 0.6–4.47)
LYMPHOCYTES NFR BLD AUTO: 12 % (ref 14–44)
MCH RBC QN AUTO: 29.8 PG (ref 26.8–34.3)
MCHC RBC AUTO-ENTMCNC: 34.8 G/DL (ref 31.4–37.4)
MCV RBC AUTO: 86 FL (ref 82–98)
MONOCYTES # BLD AUTO: 1.23 THOUSAND/ΜL (ref 0.17–1.22)
MONOCYTES NFR BLD AUTO: 10 % (ref 4–12)
NEUTROPHILS # BLD AUTO: 9.72 THOUSANDS/ΜL (ref 1.85–7.62)
NEUTS SEG NFR BLD AUTO: 78 % (ref 43–75)
NRBC BLD AUTO-RTO: 0 /100 WBCS
P AXIS: 40 DEGREES
PLATELET # BLD AUTO: 165 THOUSANDS/UL (ref 149–390)
PMV BLD AUTO: 9.8 FL (ref 8.9–12.7)
POTASSIUM SERPL-SCNC: 3.7 MMOL/L (ref 3.5–5.3)
PR INTERVAL: 176 MS
QRS AXIS: -9 DEGREES
QRSD INTERVAL: 136 MS
QT INTERVAL: 382 MS
QTC INTERVAL: 485 MS
RBC # BLD AUTO: 4.53 MILLION/UL (ref 3.88–5.62)
SODIUM SERPL-SCNC: 140 MMOL/L (ref 136–145)
T WAVE AXIS: 128 DEGREES
VENTRICULAR RATE: 97 BPM
WBC # BLD AUTO: 12.55 THOUSAND/UL (ref 4.31–10.16)

## 2022-05-08 PROCEDURE — 82553 CREATINE MB FRACTION: CPT

## 2022-05-08 PROCEDURE — 97163 PT EVAL HIGH COMPLEX 45 MIN: CPT

## 2022-05-08 PROCEDURE — 93010 ELECTROCARDIOGRAM REPORT: CPT | Performed by: INTERNAL MEDICINE

## 2022-05-08 PROCEDURE — 85025 COMPLETE CBC W/AUTO DIFF WBC: CPT

## 2022-05-08 PROCEDURE — 73630 X-RAY EXAM OF FOOT: CPT

## 2022-05-08 PROCEDURE — 97530 THERAPEUTIC ACTIVITIES: CPT

## 2022-05-08 PROCEDURE — 82550 ASSAY OF CK (CPK): CPT

## 2022-05-08 PROCEDURE — 99225 PR SBSQ OBSERVATION CARE/DAY 25 MINUTES: CPT | Performed by: HOSPITALIST

## 2022-05-08 PROCEDURE — 80048 BASIC METABOLIC PNL TOTAL CA: CPT

## 2022-05-08 RX ORDER — OXYCODONE HYDROCHLORIDE AND ACETAMINOPHEN 5; 325 MG/1; MG/1
1 TABLET ORAL EVERY 4 HOURS PRN
Status: DISCONTINUED | OUTPATIENT
Start: 2022-05-08 | End: 2022-05-12 | Stop reason: HOSPADM

## 2022-05-08 RX ORDER — HYDROMORPHONE HCL/PF 1 MG/ML
1 SYRINGE (ML) INJECTION EVERY 4 HOURS PRN
Status: DISCONTINUED | OUTPATIENT
Start: 2022-05-08 | End: 2022-05-12 | Stop reason: HOSPADM

## 2022-05-08 RX ADMIN — AMLODIPINE BESYLATE 10 MG: 10 TABLET ORAL at 21:08

## 2022-05-08 RX ADMIN — LEVETIRACETAM 1500 MG: 500 TABLET, FILM COATED ORAL at 21:02

## 2022-05-08 RX ADMIN — METOPROLOL SUCCINATE 100 MG: 100 TABLET, EXTENDED RELEASE ORAL at 10:00

## 2022-05-08 RX ADMIN — HEPARIN SODIUM 5000 UNITS: 5000 INJECTION INTRAVENOUS; SUBCUTANEOUS at 13:41

## 2022-05-08 RX ADMIN — HYDROMORPHONE HYDROCHLORIDE 1 MG: 1 INJECTION, SOLUTION INTRAMUSCULAR; INTRAVENOUS; SUBCUTANEOUS at 21:13

## 2022-05-08 RX ADMIN — LEVETIRACETAM 1500 MG: 500 TABLET, FILM COATED ORAL at 10:00

## 2022-05-08 RX ADMIN — FENOFIBRATE 145 MG: 145 TABLET, FILM COATED ORAL at 10:00

## 2022-05-08 RX ADMIN — HYDROMORPHONE HYDROCHLORIDE 1 MG: 1 INJECTION, SOLUTION INTRAMUSCULAR; INTRAVENOUS; SUBCUTANEOUS at 15:29

## 2022-05-08 RX ADMIN — MORPHINE SULFATE 4 MG: 4 INJECTION INTRAVENOUS at 02:49

## 2022-05-08 RX ADMIN — HEPARIN SODIUM 5000 UNITS: 5000 INJECTION INTRAVENOUS; SUBCUTANEOUS at 21:02

## 2022-05-08 RX ADMIN — ASPIRIN 81 MG CHEWABLE TABLET 81 MG: 81 TABLET CHEWABLE at 10:00

## 2022-05-08 RX ADMIN — PANTOPRAZOLE SODIUM 40 MG: 40 TABLET, DELAYED RELEASE ORAL at 05:43

## 2022-05-08 RX ADMIN — SODIUM CHLORIDE 150 ML/HR: 0.9 INJECTION, SOLUTION INTRAVENOUS at 02:48

## 2022-05-08 RX ADMIN — ACETAMINOPHEN 650 MG: 325 TABLET, FILM COATED ORAL at 21:02

## 2022-05-08 RX ADMIN — MORPHINE SULFATE 4 MG: 4 INJECTION INTRAVENOUS at 10:15

## 2022-05-08 NOTE — PROGRESS NOTES
[facility]  Progress Note - Lissette Stein 1965, 59 y o  male MRN: [id number]  Unit/Bed#: [address] [place] [address] Encounter: [id number]  Primary Care Provider: No primary care provider on file  Date and time admitted to hospital: 5/7/2022  3:06 PM    SIRS (systemic inflammatory response syndrome) ([facility])  Assessment & Plan  POA as evidenced by tachycardia, tachypnea, WBC 15  Resolved      Seizure disorder (HCC)  Assessment & Plan  On Keppra 1500 mg b i d , reports that he is compliant and does not miss doses of medication  Neurology has been consulted  Check Keppra level      Rhabdomyolysis  Assessment & Plan  Reports being down for only 30 minutes after fall  CK on admission 2710, increased to 6718 today  · Continue IV fluids    DORA (acute kidney injury) ([facility] )  Assessment & Plan  Creatinine 1 49 on admission  Resolved with IV fluid    Hyperlipidemia  Assessment & Plan  Continue tricor    Hypertension  Assessment & Plan  Continue amlodipine and metoprolol  Blood pressure has improved    * Syncopal episodes  Assessment & Plan  Pt presents after syncopal episode x2 at home  · Syncope vs seizure  · CT facial bones shows nasal bone fracture, CT head no acute abnormalities  · CT chest/abd/pelvis no acute abnormalities  · CT cervical spine shows no fracture or traumatic malalignment  · Given keppra 1500mg in ED    Right foot x-ray pending read  Continue telemetry  Check echo  PT/OT has been consulted        VTE Pharmacologic Prophylaxis: VTE Score: 3 heparin        Education and Discussions with Family / Patient: Patient declined call to   Time Spent for Care: 30 minutes  More than 50% of total time spent on counseling and coordination of care as described above      Current Length of Stay: 0 day(s)  Current Patient Status: Observation     Code Status: Level 1 - Full Code    Subjective:   Reports generalized pain from fall  Reports lidocaine patch does not work      Objective: Vitals:   Temp (24hrs), Av 6 °F (37 °C), Min:98 3 °F (36 8 °C), Max:99 °F (37 2 °C)    Temp:  [98 3 °F (36 8 °C)-99 °F (37 2 °C)] 98 3 °F (36 8 °C)  HR:  [] 83  Resp:  [13-42] 17  BP: (119-180)/(76-86) 131/80  SpO2:  [87 %-97 %] 93 %  Body mass index is 27 06 kg/m²  Input and Output Summary (last 24 hours): Intake/Output Summary (Last 24 hours) at 2022 1059  Last data filed at 2022 0248  Gross per 24 hour   Intake 3000 ml   Output 400 ml   Net 2600 ml       Physical Exam:   Physical Exam   General:  No acute distress, sitting up in bed  Cardiovascular:  S1, S2, RRR  Pulmonary:  Clear to auscultation bilaterally  Abdomen:  Soft, nontender, nondistended  Neuro/psych:  Alert, oriented, pleasant, no focal deficits    Additional Data:     Labs:  Results from last 7 days   Lab Units 22  0552   WBC Thousand/uL 12 55*   HEMOGLOBIN g/dL 13 5   HEMATOCRIT % 38 8   PLATELETS Thousands/uL 165   NEUTROS PCT % 78*   LYMPHS PCT % 12*   MONOS PCT % 10   EOS PCT % 0     Results from last 7 days   Lab Units 22  0552 22  1532 22  1532   SODIUM mmol/L 140   < > 141   POTASSIUM mmol/L 3 7   < > 4 0   CHLORIDE mmol/L 107   < > 103   CO2 mmol/L 24   < > 23   BUN mg/dL 12   < > 13   CREATININE mg/dL 0 87   < > 1 49*   ANION GAP mmol/L 9   < > 15*   CALCIUM mg/dL 8 6   < > 9 5   ALBUMIN g/dL  --   --  4 1   TOTAL BILIRUBIN mg/dL  --   --  0 59   ALK PHOS U/L  --   --  114   ALT U/L  --   --  36   AST U/L  --   --  61*   GLUCOSE RANDOM mg/dL 105   < > 115    < > = values in this interval not displayed                         Lines/Drains:  Invasive Devices  Report    Peripheral Intravenous Line            Peripheral IV 22 Right Antecubital <1 day                  Telemetry:  Telemetry Orders (From admission, onward)             24 Hour Telemetry Monitoring  (ED Bridging Orders Panel)  Continuous x 24 Hours (Telem)        References:    Telemetry Guidelines   Question:  Reason for 24 Hour Telemetry  Answer:  Syncope suspected to be cardiac in origin                 Telemetry Reviewed: Normal Sinus Rhythm  Indication for Continued Telemetry Use: Syncope                 Recent Cultures (last 7 days):         Last 24 Hours Medication List:   Current Facility-Administered Medications   Medication Dose Route Frequency Provider Last Rate    acetaminophen  650 mg Oral Q6H PRN Art Holland PA-C      amLODIPine  10 mg Oral HS Sheri VecEYAD spear-GEORGE      aspirin  81 mg Oral Daily Sheri Vecellio, PA-C      fenofibrate  145 mg Oral Daily Sheri Vecliseto, PA-GEORGE      heparin (porcine)  5,000 Units Subcutaneous Q8H [facility] Sheri VeclisetoJUAN      levETIRAcetam  1,500 mg Oral Q12H [facility] Sheri VecJUAN spear      metoprolol succinate  100 mg Oral Daily Sheri VeclisetoJUAN      morphine injection  4 mg Intravenous Q4H PRN Art Holland PA-C      ondansetron  4 mg Intravenous Q6H PRN Art Holland PA-C      oxyCODONE-acetaminophen  1 tablet Oral Q4H PRN Carol Lay MD      pantoprazole  40 mg Oral Early Morning Sheri VecJUAN spear      sodium chloride  150 mL/hr Intravenous Continuous Art Holland PA-C 150 mL/hr (05/08/22 0248)        Today, Patient Was Seen By: Carol Lay MD    **Please Note: This note may have been constructed using a voice recognition system  **

## 2022-05-08 NOTE — UTILIZATION REVIEW
Initial Clinical Review    Admission: Date/Time/Statement:   Admission Orders (From admission, onward)     Ordered        05/07/22 1807  Place in Observation  Once                      Orders Placed This Encounter   Procedures    Place in Observation     Standing Status:   Standing     Number of Occurrences:   1     Order Specific Question:   Level of Care     Answer:   Med Surg [16]     ED Arrival Information     Expected Arrival Acuity    - 5/7/2022 15:00 Urgent    Means of arrival Escorted by Service Admission type    Ambulance [facility] Urgent    Arrival complaint    head injury     Chief Complaint   Patient presents with    Loss of Consciousness     patient reports going to [facility] 2 separate times, LOC x 2 while sitting on toilet bowl, 2 unwittness falls, reports half body in a tub upon awakening  Sister called EMS  Initial Presentation:   58 y/o male with PMHx HTN, HLD, Seizure disorder - on Keppra, GERD; active smoking 1/2 ppd - presents via EMS to [facility] ED after a fall at home and hitting his head on the bathtub  Reports after having a bowel movement, he stood up and fell down hitting his head on the bathtub and his left ribs  He then stood up and fell again  He remained on the floor for about 30 minutes until his sister came over and helped him up  He has no recollection of falling  Presents with headache, rib pain, right foot pain  Per ED - he arrived covered in feces  In ED - HR 87  /85  Exam: alert and oriented x 3  No focal neuro deficit   ecchymosis above left eye  Left sided rib tenderness, right big toe pain - unable to bend toe, tender to palpation  CT Head negative  CT Face + Nasal bone frad Sycture  Labs:  WBC 15,220  BUN/CR  13/ 1 49  CPK  2,710  ED Tx:  IVF NSS x 1 L, IV Keppra   Placed in Observation 5/7/22 at 1807 2nd Syncope with Fall, DORA with Rhabdomyolysis - continue IVF, Neurology Consult      ED Triage Vitals   Temperature Pulse Respirations Blood Pressure SpO2   05/07/22 1514 05/07/22 1514 05/07/22 1514 05/07/22 1514 05/07/22 1514   98 5 °F (36 9 °C) 87 20 163/85 97 %      Temp Source Heart Rate Source Patient Position - Orthostatic VS BP Location FiO2 (%)   05/07/22 1514 05/07/22 1514 05/07/22 1514 05/07/22 1514 --   Oral Monitor Lying Right arm       Pain Score       05/07/22 2021       10 - Worst Possible Pain          Wt Readings from Last 1 Encounters:   05/07/22 88 kg (194 lb 0 1 oz)     Additional Vital Signs:   Date/Time Temp Pulse Resp BP MAP (mmHg) SpO2 O2 Device Patient Position - Orthostatic VS   05/08/22 02:32:47 98 5 °F (36 9 °C) 104 20 135/81 99 94 % -- --   05/07/22 23:50:15 99 °F (37 2 °C) 92 18 136/79 98 94 % -- --   05/07/22 22:17:57 -- 98 -- 136/80 99 93 % -- --   05/07/22 2110 -- -- -- -- -- -- None (Room air) --   05/07/22 2100 -- 97 -- -- -- 96 % -- --   05/07/22 18:55:54 -- 103 18 119/76 90 95 % -- --   05/07/22 1830 -- 99 16 165/83 117 96 % -- --   05/07/22 1800 -- 128 Abnormal  42 Abnormal  179/86 Abnormal  124 95 % -- --   05/07/22 1745 -- 99 14 -- -- 96 % -- --   05/07/22 1700 -- 103 13 167/79 114 87 % Abnormal  -- --   05/07/22 1630 -- 93 18 174/82 Abnormal  118 94 % -- --   05/07/22 1600 -- 92 18 180/84 Abnormal  121 91 % -- --   05/07/22 1514 98 5 °F (36 9 °C) 87 20 163/85 -- 97 % None (Room air) Lying      05/07 0701 05/08 0700   I V  (mL/kg) 2000 (22 7)   IV Piggyback 1000   Total Intake(mL/kg) 3000 (34 1)   Urine (mL/kg/hr) 400   Total Output 400   Net +2600     Pertinent Labs/Diagnostic Test Results:   CT head without contrast   Final Result by Leanne Gutierres MD (05/07 0153)      No acute intracranial abnormality  Chronic, nonemergent findings above  CT cervical spine without contrast   Final Result by Leanne Gutierres MD (05/07 1137)      No cervical spine fracture or traumatic malalignment        CT chest abdomen pelvis w contrast   Final Result by Leanne Gutierres MD (05/07 9201)      No acute abnormalities in the chest, abdomen or pelvis  Chronic and nonemergent findings above  CT facial bones without contrast   Nasal bone fracture       Results from last 7 days   Lab Units 05/07/22  1704   SARS-COV-2  Negative     Results from last 7 days   Lab Units 05/08/22  0552 05/07/22  1532   WBC Thousand/uL 12 55* 15 22*   HEMOGLOBIN g/dL 13 5 15 9   HEMATOCRIT % 38 8 45 7   PLATELETS Thousands/uL 165 190   NEUTROS ABS Thousands/µL 9 72* 13 53*         Results from last 7 days   Lab Units 05/08/22  0552 05/07/22  1532   SODIUM mmol/L 140 141   POTASSIUM mmol/L 3 7 4 0   CHLORIDE mmol/L 107 103   CO2 mmol/L 24 23   ANION GAP mmol/L 9 15*   BUN mg/dL 12 13   CREATININE mg/dL 0 87 1 49*   EGFR ml/min/1 73sq m 94 50   CALCIUM mg/dL 8 6 9 5   MAGNESIUM mg/dL  --  1 8     Results from last 7 days   Lab Units 05/07/22  1532   AST U/L 61*   ALT U/L 36   ALK PHOS U/L 114   TOTAL PROTEIN g/dL 7 3   ALBUMIN g/dL 4 1   TOTAL BILIRUBIN mg/dL 0 59       Results from last 7 days   Lab Units 05/08/22  0552 05/07/22  1532   GLUCOSE RANDOM mg/dL 105 115     Results from last 7 days   Lab Units 05/08/22  0552 05/07/22  1532   CK TOTAL U/L 6,718* 2,710*   CK MB INDEX % <1 0 <1 0   CK MB ng/mL 6 3* 9 2*     Results from last 7 days   Lab Units 05/07/22  2339 05/07/22  1735 05/07/22  1532   HS TNI 0HR ng/L  --   --  39   HS TNI 2HR ng/L  --  44  --    HSTNI D2 ng/L  --  5  --    HS TNI 4HR ng/L 52*  --   --    HSTNI D4 ng/L 13  --   --      Results from last 7 days   Lab Units 05/07/22  1532   LIPASE u/L 73       Results from last 7 days   Lab Units 05/07/22  1730   CLARITY UA  Clear   COLOR UA  Colorless   SPEC GRAV UA  1 010   PH UA  6 0   GLUCOSE UA mg/dl Negative   KETONES UA mg/dl Negative   BLOOD UA  Moderate*   PROTEIN UA mg/dl Negative   NITRITE UA  Negative   BILIRUBIN UA  Negative   UROBILINOGEN UA E U /dl 0 2   LEUKOCYTES UA  Negative   WBC UA /hpf None Seen   RBC UA /hpf 2-4   BACTERIA UA /hpf None Seen   EPITHELIAL CELLS WET PREP /hpf None Seen     Results from last 7 days   Lab Units 05/07/22  1704   INFLUENZA A PCR  Negative   INFLUENZA B PCR  Negative   RSV PCR  Negative         Results from last 7 days   Lab Units 05/07/22  1730   AMPH/METH  Negative   BARBITURATE UR  Negative   BENZODIAZEPINE UR  Negative   COCAINE UR  Negative   METHADONE URINE  Negative   OPIATE UR  Negative   PCP UR  Negative   THC UR  Negative     Results from last 7 days   Lab Units 05/07/22  1532   ETHANOL LVL mg/dL <3     ED Treatment:   Medication Administration from 05/07/2022 1500 to 05/07/2022 1851       Date/Time Order Dose Route Action     05/07/2022 1535 sodium chloride 0 9 % bolus 1,000 mL 1,000 mL Intravenous New Bag     05/07/2022 1659 iohexol (OMNIPAQUE) 350 MG/ML injection (SINGLE-DOSE) 100 mL 100 mL Intravenous Given     05/07/2022 1725 levETIRAcetam (KEPPRA) 1,500 mg in sodium chloride 0 9 % 100 mL IVPB 1,500 mg Intravenous New Bag     05/07/2022 1805 sodium chloride 0 9 % infusion 150 mL/hr Intravenous New Bag     Past Medical History:   Diagnosis Date    Biceps tendonitis, unspecified laterality 09/17/2007    Bone cyst 11/22/2011    Bronchiectasis ([facility] ) 10/02/2006    Bursitis 11/17/2005    Chest pain 04/29/2009    Chronic renal failure 11/22/2011    Diverticulitis of colon 10/09/2007    Generalized anxiety disorder 06/08/2006    GERD (gastroesophageal reflux disease)     Gout     Hyperlipidemia     Hypertension     Lateral epicondylitis, unspecified elbow     Last Assessed: 11/29/2013    Low magnesium levels     Lyme disease 11/09/2009    Pneumonia     Last Assessed: 1/7/2013     Present on Admission:   Seizure disorder ([facility] )   Hypertension   Hyperlipidemia   Rhabdomyolysis   DORA (acute kidney injury) ([facility] )    Admitting Diagnosis: Rhabdomyolysis [M62 82]  Loss of consciousness ([facility] ) [R40 20]  DORA (acute kidney injury) ([facility] ) [N17 9]  Closed fracture of nasal bone, initial encounter [S02  2XXA]  Episode of loss of consciousness [R55]    Age/Sex: 59 y o  male    Admission Orders:  Telemetry  Seizure Precautions  VS q4hrs  Nasal O2 at 2 L/min - Titrate SpO2 > 92 %  Continuous Pulse Oximetry  SCD  Up with assistance Diet Cardiovascular; Cardiac  I+O q shift  Daily weight  PT + OT Evals    Scheduled Medications:  amLODIPine, 10 mg, Oral, HS  aspirin, 81 mg, Oral, Daily  fenofibrate, 145 mg, Oral, Daily  heparin (porcine), 5,000 Units, Subcutaneous, Q8H LUCIA  levETIRAcetam, 1,500 mg, Oral, Q12H LUCIA  metoprolol succinate, 100 mg, Oral, Daily  pantoprazole, 40 mg, Oral, Early Morning    Continuous IV Infusions:  IVF sodium chloride, 150 mL/hr, Intravenous, Continuous    PRN Meds:  acetaminophen, 650 mg, Oral, Q6H PRN  IV morphine injection, 4 mg, Intravenous, Q4H PRN - 5/7 X 1, 5/8 X 1  ondansetron, 4 mg, Intravenous, Q6H PRN    IP CONSULT TO NEUROLOGY    Network Utilization Review Department  ATTENTION: Please call with any questions or concerns to [phone] and carefully listen to the prompts so that you are directed to the right person  All voicemails are confidential   Kaylah Hopper all requests for admission clinical reviews, approved or denied determinations and any other requests to dedicated fax number below belonging to the campus where the patient is receiving treatment   List of dedicated fax numbers for the Facilities:  [address] DENIALS (Administrative/Medical Necessity) [phone]   [address] (Maternity/NICU/Pediatrics) 261 [facility] [address]  [address] [facility] [place] Yariel Tony [phone] [place] [address] [phone] [facility] [phone]

## 2022-05-08 NOTE — H&P
[address] 1965, 59 y o  male MRN: [id number]  Unit/Bed#: [address] [place] [address] Encounter: [id number]  Primary Care Provider: No primary care provider on file  Date and time admitted to hospital: 5/7/2022  3:06 PM    * Syncopal episodes  Assessment & Plan  Pt presents after syncopal episode x2 at home  · Syncope vs seizure  · CT facial bones shows nasal bone fracture, CT head no acute abnormalities  · CT chest/abd/pelvis no acute abnormalities  · CT cervical spine shows no fracture or traumatic malalignment  · Given keppra 1500mg in ED  · R foot xray pending  · Monitor on tele  · Check orthostatics  · Seizure, fall precautions  · PT/OT eval  · Consult neurology        SIRS (systemic inflammatory response syndrome) ([facility])  Assessment & Plan  POA as evidenced by tachycardia, tachypnea, WBC 15  · Suspect reactive  · Afebrile, vitals stable      DORA (acute kidney injury) ([facility] [address] )  Assessment & Plan  Creatine elevated to 1 49   Continue IVFs   Avoid nephrotoxins   Monitor with BMP      Seizure disorder ([facility])  Assessment & Plan  History of chronic seizures on keppra 1500mg BID  · History of subclinical and clinical epileptic seizures captured on video EEG in August of 2020  · Per patient he is compliant with home regimen  · Patient has not followed up with neurologist in >1 year  · Seizure precautions  · Continue keppra 1500mg BID  · Consult neurology      Rhabdomyolysis  Assessment & Plan  Patient down at house for 30 minutes after fall  · CK 2710  · Continue IVFs  · Trend CK    Hyperlipidemia  Assessment & Plan  Continue tricor    Hypertension  Assessment & Plan  Continue amlodipine 10mg daily, metoprolol 100mg daily    VTE Pharmacologic Prophylaxis: VTE Score: 3 Moderate Risk (Score 3-4) - Pharmacological DVT Prophylaxis Ordered: heparin  Code Status: Level 1 - Full Code   Discussion with family: Patient declined call to        Anticipated Length of Stay: Patient will be admitted on an observation basis with an anticipated length of stay of less than 2 midnights secondary to syncope  Total Time for Visit, including Counseling / Coordination of Care: 45 minutes Greater than 50% of this total time spent on direct patient counseling and coordination of care  Chief Complaint: fall    History of Present Illness:  Markus Hart is a 59 y o  male with a PMH of gerd, HTN, HLD, seizure disorder who presents with fall at home  Patient states he was having a bowel movement and then stood up and fell down  He hit his head on the bathtub and his left ribs  He then stood up and fell again  He remained on the floor for about 30 minutes until his sister came over and helped him up  He has no recollection of falling  Now has headache, rib pain, right foot pain  Denies any numbness, weakness, confusion, tongue biting, incontinence  However, per ED patient arrived covered in feces  Patient denies any fevers, chills, chest pain, SOB, cough, abdominal pain, constipation/diarrhea, dysuria  Patient states he takes keppra 1500mg BID at home  Denies drug use, stopped drinking in December  Pt smoking 1/2 pack per day, does not want nicotine patch  Review of Systems:  Review of Systems   Constitutional: Negative for chills and fever  HENT: Negative  Eyes: Negative  Respiratory: Negative for cough, chest tightness and shortness of breath  Cardiovascular: Negative for chest pain and palpitations  Gastrointestinal: Negative for abdominal pain, constipation, diarrhea, nausea and vomiting  Endocrine: Negative  Genitourinary: Negative for dysuria and hematuria  Musculoskeletal: Positive for neck pain  Skin: Negative  Allergic/Immunologic: Negative  Neurological: Positive for headaches  Negative for dizziness, weakness and light-headedness  Hematological: Negative  Psychiatric/Behavioral: Negative          Past Medical and Surgical History:   Past Medical History:   Diagnosis Date    Biceps tendonitis, unspecified laterality 09/17/2007    Bone cyst 11/22/2011    Bronchiectasis ([address] ) 10/02/2006    Bursitis 11/17/2005    Chest pain 04/29/2009    Chronic renal failure 11/22/2011    Diverticulitis of colon 10/09/2007    Generalized anxiety disorder 06/08/2006    GERD (gastroesophageal reflux disease)     Gout     Hyperlipidemia     Hypertension     Lateral epicondylitis, unspecified elbow     Last Assessed: 11/29/2013    Low magnesium levels     Lyme disease 11/09/2009    Pneumonia     Last Assessed: 1/7/2013       Past Surgical History:   Procedure Laterality Date    KNEE ARTHROSCOPY      Therapeutic    OLECRANON BURSA EXCISION         Meds/Allergies:  Prior to Admission medications    Medication Sig Start Date End Date Taking? Authorizing Provider   amLODIPine (NORVASC) 10 mg tablet Take 10 mg by mouth daily at bedtime  Yes Historical Provider, MD   levETIRAcetam (KEPPRA) 750 mg tablet Take 2 tablets (1,500 mg total) by mouth every 12 (twelve) hours 8/5/21  Yes Zoey Aburto,    metoprolol succinate (TOPROL-XL) 100 mg 24 hr tablet Take 1 tablet (100 mg total) by mouth daily 3/5/18  Yes NOAM Escobedo   omeprazole (PriLOSEC) 20 mg delayed release capsule Take 20 mg by mouth daily  Yes Historical Provider, MD   fenofibrate (TRICOR) 145 mg tablet Take 1 tablet (145 mg total) by mouth daily 3/5/18   NOAM Malave   allopurinol (ZYLOPRIM) 300 mg tablet Take 1 tablet (300 mg total) by mouth daily  Patient not taking: Reported on 2/25/2020 3/5/18 5/7/22  NOAM Malave   aspirin 81 MG tablet Take 81 mg by mouth daily    Patient not taking: Reported on 5/7/2022 5/7/22  Historical Provider, MD   carbamide peroxide (DEBROX) 6 5 % otic solution Administer 5 drops into ears 2 (two) times a day  Patient not taking: Reported on 5/7/2022 4/13/22 5/7/22  Blanco Claudio, DO   Magnesium Oxide 400 MG CAPS Take 400 mg by mouth 2 (two) times a day  Patient not taking: Reported on 5/7/2022  5/1/15 5/7/22  Historical Provider, MD   QUEtiapine (SEROquel) 50 mg tablet Take 5 mg by mouth daily at bedtime as needed  Patient not taking: Reported on 5/7/2022 4/16/12 5/7/22  Historical Provider, MD     I have reviewed home medications with patient personally  Allergies: Allergies   Allergen Reactions    Codeine Hives     Tolerates morphine    Penicillins     Hydrocodone Rash     Reaction Date: 17Sep2007;        Social History:  Marital Status: /Civil Union   Occupation:   Patient Pre-hospital Living Situation: Home  Patient Pre-hospital Level of Mobility: walks  Patient Pre-hospital Diet Restrictions: none  Substance Use History:   Social History     Substance and Sexual Activity   Alcohol Use Yes    Alcohol/week: 6 0 standard drinks    Types: 6 Glasses of wine per week    Comment: per week     Social History     Tobacco Use   Smoking Status Current Every Day Smoker    Packs/day: 0 50   Smokeless Tobacco Never Used   Tobacco Comment    cigarette nicotine dependence     Social History     Substance and Sexual Activity   Drug Use No       Family History:  Family History   Problem Relation Age of Onset    Cancer Mother         Colon       Physical Exam:     Vitals:   Blood Pressure: 136/79 (05/07/22 2350)  Pulse: 92 (05/07/22 2350)  Temperature: 99 °F (37 2 °C) (05/07/22 2350)  Temp Source: Oral (05/07/22 1514)  Respirations: 18 (05/07/22 2350)  Height: 5' 11" (180 3 cm) (05/07/22 2100)  Weight - Scale: 88 kg (194 lb 0 1 oz) (05/07/22 1514)  SpO2: 94 % (05/07/22 2350)    Physical Exam  Vitals reviewed  Constitutional:       Appearance: He is well-developed  HENT:      Head: Normocephalic and atraumatic  Eyes:      Conjunctiva/sclera: Conjunctivae normal       Comments: ecchymosis above left eye   Cardiovascular:      Rate and Rhythm: Normal rate and regular rhythm  Heart sounds: No murmur heard        Pulmonary:      Effort: Pulmonary effort is normal  No respiratory distress  Breath sounds: Normal breath sounds  Abdominal:      Palpations: Abdomen is soft  Tenderness: There is no abdominal tenderness  Musculoskeletal:      Comments: Left sided rib tenderness, right big toe pain - unable to bend toe, tender to palpation   Skin:     General: Skin is warm and dry  Neurological:      Mental Status: He is alert and oriented to person, place, and time  Cranial Nerves: No cranial nerve deficit  Motor: No weakness  Additional Data:     Lab Results:  Results from last 7 days   Lab Units 05/07/22  1532   WBC Thousand/uL 15 22*   HEMOGLOBIN g/dL 15 9   HEMATOCRIT % 45 7   PLATELETS Thousands/uL 190   NEUTROS PCT % 89*   LYMPHS PCT % 5*   MONOS PCT % 6   EOS PCT % 0     Results from last 7 days   Lab Units 05/07/22  1532   SODIUM mmol/L 141   POTASSIUM mmol/L 4 0   CHLORIDE mmol/L 103   CO2 mmol/L 23   BUN mg/dL 13   CREATININE mg/dL 1 49*   ANION GAP mmol/L 15*   CALCIUM mg/dL 9 5   ALBUMIN g/dL 4 1   TOTAL BILIRUBIN mg/dL 0 59   ALK PHOS U/L 114   ALT U/L 36   AST U/L 61*   GLUCOSE RANDOM mg/dL 115                       Imaging: Reviewed radiology reports from this admission including: abdominal/pelvic CT and CT head  CT head without contrast   Final Result by Sagar Bella MD (05/07 1416)      No acute intracranial abnormality  Chronic, nonemergent findings above  Workstation performed: [id number]         CT cervical spine without contrast   Final Result by Sagar Bella MD (05/07 9524)      No cervical spine fracture or traumatic malalignment  Workstation performed: [id number]         CT chest abdomen pelvis w contrast   Final Result by Sagar Bella MD (05/07 4758)      No acute abnormalities in the chest, abdomen or pelvis  Chronic and nonemergent findings above            Workstation performed: [id number]         CT facial bones without contrast   Final Result by Sagar Bella MD (05/07 1727)      Nasal bone fracture  Workstation performed: [id number]         XR foot 3+ vw right    (Results Pending)       EKG and Other Studies Reviewed on Admission:       ** Please Note: This note has been constructed using a voice recognition system   **

## 2022-05-08 NOTE — PLAN OF CARE
Problem: PAIN - ADULT  Goal: Verbalizes/displays adequate comfort level or baseline comfort level  Description: Interventions:  - Encourage patient to monitor pain and request assistance  - Assess pain using appropriate pain scale  - Administer analgesics based on type and severity of pain and evaluate response  - Implement non-pharmacological measures as appropriate and evaluate response  - Consider cultural and social influences on pain and pain management  - Notify physician/advanced practitioner if interventions unsuccessful or patient reports new pain  Outcome: Progressing     Problem: SAFETY ADULT  Goal: Patient will remain free of falls  Description: INTERVENTIONS:  - Educate patient/family on patient safety including physical limitations  - Instruct patient to call for assistance with activity   - Consult OT/PT to assist with strengthening/mobility   - Keep Call bell within reach  - Keep bed low and locked with side rails adjusted as appropriate  - Keep care items and personal belongings within reach  - Initiate and maintain comfort rounds  - Make Fall Risk Sign visible to staff  - Offer Toileting every 2 Hours, in advance of need  - Initiate/Maintain bed alarm  - Obtain necessary fall risk management equipment: bed alarm, non-skid footwear  - Apply yellow socks and bracelet for high fall risk patients  - Consider moving patient to room near nurses station  Outcome: Progressing

## 2022-05-08 NOTE — ASSESSMENT & PLAN NOTE
On Keppra 1500 mg b i d , reports that he is compliant and does not miss doses of medication  Neurology has been consulted  Check Keppra level

## 2022-05-08 NOTE — ASSESSMENT & PLAN NOTE
Pt presents after syncopal episode x2 at home  · Syncope vs seizure  · CT facial bones shows nasal bone fracture, CT head no acute abnormalities  · CT chest/abd/pelvis no acute abnormalities  · CT cervical spine shows no fracture or traumatic malalignment  · Given keppra 1500mg in ED  · R foot xray pending  · Monitor on tele  · Check orthostatics  · Seizure, fall precautions  · PT/OT eval  · Consult neurology

## 2022-05-08 NOTE — PHYSICAL THERAPY NOTE
PHYSICAL THERAPY EVALUATION/TREATMENT     05/08/22 0950   PT Last Visit   PT Visit Date 05/08/22   Note Type   Note type Evaluation   Pain Assessment   Pain Assessment Tool Moore-Baker FACES   Moore-Baker FACES Pain Rating 6   Pain Location/Orientation Location: Hip;Orientation: Right;Location: Head   Restrictions/Precautions   Weight Bearing Precautions Per Order No   Other Precautions Cognitive; Chair Alarm; Bed Alarm; Fall Risk;Pain   Home Living   Type of Home House  (5 [address] )   Home Layout One level   Bathroom Shower/Tub Tub/shower unit   Home Equipment Walker   Additional Comments does not use walker; does not drive due to h/o seizures   Prior Function   Level of [place] Independent with ADLs and functional mobility   Lives With Alone   Receives Help From Family  (sister)   ADL Assistance Independent   IADLs Needs assistance   Falls in the last 6 months 1 to 4   General   Additional Pertinent History admitted for observation with LOC, Rhabdomyoloysis ; s/p fall at home x2   Family/Caregiver Present No   Cognition   Overall Cognitive Status Impaired   Arousal/Participation Cooperative   Following Commands Follows one step commands with increased time or repetition   Comments Pt answers questions about home situation, however, when on his feet, pt is confused and requires tactile and verbal cues to direct when standing in the bathroom : turning the wrong direction      Subjective   Subjective Pt states that he "hurts all over"  RN aware and handling accordingly   RLE Assessment   RLE Assessment WFL  (strength at least 3+/5)   LLE Assessment   LLE Assessment WFL  (strength at least 3+/5)   Bed Mobility   Rolling L 7  Independent   Supine to Sit 4  Minimal assistance   Additional items Assist x 1;Verbal cues   Sit to Supine 5  Supervision   Transfers   Sit to Stand 4  Minimal assistance   Additional items Assist x 1;Verbal cues   Stand to Sit 4  Minimal assistance   Additional items Assist x 1;Verbal cues Stand pivot 4  Minimal assistance   Additional items Assist x 1;Verbal cues   Ambulation/Elevation   Gait pattern   (unsteady)   Gait Assistance 4  Minimal assist   Additional items Assist x 1;Verbal cues; Tactile cues   Assistive Device None  (hand held assist and arm behind back for support)   Distance 12 feet    Balance   Static Sitting Fair   Dynamic Sitting Fair -   Static Standing Fair   Dynamic Standing Fair -   Ambulatory Poor +   Activity Tolerance   Activity Tolerance Patient limited by pain   Nurse Made Aware yes: Wylie Krabbe   Assessment   Prognosis Good   Problem List Decreased endurance; Impaired balance;Decreased mobility; Decreased safety awareness;Decreased skin integrity;Pain   Goals   Patient Goals to feel better   STG Expiration Date 05/15/22   Short Term Goal #1 Indep with all transfers supine <> short sit and sit <> stand with fair + balance   Short Term Goal #2 supervision with amb  without AD for functional household distances with fair + balance   LTG Expiration Date 05/22/22   Long Term Goal #1 Indep amb  without AD for functional household distances with good balance   Long Term Goal #2 Indep navigating 5 stairs to allow pt to enter/exit his home  Plan   Treatment/Interventions ADL retraining;Functional transfer training;Elevations; Therapeutic exercise; Endurance training;Cognitive reorientation;Patient/family training;Bed mobility;Gait training;Spoke to nursing   PT Frequency Other (Comment)  (5/wk)   Recommendation   PT Discharge Recommendation Post acute rehabilitation services   Additional Comments Pt is not at his baseline with gait: unsteady  Pt is also confused and having difficulty with directions   Will check for visual deficeit next session  RN Wylie Krabbe was also asked by this PT to monitor for anything visual when with pt       AM-PAC Basic Mobility Inpatient   Turning in Bed Without Bedrails 4   Lying on Back to Sitting on Edge of Flat Bed 4   Moving Bed to Chair 2   Standing Up From Chair 3   Walk in Room 3   Climb 3-5 Stairs 2   Basic Mobility Inpatient Raw Score 18   Basic Mobility Standardized Score 41 05   Highest Level Of Mobility   JH-HL Goal 6: Walk 10 steps or more   JH-HLM Highest Level of Mobility 7: Walk 25 feet or more   JH-HLM Goal Achieved Yes   Barthel Index   Feeding 5   Bathing 0   Grooming Score 0   Dressing Score 5   Bladder Score 10   Bowels Score 10   Toilet Use Score 5   Transfers (Bed/Chair) Score 10   Mobility (Level Surface) Score 0   Stairs Score 5   Barthel Index Score 50   Additional Treatment Session   Start Time 0940   End Time 0950   Treatment Assessment Pt requesting to use the bathroom; walked to bathroom with Min/Mod A  due to decreased balance and decreased safety awareness  Pt turn around 360 degrees in the bathroom to get to the toilet  Min A to sit on toilet  Pt was independent with hygiene after BM (but with close supervision outside of bathroom for when pt stood up)  Min A for sit > stand , pt ambulated 12 feet back to bed with Min A and verbal cues  Pt is mildly impulsive with decreased safety awareness  At some points, pt appears confused with his mobility  Will continue to monitor  Would not recommend d/c to home at this time due to being off with mentation, safety awareness and balance  End of Consult   Patient Position at End of Consult Supine;Bed/Chair alarm activated; All needs within reach   Licensure   [id number] Number  Shahab Barone PT  [id number]

## 2022-05-08 NOTE — ASSESSMENT & PLAN NOTE
Pt presents after syncopal episode x2 at home  · Syncope vs seizure  · CT facial bones shows nasal bone fracture, CT head no acute abnormalities  · CT chest/abd/pelvis no acute abnormalities  · CT cervical spine shows no fracture or traumatic malalignment  · Given keppra 1500mg in ED    Right foot x-ray pending read  Continue telemetry  Check echo  PT/OT has been consulted

## 2022-05-08 NOTE — ASSESSMENT & PLAN NOTE
Reports being down for only 30 minutes after fall  CK on admission 2710, increased to 6718 today  · Continue IV fluids

## 2022-05-09 ENCOUNTER — APPOINTMENT (OUTPATIENT)
Dept: NON INVASIVE DIAGNOSTICS | Facility: HOSPITAL | Age: 57
DRG: 565 | End: 2022-05-09
Payer: COMMERCIAL

## 2022-05-09 LAB
ALBUMIN SERPL BCP-MCNC: 3.2 G/DL (ref 3.5–5)
ALP SERPL-CCNC: 84 U/L (ref 46–116)
ALT SERPL W P-5'-P-CCNC: 41 U/L (ref 12–78)
ANION GAP SERPL CALCULATED.3IONS-SCNC: 12 MMOL/L (ref 4–13)
AORTIC ROOT: 3.2 CM
APICAL FOUR CHAMBER EJECTION FRACTION: 65 %
AST SERPL W P-5'-P-CCNC: 134 U/L (ref 5–45)
BASOPHILS # BLD AUTO: 0.03 THOUSANDS/ΜL (ref 0–0.1)
BASOPHILS NFR BLD AUTO: 0 % (ref 0–1)
BILIRUB SERPL-MCNC: 1.03 MG/DL (ref 0.2–1)
BUN SERPL-MCNC: 7 MG/DL (ref 5–25)
CALCIUM ALBUM COR SERPL-MCNC: 9.3 MG/DL (ref 8.3–10.1)
CALCIUM SERPL-MCNC: 8.7 MG/DL (ref 8.3–10.1)
CHLORIDE SERPL-SCNC: 104 MMOL/L (ref 100–108)
CK MB SERPL-MCNC: 2 NG/ML (ref 0–5)
CK MB SERPL-MCNC: <1 % (ref 0–2.5)
CK SERPL-CCNC: 4860 U/L (ref 39–308)
CO2 SERPL-SCNC: 24 MMOL/L (ref 21–32)
CREAT SERPL-MCNC: 0.77 MG/DL (ref 0.6–1.3)
E WAVE DECELERATION TIME: 240 MS
EOSINOPHIL # BLD AUTO: 0.08 THOUSAND/ΜL (ref 0–0.61)
EOSINOPHIL NFR BLD AUTO: 1 % (ref 0–6)
ERYTHROCYTE [DISTWIDTH] IN BLOOD BY AUTOMATED COUNT: 14.6 % (ref 11.6–15.1)
FRACTIONAL SHORTENING: 35 % (ref 28–44)
GFR SERPL CREATININE-BSD FRML MDRD: 99 ML/MIN/1.73SQ M
GLUCOSE P FAST SERPL-MCNC: 84 MG/DL (ref 65–99)
GLUCOSE SERPL-MCNC: 84 MG/DL (ref 65–140)
HCT VFR BLD AUTO: 38.7 % (ref 36.5–49.3)
HGB BLD-MCNC: 13.2 G/DL (ref 12–17)
IMM GRANULOCYTES # BLD AUTO: 0.06 THOUSAND/UL (ref 0–0.2)
IMM GRANULOCYTES NFR BLD AUTO: 1 % (ref 0–2)
INTERVENTRICULAR SEPTUM IN DIASTOLE (PARASTERNAL SHORT AXIS VIEW): 1.3 CM
INTERVENTRICULAR SEPTUM: 1.3 CM (ref 0.54–1.02)
LAAS-AP2: 24 CM2
LAAS-AP4: 14.8 CM2
LEFT ATRIUM SIZE: 3.4 CM
LEFT INTERNAL DIMENSION IN SYSTOLE: 2.6 CM (ref 3.28–4.97)
LEFT VENTRICULAR INTERNAL DIMENSION IN DIASTOLE: 4 CM (ref 5.42–8.07)
LEFT VENTRICULAR POSTERIOR WALL IN END DIASTOLE: 1.3 CM (ref 0.53–1.01)
LEFT VENTRICULAR STROKE VOLUME: 46 ML
LVSV (TEICH): 46 ML
LYMPHOCYTES # BLD AUTO: 1.67 THOUSANDS/ΜL (ref 0.6–4.47)
LYMPHOCYTES NFR BLD AUTO: 15 % (ref 14–44)
MAGNESIUM SERPL-MCNC: 1.4 MG/DL (ref 1.6–2.6)
MCH RBC QN AUTO: 29.3 PG (ref 26.8–34.3)
MCHC RBC AUTO-ENTMCNC: 34.1 G/DL (ref 31.4–37.4)
MCV RBC AUTO: 86 FL (ref 82–98)
MONOCYTES # BLD AUTO: 0.83 THOUSAND/ΜL (ref 0.17–1.22)
MONOCYTES NFR BLD AUTO: 7 % (ref 4–12)
MV E'TISSUE VEL-SEP: 8 CM/S
MV PEAK A VEL: 0.8 M/S
MV PEAK E VEL: 61 CM/S
MV STENOSIS PRESSURE HALF TIME: 70 MS
MV VALVE AREA P 1/2 METHOD: 3.14 CM2
NEUTROPHILS # BLD AUTO: 8.66 THOUSANDS/ΜL (ref 1.85–7.62)
NEUTS SEG NFR BLD AUTO: 76 % (ref 43–75)
NRBC BLD AUTO-RTO: 0 /100 WBCS
PHOSPHATE SERPL-MCNC: 2.6 MG/DL (ref 2.7–4.5)
PLATELET # BLD AUTO: 154 THOUSANDS/UL (ref 149–390)
PMV BLD AUTO: 10.1 FL (ref 8.9–12.7)
POTASSIUM SERPL-SCNC: 3.5 MMOL/L (ref 3.5–5.3)
PROT SERPL-MCNC: 6.1 G/DL (ref 6.4–8.2)
RA PRESSURE ESTIMATED: 8 MMHG
RBC # BLD AUTO: 4.51 MILLION/UL (ref 3.88–5.62)
RIGHT ATRIUM AREA SYSTOLE A4C: 18.7 CM2
RIGHT VENTRICLE ID DIMENSION: 3.8 CM
RV PSP: 27 MMHG
SL CV LEFT ATRIUM LENGTH A2C: 6.1 CM
SL CV LV EF: 65
SL CV PED ECHO LEFT VENTRICLE DIASTOLIC VOLUME (MOD BIPLANE) 2D: 70 ML
SL CV PED ECHO LEFT VENTRICLE SYSTOLIC VOLUME (MOD BIPLANE) 2D: 24 ML
SODIUM SERPL-SCNC: 140 MMOL/L (ref 136–145)
TR MAX PG: 19 MMHG
TR PEAK VELOCITY: 2.2 M/S
TRICUSPID VALVE PEAK REGURGITATION VELOCITY: 2.15 M/S
URATE SERPL-MCNC: 5.2 MG/DL (ref 4.2–8)
WBC # BLD AUTO: 11.33 THOUSAND/UL (ref 4.31–10.16)
Z-SCORE OF INTERVENTRICULAR SEPTUM IN END DIASTOLE: 4.27
Z-SCORE OF LEFT VENTRICULAR DIMENSION IN END DIASTOLE: -5.03
Z-SCORE OF LEFT VENTRICULAR DIMENSION IN END SYSTOLE: -3.48
Z-SCORE OF LEFT VENTRICULAR POSTERIOR WALL IN END DIASTOLE: 4.38

## 2022-05-09 PROCEDURE — 83735 ASSAY OF MAGNESIUM: CPT | Performed by: HOSPITALIST

## 2022-05-09 PROCEDURE — 99232 SBSQ HOSP IP/OBS MODERATE 35: CPT | Performed by: INTERNAL MEDICINE

## 2022-05-09 PROCEDURE — 82553 CREATINE MB FRACTION: CPT | Performed by: HOSPITALIST

## 2022-05-09 PROCEDURE — 97167 OT EVAL HIGH COMPLEX 60 MIN: CPT

## 2022-05-09 PROCEDURE — 99223 1ST HOSP IP/OBS HIGH 75: CPT | Performed by: NURSE PRACTITIONER

## 2022-05-09 PROCEDURE — 97535 SELF CARE MNGMENT TRAINING: CPT

## 2022-05-09 PROCEDURE — 84550 ASSAY OF BLOOD/URIC ACID: CPT | Performed by: PODIATRIST

## 2022-05-09 PROCEDURE — 93306 TTE W/DOPPLER COMPLETE: CPT

## 2022-05-09 PROCEDURE — 99222 1ST HOSP IP/OBS MODERATE 55: CPT | Performed by: PODIATRIST

## 2022-05-09 PROCEDURE — 93306 TTE W/DOPPLER COMPLETE: CPT | Performed by: INTERNAL MEDICINE

## 2022-05-09 PROCEDURE — 80053 COMPREHEN METABOLIC PANEL: CPT | Performed by: HOSPITALIST

## 2022-05-09 PROCEDURE — 85025 COMPLETE CBC W/AUTO DIFF WBC: CPT | Performed by: HOSPITALIST

## 2022-05-09 PROCEDURE — 82550 ASSAY OF CK (CPK): CPT | Performed by: HOSPITALIST

## 2022-05-09 PROCEDURE — 80177 DRUG SCRN QUAN LEVETIRACETAM: CPT | Performed by: HOSPITALIST

## 2022-05-09 PROCEDURE — 84100 ASSAY OF PHOSPHORUS: CPT | Performed by: HOSPITALIST

## 2022-05-09 RX ORDER — LANOLIN ALCOHOL/MO/W.PET/CERES
100 CREAM (GRAM) TOPICAL DAILY
Status: DISCONTINUED | OUTPATIENT
Start: 2022-05-10 | End: 2022-05-12 | Stop reason: HOSPADM

## 2022-05-09 RX ORDER — MELOXICAM 7.5 MG/1
7.5 TABLET ORAL DAILY
Status: COMPLETED | OUTPATIENT
Start: 2022-05-09 | End: 2022-05-11

## 2022-05-09 RX ORDER — MAGNESIUM SULFATE HEPTAHYDRATE 40 MG/ML
2 INJECTION, SOLUTION INTRAVENOUS ONCE
Status: COMPLETED | OUTPATIENT
Start: 2022-05-09 | End: 2022-05-09

## 2022-05-09 RX ADMIN — LEVETIRACETAM 1500 MG: 500 TABLET, FILM COATED ORAL at 08:15

## 2022-05-09 RX ADMIN — MELOXICAM 7.5 MG: 7.5 TABLET ORAL at 09:28

## 2022-05-09 RX ADMIN — ASPIRIN 81 MG CHEWABLE TABLET 81 MG: 81 TABLET CHEWABLE at 08:15

## 2022-05-09 RX ADMIN — AMLODIPINE BESYLATE 10 MG: 10 TABLET ORAL at 21:13

## 2022-05-09 RX ADMIN — MAGNESIUM SULFATE HEPTAHYDRATE 2 G: 40 INJECTION, SOLUTION INTRAVENOUS at 09:28

## 2022-05-09 RX ADMIN — HYDROMORPHONE HYDROCHLORIDE 1 MG: 1 INJECTION, SOLUTION INTRAMUSCULAR; INTRAVENOUS; SUBCUTANEOUS at 21:14

## 2022-05-09 RX ADMIN — SODIUM CHLORIDE 150 ML/HR: 0.9 INJECTION, SOLUTION INTRAVENOUS at 09:09

## 2022-05-09 RX ADMIN — PANTOPRAZOLE SODIUM 40 MG: 40 TABLET, DELAYED RELEASE ORAL at 06:32

## 2022-05-09 RX ADMIN — FENOFIBRATE 145 MG: 145 TABLET, FILM COATED ORAL at 08:15

## 2022-05-09 RX ADMIN — METOPROLOL SUCCINATE 100 MG: 100 TABLET, EXTENDED RELEASE ORAL at 08:15

## 2022-05-09 RX ADMIN — LEVETIRACETAM 1500 MG: 500 TABLET, FILM COATED ORAL at 21:12

## 2022-05-09 NOTE — PROGRESS NOTES
[facility]  Progress Note - Sury Lara 1965, 59 y o  male MRN: [id number]  Unit/Bed#: [address] [place] [address] Encounter: [id number]  Primary Care Provider: No primary care provider on file  Date and time admitted to hospital: 5/7/2022  3:06 PM    * Syncopal episodes  Assessment & Plan  Pt presents after syncopal episode x2 at home  · Syncope vs seizure  · CT facial bones shows nasal bone fracture, CT head no acute abnormalities  · CT chest/abd/pelvis no acute abnormalities  · CT cervical spine shows no fracture or traumatic malalignment   · Echocardiogram revealed ejection fraction 12%, grade 1 diastolic dysfunction,LBBB (which is chronic),  · Given keppra 1500mg in ED  · Neurology input will be appreciated  · Telemetry monitoring  · PT/OT recommended rehab, patient prefers to go home    Right foot pain  Assessment & Plan  · Right foot x-ray reveals soft tissue swelling of the 1st metatarsal head, small bony densities medial head of 1st metatarsal  · Podiatry consultation appreciated  · Uric acid normal  · Continue NSAID as per Podiatry    SIRS (systemic inflammatory response syndrome) ([facility])  Assessment & Plan  POA as evidenced by tachycardia, tachypnea, WBC 15  Resolved      Seizure disorder ([facility])  Assessment & Plan  · On Keppra 1500 mg b i d , reports that he is compliant and does not miss doses of medication  · Neurology consultation will be appreciated  · Keppra level pending      Rhabdomyolysis  Assessment & Plan  · Reports being down for only 30 minutes after fall  · CK 2710->6718->3860  · Continue IV fluid  · Repeat CK a m      DORA (acute kidney injury) ([facility] [address] )  Assessment & Plan  Creatinine 1 49 on admission  Resolved with IV fluid    Hyperlipidemia  Assessment & Plan  · Continue tricor    Hypertension  Assessment & Plan  · Continue amlodipine and metoprolol          VTE Pharmacologic Prophylaxis:   Pharmacologic:  Heparin    Patient Centered Rounds: I have performed bedside rounds with nursing staff today  Education and Discussions with Family / Patient: patient, updated sister    Time Spent for Care: 20 minutes  More than 50% of total time spent on counseling and coordination of care as described above  Current Length of Stay: 0 day(s)    Current Patient Status: Observation   Certification Statement: The patient will continue to require additional inpatient hospital stay due to Rhabdomyolysis, syncope    Discharge Plan / Estimated Discharge Date: TBD    Code Status: Level 1 - Full Code      Subjective:   Patient seen and examined at bedside, currently denies any chest pain, abdominal pain, nausea, or vomiting    Objective:     Vitals:   Temp (24hrs), Av 1 °F (37 3 °C), Min:98 7 °F (37 1 °C), Max:99 5 °F (37 5 °C)    Temp:  [98 7 °F (37 1 °C)-99 5 °F (37 5 °C)] 99 5 °F (37 5 °C)  HR:  [67-75] 69  Resp:  [17-18] 18  BP: (114-131)/(66-75) 121/68  SpO2:  [91 %-98 %] 98 %  Body mass index is 27 06 kg/m²  Input and Output Summary (last 24 hours): Intake/Output Summary (Last 24 hours) at 2022 1309  Last data filed at 2022 2200  Gross per 24 hour   Intake --   Output 850 ml   Net -850 ml       Physical Exam:    Constitutional: Patient is oriented to person, place and time, no acute distress  HEENT:  Normocephalic, atraumatic  Cardiovascular: Normal S1S2, RRR, No murmurs/rubs/gallops appreciated  Pulmonary:  Bilateral air entry, No rhonchi/rales/wheezing appreciated  Abdominal: Soft, Bowel sounds present, Non-tender, Non-distended  Extremities:  No cyanosis, clubbing or edema  Neurological: Cranial nerves II-XII grossly intact, sensation intact, otherwise no focal neurological symptoms     Skin:  Warm, dry    Additional Data:     Labs:    Results from last 7 days   Lab Units 22  0633   WBC Thousand/uL 11 33*   HEMOGLOBIN g/dL 13 2   HEMATOCRIT % 38 7   PLATELETS Thousands/uL 154   NEUTROS PCT % 76*   LYMPHS PCT % 15   MONOS PCT % 7   EOS PCT % 1     Results from last 7 days   Lab Units 05/09/22  0633   POTASSIUM mmol/L 3 5   CHLORIDE mmol/L 104   CO2 mmol/L 24   BUN mg/dL 7   CREATININE mg/dL 0 77   CALCIUM mg/dL 8 7   ALK PHOS U/L 84   ALT U/L 41   AST U/L 134*            I Have Reviewed All Lab Data Listed Above          Recent Cultures (last 7 days):           Last 24 Hours Medication List:   Current Facility-Administered Medications   Medication Dose Route Frequency Provider Last Rate    acetaminophen  650 mg Oral Q6H PRN Mi Bran PA-C      amLODIPine  10 mg Oral HS Sheri Vecliseto, PA-C      aspirin  81 mg Oral Daily Sheri Vecellio, PA-C      fenofibrate  145 mg Oral Daily Sheri Vecliseto, PA-C      heparin (porcine)  5,000 Units Subcutaneous Q8H [address] Sheri Vecrainer, PA-C      HYDROmorphone  1 mg Intravenous Q4H PRN Venus Baer MD      levETIRAcetam  1,500 mg Oral Q12H [address] Sheri JUAN White      meloxicam  7 5 mg Oral Daily Diogo Cisse DPM      metoprolol succinate  100 mg Oral Daily Sheri VecEYAD spear-GEORGE      ondansetron  4 mg Intravenous Q6H PRN Mi Bran PA-C      oxyCODONE-acetaminophen  1 tablet Oral Q4H PRN Venus Baer MD      pantoprazole  40 mg Oral Early Morning Mi Bran PA-C      sodium chloride  150 mL/hr Intravenous Continuous Mi Bran PA-C 150 mL/hr (05/09/22 0003)        Today, Patient Was Seen By: Alyssa Michelle MD

## 2022-05-09 NOTE — UTILIZATION REVIEW
Initial Clinical Review    Observation 5/7/22 @ 0345 [id number], converted to inpatient admission 5/9/22 @ 1515 for continued care & tx s/p syncopal episodes, rhabdomyolysis  Admission: Date/Time/Statement:   Admission Orders (From admission, onward)     Ordered        05/09/22 1515  Inpatient Admission  Once            05/07/22 1807  Place in Observation  Once                      Orders Placed This Encounter   Procedures    Inpatient Admission     Standing Status:   Standing     Number of Occurrences:   1     Order Specific Question:   Level of Care     Answer:   Med Surg [16]     Order Specific Question:   Estimated length of stay     Answer:   More than 2 Midnights     Order Specific Question:   Certification     Answer:   I certify that inpatient services are medically necessary for this patient for a duration of greater than two midnights  See H&P and MD Progress Notes for additional information about the patient's course of treatment  ED Arrival Information     Expected Arrival Acuity    - 5/7/2022 15:00 Urgent    Means of arrival Escorted by Service Admission type    Ambulance [facility] Urgent    Arrival complaint    head injury     Chief Complaint   Patient presents with    Loss of Consciousness     patient reports going to [facility] 2 separate times, LOC x 2 while sitting on toilet bowl, 2 unwittness falls, reports half body in a tub upon awakening  Sister called EMS  Initial Presentation:   58 y/o male with PMHx HTN, HLD, Seizure disorder - on Keppra, GERD; active smoking 1/2 ppd - presents via EMS to [facility] ED after a fall at home and hitting his head on the bathtub  Reports after having a bowel movement, he stood up and fell down hitting his head on the bathtub and his left ribs  He then stood up and fell again  He remained on the floor for about 30 minutes until his sister came over and helped him up  He has no recollection of falling   Presents with headache, rib pain, right foot pain    Per ED - he arrived covered in feces  In ED - HR 87  /85  Exam: alert and oriented x 3  No focal neuro deficit   ecchymosis above left eye  Left sided rib tenderness, right big toe pain - unable to bend toe, tender to palpation  CT Head negative  CT Face + Nasal bone frad Sycture  Labs:  WBC 15,220  BUN/CR  13/ 1 49  CPK  2,710  ED Tx:  IVF NSS x 1 L, IV Keppra  Placed in Observation 5/7/22 at 1807 2nd Syncope with Fall, DORA with Rhabdomyolysis - continue IVF    Observation to IP admission 5/9/22:  Tmax 99 5  Telemetry monitoring continued s/p syncopal episodes resulting in nasal bone fx  Podiatry consulted for R foot pain  Using IV dilaudid & morphine for pain control, started on Mobic  IVF in progress for rhabdo with CK still >4800  IV Mg repletion given  Per podiatry: History of syncope with secondary injury foot bilateral   Pain right foot, rule out bone bruise verses tophaceous gouty arthritis  Plan: At this time I do not believe patient has clinically significant foot fracture  Suspect bone bruise with possible toe subluxation  X-ray changes consistent with tophaceous gout  Rule out hyperuricemia  Blood work ordered  We will treat for inflammatory arthropathy/trauma  We will add short course of nonsteroidal anti-inflammatory medication  Per neuro: syncope, suspect recurrent seizure, rhabdo  Continue Keppra, follow level, electrolyte repletion per medical team, seizure precautions  Day 2- 5/10/22:  No further seizures noted  Remains on Keppra, compliance reinforced  Rhabdomyolysis with CK trending downward 4538 today, remains on IVF  Mg & K repletion continued        ED Triage Vitals   Temperature Pulse Respirations Blood Pressure SpO2   05/07/22 1514 05/07/22 1514 05/07/22 1514 05/07/22 1514 05/07/22 1514   98 5 °F (36 9 °C) 87 20 163/85 97 %      Temp Source Heart Rate Source Patient Position - Orthostatic VS BP Location FiO2 (%)   05/07/22 1514 05/07/22 1514 05/07/22 1514 05/07/22 1514 --   Oral Monitor Lying Right arm       Pain Score       05/07/22 2021       10 - Worst Possible Pain          Wt Readings from Last 1 Encounters:   05/09/22 88 kg (194 lb)     Additional Vital Signs:   05/09/22 08:14:41 99 5 °F (37 5 °C) 69 18 121/68 86 98 % None (Room air) Lying   05/09/22 00:22:26 -- 75 17 117/66 83 96 % None (Room air) Lying   05/08/22 21:07:37 -- 70 18 129/69 89 96 % -- --   05/08/22 19:40:48 98 7 °F (37 1 °C) 71 17 131/72 92 97 % None (Room air) Lying   05/08/22 15:11:55 -- 73 -- 114/75 88 93 % -- --   05/08/22 1415 -- 67 -- -- -- 91 % -- --   05/08/22 1200 -- 80 -- -- -- 93 % -- --   05/08/22 1130 -- 87 -- -- -- 94 % -- --   05/08/22 1040 -- 83 -- -- -- 93 % -- --   05/08/22 0932 98 3 °F (36 8 °C) 89 17 131/80 104 -- -- Lying   05/08/22 02:32:47 98 5 °F (36 9 °C) 104 20 135/81 99 94 % -- --   05/07/22 23:50:15 99 °F (37 2 °C) 92 18 136/79 98 94 % -- --   05/07/22 22:17:57 -- 98 -- 136/80 99 93 % -- --   05/07/22 2110 -- -- -- -- -- -- None (Room air) --   05/07/22 2100 -- 97 -- -- -- 96 % -- --   05/07/22 18:55:54 -- 103 18 119/76 90 95 % -- --   05/07/22 1830 -- 99 16 165/83 117 96 % -- --   05/07/22 1800 -- 128 Abnormal  42 Abnormal  179/86 Abnormal  124 95 % -- --     Pertinent Labs/Diagnostic Test Results:   CT head without contrast   Final Result (05/07 1719)      No acute intracranial abnormality  Chronic, nonemergent findings above  CT cervical spine without contrast   Final Result (05/07 1733)      No cervical spine fracture or traumatic malalignment  CT chest abdomen pelvis w contrast   Final Result  (05/07 1741)      No acute abnormalities in the chest, abdomen or pelvis  Chronic and nonemergent findings above  CT facial bones without contrast   Nasal bone fracture       5/7 Ekg=  Normal sinus rhythm  Left bundle branch block    Results from last 7 days   Lab Units 05/07/22  1704   SARS-COV-2  Negative     Results from last 7 days   Lab Units 05/10/22  0501 05/09/22  8292 05/08/22  0552 05/07/22  1532 05/07/22  1532   WBC Thousand/uL 8 85 11 33* 12 55*   < > 15 22*   HEMOGLOBIN g/dL 13 5 13 2 13 5  --  15 9   HEMATOCRIT % 40 0 38 7 38 8  --  45 7   PLATELETS Thousands/uL 165 154 165   < > 190   NEUTROS ABS Thousands/µL 6 11 8 66* 9 72*   < > 13 53*    < > = values in this interval not displayed       Results from last 7 days   Lab Units 05/10/22  0942 05/10/22  0501 05/09/22  8748 05/08/22  0552 05/07/22  1532   SODIUM mmol/L  --  137 140 140 141   POTASSIUM mmol/L  --  3 0* 3 5 3 7 4 0   CHLORIDE mmol/L  --  100 104 107 103   CO2 mmol/L  --  26 24 24 23   ANION GAP mmol/L  --  11 12 9 15*   BUN mg/dL  --  4* 7 12 13   CREATININE mg/dL  --  0 85 0 77 0 87 1 49*   EGFR ml/min/1 73sq m  --  95 99 94 50   CALCIUM mg/dL  --  9 1 8 7 8 6 9 5   MAGNESIUM mg/dL 1 3*  --  1 4*  --  1 8   PHOSPHORUS mg/dL  --   --  2 6*  --   --      Results from last 7 days   Lab Units 05/10/22  0501 05/09/22  0633 05/07/22  1532   AST U/L 123* 134* 61*   ALT U/L 46 41 36   ALK PHOS U/L 82 84 114   TOTAL PROTEIN g/dL 6 5 6 1* 7 3   ALBUMIN g/dL 3 4* 3 2* 4 1   TOTAL BILIRUBIN mg/dL 0 99 1 03* 0 59       Results from last 7 days   Lab Units 05/10/22  0501 05/09/22  0633 05/08/22  0552 05/07/22  1532   GLUCOSE RANDOM mg/dL 90 84 105 115     Results from last 7 days   Lab Units 05/10/22  0501 05/09/22  0633 05/08/22  0552   CK TOTAL U/L 4,538* 4,860* 6,718*   CK MB INDEX % <1 0 <1 0 <1 0   CK MB ng/mL 1 0 2 0 6 3*     Results from last 7 days   Lab Units 05/07/22  2339 05/07/22  1735 05/07/22  1532   HS TNI 0HR ng/L  --   --  39   HS TNI 2HR ng/L  --  44  --    HSTNI D2 ng/L  --  5  --    HS TNI 4HR ng/L 52*  --   --    HSTNI D4 ng/L 13  --   --      Results from last 7 days   Lab Units 05/07/22  1532   LIPASE u/L 73       Results from last 7 days   Lab Units 05/07/22  1730   CLARITY UA  Clear   COLOR UA  Colorless   SPEC GRAV UA  1 010   PH UA  6 0   GLUCOSE UA mg/dl Negative KETONES UA mg/dl Negative   BLOOD UA  Moderate*   PROTEIN UA mg/dl Negative   NITRITE UA  Negative   BILIRUBIN UA  Negative   UROBILINOGEN UA E U /dl 0 2   LEUKOCYTES UA  Negative   WBC UA /hpf None Seen   RBC UA /hpf 2-4   BACTERIA UA /hpf None Seen   EPITHELIAL CELLS WET PREP /hpf None Seen     Results from last 7 days   Lab Units 05/07/22  1704   INFLUENZA A PCR  Negative   INFLUENZA B PCR  Negative   RSV PCR  Negative     Results from last 7 days   Lab Units 05/07/22  1730   AMPH/METH  Negative   BARBITURATE UR  Negative   BENZODIAZEPINE UR  Negative   COCAINE UR  Negative   METHADONE URINE  Negative   OPIATE UR  Negative   PCP UR  Negative   THC UR  Negative     Results from last 7 days   Lab Units 05/07/22  1532   ETHANOL LVL mg/dL <3     ED Treatment:   Medication Administration from 05/07/2022 1500 to 05/07/2022 1851       Date/Time Order Dose Route Action     05/07/2022 1535 sodium chloride 0 9 % bolus 1,000 mL 1,000 mL Intravenous New Bag     05/07/2022 1659 iohexol (OMNIPAQUE) 350 MG/ML injection (SINGLE-DOSE) 100 mL 100 mL Intravenous Given     05/07/2022 1725 levETIRAcetam (KEPPRA) 1,500 mg in sodium chloride 0 9 % 100 mL IVPB 1,500 mg Intravenous New Bag     05/07/2022 1805 sodium chloride 0 9 % infusion 150 mL/hr Intravenous New Bag     Past Medical History:   Diagnosis Date    Biceps tendonitis, unspecified laterality 09/17/2007    Bone cyst 11/22/2011    Bronchiectasis ([facility] [address] ) 10/02/2006    Bursitis 11/17/2005    Chest pain 04/29/2009    Chronic renal failure 11/22/2011    Diverticulitis of colon 10/09/2007    Generalized anxiety disorder 06/08/2006    GERD (gastroesophageal reflux disease)     Gout     Hyperlipidemia     Hypertension     Lateral epicondylitis, unspecified elbow     Last Assessed: 11/29/2013    Low magnesium levels     Lyme disease 11/09/2009    Pneumonia     Last Assessed: 1/7/2013     Present on Admission:   Seizure disorder ([facility] [address] )   Hypertension   Hyperlipidemia   Rhabdomyolysis   [facility]   Hypomagnesemia    Admitting Diagnosis: Rhabdomyolysis [M62 82]  Loss of consciousness (HCC) [R40 20]  [facility] [N17 9]  Closed fracture of nasal bone, initial encounter [S02  2XXA]  Episode of loss of consciousness [R55]    Age/Sex: 59 y o  male    Admission Orders:  Telemetry  Seizure Precautions  Nasal O2 at 2 L/min - Titrate SpO2 > 92 %  Continuous Pulse Oximetry  SCD  PT + OT Evals  Orthostatic BP  Consult podiatry  Consult neuro    Scheduled Medications:  amLODIPine, 10 mg, Oral, HS  aspirin, 81 mg, Oral, Daily  fenofibrate, 145 mg, Oral, Daily  heparin (porcine), 5,000 Units, Subcutaneous, Q8H LUCIA  levETIRAcetam, 1,500 mg, Oral, Q12H LUCIA  magnesium sulfate 2 g/50 mL IVPB 2 g, Once, 5/9, x 2 doses 5/10  meloxicam (MOBIC) tablet 7 5 mg, daily, start 5/9  metoprolol succinate, 100 mg, Oral, Daily  pantoprazole, 40 mg, Oral, Early Morning  potassium chloride (K-DUR,KLOR-CON) CR tablet 40 mEq, x 2 doses, 5/10  thiamine tablet 100 mg, daily    Continuous IV Infusions:  IVF sodium chloride, 150 mL/hr, Intravenous, Continuous    PRN Meds:  acetaminophen, 650 mg, Oral, Q6H PRN  HYDROmorphone (DILAUDID) injection 1 mg, Dose: 1 mg,   Freq: Every 4 hours PRN-used x2, 5/8  IV morphine injection, 4 mg, Intravenous, Q4H PRN - 5/7 X 1, 5/8 X 2  ondansetron, 4 mg, Intravenous, Q6H PRN    Network Utilization Review Department  ATTENTION: Please call with any questions or concerns to [phone] and carefully listen to the prompts so that you are directed to the right person  All voicemails are confidential   Sheri Chucky all requests for admission clinical reviews, approved or denied determinations and any other requests to dedicated fax number below belonging to the campus where the patient is receiving treatment   List of dedicated fax numbers for the Facilities:  FACILITY NAME UR FAX NUMBER   ADMISSION DENIALS (Administrative/Medical Necessity) [phone]   [address] (Maternity/NICU/Pediatrics) [address] [facility],[address] [facility] [address] 125 [facility]  [phone]   [address] [address] [place] [address] [address] [phone]   [address] [phone]

## 2022-05-09 NOTE — ASSESSMENT & PLAN NOTE
· Right foot x-ray reveals soft tissue swelling of the 1st metatarsal head, small bony densities medial head of 1st metatarsal  · Podiatry consultation appreciated  · Uric acid normal  · Continue NSAID as per Podiatry

## 2022-05-09 NOTE — PLAN OF CARE
Problem: MOBILITY - ADULT  Goal: Maintain or return to baseline ADL function  Description: INTERVENTIONS:  -  Assess patient's ability to carry out ADLs; assess patient's baseline for ADL function and identify physical deficits which impact ability to perform ADLs (bathing, care of mouth/teeth, toileting, grooming, dressing, etc )  - Assess/evaluate cause of self-care deficits   - Assess range of motion  - Assess patient's mobility; develop plan if impaired  - Assess patient's need for assistive devices and provide as appropriate  - Encourage maximum independence but intervene and supervise when necessary  - Involve family in performance of ADLs  - Assess for home care needs following discharge   - Consider OT consult to assist with ADL evaluation and planning for discharge  - Provide patient education as appropriate  Outcome: Progressing  Goal: Maintains/Returns to pre admission functional level  Description: INTERVENTIONS:  - Perform BMAT or MOVE assessment daily    - Set and communicate daily mobility goal to care team and patient/family/caregiver     - Collaborate with rehabilitation services on mobility goals if consulted  - Out of bed for toileting  - Record patient progress and toleration of activity level   Outcome: Progressing     Problem: PAIN - ADULT  Goal: Verbalizes/displays adequate comfort level or baseline comfort level  Description: Interventions:  - Encourage patient to monitor pain and request assistance  - Assess pain using appropriate pain scale  - Administer analgesics based on type and severity of pain and evaluate response  - Implement non-pharmacological measures as appropriate and evaluate response  - Consider cultural and social influences on pain and pain management  - Notify physician/advanced practitioner if interventions unsuccessful or patient reports new pain  Outcome: Progressing     Problem: SAFETY ADULT  Goal: Patient will remain free of falls  Description: INTERVENTIONS:  - Educate patient/family on patient safety including physical limitations  - Instruct patient to call for assistance with activity   - Consult OT/PT to assist with strengthening/mobility   - Keep Call bell within reach  - Keep bed low and locked with side rails adjusted as appropriate  - Keep care items and personal belongings within reach  - Initiate and maintain comfort rounds  - Make Fall Risk Sign visible to staff  - Offer Toileting every 2 Hours, in advance of need  - Initiate/Maintain bed alarm  - Obtain necessary fall risk management equipment:   - Apply yellow socks and bracelet for high fall risk patients  - Consider moving patient to room near nurses station  Outcome: Progressing     Problem: DISCHARGE PLANNING  Goal: Discharge to home or other facility with appropriate resources  Description: INTERVENTIONS:  - Identify barriers to discharge w/patient and caregiver  - Arrange for needed discharge resources and transportation as appropriate  - Identify discharge learning needs (meds, wound care, etc )  - Arrange for interpretive services to assist at discharge as needed  - Refer to Case Management Department for coordinating discharge planning if the patient needs post-hospital services based on physician/advanced practitioner order or complex needs related to functional status, cognitive ability, or social support system  Outcome: Progressing     Problem: Knowledge Deficit  Goal: Patient/family/caregiver demonstrates understanding of disease process, treatment plan, medications, and discharge instructions  Description: Complete learning assessment and assess knowledge base    Interventions:  - Provide teaching at level of understanding  - Provide teaching via preferred learning methods  Outcome: Progressing

## 2022-05-09 NOTE — ASSESSMENT & PLAN NOTE
Pt presents after syncopal episode x2 at home  · Syncope vs seizure  · CT facial bones shows nasal bone fracture, CT head no acute abnormalities  · CT chest/abd/pelvis no acute abnormalities  · CT cervical spine shows no fracture or traumatic malalignment   · Echocardiogram revealed ejection fraction 59%, grade 1 diastolic dysfunction,LBBB (which is chronic),  · Given keppra 1500mg in ED  · Neurology input will be appreciated  · Telemetry monitoring  · PT/OT recommended rehab, patient prefers to go home

## 2022-05-09 NOTE — PLAN OF CARE
Problem: MOBILITY - ADULT  Goal: Maintain or return to baseline ADL function  Description: INTERVENTIONS:  -  Assess patient's ability to carry out ADLs; assess patient's baseline for ADL function and identify physical deficits which impact ability to perform ADLs (bathing, care of mouth/teeth, toileting, grooming, dressing, etc )  - Assess/evaluate cause of self-care deficits   - Assess range of motion  - Assess patient's mobility; develop plan if impaired  - Assess patient's need for assistive devices and provide as appropriate  - Encourage maximum independence but intervene and supervise when necessary  - Involve family in performance of ADLs  - Assess for home care needs following discharge   - Consider OT consult to assist with ADL evaluation and planning for discharge  - Provide patient education as appropriate  Outcome: Progressing  Goal: Maintains/Returns to pre admission functional level  Description: INTERVENTIONS:  - Perform BMAT or MOVE assessment daily    - Set and communicate daily mobility goal to care team and patient/family/caregiver     - Collaborate with rehabilitation services on mobility goals if consulted  - Perform Range of Motion -self   - Reposition patient every -self   - Dangle patient 3 times a day  - Stand patient 3 times a day  - Ambulate patient 3 times a day  - Out of bed to chair 2 times a day   - Out of bed for meals 2 times a day  - Out of bed for toileting  - Record patient progress and toleration of activity level   Outcome: Progressing     Problem: PAIN - ADULT  Goal: Verbalizes/displays adequate comfort level or baseline comfort level  Description: Interventions:  - Encourage patient to monitor pain and request assistance  - Assess pain using appropriate pain scale  - Administer analgesics based on type and severity of pain and evaluate response  - Implement non-pharmacological measures as appropriate and evaluate response  - Consider cultural and social influences on pain and pain management  - Notify physician/advanced practitioner if interventions unsuccessful or patient reports new pain  Outcome: Progressing     Problem: SAFETY ADULT  Goal: Patient will remain free of falls  Description: INTERVENTIONS:  - Educate patient/family on patient safety including physical limitations  - Instruct patient to call for assistance with activity   - Consult OT/PT to assist with strengthening/mobility   - Keep Call bell within reach  - Keep bed low and locked with side rails adjusted as appropriate  - Keep care items and personal belongings within reach  - Initiate and maintain comfort rounds  - Make Fall Risk Sign visible to staff  - Offer Toileting every 2 Hours, in advance of need  - Initiate/Maintain bed alarm  - Obtain necessary fall risk management equipment: chair alarm  - Apply yellow socks and bracelet for high fall risk patients  - Consider moving patient to room near nurses station  Outcome: Progressing     Problem: DISCHARGE PLANNING  Goal: Discharge to home or other facility with appropriate resources  Description: INTERVENTIONS:  - Identify barriers to discharge w/patient and caregiver  - Arrange for needed discharge resources and transportation as appropriate  - Identify discharge learning needs (meds, wound care, etc )  - Arrange for interpretive services to assist at discharge as needed  - Refer to Case Management Department for coordinating discharge planning if the patient needs post-hospital services based on physician/advanced practitioner order or complex needs related to functional status, cognitive ability, or social support system  Outcome: Progressing     Problem: Knowledge Deficit  Goal: Patient/family/caregiver demonstrates understanding of disease process, treatment plan, medications, and discharge instructions  Description: Complete learning assessment and assess knowledge base    Interventions:  - Provide teaching at level of understanding  - Provide teaching via preferred learning methods  Outcome: Progressing

## 2022-05-09 NOTE — ASSESSMENT & PLAN NOTE
· On Keppra 1500 mg b i d , reports that he is compliant and does not miss doses of medication  · Neurology consultation will be appreciated  · Keppra level pending

## 2022-05-09 NOTE — OCCUPATIONAL THERAPY NOTE
Occupational Therapy Evaluation/Treatment Note       05/09/22 1005   Note Type   Note type Evaluation   Restrictions/Precautions   Other Precautions Cognitive; Chair Alarm; Bed Alarm; Fall Risk;Pain   Pain Assessment   Pain Assessment Tool 0-10   Pain Score 7   Pain Location/Orientation Location: Generalized  ("All over" per patient)   Home Living   Type of [address] [place] [address] One level;Stairs to enter with rails  (5 ORALIA)   Bathroom Shower/Tub Tub/shower unit   Bathroom Toilet Standard   Bathroom Equipment Grab bars in [place] [id number]  (Does not currently use)   Additional Comments Patient presented to hospital s/p episode of LOC/syncope and falling off toilet at home; pt with history of seizures; head imaging negative   Prior Function   Level of [place] Independent with ADLs and functional mobility; Needs assistance with IADLs   Lives With Alone   Receives Help From Family  (Sister)   ADL Assistance Independent   IADLs Needs assistance   Falls in the last 6 months 1 to 4   Comments Patient reports PTA independent in ADLs and mobility without AD; needs assist iADLs due to unable to drive because of seizure disorder   ADL   Eating Assistance 5  Supervision/Setup   Grooming Assistance 5  Supervision/Setup   UB Bathing Assistance 4  Minimal Assistance   LB Bathing Assistance 3  Moderate Assistance   UB Dressing Assistance 4  Minimal Assistance    [address] 3  Moderate Assistance   Toileting Assistance  4  Minimal Assistance   Bed Mobility   Supine to Sit 5  Supervision   Sit to Supine 5  Supervision   Transfers   Sit to Stand 4  Minimal assistance   Stand to Sit 4  Minimal assistance   Functional Mobility   Functional Mobility 4  Minimal assistance   Additional Comments Ambulated few feet in room without AD; moderately unsteady, pt is impulsive   Balance   Static Sitting Fair +   Dynamic Sitting Fair   Static Standing Fair   Dynamic Standing Fair -   Activity Tolerance   Activity Tolerance Patient limited by pain; Other (Comment)  (Limited by cognition)   RUE Assessment   RUE Assessment WFL   LUE Assessment   LUE Assessment WFL   Cognition   Overall Cognitive Status Impaired   Arousal/Participation Alert; Cooperative   Attention Attends with cues to redirect   Orientation Level Oriented X4   Memory Decreased short term memory   Following Commands Follows one step commands with increased time or repetition   Comments Patient is A&O x 4 however becomes confused with simple tasks and poor command follow; patient with odd behaviors at times, while in bathroom and door closed patient suddenly getting up stating he needed to close the door and not aware that door was already clothes; patient looking to pull up his pants and reaching for them after toileting however patient never wearing pants only wearing hospital gown and only realized once OT intervened; Patient impulsive and with poor safety awareness, limited insight into current functional limitations   Assessment   Limitation Decreased ADL status; Decreased UE strength;Decreased Safe judgement during ADL;Decreased cognition;Decreased endurance;Decreased self-care trans;Decreased high-level ADLs   Prognosis Good   Assessment Patient evaluated by Occupational Therapy  Patient admitted with Syncopal episodes  The patients occupational profile, medical and therapy history includes a extensive additional review of physical, cognitive, or psychosocial history related to current functional performance  Comorbidities affecting functional mobility and ADLS include: HLD, HTN, GERD, gout, biceps tendonitis, CHATO, Lyme disease, seizure disorder, PNA, lateral epicondylitis  Prior to admission, patient was independent with functional mobility without assistive device, independent with ADLS and requiring assist for IADLS    The evaluation identifies the following performance deficits: weakness, impaired balance, decreased endurance, increased fall risk, new onset of impairment of functional mobility, decreased ADLS, decreased IADLS, decreased activity tolerance, decreased safety awareness, impaired judgement, decreased cognition, decreased strength and impaired psychosocial skills, that result in activity limitations and/or participation restrictions  This evaluation requires clinical decision making of high complexity, because the patient presents with comorbidites that affect occupational performance and required significant modification of tasks or assistance with consideration of multiple treatment options  The Barthel Index was used as a functional outcome tool presenting with a score of Barthel Index Score: 50, indicating marked limitations of functional mobility and ADLS  The patient's raw score on the -PAC Daily Activity inpatient short form is 18, standardized score is 38 66, less than 39 4  Patients at this level are likely to benefit from DC to post-acute rehabilitation services  Please refer to the recommendation of the Occupational Therapist for safe DC planning  Patient will benefit from skilled Occupational Therapy services to address above deficits and facilitate a safe return to prior level of function  Goals   Patient Goals 'I want to go home today'   STG Time Frame   (1-7 days)   Short Term Goal  Goals established to promote Patient Goals: 'I want to go home today':  Patient will increase standing tolerance to 5 minutes during ADL task to decrease assistance level and decrease fall risk; Patient will increase bed mobility to independent in preparation for ADLS and transfers;  Patient will increase functional mobility to and from bathroom with no assistive device with supervision to increase performance with ADLS and to use a toilet; Patient will tolerate 5 minutes of UE ROM/strengthening to increase general activity tolerance and performance in ADLS/IADLS; Patient will improve functional activity tolerance to 5 minutes of sustained functional tasks to increase participation in basic self-care and decrease assistance level;  Patient will be able to to verbalize understanding and perform energy conservation/proper body mechanics during ADLS and functional mobility at least 50% of the time with moderate cueing to decrease signs of fatigue and increase stamina to return to prior level of function; Patient will increase dynamic sitting balance to fair+ to improve the ability to sit at edge of bed or on a chair for ADLS;  Patient will increase dynamic standing balance to fair to improve postural stability and decrease fall risk during standing ADLS and transfers  LTG Time Frame   (8-14 days)   Long Term Goal Patient will increase standing tolerance to 10 minutes during ADL task to decrease assistance level and decrease fall risk; Patient will increase functional mobility to and from bathroom with no assistive device independently to increase performance with ADLS and to use a toilet; Patient will tolerate 10 minutes of UE ROM/strengthening to increase general activity tolerance and performance in ADLS/IADLS; Patient will improve functional activity tolerance to 10 minutes of sustained functional tasks to increase participation in basic self-care and decrease assistance level;  Patient will be able to to verbalize understanding and perform energy conservation/proper body mechanics during ADLS and functional mobility at least 75% of the time with minimal cueing to decrease signs of fatigue and increase stamina to return to prior level of function; Patient will increase static/dynamic sitting balance to good to improve the ability to sit at edge of bed or on a chair for ADLS;  Patient will increase static/dynamic standing balance to fair+ to improve postural stability and decrease fall risk during standing ADLS and transfers  Pt will score >/= 22/24 on AM-PAC Daily Activity Inpatient scale to promote safe independence with ADLs and functional mobility;  Pt will score >/= 80/100 on Barthel Index in order to decrease caregiver assistance needed and increase ability to perform ADLs and functional mobility   Functional Transfer Goals   Pt Will Perform All Functional Transfers   (STG supervision, LTG independent)   ADL Goals   Pt Will Perform Eating   (LTG independent)   Pt Will Perform Grooming   (LTG independent)   Pt Will Perform Bathing   (STG min assist, LTG supervision)   Pt Will Perform UE Dressing   (STG supervision, LTG independent)   Pt Will Perform LE Dressing   (STG min assist, LTG supervision)   Pt Will Perform Toileting   (STG supervision, LTG independent)   Plan   Treatment Interventions ADL retraining;Functional transfer training;UE strengthening/ROM; Endurance training;Patient/family training;Equipment evaluation/education; Compensatory technique education;Continued evaluation; Energy conservation; Activityengagement   Goal Expiration Date 05/23/22   OT Frequency 3-5x/wk   Additional Treatment Session   Start Time 0130   End Time 1005   Treatment Assessment S: "I have to go to the bathroom" O: Patient performed sit to stand from EOB with min assist; ambulated short household distance to/from bathroom with no AD and min assist, max verbal cues for safety awareness as patient unsafely rushing to bathroom and getting self tangled in IV line; ambulatory transfer to/from standard height toilet with min assist and no AD; toilet hygiene completed with min assist while seated, required mod verbal cues for safety as patient unsafely bending all the way forward (almost to floor) when retrieving toilet paper , making self at greater risk for fall; when trying to get up from toilet patient reaching hands around for pants, trying to grasp at pants that were not there, patient required mod verbal cues for redirection and to realize that he was not wearing pants he was only wearing a hospital gown; once back from bathroom, stand to sit to EOB with min assist, pt declined sitting OOB in chair, requesting to return to supine to rest, sit to supine with supervision; A: Patient with poor safety awareness and limited insight into current functional limitations; unsure of patient's baseline cognitive status; patient is also unsteady and at high risk for falls, may benefit from STR in order to return to PLOF and reduce risk for falls and readmission; at end of session patient supine in bed with all needs met, bed alarm set; P: STR   Recommendation   OT Discharge Recommendation Post acute rehabilitation services   AM-PAC Daily Activity Inpatient   Lower Body Dressing 2   Bathing 2   Toileting 3   Upper Body Dressing 3   Grooming 4   Eating 4   Daily Activity Raw Score 18   Daily Activity Standardized Score (Calc for Raw Score >=11) 38 66   AM-PAC Applied Cognition Inpatient   Following a Speech/Presentation 3   Understanding Ordinary Conversation 3   Taking Medications 3   Remembering Where Things Are Placed or Put Away 3   Remembering List of 4-5 Errands 3   Taking Care of Complicated Tasks 2   Applied Cognition Raw Score 17   Applied Cognition Standardized Score 36 52   Barthel Index   Feeding 5   Bathing 0   Grooming Score 0   Dressing Score 5   Bladder Score 10   Bowels Score 10   Toilet Use Score 5   Transfers (Bed/Chair) Score 10   Mobility (Level Surface) Score 0   Stairs Score 5   Barthel Index Score 48   Licensure   [place] License Number  Rosalie Beverly, OTR/L [id number]

## 2022-05-09 NOTE — CONSULTS
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  4  Rhabdomyolysis  5  History of right temporal IPH  6  Hypomagnesemia  7  ETOH abuse  8 medication non adherence    -fall risk  -seizure precaution  -IV fluid and electrolyte repletion per medical team  -monitor patient for ETOH withdrawal  -Keppra 1500 mg twice a day  -Keppra level remains pending at this time  -PT/OT-may need to re-evaluate as patient is more alert and oriented today  -may consider adding thiamine 100 mg daily  -patient's last MRI done August of 2021, no acute abnormality seen  Patient has no significant focal deficits on exam   Suspect medication non adherence as etiology of possible seizure activity, no overt need for MRI at this point in time  If patient should have neurological changes can repeat CT H  -patient has known epilepsy based on video EEG  No need for repeat EEG at this time    Patient is a 17-year-old male who suffered a fall in his home  Patient had been sitting on the toilet having a bowel movement, he reports that it was normal stool without any straining or issues  Patient stated he stood up when he was done in fell  He stood again a does not remember falling however when he came to he was on the ground half of him was in his bathtub  Patient stated he was disoriented when he woke, noted he was incontinent of stool however no tongue bite  Patient laid on the ground for approximately 1/2 hour when his sister came to his side    EMS was called and patient was brought to the ER  Patient was found to have a negative ETOH level, CPKs 2000 and increasing into the 6700 range  Patient had slightly elevated creatinine level at 1 47 with elevated AST  Patient's WBC count was 15 22  Patient had CTs completed, CT of the head was negative for any acute findings or bleeds  Patient was admitted for possible seizure activity versus syncope  Syncope continues to be worked up per medical team however seizure activity is possible as well  Patient has history of seizures and has been on Keppra 1500 mg b i d   Patient states he has to be taking to the capsules twice a day and reports that he is taking this religiously  Patient states that his neurologist from [facility] is prescribing for him however he has not seen the neurologist in over 2 years  Patient stated he picked up his Keppra approximately 1 month ago at his [employer]  Contact with patient's pharmacy, patient had prescription sent in in August of 2021 however this was never picked up  They have never issued Keppra to this patient  Patient reports having been sober since December, states that he went to alcohol rehab at that time  Patient denies any alcohol intake however does not denied hospitalization in April 2022 for alcohol intoxication  Discussed the possibility of alcohol withdrawal leading to possible seizure activity, patient is significantly at risk as well if not taking his AEDs appropriately  Patient is aware, continues to report that he is compliant  Keppra level is pending  Patient has no focal deficits on exam, he does have anisocoria with right pupil greater than left as well as difficulties with adduction of the right eye  Suspect related to right-sided IPH and trauma  Patient's gait is steady, is able to heel-toe walk and do tandem gait  Initial PT evaluation recommendations for acute rehab however patient was less oriented and alert during this evaluation  Recommendations for patient to continue with PT while in the hospital for further evaluations  Patient states he does not drive, he is aware that he is not to drive for at least 6 months while seizure-free  DMV will be notified of his seizure activity however patient states that they already have his license from his prior seizure activities  Patient states that he does not drive his sister drives him  Will continue on Keppra at this time, patient has known EEG studies with epilepsy therefore EEG is not necessary  Patient without focal deficits therefore MRI is not needed at this time  Will add Maximo to his medication regimen and continue to follow for Keppra level which was drawn after multiple inpatient Keppra doses were given  Pablo Gurrola will need follow up in in 6 weeks with epilepsy attending  He will not require outpatient neurological testing  HPI:  Amy Camejo  Lazaro Tucker is a 61yo with PMH of HLD, HTN, smoking, alcohol abuse and seizure activity post traumatic right temporal intraparenchymal hemorrhage  Patient had reported being on the toilet having a bowel movement, he stood after he was done and noted a fall  Patient tried to stand again and believes he fell again  Patient reported when awaking he was half in the tub half out of the tub  Patient reported he was incontinent of feces and felt disoriented  Patient denied having any difficulties with his bowel movement, he denies any prior senses of dizziness or lightheadedness  Patient denied having any sense of aura prior to this event  Patient was brought to the ER, creatinine 1 47, AST 61 with normal ALT  Patient's Mag initially 1 8, dropped to 1 4 overnight  Patient had CPK level, peaked at 6718 the WBC count of 15 22  Patient had CT of the head showing no acute intracranial abnormalities  Patient did have findings of microangiopathy as well as chronic left thalamic and basal ganglion lacunar infarcts    Patient had right posterior inferior temporal encephalomalacia  Patient reports having a history of seizure activity since his IPH  Patient had video monitoring in 2020 showing excess fast frequency activity, focal slowing and sharp waves with 3 seizures from the right hemisphere, posterior temporal region  Patient has been on Keppra 1500 mg twice a day  Patient states that he was seeing Dr Ximena English at [facility], notes he has not been back to see him an a couple years  Patient states that he takes his medication regularly, states being Bahai about this  Patient was then hospitalized in August 2021, patient was covered in feces near his toilet, face down and minimally responsive  Patient had rightward gaze deviation with nonreactive right pupil and was combative and agitated  Determine patient appeared to be postictal and had slow improvements over time  Patient's UDS at that time was positive for benzodiazepine, his ETOH was negative  Patient had MRI of the brain at that time showing no acute infarction, ICH or mass effect  Continues to show chronic infarctions with chronic hemorrhagic components in the posterior inferior aspect of right temporal lobe  Patient return to the ER in April 2022 with alcohol intoxication  He reported generalized body aches with blurred vision and slurred speech  He had been drinking prior to his arrival to the ER, his ETOH was 252  Patient's UDS was positive for oxycodone  Patient's ETOH on arrival to the ER 05/07/2022 was negative, no abnormal findings on his UDS  When asked patient stated that he has been sober since December 2021, stated he went to alcohol rehab  Discussed with patient his hospital visit for alcohol intoxication in April, patient was unable to fully explain and continued to this date he has been sober since December 2021  During exam patient was awakened from sleep, he awoke oriented and was able to hold conversation    He was oriented to himself, place and year  Patient states his complaints of generalized body aches  Denies any headache, dizziness, lightheadedness, speech or swallowing changes  Patient reported compliance with his medication, sobriety since December 2021 and has poor recollection of dates and times  He is aware of the date today however has poor recollection of time  Patient has equal motor strength, sensation reflexes  His Romberg was negative, his casual gait steady throughout his room  Patient did heel-toe walking as well as sobriety walk with no ataxia noted  Patient has no significant focal deficits, CTH negative for acute findings suggestive of bleed  Other than body aches from his fall, patient has no other neurological complaints  Patient has EEG findings consistent with epilepsy, no need to repeat EEG at this time  Patient is on Keppra 1500 mg twice a day  He reports he recently picked up his prescription at his [place] in [address]  Call made to [employer] and spoke with Jazmín the pharmacist at [facility]  Stated that there was a prescription in the system for Aug  Of 2021 but was never picked up  Keppra level has been drawn this am, however, may be inaccurate as pt has received dosing while in the hospital, will follow up for this result  In meantime, pt has long history of being non adherent with his medication regimen and has not been forth coming re: his ETOH medication history  Medical team will need to continue work up for [address]  Syncope, however, it is not unreasonable to think pt had seizure activity at home leading to falls  Pt was incontinent of stool (which has been his presentation in the past) and disoriented until his sister came to the home  Pt is not taking his Keppra and continues to have ETOH  His level this event was <3 and negative oxy during this admission which was pos  for both in April and could have precipitated his seizure activity    Will maintain pt on Keppra 1500mg bid, may consider adding thiamine  Pt denies having a 's license  Stated that his sister has been driving him, the state has his on hold due to the seizures  Will send out DMV form to confirm  Pt aware that with seizure activity he is not to drive or do activities that will cause harm if done alone such as ladders and swimming          Past Medical History:   Diagnosis Date    Biceps tendonitis, unspecified laterality 09/17/2007    Bone cyst 11/22/2011    Bronchiectasis ([address] ) 10/02/2006    Bursitis 11/17/2005    Chest pain 04/29/2009    Chronic renal failure 11/22/2011    Diverticulitis of colon 10/09/2007    Generalized anxiety disorder 06/08/2006    GERD (gastroesophageal reflux disease)     Gout     Hyperlipidemia     Hypertension     Lateral epicondylitis, unspecified elbow     Last Assessed: 11/29/2013    Low magnesium levels     Lyme disease 11/09/2009    Pneumonia     Last Assessed: 1/7/2013       Past Surgical History:   Procedure Laterality Date    KNEE ARTHROSCOPY      Therapeutic    OLECRANON BURSA EXCISION         Allergies   Allergen Reactions    Codeine Hives     Tolerates morphine    Penicillins     Hydrocodone Rash     Reaction Date: 17Sep2007;          Current Facility-Administered Medications:     acetaminophen (TYLENOL) tablet 650 mg, 650 mg, Oral, Q6H PRN, Sheri Vecellio, PA-C, 650 mg at 05/08/22 2102    amLODIPine (NORVASC) tablet 10 mg, 10 mg, Oral, HS, Sheri Vecellio, PA-C, 10 mg at 05/08/22 2108    aspirin chewable tablet 81 mg, 81 mg, Oral, Daily, Sheri Vecellio, PA-C, 81 mg at 05/09/22 0815    fenofibrate (TRICOR) tablet 145 mg, 145 mg, Oral, Daily, Sheri Vecellio, PA-C, 145 mg at 05/09/22 0815    heparin (porcine) subcutaneous injection 5,000 Units, 5,000 Units, Subcutaneous, Q8H [facility], Sheri Vecellio, PA-C, 5,000 Units at 05/08/22 2102    HYDROmorphone (DILAUDID) injection 1 mg, 1 mg, Intravenous, Q4H PRN, Renee Dacosta MD, 1 mg at 05/08/22 2113   levETIRAcetam (KEPPRA) tablet 1,500 mg, 1,500 mg, Oral, Q12H LUCIA, Sheri Vecliseto, PA-C, 1,500 mg at 05/09/22 0815    meloxicam (MOBIC) tablet 7 5 mg, 7 5 mg, Oral, Daily, Nathanel Mon, DPM, 7 5 mg at 05/09/22 6365    metoprolol succinate (TOPROL-XL) 24 hr tablet 100 mg, 100 mg, Oral, Daily, Sheri Vecliseto, PA-C, 100 mg at 05/09/22 0815    ondansetron (ZOFRAN) injection 4 mg, 4 mg, Intravenous, Q6H PRN, Marivel Timmons PA-C    oxyCODONE-acetaminophen (PERCOCET) 5-325 mg per tablet 1 tablet, 1 tablet, Oral, Q4H PRN, Liseth Robles MD    pantoprazole (PROTONIX) EC tablet 40 mg, 40 mg, Oral, Early Morning, Sheri Cindy, PA-C, 40 mg at 05/09/22 4267    sodium chloride 0 9 % infusion, 150 mL/hr, Intravenous, Continuous, Sheri Vecliseto, PA-C, Last Rate: 150 mL/hr at 05/09/22 0909, 150 mL/hr at 05/09/22 0909    Social History     Socioeconomic History    Marital status: /Civil Union     Spouse name: Not on file    Number of children: Not on file    Years of education: Not on file    Highest education level: Not on file   Occupational History    Not on file   Tobacco Use    Smoking status: Current Every Day Smoker     Packs/day: 0 50    Smokeless tobacco: Never Used    Tobacco comment: cigarette nicotine dependence   Vaping Use    Vaping Use: Never used   Substance and Sexual Activity    Alcohol use: Yes     Alcohol/week: 6 0 standard drinks     Types: 6 Glasses of wine per week     Comment: per week    Drug use: No    Sexual activity: Yes     Partners: Female   Other Topics Concern    Not on file   Social History Narrative    Not on file     Social Determinants of Health     Financial Resource Strain: Not on file   Food Insecurity: No Food Insecurity    Worried About Running Out of Food in the Last Year: Never true    Hermelinda of Food in the Last Year: Never true   Transportation Needs: No Transportation Needs    Lack of Transportation (Medical):  No    Lack of Transportation (Non-Medical): No   Physical Activity: Not on file   Stress: Not on file   Social Connections: Not on file   Intimate Partner Violence: Not on file   Housing Stability: Low Risk     Unable to Pay for Housing in the Last Year: No    Number of Places Lived in the Last Year: 1    Unstable Housing in the Last Year: No       Family History   Problem Relation Age of Onset    Cancer Mother         Colon         Review of systems:  Please see HPI for positive symptoms  Constitutional: No fever, no chills, no weight change  Ocular: No diplopia, no blurred vision, spots/zigzag lines  HEENT:  No sore throat, headache or congestion  COR:  No chest pain  No palpitations  Lungs:  no sob  GI:  no  nausea, no vomiting, no diarrhea, no constipation, no anorexia  :  No dysuria, frequency, or urgency  No hematuria  Musculoskeletal:  No joint pain or swelling   + general body pain  Skin:  No rash or itching  +ecchymosis of the Lt eye  Psychiatric:  no anxiety, no depression  Endocrine:  No polyuria or polydipsia  Physical examination:  /72   Pulse 78   Temp 97 7 °F (36 5 °C)   Resp 18   Ht 5' 11" (1 803 m)   Wt 88 kg (194 lb)   SpO2 95%   BMI 27 06 kg/m²     GENERAL APPEARANCE:  The patient is alert, oriented  HEENT:  Head is normocephalic  Pupils are equal and reactive  NECK:  Supple without lymphadenopathy  HEART:  Regular rate and rhythm  LUNGS:  clear to auscultation  No crackles or wheezes are heard  ABDOMEN:  Soft, nontender, nondistended with good bowel sounds heard  EXTREMITIES:  Without cyanosis, clubbing or edema  Mental status: The patient is alert, attentive, and oriented  Speech is clear and fluent, good repetition, comprehension, and naming  Cranial nerves:  CN II: Visual fields are full to confrontation  Fundoscopic exam is normal with sharp discs and no vascular changes  +anisocoria  Rt 3 mm and Lt 2mm,  reactive to light     CN III, IV, VI: At primary gaze, there is no eye deviation  +difficulty moving left past central gaze in Rt eye  CN V: Facial sensation is intact in all 3 divisions bilaterally  Corneal responses are intact  CN VII: Face is symmetric with normal eye closure and smile  CN VIII: Hearing is normal to rubbing fingers  CN IX, X: Palate elevates symmetrically  Phonation is normal   CN XI: Head turning and shoulder shrug are intact  CN XII: Tongue is midline with normal movements and no atrophy  Motor: There is no pronator drift of out-stretched arms  Muscle bulk and tone are normal    Muscle exam  Arm Right Left Leg Right Left   Deltoid 5/5 5/5 Iliopsoas 5/5 5/5   Biceps 5/5 5/5 Quads 5/5 5/5   Triceps 5/5 5/5 Hamstrings 5/5 5/5   Wrist Extension 5/5 5/5 Ankle Dorsi Flexion 5/5 5/5   Wrist Flexion 5/5 5/5 Ankle Plantar Flexion 5/5 5/5        Reflexes    RJ BJ TJ KJ AJ Plantars Davis's   Right 1+ 1+ 1+ tr tr Downgoing Not present   Left 1+ 1+ 1+ tr tr Downgoing Not present      Sensory:  Light touch, Temperature, position sense, and vibration sense are intact in fingers and toes  Coordination:  Rapid alternating movements and fine finger movements are intact  There is no dysmetria on finger-to-nose and heel-knee-shin  There are no abnormal or extraneous movements  Romberg negative  Gait/Stance:  Normal heel/toe walking and tandem gait      Lab Results   Component Value Date    WBC 11 33 (H) 05/09/2022    HGB 13 2 05/09/2022    HCT 38 7 05/09/2022    MCV 86 05/09/2022     05/09/2022     No results found for: HGBA1C  Lab Results   Component Value Date    ALT 41 05/09/2022     (H) 05/09/2022    ALKPHOS 84 05/09/2022    BILITOT 0 7 08/24/2016     Lab Results   Component Value Date    GLUCOSE 102 08/04/2021    CALCIUM 8 7 05/09/2022     08/24/2016    K 3 5 05/09/2022    CO2 24 05/09/2022     05/09/2022    BUN 7 05/09/2022    CREATININE 0 77 05/09/2022         Review of reports and notes reveal:  Independent Interpretation of images or specimens:  XR foot 3+ vw right    Result Date: 5/8/2022  1  Soft tissue swelling medial to the head of the 1st metatarsal  2   Small bony densities medial to the head of the 1st metatarsal may represent small avulsion-type fractures  The study was marked in EPIC for significant notification  Workstation performed: [id number]     CT head without contrast    Result Date: 5/7/2022  No acute intracranial abnormality  Chronic, nonemergent findings above  Workstation performed: [id number]     CT facial bones without contrast    Result Date: 5/7/2022  Nasal bone fracture  Workstation performed: [id number]     CT cervical spine without contrast    Result Date: 5/7/2022  No cervical spine fracture or traumatic malalignment  Workstation performed: [id number]     CT chest abdomen pelvis w contrast    Result Date: 5/7/2022  No acute abnormalities in the chest, abdomen or pelvis  Chronic and nonemergent findings above  Workstation performed: [id number]           Thank you for this consult  Total time of encounter: 70 Minutes  More than 50% of time was spent in counseling and coordination of care of patient

## 2022-05-09 NOTE — ASSESSMENT & PLAN NOTE
· Reports being down for only 30 minutes after fall  · CK 2710->6718->3860  · Continue IV fluid  · Repeat CK a m

## 2022-05-09 NOTE — CONSULTS
Consult - Podiatry   Carolyne Urena 59 y o  male MRN: [id number]  Unit/Bed#: [address] [place] [address] Encounter: [id number]    Assessment/Plan     Assessment:  History of syncope with secondary injury foot bilateral   Pain right foot, rule out bone bruise verses tophaceous gouty arthritis  Plan:  Chart reviewed  Patient examined  At this time I do not believe patient has clinically significant foot fracture  Suspect bone bruise with possible toe subluxation  X-ray changes consistent with tophaceous gout  Rule out hyperuricemia  Blood work ordered  We will treat for inflammatory arthropathy/trauma  We will add short course of nonsteroidal anti-inflammatory medication  Will follow as outpatient  History of Present Illness     HPI:  Carolyne Urena is a 59 y o  male who presents with history of injury to his feet  He has history of syncope with secondary fall/trauma  Patient on wear any occult foot injury  He does have pain in his right foot at this time  Patient has history of acute gouty attack       Consults  Review of Systems   Constitutional: Negative  HENT: Negative  Eyes: Negative  Respiratory: Negative  Cardiovascular: Negative  Gastrointestinal: Negative  Musculoskeletal:  Pes planus noted bilateral with secondary hallux valgus deformity  There is pain with palpation right 1st MPJ  Negative edema or ecchymosis noted within the area  X-rays are indicative of possible avulsion fracture/bony change  This may be consistent with 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  Skin:  Within normal limits  Neurological: Negative  Psych: negative         Historical Information   Past Medical History:   Diagnosis Date    Biceps tendonitis, unspecified laterality 09/17/2007    Bone cyst 11/22/2011    Bronchiectasis ([address] ) 10/02/2006    Bursitis 11/17/2005    Chest pain 04/29/2009    Chronic renal failure 11/22/2011    Diverticulitis of colon 10/09/2007    Generalized anxiety disorder 06/08/2006    GERD (gastroesophageal reflux disease)     Gout     Hyperlipidemia     Hypertension     Lateral epicondylitis, unspecified elbow     Last Assessed: 11/29/2013    Low magnesium levels     Lyme disease 11/09/2009    Pneumonia     Last Assessed: 1/7/2013     Past Surgical History:   Procedure Laterality Date    KNEE ARTHROSCOPY      Therapeutic    OLECRANON BURSA EXCISION       Social History   Social History     Substance and Sexual Activity   Alcohol Use Yes    Alcohol/week: 6 0 standard drinks    Types: 6 Glasses of wine per week    Comment: per week     Social History     Substance and Sexual Activity   Drug Use No     Social History     Tobacco Use   Smoking Status Current Every Day Smoker    Packs/day: 0 50   Smokeless Tobacco Never Used   Tobacco Comment    cigarette nicotine dependence     Family History:   Family History   Problem Relation Age of Onset    Cancer Mother         Colon       Meds/Allergies   Medications Prior to Admission   Medication    amLODIPine (NORVASC) 10 mg tablet    levETIRAcetam (KEPPRA) 750 mg tablet    metoprolol succinate (TOPROL-XL) 100 mg 24 hr tablet    omeprazole (PriLOSEC) 20 mg delayed release capsule    fenofibrate (TRICOR) 145 mg tablet     Allergies   Allergen Reactions    Codeine Hives     Tolerates morphine    Penicillins     Hydrocodone Rash     Reaction Date: 17Sep2007;        Objective   First Vitals:   Blood Pressure: 163/85 (05/07/22 1514)  Pulse: 87 (05/07/22 1514)  Temperature: 98 5 °F (36 9 °C) (05/07/22 1514)  Temp Source: Oral (05/07/22 1514)  Respirations: 20 (05/07/22 1514)  Height: 5' 11" (180 3 cm) (05/07/22 2100)  Weight - Scale: 88 kg (194 lb 0 1 oz) (05/07/22 1514)  SpO2: 97 % (05/07/22 1514)    Current Vitals:   Blood Pressure: 121/68 (05/09/22 0814)  Pulse: 69 (05/09/22 0814)  Temperature: 99 5 °F (37 5 °C) (05/09/22 0814)  Temp Source: Oral (05/09/22 0814)  Respirations: 18 (05/09/22 0814)  Height: 5' 11" (180 3 cm) (05/09/22 0414)  Weight - Scale: 88 kg (194 lb) (05/09/22 0814)  SpO2: 98 % (05/09/22 0814)        /68 (BP Location: Right arm)   Pulse 69   Temp 99 5 °F (37 5 °C) (Oral)   Resp 18   Ht 5' 11" (1 803 m)   Wt 88 kg (194 lb)   SpO2 98%   BMI 27 06 kg/m²      General Appearance:    Alert, cooperative, no distress   Head:    Normocephalic, without obvious abnormality, atraumatic   Eyes:    PERRL, conjunctiva/corneas clear, EOM's intact        Nose:   Moist mucous membranes   Neck:   Supple, symmetrical, trachea midline   Back:     Symmetric   Lungs:     Respirations unlabored   Heart:    Regular rate and rhythm, S1 and S2 normal, no murmur, rub   or gallop   Abdomen:     Soft, non-tender   Extremities:   Foot bilateral demonstrates several excoriations on the dorsum of the toes  No evidence of occult cellulitis at this time  All digits grossly rectus   Pulses:   Within normal limits bilateral   Skin:   Grossly within normal limits  Neurologic:   Gross sensation is within normal limits  Protective sensation is present             Lab Results:   Admission on 05/07/2022   Component Date Value    WBC 05/07/2022 15 22*    RBC 05/07/2022 5 39     Hemoglobin 05/07/2022 15 9     Hematocrit 05/07/2022 45 7     MCV 05/07/2022 85     MCH 05/07/2022 29 5     MCHC 05/07/2022 34 8     RDW 05/07/2022 14 6     MPV 05/07/2022 9 6     Platelets 63/44/6343 190     nRBC 05/07/2022 0     Neutrophils Relative 05/07/2022 89*    Immat GRANS % 05/07/2022 0     Lymphocytes Relative 05/07/2022 5*    Monocytes Relative 05/07/2022 6     Eosinophils Relative 05/07/2022 0     Basophils Relative 05/07/2022 0     Neutrophils Absolute 05/07/2022 13 53*    Immature Grans Absolute 05/07/2022 0 05     Lymphocytes Absolute 05/07/2022 0 74     Monocytes Absolute 05/07/2022 0 88     Eosinophils Absolute 05/07/2022 0 00     Basophils Absolute 05/07/2022 0 02     Sodium 05/07/2022 141     Potassium 05/07/2022 4 0     Chloride 05/07/2022 103     CO2 05/07/2022 23     ANION GAP 05/07/2022 15*    BUN 05/07/2022 13     Creatinine 05/07/2022 1 49*    Glucose 05/07/2022 115     Calcium 05/07/2022 9 5     AST 05/07/2022 61*    ALT 05/07/2022 36     Alkaline Phosphatase 05/07/2022 114     Total Protein 05/07/2022 7 3     Albumin 05/07/2022 4 1     Total Bilirubin 05/07/2022 0 59     eGFR 05/07/2022 50     Ethanol Lvl 05/07/2022 <3     Color, UA 05/07/2022 Colorless     Clarity, UA 05/07/2022 Clear     Specific Gravity, UA 05/07/2022 1 010     pH, UA 05/07/2022 6 0     Leukocytes, UA 05/07/2022 Negative     Nitrite, UA 05/07/2022 Negative     Protein, UA 05/07/2022 Negative     Glucose, UA 05/07/2022 Negative     Ketones, UA 05/07/2022 Negative     Urobilinogen, UA 05/07/2022 0 2     Bilirubin, UA 05/07/2022 Negative     Blood, UA 05/07/2022 Moderate*    Amph/Meth UR 05/07/2022 Negative     Barbiturate Ur 05/07/2022 Negative     Benzodiazepine Urine 05/07/2022 Negative     Cocaine Urine 05/07/2022 Negative     Methadone Urine 05/07/2022 Negative     Opiate Urine 05/07/2022 Negative     PCP Ur 05/07/2022 Negative     THC Urine 05/07/2022 Negative     Oxycodone Urine 05/07/2022 Negative     hs TnI 0hr 05/07/2022 39     Total CK 05/07/2022 2,710*    Lipase 05/07/2022 73     Magnesium 05/07/2022 1 8     hs TnI 2hr 05/07/2022 44     Delta 2hr hsTnI 05/07/2022 5     hs TnI 4hr 05/07/2022 52*    Delta 4hr hsTnI 05/07/2022 13     CK-MB Index 05/07/2022 <1 0     CK-MB 05/07/2022 9 2*    SARS-CoV-2 05/07/2022 Negative     INFLUENZA A PCR 05/07/2022 Negative     INFLUENZA B PCR 05/07/2022 Negative     RSV PCR 05/07/2022 Negative     RBC, UA 05/07/2022 2-4     WBC, UA 05/07/2022 None Seen     Epithelial Cells 05/07/2022 None Seen     Bacteria, UA 05/07/2022 None Seen     Sodium 05/08/2022 140     Potassium 05/08/2022 3 7     Chloride 05/08/2022 107     CO2 05/08/2022 24     ANION GAP 05/08/2022 9     BUN 05/08/2022 12     Creatinine 05/08/2022 0 87     Glucose 05/08/2022 105     Glucose, Fasting 05/08/2022 105*    Calcium 05/08/2022 8 6     eGFR 05/08/2022 94     WBC 05/08/2022 12 55*    RBC 05/08/2022 4 53     Hemoglobin 05/08/2022 13 5     Hematocrit 05/08/2022 38 8     MCV 05/08/2022 86     MCH 05/08/2022 29 8     MCHC 05/08/2022 34 8     RDW 05/08/2022 14 6     MPV 05/08/2022 9 8     Platelets 08/74/4820 165     nRBC 05/08/2022 0     Neutrophils Relative 05/08/2022 78*    Immat GRANS % 05/08/2022 0     Lymphocytes Relative 05/08/2022 12*    Monocytes Relative 05/08/2022 10     Eosinophils Relative 05/08/2022 0     Basophils Relative 05/08/2022 0     Neutrophils Absolute 05/08/2022 9 72*    Immature Grans Absolute 05/08/2022 0 05     Lymphocytes Absolute 05/08/2022 1 52     Monocytes Absolute 05/08/2022 1 23*    Eosinophils Absolute 05/08/2022 0 01     Basophils Absolute 05/08/2022 0 02     Total CK 05/08/2022 6,718*    CK-MB Index 05/08/2022 <1 0     CK-MB 05/08/2022 6 3*    LA Volume Index (BP) 05/09/2022 16 3     LA size 05/09/2022 3 4     Triscuspid Valve Regurgi* 05/09/2022 19 0     Tricuspid valve peak reg* 05/09/2022 2 15     LVPWd 05/09/2022 1 30     Left Atrium Area-systoli* 05/09/2022 24     Left Atrium Area-systoli* 05/09/2022 14 8     MV E' Tissue Velocity Se* 05/09/2022 8     TR Peak Donald 05/09/2022 2 2     IVSd 05/09/2022 7 13     LV DIASTOLIC VOLUME (MOD* 00/56/4984 70     LEFT VENTRICLE SYSTOLIC * 72/59/0599 24     Left ventricular stroke * 05/09/2022 46 00     A4C EF 05/09/2022 65     LA length (A2C) 05/09/2022 6 10     LVIDd 05/09/2022 4 00     IVS 05/09/2022 1 3     LVIDS 05/09/2022 2 60     FS 05/09/2022 35     Ao root 05/09/2022 3 20     RVID d 05/09/2022 3 8     MV valve area p 1/2 meth* 05/09/2022 3 10     E/A ratio 05/09/2022 0 76     E wave deceleration time 05/09/2022 240     MV Peak E Donald 05/09/2022 61     MV Peak A Donald 05/09/2022 0 8     RAA A4C 05/09/2022 18 7     MV stenosis pressure 1/2* 05/09/2022 70     LVSV, 2D 05/09/2022 46     Ventricular Rate 05/07/2022 97     Atrial Rate 05/07/2022 97     SD Interval 05/07/2022 176     QRSD Interval 05/07/2022 136     QT Interval 05/07/2022 382     QTC Interval 05/07/2022 485     P Axis 05/07/2022 40     QRS Axis 05/07/2022 -9     T Wave Axis 05/07/2022 128     Total CK 05/09/2022 4,860*    Sodium 05/09/2022 140     Potassium 05/09/2022 3 5     Chloride 05/09/2022 104     CO2 05/09/2022 24     ANION GAP 05/09/2022 12     BUN 05/09/2022 7     Creatinine 05/09/2022 0 77     Glucose 05/09/2022 84     Glucose, Fasting 05/09/2022 84     Calcium 05/09/2022 8 7     Corrected Calcium 05/09/2022 9 3     AST 05/09/2022 134*    ALT 05/09/2022 41     Alkaline Phosphatase 05/09/2022 84     Total Protein 05/09/2022 6 1*    Albumin 05/09/2022 3 2*    Total Bilirubin 05/09/2022 1 03*    eGFR 05/09/2022 99     WBC 05/09/2022 11 33*    RBC 05/09/2022 4 51     Hemoglobin 05/09/2022 13 2     Hematocrit 05/09/2022 38 7     MCV 05/09/2022 86     MCH 05/09/2022 29 3     MCHC 05/09/2022 34 1     RDW 05/09/2022 14 6     MPV 05/09/2022 10 1     Platelets 34/35/1020 154     nRBC 05/09/2022 0     Neutrophils Relative 05/09/2022 76*    Immat GRANS % 05/09/2022 1     Lymphocytes Relative 05/09/2022 15     Monocytes Relative 05/09/2022 7     Eosinophils Relative 05/09/2022 1     Basophils Relative 05/09/2022 0     Neutrophils Absolute 05/09/2022 8 66*    Immature Grans Absolute 05/09/2022 0 06     Lymphocytes Absolute 05/09/2022 1 67     Monocytes Absolute 05/09/2022 0 83     Eosinophils Absolute 05/09/2022 0 08     Basophils Absolute 05/09/2022 0 03     Magnesium 05/09/2022 1 4*    Phosphorus 05/09/2022 2 6*                   Invalid input(s): LABAEARO            Imaging: I have personally reviewed pertinent films in PACS  EKG, Pathology, and Other Studies: I have personally reviewed pertinent reports  Code Status: Level 1 - Full Code  Advance Directive and Living Will:      Power of :    POLST:            Counseling and Coordination of care  I spent 35 minutes face-to-face with patient today  More than 50% was spent in counseling & coordination of care  Counseled patient regarding need for treatment debriding inflammatory process down on the foot as well as further workup to rule out hyperuricemia  Lawanda Clancy

## 2022-05-10 PROBLEM — E87.6 HYPOKALEMIA: Status: ACTIVE | Noted: 2022-05-10

## 2022-05-10 LAB
ALBUMIN SERPL BCP-MCNC: 3.4 G/DL (ref 3.5–5)
ALP SERPL-CCNC: 82 U/L (ref 46–116)
ALT SERPL W P-5'-P-CCNC: 46 U/L (ref 12–78)
ANION GAP SERPL CALCULATED.3IONS-SCNC: 11 MMOL/L (ref 4–13)
AST SERPL W P-5'-P-CCNC: 123 U/L (ref 5–45)
BASOPHILS # BLD AUTO: 0.04 THOUSANDS/ΜL (ref 0–0.1)
BASOPHILS NFR BLD AUTO: 1 % (ref 0–1)
BILIRUB SERPL-MCNC: 0.99 MG/DL (ref 0.2–1)
BUN SERPL-MCNC: 4 MG/DL (ref 5–25)
CALCIUM ALBUM COR SERPL-MCNC: 9.6 MG/DL (ref 8.3–10.1)
CALCIUM SERPL-MCNC: 9.1 MG/DL (ref 8.3–10.1)
CHLORIDE SERPL-SCNC: 100 MMOL/L (ref 100–108)
CK MB SERPL-MCNC: 1 NG/ML (ref 0–5)
CK MB SERPL-MCNC: <1 % (ref 0–2.5)
CK SERPL-CCNC: 4538 U/L (ref 39–308)
CO2 SERPL-SCNC: 26 MMOL/L (ref 21–32)
CREAT SERPL-MCNC: 0.85 MG/DL (ref 0.6–1.3)
EOSINOPHIL # BLD AUTO: 0.08 THOUSAND/ΜL (ref 0–0.61)
EOSINOPHIL NFR BLD AUTO: 1 % (ref 0–6)
ERYTHROCYTE [DISTWIDTH] IN BLOOD BY AUTOMATED COUNT: 14.5 % (ref 11.6–15.1)
GFR SERPL CREATININE-BSD FRML MDRD: 95 ML/MIN/1.73SQ M
GLUCOSE SERPL-MCNC: 90 MG/DL (ref 65–140)
HCT VFR BLD AUTO: 40 % (ref 36.5–49.3)
HGB BLD-MCNC: 13.5 G/DL (ref 12–17)
IMM GRANULOCYTES # BLD AUTO: 0.03 THOUSAND/UL (ref 0–0.2)
IMM GRANULOCYTES NFR BLD AUTO: 0 % (ref 0–2)
LYMPHOCYTES # BLD AUTO: 1.65 THOUSANDS/ΜL (ref 0.6–4.47)
LYMPHOCYTES NFR BLD AUTO: 19 % (ref 14–44)
MAGNESIUM SERPL-MCNC: 1.3 MG/DL (ref 1.6–2.6)
MCH RBC QN AUTO: 29.1 PG (ref 26.8–34.3)
MCHC RBC AUTO-ENTMCNC: 33.8 G/DL (ref 31.4–37.4)
MCV RBC AUTO: 86 FL (ref 82–98)
MONOCYTES # BLD AUTO: 0.94 THOUSAND/ΜL (ref 0.17–1.22)
MONOCYTES NFR BLD AUTO: 11 % (ref 4–12)
NEUTROPHILS # BLD AUTO: 6.11 THOUSANDS/ΜL (ref 1.85–7.62)
NEUTS SEG NFR BLD AUTO: 68 % (ref 43–75)
NRBC BLD AUTO-RTO: 0 /100 WBCS
PLATELET # BLD AUTO: 165 THOUSANDS/UL (ref 149–390)
PMV BLD AUTO: 10.5 FL (ref 8.9–12.7)
POTASSIUM SERPL-SCNC: 3 MMOL/L (ref 3.5–5.3)
PROT SERPL-MCNC: 6.5 G/DL (ref 6.4–8.2)
RBC # BLD AUTO: 4.64 MILLION/UL (ref 3.88–5.62)
SODIUM SERPL-SCNC: 137 MMOL/L (ref 136–145)
WBC # BLD AUTO: 8.85 THOUSAND/UL (ref 4.31–10.16)

## 2022-05-10 PROCEDURE — 82550 ASSAY OF CK (CPK): CPT | Performed by: INTERNAL MEDICINE

## 2022-05-10 PROCEDURE — 85025 COMPLETE CBC W/AUTO DIFF WBC: CPT | Performed by: INTERNAL MEDICINE

## 2022-05-10 PROCEDURE — 83735 ASSAY OF MAGNESIUM: CPT | Performed by: INTERNAL MEDICINE

## 2022-05-10 PROCEDURE — 99232 SBSQ HOSP IP/OBS MODERATE 35: CPT | Performed by: PODIATRIST

## 2022-05-10 PROCEDURE — 80053 COMPREHEN METABOLIC PANEL: CPT | Performed by: INTERNAL MEDICINE

## 2022-05-10 PROCEDURE — 99232 SBSQ HOSP IP/OBS MODERATE 35: CPT | Performed by: INTERNAL MEDICINE

## 2022-05-10 PROCEDURE — 82553 CREATINE MB FRACTION: CPT | Performed by: INTERNAL MEDICINE

## 2022-05-10 RX ORDER — POTASSIUM CHLORIDE 20 MEQ/1
40 TABLET, EXTENDED RELEASE ORAL
Status: COMPLETED | OUTPATIENT
Start: 2022-05-10 | End: 2022-05-10

## 2022-05-10 RX ORDER — MAGNESIUM SULFATE HEPTAHYDRATE 40 MG/ML
2 INJECTION, SOLUTION INTRAVENOUS ONCE
Status: DISCONTINUED | OUTPATIENT
Start: 2022-05-10 | End: 2022-05-10

## 2022-05-10 RX ORDER — MAGNESIUM SULFATE HEPTAHYDRATE 40 MG/ML
2 INJECTION, SOLUTION INTRAVENOUS ONCE
Status: COMPLETED | OUTPATIENT
Start: 2022-05-10 | End: 2022-05-10

## 2022-05-10 RX ORDER — MAGNESIUM SULFATE HEPTAHYDRATE 40 MG/ML
4 INJECTION, SOLUTION INTRAVENOUS ONCE
Status: DISCONTINUED | OUTPATIENT
Start: 2022-05-10 | End: 2022-05-10

## 2022-05-10 RX ADMIN — THIAMINE HCL TAB 100 MG 100 MG: 100 TAB at 08:50

## 2022-05-10 RX ADMIN — ASPIRIN 81 MG CHEWABLE TABLET 81 MG: 81 TABLET CHEWABLE at 08:51

## 2022-05-10 RX ADMIN — SODIUM CHLORIDE 150 ML/HR: 0.9 INJECTION, SOLUTION INTRAVENOUS at 06:08

## 2022-05-10 RX ADMIN — PANTOPRAZOLE SODIUM 40 MG: 40 TABLET, DELAYED RELEASE ORAL at 06:07

## 2022-05-10 RX ADMIN — FENOFIBRATE 145 MG: 145 TABLET, FILM COATED ORAL at 08:50

## 2022-05-10 RX ADMIN — HEPARIN SODIUM 5000 UNITS: 5000 INJECTION INTRAVENOUS; SUBCUTANEOUS at 14:00

## 2022-05-10 RX ADMIN — POTASSIUM CHLORIDE 40 MEQ: 20 TABLET, EXTENDED RELEASE ORAL at 11:00

## 2022-05-10 RX ADMIN — HEPARIN SODIUM 5000 UNITS: 5000 INJECTION INTRAVENOUS; SUBCUTANEOUS at 21:42

## 2022-05-10 RX ADMIN — ACETAMINOPHEN 650 MG: 325 TABLET, FILM COATED ORAL at 08:51

## 2022-05-10 RX ADMIN — MAGNESIUM SULFATE HEPTAHYDRATE 2 G: 40 INJECTION, SOLUTION INTRAVENOUS at 12:30

## 2022-05-10 RX ADMIN — MELOXICAM 7.5 MG: 7.5 TABLET ORAL at 08:53

## 2022-05-10 RX ADMIN — METOPROLOL SUCCINATE 100 MG: 100 TABLET, EXTENDED RELEASE ORAL at 08:51

## 2022-05-10 RX ADMIN — MAGNESIUM SULFATE HEPTAHYDRATE 2 G: 40 INJECTION, SOLUTION INTRAVENOUS at 14:30

## 2022-05-10 RX ADMIN — LEVETIRACETAM 1500 MG: 500 TABLET, FILM COATED ORAL at 21:42

## 2022-05-10 RX ADMIN — SODIUM CHLORIDE, SODIUM LACTATE, POTASSIUM CHLORIDE, AND CALCIUM CHLORIDE 1000 ML: .6; .31; .03; .02 INJECTION, SOLUTION INTRAVENOUS at 11:55

## 2022-05-10 RX ADMIN — POTASSIUM CHLORIDE 40 MEQ: 20 TABLET, EXTENDED RELEASE ORAL at 08:50

## 2022-05-10 RX ADMIN — AMLODIPINE BESYLATE 10 MG: 10 TABLET ORAL at 21:44

## 2022-05-10 RX ADMIN — LEVETIRACETAM 1500 MG: 500 TABLET, FILM COATED ORAL at 08:50

## 2022-05-10 NOTE — PROGRESS NOTES
Progress Note - Podiatry  Erin Wynn 59 y o  male MRN: [id number]  Unit/Bed#: [address] [place] [address] Encounter: [id number]    Assessment:  Pedal trauma, resolving nicely  Digital excoriations of foot, resolving  No evidence of cellulitis  Bony changes noted on x-ray consistent with tophaceous gout  Plan:  Chart reviewed  Patient examined  Patient advised on condition  He has no pedal pain at this time  We will taken active surveillance position  At this time there is no need for podiatry to follow in-house  If there is any pedal issues, patient could be seen as outpatient  Subjective/Objective   Chief Complaint:   Chief Complaint   Patient presents with    Loss of Consciousness     patient reports going to [facility] 2 separate times, LOC x 2 while sitting on toilet bowl, 2 unwittness falls, reports half body in a tub upon awakening  Sister called EMS  Subjective:  Patient has no foot pain  He is aware of his uric acid levels  His only complaint today is headache  Blood pressure 123/73, pulse 68, temperature 97 7 °F (36 5 °C), temperature source Oral, resp  rate 17, height 5' 11" (1 803 m), weight 88 kg (194 lb), SpO2 98 %  ,Body mass index is 27 06 kg/m²  Invasive Devices  Report    Peripheral Intravenous Line            Peripheral IV 05/07/22 Right Antecubital 2 days                Physical Exam:   General appearance: alert, cooperative and no distress  Neuro/Vascular:  No change in neurovascular status  Within normal limits  Extremity:  Foot bilateral left greater than right, demonstrates dorsal excoriations of the digits  They are superficial without infection  Patient has normal uric acid levels  1st MPJ bilateral stable without signs of infection or fracture                Labs and other studies:   CBC w/diff  Results from last 7 days   Lab Units 05/10/22  0501   WBC Thousand/uL 8 85   HEMOGLOBIN g/dL 13 5   HEMATOCRIT % 40 0   PLATELETS Thousands/uL 165   NEUTROS PCT % 68 LYMPHS PCT % 19   MONOS PCT % 11   EOS PCT % 1     BMP  Results from last 7 days   Lab Units 05/10/22  0501   POTASSIUM mmol/L 3 0*   CHLORIDE mmol/L 100   CO2 mmol/L 26   BUN mg/dL 4*   CREATININE mg/dL 0 85   CALCIUM mg/dL 9 1     CMP  Results from last 7 days   Lab Units 05/10/22  0501   POTASSIUM mmol/L 3 0*   CHLORIDE mmol/L 100   CO2 mmol/L 26   BUN mg/dL 4*   CREATININE mg/dL 0 85   CALCIUM mg/dL 9 1   ALK PHOS U/L 82   ALT U/L 46   AST U/L 123*       @Culture@  Lab Results   Component Value Date    BLOODCX No Growth After 5 Days   08/05/2021    URINECX No Growth <1000 cfu/mL 08/05/2021         Current Facility-Administered Medications:     acetaminophen (TYLENOL) tablet 650 mg, 650 mg, Oral, Q6H PRN, Sheri Vecellio, PA-C, 650 mg at 05/08/22 2102    amLODIPine (NORVASC) tablet 10 mg, 10 mg, Oral, HS, Sheri Vecellio, PA-C, 10 mg at 05/09/22 2113    aspirin chewable tablet 81 mg, 81 mg, Oral, Daily, Sheri Vecellio, PA-C, 81 mg at 05/09/22 0815    fenofibrate (TRICOR) tablet 145 mg, 145 mg, Oral, Daily, Sheri Vecellio, PA-C, 145 mg at 05/09/22 0815    heparin (porcine) subcutaneous injection 5,000 Units, 5,000 Units, Subcutaneous, Q8H [address], Sheri Vecellio, PA-C, 5,000 Units at 05/08/22 2102    HYDROmorphone (DILAUDID) injection 1 mg, 1 mg, Intravenous, Q4H PRN, Alberto Garrison MD, 1 mg at 05/09/22 2114    levETIRAcetam (KEPPRA) tablet 1,500 mg, 1,500 mg, Oral, Q12H [address], Sheri Vecellio, PA-C, 1,500 mg at 05/09/22 2112    meloxicam (MOBIC) tablet 7 5 mg, 7 5 mg, Oral, Daily, Zohra Miller DPM, 7 5 mg at 05/09/22 7570    metoprolol succinate (TOPROL-XL) 24 hr tablet 100 mg, 100 mg, Oral, Daily, Sheri White PA-C, 100 mg at 05/09/22 0815    ondansetron (ZOFRAN) injection 4 mg, 4 mg, Intravenous, Q6H PRN, Venice Conrad PA-C    oxyCODONE-acetaminophen (PERCOCET) 5-325 mg per tablet 1 tablet, 1 tablet, Oral, Q4H PRN, Alberto Garrison MD    pantoprazole (PROTONIX) EC tablet 40 mg, 40 mg, Oral, Early Morning, Sheri Vecellio, PA-C, 40 mg at 05/10/22 0607    potassium chloride (K-DUR,KLOR-CON) CR tablet 40 mEq, 40 mEq, Oral, Q2H, Em Daniels MD    sodium chloride 0 9 % infusion, 150 mL/hr, Intravenous, Continuous, Sheri Vecellio, PA-C, Last Rate: 150 mL/hr at 05/10/22 0608, 150 mL/hr at 05/10/22 0608    thiamine tablet 100 mg, 100 mg, Oral, Daily, NOAM Thomas    Imaging: I have personally reviewed pertinent films in PACS  EKG, Pathology, and Other Studies: I have personally reviewed pertinent reports  VTE Pharmacologic Prophylaxis: Sequential compression device (Venodyne)   VTE Mechanical Prophylaxis: sequential compression device          Counseling and Coordination of care  I spent 25 minutes face-to-face with patient today  More than 50% was spent in counseling & coordination of care  Counseled patient regarding prevention of gouty attack  Follow-up in office         Ashanti Croft DPM

## 2022-05-10 NOTE — ASSESSMENT & PLAN NOTE
Pt presents after syncopal episode x2 at home  · Syncope vs seizure  · CT facial bones shows nasal bone fracture, CT head no acute abnormalities  · CT chest/abd/pelvis no acute abnormalities  · CT cervical spine shows no fracture or traumatic malalignment   · Echocardiogram revealed ejection fraction 03%, grade 1 diastolic dysfunction,LBBB (which is chronic),  · Given keppra 1500mg in ED  · Neurology input will be appreciated  · Telemetry monitoring  · PT/OT recommended rehab, patient prefers to go home    5/10 No current episodes, continue keppra, medication compliance reinforced, continue to replete electrolytes

## 2022-05-10 NOTE — PROGRESS NOTES
[facility]  Progress Note - Markus Hart 1965, 59 y o  male MRN: [id number]  Unit/Bed#: [address] [place] [address] Encounter: [id number]  Primary Care Provider: No primary care provider on file     Date and time admitted to hospital: 5/7/2022  3:06 PM    * Syncopal episodes  Assessment & Plan  Pt presents after syncopal episode x2 at home  · Syncope vs seizure  · CT facial bones shows nasal bone fracture, CT head no acute abnormalities  · CT chest/abd/pelvis no acute abnormalities  · CT cervical spine shows no fracture or traumatic malalignment   · Echocardiogram revealed ejection fraction 77%, grade 1 diastolic dysfunction,LBBB (which is chronic),  · Given keppra 1500mg in ED  · Neurology input will be appreciated  · Telemetry monitoring  · PT/OT recommended rehab, patient prefers to go home    5/10 No current episodes, continue keppra, medication compliance reinforced, continue to replete electrolytes     Seizure disorder (HCC)  Assessment & Plan  · On Keppra 1500 mg b i d , reports that he is compliant and does not miss doses of medication  · Neurology consultation will be appreciated  · Keppra level pending      5/10 Awaiting keppra level, no further seizure activity, continue keppra BID, discussed with neurology who informed as per patients pharmacy he had never picked up his keppra from previous visit in the hospital, compliance reinforced, continue monitoring       Hypokalemia  Assessment & Plan  Will replete and give IV mg as well  Continue monitoring     Chronic tophaceous gout of right foot  Assessment & Plan  NSAIDS as per podiatry     Right foot pain  Assessment & Plan  · Right foot x-ray reveals soft tissue swelling of the 1st metatarsal head, small bony densities medial head of 1st metatarsal  · Podiatry consultation appreciated  · Uric acid normal  · Continue NSAID as per Podiatry    Rhabdomyolysis  Assessment & Plan  · Reports being down for only 30 minutes after fall  · CK 7450->0518->5710  · Continue IV fluid  · Repeat CK a m     5/10 CK 4538 today, continue IVF, continue monitoring     DORA (acute kidney injury) ([address] )  Assessment & Plan  Creatinine 1 49 on admission  Resolved with IV fluid    Hyperlipidemia  Assessment & Plan  · Continue tricor    Hypertension  Assessment & Plan  · Continue amlodipine and metoprolol    Hypomagnesemia  Assessment & Plan  MG 1 3  Will replete IV        VTE Pharmacologic Prophylaxis: VTE Score: 3 Moderate Risk (Score 3-4) - Pharmacological DVT Prophylaxis Ordered: heparin  Patient Centered Rounds: I performed bedside rounds with nursing staff today  Discussions with Specialists or Other Care Team Provider:  Neurology     Education and Discussions with Family / Patient: Patient declined call to   Time Spent for Care: 30 minutes  More than 50% of total time spent on counseling and coordination of care as described above  Current Length of Stay: 1 day(s)  Current Patient Status: Inpatient   Certification Statement: The patient will continue to require additional inpatient hospital stay due to Electrolyte deficiency   Discharge Plan: Anticipate discharge tomorrow to home  Code Status: Level 1 - Full Code    Subjective:   Patient seen and examined at bedside  NAD, VSS  Patient reports ongoing headache and rib pain from fall  Encouraged to use PRN pain meds   Discussed case in detail, medication compliance reinforced     Objective:     Vitals:   Temp (24hrs), Av °F (36 7 °C), Min:97 7 °F (36 5 °C), Max:98 5 °F (36 9 °C)    Temp:  [97 7 °F (36 5 °C)-98 5 °F (36 9 °C)] 98 5 °F (36 9 °C)  HR:  [68-78] 68  Resp:  [17-18] 17  BP: (116-123)/(72-99) 121/99  SpO2:  [95 %-98 %] 98 %  Body mass index is 27 06 kg/m²  Input and Output Summary (last 24 hours):      Intake/Output Summary (Last 24 hours) at 5/10/2022 1158  Last data filed at 5/10/2022 9114  Gross per 24 hour   Intake 1000 ml   Output 450 ml   Net 550 ml       Physical Exam:   Physical Exam  Constitutional:       Appearance: Normal appearance  HENT:      Head: Normocephalic  Cardiovascular:      Rate and Rhythm: Normal rate and regular rhythm  Pulmonary:      Effort: Pulmonary effort is normal    Abdominal:      General: Bowel sounds are normal    Musculoskeletal:         General: Normal range of motion  Skin:     General: Skin is warm and dry  Neurological:      General: No focal deficit present  Mental Status: He is oriented to person, place, and time  Additional Data:     Labs:  Results from last 7 days   Lab Units 05/10/22  0501   WBC Thousand/uL 8 85   HEMOGLOBIN g/dL 13 5   HEMATOCRIT % 40 0   PLATELETS Thousands/uL 165   NEUTROS PCT % 68   LYMPHS PCT % 19   MONOS PCT % 11   EOS PCT % 1     Results from last 7 days   Lab Units 05/10/22  0501   SODIUM mmol/L 137   POTASSIUM mmol/L 3 0*   CHLORIDE mmol/L 100   CO2 mmol/L 26   BUN mg/dL 4*   CREATININE mg/dL 0 85   ANION GAP mmol/L 11   CALCIUM mg/dL 9 1   ALBUMIN g/dL 3 4*   TOTAL BILIRUBIN mg/dL 0 99   ALK PHOS U/L 82   ALT U/L 46   AST U/L 123*   GLUCOSE RANDOM mg/dL 90                       Lines/Drains:  Invasive Devices  Report    Peripheral Intravenous Line            Peripheral IV 22 Right Antecubital 2 days                  Telemetry:  Telemetry Orders (From admission, onward)             24 Hour Telemetry Monitoring  (ED Bridging Orders Panel)  Continuous x 24 Hours (Telem)           References:    Telemetry Guidelines   Question:  Reason for 24 Hour Telemetry  Answer:  Syncope suspected to be cardiac in origin                 Telemetry Reviewed: Normal Sinus Rhythm  Indication for Continued Telemetry Use: Metabolic/electrolyte disturbance with high probability of dysrhythmia             Imaging: No pertinent imaging reviewed      Recent Cultures (last 7 days):         Last 24 Hours Medication List:   Current Facility-Administered Medications   Medication Dose Route Frequency Provider Last Rate    acetaminophen  650 mg Oral Q6H PRN Tai Marr PA-C      amLODIPine  10 mg Oral HS Tai Marr PA-C      aspirin  81 mg Oral Daily Sheri JUAN White      fenofibrate  145 mg Oral Daily Sheri JUAN White      heparin (porcine)  5,000 Units Subcutaneous Q8H [address] Sheri JUAN White      HYDROmorphone  1 mg Intravenous Q4H PRN Kaykay Crook MD      lactated ringers  1,000 mL Intravenous Once Haylee Schmitt MD 1,000 mL (05/10/22 6834)    levETIRAcetam  1,500 mg Oral Q12H [address] Sheri White PA-C      magnesium sulfate  2 g Intravenous Once Haylee Schmitt MD      Followed by   Aureliano Perez magnesium sulfate  2 g Intravenous Once Haylee Schmitt MD      meloxicam  7 5 mg Oral Daily Hermilo Blackwell DPM      metoprolol succinate  100 mg Oral Daily Sheri JUAN White      ondansetron  4 mg Intravenous Q6H PRN Tai Marr PA-C      oxyCODONE-acetaminophen  1 tablet Oral Q4H PRN Kaykay Crook MD      pantoprazole  40 mg Oral Early Morning Sheri White PA-C      sodium chloride  150 mL/hr Intravenous Continuous Tai Marr PA-C 150 mL/hr (05/10/22 5276)    thiamine  100 mg Oral Daily NOAM Enriquez          Today, Patient Was Seen By: Haylee Schmitt MD    **Please Note: This note may have been constructed using a voice recognition system  **

## 2022-05-10 NOTE — ASSESSMENT & PLAN NOTE
· On Keppra 1500 mg b i d , reports that he is compliant and does not miss doses of medication  · Neurology consultation will be appreciated  · Keppra level pending      5/10 Awaiting keppra level, no further seizure activity, continue keppra BID, discussed with neurology who informed as per patients pharmacy he had never picked up his keppra from previous visit in the hospital, compliance reinforced, continue monitoring

## 2022-05-10 NOTE — PLAN OF CARE
Problem: MOBILITY - ADULT  Goal: Maintain or return to baseline ADL function  Description: INTERVENTIONS:  -  Assess patient's ability to carry out ADLs; assess patient's baseline for ADL function and identify physical deficits which impact ability to perform ADLs (bathing, care of mouth/teeth, toileting, grooming, dressing, etc )  - Assess/evaluate cause of self-care deficits   - Assess range of motion  - Assess patient's mobility; develop plan if impaired  - Assess patient's need for assistive devices and provide as appropriate  - Encourage maximum independence but intervene and supervise when necessary  - Involve family in performance of ADLs  - Assess for home care needs following discharge   - Consider OT consult to assist with ADL evaluation and planning for discharge  - Provide patient education as appropriate  Outcome: Progressing  Goal: Maintains/Returns to pre admission functional level  Description: INTERVENTIONS:  - Perform BMAT or MOVE assessment daily    - Set and communicate daily mobility goal to care team and patient/family/caregiver  - Collaborate with rehabilitation services on mobility goals if consulted  - Perform Range of Motion 3 times a day  - Reposition patient every 3 hours    - Dangle patient 3 times a day  - Stand patient 3 times a day  - Ambulate patient 3 times a day  - Out of bed to chair 3 times a day   - Out of bed for meals 3 times a day  - Out of bed for toileting  - Record patient progress and toleration of activity level   Outcome: Progressing     Problem: PAIN - ADULT  Goal: Verbalizes/displays adequate comfort level or baseline comfort level  Description: Interventions:  - Encourage patient to monitor pain and request assistance  - Assess pain using appropriate pain scale  - Administer analgesics based on type and severity of pain and evaluate response  - Implement non-pharmacological measures as appropriate and evaluate response  - Consider cultural and social influences on pain and pain management  - Notify physician/advanced practitioner if interventions unsuccessful or patient reports new pain  Outcome: Progressing     Problem: SAFETY ADULT  Goal: Patient will remain free of falls  Description: INTERVENTIONS:  - Educate patient/family on patient safety including physical limitations  - Instruct patient to call for assistance with activity   - Consult OT/PT to assist with strengthening/mobility   - Keep Call bell within reach  - Keep bed low and locked with side rails adjusted as appropriate  - Keep care items and personal belongings within reach  - Initiate and maintain comfort rounds  - Make Fall Risk Sign visible to staff  - Offer Toileting every 2 Hours, in advance of need  - Initiate/Maintain bed alarm  - Obtain necessary fall risk management equipment: call bell   - Apply yellow socks and bracelet for high fall risk patients  - Consider moving patient to room near nurses station  Outcome: Progressing     Problem: DISCHARGE PLANNING  Goal: Discharge to home or other facility with appropriate resources  Description: INTERVENTIONS:  - Identify barriers to discharge w/patient and caregiver  - Arrange for needed discharge resources and transportation as appropriate  - Identify discharge learning needs (meds, wound care, etc )  - Arrange for interpretive services to assist at discharge as needed  - Refer to Case Management Department for coordinating discharge planning if the patient needs post-hospital services based on physician/advanced practitioner order or complex needs related to functional status, cognitive ability, or social support system  Outcome: Progressing     Problem: Knowledge Deficit  Goal: Patient/family/caregiver demonstrates understanding of disease process, treatment plan, medications, and discharge instructions  Description: Complete learning assessment and assess knowledge base    Interventions:  - Provide teaching at level of understanding  - Provide teaching via preferred learning methods  Outcome: Progressing     Problem: Potential for Falls  Goal: Patient will remain free of falls  Description: INTERVENTIONS:  - Educate patient/family on patient safety including physical limitations  - Instruct patient to call for assistance with activity   - Consult OT/PT to assist with strengthening/mobility   - Keep Call bell within reach  - Keep bed low and locked with side rails adjusted as appropriate  - Keep care items and personal belongings within reach  - Initiate and maintain comfort rounds  - Make Fall Risk Sign visible to staff  - Offer Toileting every 2 Hours, in advance of need  - Initiate/Maintain bed alarm  - Obtain necessary fall risk management equipment: call bell   - Apply yellow socks and bracelet for high fall risk patients  - Consider moving patient to room near nurses station  Outcome: Progressing

## 2022-05-10 NOTE — ASSESSMENT & PLAN NOTE
· Reports being down for only 30 minutes after fall  · CK 2710->6718->3860  · Continue IV fluid  · Repeat CK a m     5/10 CK 3623 today, continue IVF, continue monitoring

## 2022-05-10 NOTE — PHYSICAL THERAPY NOTE
Attempted PT treatment however pt on the phone and ignored this therapist when trying to initiate a PT session  Will follow    Guru Cates PT  [id number]     05/10/22 1536   Note Type   Note Type Cancelled Session   Cancel Reasons Other

## 2022-05-11 LAB
ALBUMIN SERPL BCP-MCNC: 3.2 G/DL (ref 3.5–5)
ALP SERPL-CCNC: 84 U/L (ref 46–116)
ALT SERPL W P-5'-P-CCNC: 40 U/L (ref 12–78)
ANION GAP SERPL CALCULATED.3IONS-SCNC: 10 MMOL/L (ref 4–13)
AST SERPL W P-5'-P-CCNC: 80 U/L (ref 5–45)
BASOPHILS # BLD AUTO: 0.03 THOUSANDS/ΜL (ref 0–0.1)
BASOPHILS NFR BLD AUTO: 0 % (ref 0–1)
BILIRUB SERPL-MCNC: 0.78 MG/DL (ref 0.2–1)
BUN SERPL-MCNC: 7 MG/DL (ref 5–25)
CALCIUM ALBUM COR SERPL-MCNC: 9.6 MG/DL (ref 8.3–10.1)
CALCIUM SERPL-MCNC: 9 MG/DL (ref 8.3–10.1)
CHLORIDE SERPL-SCNC: 102 MMOL/L (ref 100–108)
CO2 SERPL-SCNC: 27 MMOL/L (ref 21–32)
CREAT SERPL-MCNC: 0.88 MG/DL (ref 0.6–1.3)
EOSINOPHIL # BLD AUTO: 0.09 THOUSAND/ΜL (ref 0–0.61)
EOSINOPHIL NFR BLD AUTO: 1 % (ref 0–6)
ERYTHROCYTE [DISTWIDTH] IN BLOOD BY AUTOMATED COUNT: 14.5 % (ref 11.6–15.1)
GFR SERPL CREATININE-BSD FRML MDRD: 94 ML/MIN/1.73SQ M
GLUCOSE SERPL-MCNC: 95 MG/DL (ref 65–140)
HCT VFR BLD AUTO: 41.6 % (ref 36.5–49.3)
HGB BLD-MCNC: 14.6 G/DL (ref 12–17)
IMM GRANULOCYTES # BLD AUTO: 0.03 THOUSAND/UL (ref 0–0.2)
IMM GRANULOCYTES NFR BLD AUTO: 0 % (ref 0–2)
LYMPHOCYTES # BLD AUTO: 1.34 THOUSANDS/ΜL (ref 0.6–4.47)
LYMPHOCYTES NFR BLD AUTO: 18 % (ref 14–44)
MAGNESIUM SERPL-MCNC: 1.7 MG/DL (ref 1.6–2.6)
MCH RBC QN AUTO: 29.9 PG (ref 26.8–34.3)
MCHC RBC AUTO-ENTMCNC: 35.1 G/DL (ref 31.4–37.4)
MCV RBC AUTO: 85 FL (ref 82–98)
MONOCYTES # BLD AUTO: 0.7 THOUSAND/ΜL (ref 0.17–1.22)
MONOCYTES NFR BLD AUTO: 10 % (ref 4–12)
NEUTROPHILS # BLD AUTO: 5.09 THOUSANDS/ΜL (ref 1.85–7.62)
NEUTS SEG NFR BLD AUTO: 71 % (ref 43–75)
NRBC BLD AUTO-RTO: 0 /100 WBCS
PHOSPHATE SERPL-MCNC: 3.2 MG/DL (ref 2.7–4.5)
PLATELET # BLD AUTO: 155 THOUSANDS/UL (ref 149–390)
PMV BLD AUTO: 10 FL (ref 8.9–12.7)
POTASSIUM SERPL-SCNC: 3.6 MMOL/L (ref 3.5–5.3)
PROT SERPL-MCNC: 6.3 G/DL (ref 6.4–8.2)
RBC # BLD AUTO: 4.88 MILLION/UL (ref 3.88–5.62)
SODIUM SERPL-SCNC: 139 MMOL/L (ref 136–145)
WBC # BLD AUTO: 7.28 THOUSAND/UL (ref 4.31–10.16)

## 2022-05-11 PROCEDURE — 84100 ASSAY OF PHOSPHORUS: CPT | Performed by: INTERNAL MEDICINE

## 2022-05-11 PROCEDURE — 80053 COMPREHEN METABOLIC PANEL: CPT | Performed by: INTERNAL MEDICINE

## 2022-05-11 PROCEDURE — 85025 COMPLETE CBC W/AUTO DIFF WBC: CPT | Performed by: INTERNAL MEDICINE

## 2022-05-11 PROCEDURE — 83735 ASSAY OF MAGNESIUM: CPT | Performed by: INTERNAL MEDICINE

## 2022-05-11 PROCEDURE — 99238 HOSP IP/OBS DSCHRG MGMT 30/<: CPT | Performed by: FAMILY MEDICINE

## 2022-05-11 RX ORDER — LEVETIRACETAM 750 MG/1
1500 TABLET ORAL EVERY 12 HOURS
Qty: 120 TABLET | Refills: 0 | Status: SHIPPED | OUTPATIENT
Start: 2022-05-11 | End: 2022-06-10

## 2022-05-11 RX ORDER — LANOLIN ALCOHOL/MO/W.PET/CERES
100 CREAM (GRAM) TOPICAL DAILY
Qty: 30 TABLET | Refills: 0 | Status: SHIPPED | OUTPATIENT
Start: 2022-05-12 | End: 2022-06-11

## 2022-05-11 RX ADMIN — HEPARIN SODIUM 5000 UNITS: 5000 INJECTION INTRAVENOUS; SUBCUTANEOUS at 22:07

## 2022-05-11 RX ADMIN — ACETAMINOPHEN 650 MG: 325 TABLET, FILM COATED ORAL at 16:01

## 2022-05-11 RX ADMIN — MELOXICAM 7.5 MG: 7.5 TABLET ORAL at 09:32

## 2022-05-11 RX ADMIN — LEVETIRACETAM 1500 MG: 500 TABLET, FILM COATED ORAL at 22:00

## 2022-05-11 RX ADMIN — ONDANSETRON 4 MG: 2 INJECTION INTRAMUSCULAR; INTRAVENOUS at 22:07

## 2022-05-11 RX ADMIN — ASPIRIN 81 MG CHEWABLE TABLET 81 MG: 81 TABLET CHEWABLE at 09:32

## 2022-05-11 RX ADMIN — AMLODIPINE BESYLATE 10 MG: 10 TABLET ORAL at 22:07

## 2022-05-11 RX ADMIN — OXYCODONE HYDROCHLORIDE AND ACETAMINOPHEN 1 TABLET: 5; 325 TABLET ORAL at 14:29

## 2022-05-11 RX ADMIN — ACETAMINOPHEN 650 MG: 325 TABLET, FILM COATED ORAL at 22:07

## 2022-05-11 RX ADMIN — THIAMINE HCL TAB 100 MG 100 MG: 100 TAB at 09:32

## 2022-05-11 RX ADMIN — FENOFIBRATE 145 MG: 145 TABLET, FILM COATED ORAL at 09:32

## 2022-05-11 RX ADMIN — METOPROLOL SUCCINATE 100 MG: 100 TABLET, EXTENDED RELEASE ORAL at 09:32

## 2022-05-11 RX ADMIN — LEVETIRACETAM 1500 MG: 500 TABLET, FILM COATED ORAL at 09:32

## 2022-05-11 RX ADMIN — HYDROMORPHONE HYDROCHLORIDE 1 MG: 1 INJECTION, SOLUTION INTRAMUSCULAR; INTRAVENOUS; SUBCUTANEOUS at 18:17

## 2022-05-11 RX ADMIN — PANTOPRAZOLE SODIUM 40 MG: 40 TABLET, DELAYED RELEASE ORAL at 05:31

## 2022-05-11 RX ADMIN — HEPARIN SODIUM 5000 UNITS: 5000 INJECTION INTRAVENOUS; SUBCUTANEOUS at 05:31

## 2022-05-11 NOTE — OCCUPATIONAL THERAPY NOTE
Occupational Therapy Attempt Note        05/11/22 1109   Note Type   Note Type Cancelled Session   Cancel Reasons Other  (Patient refused due to pain/fatigue, will f/u)   Licensure   [place] License Number  Garrett Weber, OTR/L [id number]

## 2022-05-11 NOTE — DISCHARGE INSTRUCTIONS
Please continue taking all medications as prescribed  Please follow up with your primary medical doctor within 1 week of discharge  Please return to the nearest emergency department if you develop any signs or symptoms of worsening disease or infection, including but not limited to chest pain, shortness of breath, abdominal pain, lightheadedness, dizziness, palpitations, fevers or chills

## 2022-05-11 NOTE — PLAN OF CARE
Problem: MOBILITY - ADULT  Goal: Maintain or return to baseline ADL function  Description: INTERVENTIONS:  -  Assess patient's ability to carry out ADLs; assess patient's baseline for ADL function and identify physical deficits which impact ability to perform ADLs (bathing, care of mouth/teeth, toileting, grooming, dressing, etc )  - Assess/evaluate cause of self-care deficits   - Assess range of motion  - Assess patient's mobility; develop plan if impaired  - Assess patient's need for assistive devices and provide as appropriate  - Encourage maximum independence but intervene and supervise when necessary  - Involve family in performance of ADLs  - Assess for home care needs following discharge   - Consider OT consult to assist with ADL evaluation and planning for discharge  - Provide patient education as appropriate  Outcome: Progressing  Goal: Maintains/Returns to pre admission functional level  Description: INTERVENTIONS:  - Perform BMAT or MOVE assessment daily    - Set and communicate daily mobility goal to care team and patient/family/caregiver  - Collaborate with rehabilitation services on mobility goals if consulted  - Perform Range of Motion 3 times a day  - Reposition patient every 2 hours    - Dangle patient 3 times a day  - Stand patient 3 times a day  - Ambulate patient 3 times a day  - Out of bed to chair 3 times a day   - Out of bed for meals 3 times a day  - Out of bed for toileting  - Record patient progress and toleration of activity level   Outcome: Progressing     Problem: PAIN - ADULT  Goal: Verbalizes/displays adequate comfort level or baseline comfort level  Description: Interventions:  - Encourage patient to monitor pain and request assistance  - Assess pain using appropriate pain scale  - Administer analgesics based on type and severity of pain and evaluate response  - Implement non-pharmacological measures as appropriate and evaluate response  - Consider cultural and social influences on pain and pain management  - Notify physician/advanced practitioner if interventions unsuccessful or patient reports new pain  Outcome: Progressing     Problem: SAFETY ADULT  Goal: Patient will remain free of falls  Description: INTERVENTIONS:  - Educate patient/family on patient safety including physical limitations  - Instruct patient to call for assistance with activity   - Consult OT/PT to assist with strengthening/mobility   - Keep Call bell within reach  - Keep bed low and locked with side rails adjusted as appropriate  - Keep care items and personal belongings within reach  - Initiate and maintain comfort rounds  - Make Fall Risk Sign visible to staff  - Offer Toileting every 2 Hours, in advance of need  - Initiate/Maintain  bed alarm  - Obtain necessary fall risk management equipment:   - Apply yellow socks and bracelet for high fall risk patients  - Consider moving patient to room near nurses station  Outcome: Progressing     Problem: DISCHARGE PLANNING  Goal: Discharge to home or other facility with appropriate resources  Description: INTERVENTIONS:  - Identify barriers to discharge w/patient and caregiver  - Arrange for needed discharge resources and transportation as appropriate  - Identify discharge learning needs (meds, wound care, etc )  - Arrange for interpretive services to assist at discharge as needed  - Refer to Case Management Department for coordinating discharge planning if the patient needs post-hospital services based on physician/advanced practitioner order or complex needs related to functional status, cognitive ability, or social support system  Outcome: Progressing     Problem: Knowledge Deficit  Goal: Patient/family/caregiver demonstrates understanding of disease process, treatment plan, medications, and discharge instructions  Description: Complete learning assessment and assess knowledge base    Interventions:  - Provide teaching at level of understanding  - Provide teaching via preferred learning methods  Outcome: Progressing     Problem: Potential for Falls  Goal: Patient will remain free of falls  Description: INTERVENTIONS:  - Educate patient/family on patient safety including physical limitations  - Instruct patient to call for assistance with activity   - Consult OT/PT to assist with strengthening/mobility   - Keep Call bell within reach  - Keep bed low and locked with side rails adjusted as appropriate  - Keep care items and personal belongings within reach  - Initiate and maintain comfort rounds  - Make Fall Risk Sign visible to staff  - Offer Toileting every 2 Hours, in advance of need  - Initiate/Maintain bed alarm  - Obtain necessary fall risk management equipment:   - Apply yellow socks and bracelet for high fall risk patients  - Consider moving patient to room near nurses station  Outcome: Progressing

## 2022-05-11 NOTE — CASE MANAGEMENT
Case Management Assessment & Discharge Planning Note    Patient name Justin Valenzuela  Location 2 [facility] 68 212/2 [facility] 212 MRN [id number]  : 1965 Date 2022       Current Admission Date: 2022  Current Admission Diagnosis:Syncopal episodes   Patient Active Problem List    Diagnosis Date Noted    Hypokalemia 05/10/2022    Contusion of right foot     Right foot pain     Arthralgia of right foot     Chronic tophaceous gout of right foot     Syncopal episodes 2022    SIRS (systemic inflammatory response syndrome) ([facility] ) 2022    Acute gout of multiple sites 2021    Hypertension 2021    Hyperlipidemia 2021    Tobacco abuse 2021    GERD (gastroesophageal reflux disease) 2021    Depression 2021    Left bundle branch block 2021    Thrombocytopenia ([facility] ) 2021    Prolonged Q-T interval on ECG 2021    Rhabdomyolysis 2021    Breakthrough seizure ([facility] ) 2021    Seizure disorder ([facility] ) 2021    History of cerebral hemorrhage 2021    History of alcohol abuse 2021    Hypocalcemia 2017    Lactic acidosis 2017    Rhabdomyolysis 2017    Nicotine dependence 2017    Dyslipidemia 2017    Seizure ([address] ) 2017    DORA (acute kidney injury) ([facility] ) 2017    Hypomagnesemia 2016    Diarrhea 2016    Blood in stool 2016    Hypertension     Hyperlipidemia     GERD (gastroesophageal reflux disease)     Compression neuropathy of lower extremity 02/10/2016      LOS (days): 2  Geometric Mean LOS (GMLOS) (days):   Days to GMLOS:     OBJECTIVE:    Risk of Unplanned Readmission Score: 9 19         Current admission status: Inpatient       Preferred Pharmacy:   1200 [facility], [address] [place] [address], [address]   [place] [address] [address]  Phone: [phone] Fax: 13 [facility] #[id number], [address], [place], [place] [address] [address]  Phone: [phone] Fax: [phone]    Renetta Jackson [address] [address]  Phone: [phone] Fax: [phone] - [place], [place] - [address]  [address]  4401 [facility] [address]  Phone: [phone] Fax: [phone]    Primary Care Provider: No primary care provider on file  Primary Insurance: BLUE CROSS  Secondary Insurance:     ASSESSMENT:  Active Health Care Proxies     [facility]   Primary Phone: [phone] (Home)            Readmission Root Cause  30 Day Readmission: No    Patient Information  Admitted from[de-identified] Home  Mental Status: Alert  During Assessment patient was accompanied by: Not accompanied during assessment  Assessment information provided by[de-identified] Patient  Primary Caregiver: Self  Support Systems: Family members  [place] of Residence: [address] [place] [address] do you live in?: [address] entry access options   Select all that apply : Stairs  Number of steps to enter home : 5  Do the steps have railings?: Yes  Type of Current Residence: [place]  In the last 12 months, was there a time when you were not able to pay the mortgage or rent on time?: No  In the last 12 months, how many places have you lived?: 1  In the last 12 months, was there a time when you did not have a steady place to sleep or slept in a shelter (including now)?: No  Homeless/housing insecurity resource given?: No  Living Arrangements: Lives Alone  Is patient a ?: No    Activities of Daily Living Prior to Admission  Functional Status: Independent  Completes ADLs independently?: Yes  Ambulates independently?: Yes  Does patient use assisted devices?: No  Does patient currently own DME?: Yes  What DME does the patient currently own?: Imtiaz Chatman  Does patient have a history of Outpatient Therapy (PT/OT)?: No  Does the patient have a history of Short-Term Rehab?: No  Does patient have a history of HHC?: No  Does patient currently have [address]?: No    Patient Information Continued  Income Source: Unemployed (Patient reports SSD application is pending )  Does patient have prescription coverage?: Yes  Within the past 12 months, you worried that your food would run out before you got the money to buy more : Never true  Within the past 12 months, the food you bought just didn't last and you didn't have money to get more : Never true  Food insecurity resource given?: No  Does patient receive dialysis treatments?: No  Does patient have a history of substance abuse?: Yes  Historical substance use preference: Alcohol/ETOH  History of Withdrawal Symptoms: Denies past symptoms  Is patient currently in treatment for substance abuse?: N/A - sober  Does patient have a history of Mental Health Diagnosis?: No    PHQ 2/9 Screening   Reviewed PHQ 2/9 Depression Screening Score?: No    Means of Transportation  Means of Transport to Appts[de-identified] Family transport  In the past 12 months, has lack of transportation kept you from medical appointments or from getting medications?: No  In the past 12 months, has lack of transportation kept you from meetings, work, or from getting things needed for daily living?: No  Was application for public transport provided?: No    DISCHARGE DETAILS:    Discharge planning discussed with[de-identified] Patient  Freedom of Choice: Yes  Comments - Freedom of Choice: Patient choosing to D/C home  Refusing STR and refusing HHC/VNA    CM contacted family/caregiver?: No- see comments (Declined)  Were Treatment Team discharge recommendations reviewed with patient/caregiver?: Yes  Did patient/caregiver verbalize understanding of patient care needs?: Yes  Were patient/caregiver advised of the risks associated with not following Treatment Team discharge recommendations?: Yes    Requested [address]         Is the patient interested in [address] at discharge?: No (REFUSING)    DME Referral Provided  Referral made for DME?: No    Would you like to participate in our [address] service program?  : No - Declined    Treatment Team Recommendation: Short Term Rehab (REFUSING)  Discharge Destination Plan[de-identified] Home  Transport at Discharge : Family, Automobile (SISTER)  ETA of Transport (Date): 05/11/22

## 2022-05-11 NOTE — PLAN OF CARE
Problem: MOBILITY - ADULT  Goal: Maintain or return to baseline ADL function  Description: INTERVENTIONS:  -  Assess patient's ability to carry out ADLs; assess patient's baseline for ADL function and identify physical deficits which impact ability to perform ADLs (bathing, care of mouth/teeth, toileting, grooming, dressing, etc )  - Assess/evaluate cause of self-care deficits   - Assess range of motion  - Assess patient's mobility; develop plan if impaired  - Assess patient's need for assistive devices and provide as appropriate  - Encourage maximum independence but intervene and supervise when necessary  - Involve family in performance of ADLs  - Assess for home care needs following discharge   - Consider OT consult to assist with ADL evaluation and planning for discharge  - Provide patient education as appropriate  Outcome: Progressing  Goal: Maintains/Returns to pre admission functional level  Description: INTERVENTIONS:  - Perform BMAT or MOVE assessment daily    - Set and communicate daily mobility goal to care team and patient/family/caregiver     - Collaborate with rehabilitation services on mobility goals if consulted  - Out of bed for toileting  - Record patient progress and toleration of activity level   Outcome: Progressing     Problem: PAIN - ADULT  Goal: Verbalizes/displays adequate comfort level or baseline comfort level  Description: Interventions:  - Encourage patient to monitor pain and request assistance  - Assess pain using appropriate pain scale  - Administer analgesics based on type and severity of pain and evaluate response  - Implement non-pharmacological measures as appropriate and evaluate response  - Consider cultural and social influences on pain and pain management  - Notify physician/advanced practitioner if interventions unsuccessful or patient reports new pain  Outcome: Progressing     Problem: SAFETY ADULT  Goal: Patient will remain free of falls  Description: INTERVENTIONS:  - Educate patient/family on patient safety including physical limitations  - Instruct patient to call for assistance with activity   - Consult OT/PT to assist with strengthening/mobility   - Keep Call bell within reach  - Keep bed low and locked with side rails adjusted as appropriate  - Keep care items and personal belongings within reach  - Initiate and maintain comfort rounds  - Make Fall Risk Sign visible to staff  - Obtain necessary fall risk management equipment:   - Apply yellow socks and bracelet for high fall risk patients  - Consider moving patient to room near nurses station  Outcome: Progressing     Problem: DISCHARGE PLANNING  Goal: Discharge to home or other facility with appropriate resources  Description: INTERVENTIONS:  - Identify barriers to discharge w/patient and caregiver  - Arrange for needed discharge resources and transportation as appropriate  - Identify discharge learning needs (meds, wound care, etc )  - Arrange for interpretive services to assist at discharge as needed  - Refer to Case Management Department for coordinating discharge planning if the patient needs post-hospital services based on physician/advanced practitioner order or complex needs related to functional status, cognitive ability, or social support system  Outcome: Progressing     Problem: Knowledge Deficit  Goal: Patient/family/caregiver demonstrates understanding of disease process, treatment plan, medications, and discharge instructions  Description: Complete learning assessment and assess knowledge base    Interventions:  - Provide teaching at level of understanding  - Provide teaching via preferred learning methods  Outcome: Progressing     Problem: Potential for Falls  Goal: Patient will remain free of falls  Description: INTERVENTIONS:  - Educate patient/family on patient safety including physical limitations  - Instruct patient to call for assistance with activity   - Consult OT/PT to assist with strengthening/mobility   - Keep Call bell within reach  - Keep bed low and locked with side rails adjusted as appropriate  - Keep care items and personal belongings within reach  - Initiate and maintain comfort rounds  - Make Fall Risk Sign visible to staff  - Offer Toileting every 2 Hours, in advance of need  - Initiate/Maintain bed alarm  - Obtain necessary fall risk management equipment:   - Apply yellow socks and bracelet for high fall risk patients  - Consider moving patient to room near nurses station  Outcome: Progressing

## 2022-05-12 VITALS
HEIGHT: 71 IN | RESPIRATION RATE: 16 BRPM | OXYGEN SATURATION: 98 % | SYSTOLIC BLOOD PRESSURE: 136 MMHG | WEIGHT: 194 LBS | TEMPERATURE: 98.1 F | DIASTOLIC BLOOD PRESSURE: 66 MMHG | HEART RATE: 71 BPM | BODY MASS INDEX: 27.16 KG/M2

## 2022-05-12 LAB
ALBUMIN SERPL BCP-MCNC: 3.2 G/DL (ref 3.5–5)
ALP SERPL-CCNC: 77 U/L (ref 46–116)
ALT SERPL W P-5'-P-CCNC: 39 U/L (ref 12–78)
ANION GAP SERPL CALCULATED.3IONS-SCNC: 9 MMOL/L (ref 4–13)
AST SERPL W P-5'-P-CCNC: 78 U/L (ref 5–45)
ATRIAL RATE: 89 BPM
BILIRUB SERPL-MCNC: 0.5 MG/DL (ref 0.2–1)
BUN SERPL-MCNC: 10 MG/DL (ref 5–25)
CALCIUM ALBUM COR SERPL-MCNC: 9.6 MG/DL (ref 8.3–10.1)
CALCIUM SERPL-MCNC: 9 MG/DL (ref 8.3–10.1)
CHLORIDE SERPL-SCNC: 103 MMOL/L (ref 100–108)
CK MB SERPL-MCNC: 1.1 NG/ML (ref 0–5)
CK MB SERPL-MCNC: <1 % (ref 0–2.5)
CK SERPL-CCNC: 2656 U/L (ref 39–308)
CO2 SERPL-SCNC: 28 MMOL/L (ref 21–32)
CREAT SERPL-MCNC: 1.16 MG/DL (ref 0.6–1.3)
ERYTHROCYTE [DISTWIDTH] IN BLOOD BY AUTOMATED COUNT: 14.3 % (ref 11.6–15.1)
GFR SERPL CREATININE-BSD FRML MDRD: 68 ML/MIN/1.73SQ M
GLUCOSE SERPL-MCNC: 113 MG/DL (ref 65–140)
HCT VFR BLD AUTO: 39.4 % (ref 36.5–49.3)
HGB BLD-MCNC: 13.5 G/DL (ref 12–17)
LEVETIRACETAM SERPL-MCNC: 28.5 UG/ML (ref 10–40)
MAGNESIUM SERPL-MCNC: 1.4 MG/DL (ref 1.6–2.6)
MCH RBC QN AUTO: 29.7 PG (ref 26.8–34.3)
MCHC RBC AUTO-ENTMCNC: 34.3 G/DL (ref 31.4–37.4)
MCV RBC AUTO: 87 FL (ref 82–98)
P AXIS: 43 DEGREES
PLATELET # BLD AUTO: 177 THOUSANDS/UL (ref 149–390)
PMV BLD AUTO: 10.3 FL (ref 8.9–12.7)
POTASSIUM SERPL-SCNC: 3.8 MMOL/L (ref 3.5–5.3)
PR INTERVAL: 170 MS
PROT SERPL-MCNC: 6.3 G/DL (ref 6.4–8.2)
QRS AXIS: -4 DEGREES
QRSD INTERVAL: 140 MS
QT INTERVAL: 394 MS
QTC INTERVAL: 479 MS
RBC # BLD AUTO: 4.54 MILLION/UL (ref 3.88–5.62)
SODIUM SERPL-SCNC: 140 MMOL/L (ref 136–145)
T WAVE AXIS: 74 DEGREES
VENTRICULAR RATE: 89 BPM
WBC # BLD AUTO: 6.99 THOUSAND/UL (ref 4.31–10.16)

## 2022-05-12 PROCEDURE — 97116 GAIT TRAINING THERAPY: CPT

## 2022-05-12 PROCEDURE — 83735 ASSAY OF MAGNESIUM: CPT | Performed by: FAMILY MEDICINE

## 2022-05-12 PROCEDURE — 93010 ELECTROCARDIOGRAM REPORT: CPT | Performed by: INTERNAL MEDICINE

## 2022-05-12 PROCEDURE — 85027 COMPLETE CBC AUTOMATED: CPT | Performed by: FAMILY MEDICINE

## 2022-05-12 PROCEDURE — 82553 CREATINE MB FRACTION: CPT | Performed by: FAMILY MEDICINE

## 2022-05-12 PROCEDURE — 82550 ASSAY OF CK (CPK): CPT | Performed by: FAMILY MEDICINE

## 2022-05-12 PROCEDURE — 80053 COMPREHEN METABOLIC PANEL: CPT | Performed by: FAMILY MEDICINE

## 2022-05-12 RX ORDER — BUTALBITAL, ACETAMINOPHEN AND CAFFEINE 50; 325; 40 MG/1; MG/1; MG/1
1 TABLET ORAL ONCE
Status: COMPLETED | OUTPATIENT
Start: 2022-05-12 | End: 2022-05-12

## 2022-05-12 RX ORDER — BUTALBITAL, ACETAMINOPHEN AND CAFFEINE 300; 40; 50 MG/1; MG/1; MG/1
1 CAPSULE ORAL EVERY 12 HOURS PRN
Qty: 10 CAPSULE | Refills: 0 | Status: SHIPPED | OUTPATIENT
Start: 2022-05-12 | End: 2022-05-23

## 2022-05-12 RX ADMIN — MAGNESIUM OXIDE TAB 400 MG (241.3 MG ELEMENTAL MG) 800 MG: 400 (241.3 MG) TAB at 10:13

## 2022-05-12 RX ADMIN — ACETAMINOPHEN 650 MG: 325 TABLET, FILM COATED ORAL at 10:13

## 2022-05-12 RX ADMIN — LEVETIRACETAM 1500 MG: 500 TABLET, FILM COATED ORAL at 09:06

## 2022-05-12 RX ADMIN — METOPROLOL SUCCINATE 100 MG: 100 TABLET, EXTENDED RELEASE ORAL at 09:06

## 2022-05-12 RX ADMIN — FENOFIBRATE 145 MG: 145 TABLET, FILM COATED ORAL at 09:06

## 2022-05-12 RX ADMIN — OXYCODONE HYDROCHLORIDE AND ACETAMINOPHEN 1 TABLET: 5; 325 TABLET ORAL at 00:22

## 2022-05-12 RX ADMIN — BUTALBITAL, ACETAMINOPHEN AND CAFFEINE 1 TABLET: 50; 325; 40 TABLET ORAL at 11:22

## 2022-05-12 RX ADMIN — HYDROMORPHONE HYDROCHLORIDE 1 MG: 1 INJECTION, SOLUTION INTRAMUSCULAR; INTRAVENOUS; SUBCUTANEOUS at 03:24

## 2022-05-12 RX ADMIN — PANTOPRAZOLE SODIUM 40 MG: 40 TABLET, DELAYED RELEASE ORAL at 05:42

## 2022-05-12 RX ADMIN — THIAMINE HCL TAB 100 MG 100 MG: 100 TAB at 09:06

## 2022-05-12 RX ADMIN — SODIUM CHLORIDE 500 ML: 0.9 INJECTION, SOLUTION INTRAVENOUS at 10:13

## 2022-05-12 RX ADMIN — ASPIRIN 81 MG CHEWABLE TABLET 81 MG: 81 TABLET CHEWABLE at 09:06

## 2022-05-12 RX ADMIN — HEPARIN SODIUM 5000 UNITS: 5000 INJECTION INTRAVENOUS; SUBCUTANEOUS at 05:42

## 2022-05-12 NOTE — PHYSICAL THERAPY NOTE
PT TREATMENT     05/12/22 1300   PT Last Visit   PT Visit Date 05/12/22   Note Type   Note Type Treatment   Pain Assessment   Pain Assessment Tool 0-10   Pain Score No Pain   Restrictions/Precautions   Weight Bearing Precautions Per Order No   Other Precautions Fall Risk;Cognitive   General   Chart Reviewed Yes   Family/Caregiver Present No   Cognition   Overall Cognitive Status Impaired   Arousal/Participation Cooperative   Attention Attends with cues to redirect   Memory Decreased short term memory   Following Commands Follows multistep commands with increased time or repetition   Comments oriented but is forgetful; forgets a recent conversation with PT re: d/c   Subjective   Subjective "So do I just wait here?"  referring to his d/c instructions   Bed Mobility   Additional Comments Pt  presents sitting at edge of bed in street clothes   Transfers   Sit to Stand 7  Independent   Stand to Sit 7  Independent   Ambulation/Elevation   Gait pattern WNL   Gait Assistance 7  Independent   Additional items Verbal cues   Assistive Device Straight cane   Distance 250 feet with changes in direction   Ambulation/Elevation Additional Comments Pt instructed with use of single point cane;  issued cane and adjusted for proper height   Balance   Static Sitting Good   Dynamic Sitting Good   Static Standing Fair +   Dynamic Standing Fair +   Ambulatory Fair +  (with SPC)   Activity Tolerance   Activity Tolerance Patient tolerated treatment well   Nurse Made Aware yes: Yobany   Assessment   Prognosis Good   Problem List Decreased mobility; Decreased safety awareness   Assessment Pt has made significant improvements since seeing pt on initial evaluation  Pt has a RW at home but his home is too small to use ; pt issued cane to use instead  Pt demonstrates use of cane appropriately  Cont as per plan  Pt is safe for d/c home with use of SPC  The patient's AM-PAC Basic Mobility Inpatient Short Form Raw Score is 24   A Raw score of greater than 16 suggests the patient may benefit from discharge to home  Please also refer to the recommendation of the Physical Therapist for safe discharge planning  Goals   Patient Goals to go home "my sister is waiting on me at home"   Plan   Treatment/Interventions Functional transfer training;LE strengthening/ROM; Therapeutic exercise; Endurance training;Cognitive reorientation;Patient/family training;Equipment eval/education;Gait training;Spoke to nursing;Spoke to case management   Progress Progressing toward goals   PT Frequency Other (Comment)  (5/wk)   Recommendation   PT Discharge Recommendation No rehabilitation needs   Additional Comments pt has made significant improvement with functional mobility; no rehab needs at this time  AM-PAC Basic Mobility Inpatient   Turning in Bed Without Bedrails 4   Lying on Back to Sitting on Edge of Flat Bed 4   Moving Bed to Chair 4   Standing Up From Chair 4   Walk in Room 4   Climb 3-5 Stairs 4   Basic Mobility Inpatient Raw Score 24   Basic Mobility Standardized Score 57 68   Highest Level Of Mobility   JH-HLM Goal 8: Walk 250 feet or more   JH-HLM Highest Level of Mobility 8: Walk 250 feet ot more   JH-HLM Goal Achieved Yes   Education   Education Provided Assistive device  (straight cane  (Single point cane))   Patient Demonstrates acceptance/verbal understanding;Explanation/teachback used   End of Consult   Patient Position at End of Consult Seated edge of bed   Licensure   [place] License Number  Viktor Martinez PT  [id number]

## 2022-05-12 NOTE — CASE MANAGEMENT
Case Management Discharge Planning Note    Patient name Imelda Thompson  Location 2 [facility] 68 212/2 [facility] 212 MRN [id number]  : 1965 Date 2022       Current Admission Date: 2022  Current Admission Diagnosis:Syncopal episodes   Patient Active Problem List    Diagnosis Date Noted    Hypokalemia 05/10/2022    Contusion of right foot     Right foot pain     Arthralgia of right foot     Chronic tophaceous gout of right foot     Syncopal episodes 2022    SIRS (systemic inflammatory response syndrome) ([facility] ) 2022    Acute gout of multiple sites 2021    Hypertension 2021    Hyperlipidemia 2021    Tobacco abuse 2021    GERD (gastroesophageal reflux disease) 2021    Depression 2021    Left bundle branch block 2021    Thrombocytopenia ([facility] ) 2021    Prolonged Q-T interval on ECG 2021    Rhabdomyolysis 2021    Breakthrough seizure ([facility] ) 2021    Seizure disorder ([facility] [facility] ) 2021    History of cerebral hemorrhage 2021    History of alcohol abuse 2021    Hypocalcemia 2017    Lactic acidosis 2017    Rhabdomyolysis 2017    Nicotine dependence 2017    Dyslipidemia 2017    Seizure ([facility] ) 2017    DORA (acute kidney injury) ([facility] ) 2017    Hypomagnesemia 2016    Diarrhea 2016    Blood in stool 2016    Hypertension     Hyperlipidemia     GERD (gastroesophageal reflux disease)     Compression neuropathy of lower extremity 02/10/2016      LOS (days): 3  Geometric Mean LOS (GMLOS) (days):   Days to GMLOS:     OBJECTIVE:  Risk of Unplanned Readmission Score: 12 68     Current admission status: Inpatient   Preferred Pharmacy:   [facility] Anna [facility], [address] [place] [address] Drive, [facility] [address]   [place]  [address]  Phone: [phone] Fax: 18 [facility] #[id number], [address], [place], [address] [facility] [address]  Phone: [phone] Fax: [phone]    Renetta Jackson 83 [address] [address]  Phone: [phone] Fax: [address] [place] [address] [address] - [address] [place] [address] - [address] [facility]  309 [facility]  [address] [facility] [address] [address]  Phone: [phone] Fax: [phone]    Primary Care Provider: No primary care provider on file  Primary Insurance: BLUE CROSS  Secondary Insurance:     DISCHARGE DETAILS:    Discharge planning discussed with[de-identified] Patient  Freedom of Choice: Yes  Comments - Freedom of Choice: Pt will be discharging home  Declining rehab and home care  Pt requesting ride home and possibly a cane  SW will assist with LYFT ride  Will contact PT about cane  CM contacted family/caregiver?: No- see comments (per pt    Darletta Pac however SW was able to get pt through to sister on room phone so he could talk with her)  Were Treatment Team discharge recommendations reviewed with patient/caregiver?: Yes  Did patient/caregiver verbalize understanding of patient care needs?: Yes  Were patient/caregiver advised of the risks associated with not following Treatment Team discharge recommendations?: Yes    DME Referral Provided  Referral made for DME?: Yes  DME referral completed for the following items[de-identified] Carla Countess  DME Supplier Name[de-identified]  (PT to assess)    Treatment Team Recommendation: Short Term Rehab  Discharge Destination Plan[de-identified] Home  Transport at Discharge :  Other (Comment) (LFYT - waiver signed by pt and placed in scan bin for chart)

## 2022-05-12 NOTE — NURSING NOTE
Patient discharged today to home  Patient received cane after PT eval and was cleared for discharge by attending physician and consulting physicians  All discharge and follow up education completed at bedside  Patient educated on importance of medication regimen compliance  Patient expressed understanding and readback was performed  IV access removed  All belongings with patient at time of discharge

## 2022-05-12 NOTE — PROGRESS NOTES
Patient seen and examined at bedside  Patient was discharged yesterday however was unable to leave secondary to not having keys to his house  Patient denies any new complaints today  No chest pain or shortness of breath  Creatinine up a little from yesterday to 1  16  Patient received a 500ml NS bolus  Repeat CK was performed which showed that the CK is continuing to improve and is now 2,656  Patient able to get the keys to his house today and has been discharged home

## 2022-05-13 NOTE — UTILIZATION REVIEW
Notification of Discharge   This is a Notification of Discharge from our facility [address]  Please be advised that this patient has been discharge from our facility  Below you will find the admission and discharge date and time including the patients disposition  UTILIZATION REVIEW CONTACT:  Yessica Cm  Utilization   Network Utilization Review Department  Phone: [phone] x carefully listen to the prompts  All voicemails are confidential   Email: [email or web address]     PHYSICIAN ADVISORY SERVICES:  FOR VDIT-BH-UBCE REVIEW - MEDICAL NECESSITY DENIAL  Phone: [phone]  Fax: [phone]  Email: [email or web address]     PRESENTATION DATE: 5/7/2022  3:06 PM  OBERVATION ADMISSION DATE:   INPATIENT ADMISSION DATE: 5/9/22  3:15 PM   DISCHARGE DATE: 5/12/2022  1:42 PM  DISPOSITION: Home/Self Care Home/Self Care      IMPORTANT INFORMATION:  Send all requests for admission clinical reviews, approved or denied determinations and any other requests to dedicated fax number below belonging to the campus where the patient is receiving treatment   List of dedicated fax numbers:  [address] DENIALS (Administrative/Medical Necessity) [phone]   [address] (Maternity/NICU/Pediatrics) [phone]   Eden Albert [phone]   130 [facility] [phone]   [address] [place] [address] [phone]   2000 [facility] [address] [place] [address],[address] [phone]   [facility]  [phone]   220[address] [facility] [address]  2401 [facility][note 3] 1000 [address] [facility] [phone]

## 2022-05-17 ENCOUNTER — APPOINTMENT (EMERGENCY)
Dept: RADIOLOGY | Facility: HOSPITAL | Age: 57
DRG: 040 | End: 2022-05-17
Payer: COMMERCIAL

## 2022-05-17 ENCOUNTER — HOSPITAL ENCOUNTER (INPATIENT)
Facility: HOSPITAL | Age: 57
LOS: 6 days | Discharge: NURSING, REHAB OR LONG-TERM CARE | DRG: 040 | End: 2022-05-23
Attending: EMERGENCY MEDICINE | Admitting: INTERNAL MEDICINE
Payer: COMMERCIAL

## 2022-05-17 DIAGNOSIS — R29.90 STROKE-LIKE SYMPTOMS: ICD-10-CM

## 2022-05-17 DIAGNOSIS — S02.2XXA CLOSED FRACTURE OF NASAL BONE, INITIAL ENCOUNTER: ICD-10-CM
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
## 2022-05-17 DIAGNOSIS — I63.9 STROKE (HCC): ICD-10-CM

## 2022-05-17 DIAGNOSIS — I63.9 CVA (CEREBRAL VASCULAR ACCIDENT) (HCC): ICD-10-CM

## 2022-05-17 DIAGNOSIS — R41.82 ALTERED MENTAL STATUS: ICD-10-CM

## 2022-05-17 DIAGNOSIS — R56.9 SEIZURE (HCC): ICD-10-CM

## 2022-05-17 DIAGNOSIS — R53.1 WEAKNESS: ICD-10-CM

## 2022-05-17 LAB
2HR DELTA HS TROPONIN: 1 NG/L
4HR DELTA HS TROPONIN: 0 NG/L
ALBUMIN SERPL BCP-MCNC: 4.2 G/DL (ref 3.5–5)
ALP SERPL-CCNC: 96 U/L (ref 46–116)
ALT SERPL W P-5'-P-CCNC: 29 U/L (ref 12–78)
ANION GAP SERPL CALCULATED.3IONS-SCNC: 15 MMOL/L (ref 4–13)
APTT PPP: 28 SECONDS (ref 23–37)
AST SERPL W P-5'-P-CCNC: 30 U/L (ref 5–45)
BASOPHILS # BLD AUTO: 0.03 THOUSANDS/ΜL (ref 0–0.1)
BASOPHILS NFR BLD AUTO: 0 % (ref 0–1)
BILIRUB SERPL-MCNC: 0.74 MG/DL (ref 0.2–1)
BUN SERPL-MCNC: 12 MG/DL (ref 5–25)
CALCIUM SERPL-MCNC: 9.6 MG/DL (ref 8.3–10.1)
CARDIAC TROPONIN I PNL SERPL HS: 7 NG/L
CARDIAC TROPONIN I PNL SERPL HS: 7 NG/L
CARDIAC TROPONIN I PNL SERPL HS: 8 NG/L
CHLORIDE SERPL-SCNC: 99 MMOL/L (ref 100–108)
CK MB SERPL-MCNC: 1.8 NG/ML (ref 0–5)
CK MB SERPL-MCNC: <1 % (ref 0–2.5)
CK SERPL-CCNC: 295 U/L (ref 39–308)
CO2 SERPL-SCNC: 23 MMOL/L (ref 21–32)
CREAT SERPL-MCNC: 1.02 MG/DL (ref 0.6–1.3)
EOSINOPHIL # BLD AUTO: 0.05 THOUSAND/ΜL (ref 0–0.61)
EOSINOPHIL NFR BLD AUTO: 0 % (ref 0–6)
ERYTHROCYTE [DISTWIDTH] IN BLOOD BY AUTOMATED COUNT: 13.9 % (ref 11.6–15.1)
ETHANOL SERPL-MCNC: <3 MG/DL (ref 0–3)
GFR SERPL CREATININE-BSD FRML MDRD: 80 ML/MIN/1.73SQ M
GLUCOSE SERPL-MCNC: 87 MG/DL (ref 65–140)
GLUCOSE SERPL-MCNC: 92 MG/DL (ref 65–140)
HCT VFR BLD AUTO: 46.9 % (ref 36.5–49.3)
HGB BLD-MCNC: 16.2 G/DL (ref 12–17)
IMM GRANULOCYTES # BLD AUTO: 0.06 THOUSAND/UL (ref 0–0.2)
IMM GRANULOCYTES NFR BLD AUTO: 1 % (ref 0–2)
INR PPP: 1.04 (ref 0.84–1.19)
LYMPHOCYTES # BLD AUTO: 1.47 THOUSANDS/ΜL (ref 0.6–4.47)
LYMPHOCYTES NFR BLD AUTO: 12 % (ref 14–44)
MCH RBC QN AUTO: 29.5 PG (ref 26.8–34.3)
MCHC RBC AUTO-ENTMCNC: 34.5 G/DL (ref 31.4–37.4)
MCV RBC AUTO: 85 FL (ref 82–98)
MONOCYTES # BLD AUTO: 0.93 THOUSAND/ΜL (ref 0.17–1.22)
MONOCYTES NFR BLD AUTO: 8 % (ref 4–12)
NEUTROPHILS # BLD AUTO: 9.66 THOUSANDS/ΜL (ref 1.85–7.62)
NEUTS SEG NFR BLD AUTO: 79 % (ref 43–75)
NRBC BLD AUTO-RTO: 0 /100 WBCS
PLATELET # BLD AUTO: 275 THOUSANDS/UL (ref 149–390)
PMV BLD AUTO: 9.7 FL (ref 8.9–12.7)
POTASSIUM SERPL-SCNC: 3.7 MMOL/L (ref 3.5–5.3)
PROT SERPL-MCNC: 7.6 G/DL (ref 6.4–8.2)
PROTHROMBIN TIME: 13.4 SECONDS (ref 11.6–14.5)
RBC # BLD AUTO: 5.49 MILLION/UL (ref 3.88–5.62)
SODIUM SERPL-SCNC: 137 MMOL/L (ref 136–145)
WBC # BLD AUTO: 12.2 THOUSAND/UL (ref 4.31–10.16)

## 2022-05-17 PROCEDURE — G1004 CDSM NDSC: HCPCS

## 2022-05-17 PROCEDURE — 82948 REAGENT STRIP/BLOOD GLUCOSE: CPT

## 2022-05-17 PROCEDURE — 99285 EMERGENCY DEPT VISIT HI MDM: CPT

## 2022-05-17 PROCEDURE — 99285 EMERGENCY DEPT VISIT HI MDM: CPT | Performed by: EMERGENCY MEDICINE

## 2022-05-17 PROCEDURE — 36415 COLL VENOUS BLD VENIPUNCTURE: CPT | Performed by: EMERGENCY MEDICINE

## 2022-05-17 PROCEDURE — 83036 HEMOGLOBIN GLYCOSYLATED A1C: CPT

## 2022-05-17 PROCEDURE — 84484 ASSAY OF TROPONIN QUANT: CPT | Performed by: EMERGENCY MEDICINE

## 2022-05-17 PROCEDURE — 96374 THER/PROPH/DIAG INJ IV PUSH: CPT

## 2022-05-17 PROCEDURE — 72125 CT NECK SPINE W/O DYE: CPT

## 2022-05-17 PROCEDURE — 82553 CREATINE MB FRACTION: CPT | Performed by: EMERGENCY MEDICINE

## 2022-05-17 PROCEDURE — 93005 ELECTROCARDIOGRAM TRACING: CPT

## 2022-05-17 PROCEDURE — 85730 THROMBOPLASTIN TIME PARTIAL: CPT | Performed by: EMERGENCY MEDICINE

## 2022-05-17 PROCEDURE — 83735 ASSAY OF MAGNESIUM: CPT | Performed by: NURSE PRACTITIONER

## 2022-05-17 PROCEDURE — 85610 PROTHROMBIN TIME: CPT | Performed by: EMERGENCY MEDICINE

## 2022-05-17 PROCEDURE — 80053 COMPREHEN METABOLIC PANEL: CPT | Performed by: EMERGENCY MEDICINE

## 2022-05-17 PROCEDURE — 85025 COMPLETE CBC W/AUTO DIFF WBC: CPT | Performed by: EMERGENCY MEDICINE

## 2022-05-17 PROCEDURE — 99223 1ST HOSP IP/OBS HIGH 75: CPT

## 2022-05-17 PROCEDURE — 72131 CT LUMBAR SPINE W/O DYE: CPT

## 2022-05-17 PROCEDURE — 70450 CT HEAD/BRAIN W/O DYE: CPT

## 2022-05-17 PROCEDURE — 80177 DRUG SCRN QUAN LEVETIRACETAM: CPT

## 2022-05-17 PROCEDURE — 82550 ASSAY OF CK (CPK): CPT | Performed by: EMERGENCY MEDICINE

## 2022-05-17 PROCEDURE — 72128 CT CHEST SPINE W/O DYE: CPT

## 2022-05-17 PROCEDURE — 80061 LIPID PANEL: CPT

## 2022-05-17 PROCEDURE — 82077 ASSAY SPEC XCP UR&BREATH IA: CPT | Performed by: EMERGENCY MEDICINE

## 2022-05-17 RX ORDER — LANOLIN ALCOHOL/MO/W.PET/CERES
100 CREAM (GRAM) TOPICAL DAILY
Status: DISCONTINUED | OUTPATIENT
Start: 2022-05-18 | End: 2022-05-23 | Stop reason: HOSPADM

## 2022-05-17 RX ORDER — METOPROLOL SUCCINATE 100 MG/1
100 TABLET, EXTENDED RELEASE ORAL DAILY
Status: DISCONTINUED | OUTPATIENT
Start: 2022-05-18 | End: 2022-05-23 | Stop reason: HOSPADM

## 2022-05-17 RX ORDER — LORAZEPAM 2 MG/ML
1 INJECTION INTRAMUSCULAR ONCE
Status: COMPLETED | OUTPATIENT
Start: 2022-05-17 | End: 2022-05-17

## 2022-05-17 RX ORDER — PANTOPRAZOLE SODIUM 20 MG/1
20 TABLET, DELAYED RELEASE ORAL
Status: DISCONTINUED | OUTPATIENT
Start: 2022-05-18 | End: 2022-05-23 | Stop reason: HOSPADM

## 2022-05-17 RX ORDER — ONDANSETRON 2 MG/ML
4 INJECTION INTRAMUSCULAR; INTRAVENOUS EVERY 6 HOURS PRN
Status: DISCONTINUED | OUTPATIENT
Start: 2022-05-17 | End: 2022-05-23 | Stop reason: HOSPADM

## 2022-05-17 RX ORDER — FENOFIBRATE 145 MG/1
145 TABLET, COATED ORAL DAILY
Status: DISCONTINUED | OUTPATIENT
Start: 2022-05-18 | End: 2022-05-23 | Stop reason: HOSPADM

## 2022-05-17 RX ORDER — ACETAMINOPHEN 325 MG/1
650 TABLET ORAL EVERY 6 HOURS PRN
Status: DISCONTINUED | OUTPATIENT
Start: 2022-05-17 | End: 2022-05-23 | Stop reason: HOSPADM

## 2022-05-17 RX ORDER — HEPARIN SODIUM 5000 [USP'U]/ML
5000 INJECTION, SOLUTION INTRAVENOUS; SUBCUTANEOUS EVERY 8 HOURS SCHEDULED
Status: DISCONTINUED | OUTPATIENT
Start: 2022-05-17 | End: 2022-05-23 | Stop reason: HOSPADM

## 2022-05-17 RX ORDER — ASPIRIN 81 MG/1
81 TABLET, CHEWABLE ORAL DAILY
Status: DISCONTINUED | OUTPATIENT
Start: 2022-05-18 | End: 2022-05-23 | Stop reason: HOSPADM

## 2022-05-17 RX ORDER — LEVETIRACETAM 500 MG/1
1500 TABLET ORAL EVERY 12 HOURS
Status: DISCONTINUED | OUTPATIENT
Start: 2022-05-18 | End: 2022-05-23 | Stop reason: HOSPADM

## 2022-05-17 RX ORDER — AMLODIPINE BESYLATE 10 MG/1
10 TABLET ORAL
Status: DISCONTINUED | OUTPATIENT
Start: 2022-05-17 | End: 2022-05-19

## 2022-05-17 RX ORDER — ASPIRIN 325 MG
325 TABLET ORAL ONCE
Status: COMPLETED | OUTPATIENT
Start: 2022-05-17 | End: 2022-05-18

## 2022-05-17 RX ADMIN — LEVETIRACETAM 1000 MG: 100 INJECTION, SOLUTION INTRAVENOUS at 18:58

## 2022-05-17 RX ADMIN — LORAZEPAM 1 MG: 2 INJECTION INTRAMUSCULAR; INTRAVENOUS at 18:37

## 2022-05-17 NOTE — ED PROVIDER NOTES
History  Chief Complaint   Patient presents with    Fall     Patient states he collapsed and fell - states was on the floor for several hours  States he thinks he was having seizures (states he was "convulsing, but awake the whole time")  Requesting  for help in the home  58 yo male says the wind blew his front door open and it whacked him in the forehead 3 hours ago  He fell to the ground and couldn't get up  He says he did lose consciousness  He says he laid on the floor until his niece called EMS - he doesn't know how that happened  He c/o mid and lower back pain  He denies chest or belly pain  He denies alcohol  No recent fever, cough, vomiting, diarrhea  He says he needs a nurse to live with him and take care of him because he can't walk due to whole body pain  History provided by:  Patient   used: No    Fall  Associated symptoms: back pain and seizures    Associated symptoms: no abdominal pain, no chest pain, no headaches, no nausea and no vomiting        Prior to Admission Medications   Prescriptions Last Dose Informant Patient Reported? Taking? Butalbital-APAP-Caffeine (Fioricet) -40 MG CAPS   No No   Sig: Take 1 tablet by mouth every 12 (twelve) hours as needed (Headache) for up to 5 days   amLODIPine (NORVASC) 10 mg tablet   Yes No   Sig: Take 10 mg by mouth daily at bedtime  fenofibrate (TRICOR) 145 mg tablet   No No   Sig: Take 1 tablet (145 mg total) by mouth daily   levETIRAcetam (KEPPRA) 750 mg tablet   No No   Sig: Take 2 tablets (1,500 mg total) by mouth every 12 (twelve) hours   metoprolol succinate (TOPROL-XL) 100 mg 24 hr tablet   No No   Sig: Take 1 tablet (100 mg total) by mouth daily   omeprazole (PriLOSEC) 20 mg delayed release capsule   Yes No   Sig: Take 20 mg by mouth daily  thiamine 100 MG tablet   No No   Sig: Take 1 tablet (100 mg total) by mouth in the morning        Facility-Administered Medications: None       Past Medical History:   Diagnosis Date    Biceps tendonitis, unspecified laterality 2007    Bone cyst 2011    Bronchiectasis ([address] ) 10/02/2006    Bursitis 2005    Chest pain 2009    Chronic renal failure 2011    Diverticulitis of colon 10/09/2007    Generalized anxiety disorder 2006    GERD (gastroesophageal reflux disease)     Gout     Hyperlipidemia     Hypertension     Lateral epicondylitis, unspecified elbow     Last Assessed: 2013    Low magnesium levels     Lyme disease 2009    Pneumonia     Last Assessed: 2013       Past Surgical History:   Procedure Laterality Date    KNEE ARTHROSCOPY      Therapeutic    OLECRANON BURSA EXCISION         Family History   Problem Relation Age of Onset    Cancer Mother         Colon     I have reviewed and agree with the history as documented  E-Cigarette/Vaping    E-Cigarette Use Never User      E-Cigarette/Vaping Substances     Social History     Tobacco Use    Smoking status: Former Smoker     Packs/day: 0 50     Quit date: 5/10/2022     Years since quittin 0    Smokeless tobacco: Never Used    Tobacco comment: cigarette nicotine dependence   Vaping Use    Vaping Use: Never used   Substance Use Topics    Alcohol use: Yes     Alcohol/week: 6 0 standard drinks     Types: 6 Glasses of wine per week     Comment: 20-states he has not had alcohol since 2021    Drug use: No       Review of Systems   Constitutional: Negative  Negative for chills and fever  HENT: Negative  Negative for congestion and sore throat  Eyes: Negative  Respiratory: Negative  Negative for cough and shortness of breath  Cardiovascular: Negative  Negative for chest pain and leg swelling  Gastrointestinal: Negative  Negative for abdominal pain, diarrhea, nausea and vomiting  Genitourinary: Negative  Negative for dysuria, flank pain and hematuria     Musculoskeletal: Positive for back pain and gait problem  Negative for myalgias  Skin: Negative  Negative for rash and wound  Neurological: Positive for seizures and weakness  Negative for dizziness and headaches  Psychiatric/Behavioral: Positive for confusion and hallucinations  The patient is not nervous/anxious  All other systems reviewed and are negative  Physical Exam  Physical Exam  Vitals and nursing note reviewed  Constitutional:       General: He is not in acute distress  Appearance: He is well-developed  He is not ill-appearing or diaphoretic  HENT:      Head: Normocephalic and atraumatic  Right Ear: External ear normal       Left Ear: External ear normal       Mouth/Throat:      Mouth: Mucous membranes are moist       Pharynx: Oropharynx is clear  Eyes:      General: No scleral icterus  Conjunctiva/sclera: Conjunctivae normal       Pupils: Pupils are equal, round, and reactive to light  Neck:      Comments: In collar  Cardiovascular:      Rate and Rhythm: Normal rate and regular rhythm  Heart sounds: Normal heart sounds  No murmur heard  Pulmonary:      Effort: Pulmonary effort is normal  No respiratory distress  Breath sounds: Normal breath sounds  Chest:      Chest wall: No tenderness  Abdominal:      General: Bowel sounds are normal  There is no distension  Palpations: Abdomen is soft  Tenderness: There is no abdominal tenderness  Musculoskeletal:         General: No tenderness or deformity  Normal range of motion  Right lower leg: No edema  Left lower leg: No edema  Skin:     General: Skin is warm and dry  Coloration: Skin is not pale  Findings: No erythema or rash  Neurological:      Mental Status: He is alert and oriented to person, place, and time  Cranial Nerves: No cranial nerve deficit  Sensory: Sensory deficit present  Motor: Weakness present  Coordination: Coordination abnormal       Comments: Pt   With intermittent twitching of trunk and extremities but fully awake and conversant - says this has been happening off and on since being in the hospital   Psychiatric:      Comments: Pt  Hallucinating at times         Vital Signs  ED Triage Vitals   Temperature Pulse Respirations Blood Pressure SpO2   05/17/22 1642 05/17/22 1642 05/17/22 1642 05/17/22 1642 05/17/22 1642   99 1 °F (37 3 °C) 104 19 133/86 96 %      Temp Source Heart Rate Source Patient Position - Orthostatic VS BP Location FiO2 (%)   05/17/22 1642 05/17/22 1642 05/17/22 1642 05/17/22 1642 --   Tympanic Monitor Sitting Left arm       Pain Score       05/17/22 1800       10 - Worst Possible Pain           Vitals:    05/17/22 1800 05/17/22 1815 05/17/22 1830 05/17/22 1845   BP: (!) 174/110 (!) 157/106 165/95 150/85   Pulse: 96 102 98 104   Patient Position - Orthostatic VS: Lying Lying Lying Lying         Visual Acuity  Visual Acuity    Flowsheet Row Most Recent Value   L Pupil Size (mm) 3   R Pupil Size (mm) 3          ED Medications  Medications   LORazepam (ATIVAN) injection 1 mg (1 mg Intravenous Given 5/17/22 1837)   levETIRAcetam (KEPPRA) 1,000 mg in sodium chloride 0 9 % 100 mL IVPB (0 mg Intravenous Stopped 5/17/22 1913)       Diagnostic Studies  Results Reviewed     Procedure Component Value Units Date/Time    HS Troponin I 2hr [[id number]] Collected: 05/17/22 1936    Lab Status: No result Specimen: Blood from Arm, Right     HS Troponin I 4hr [[id number]]     Lab Status: No result Specimen: Blood     CKMB [[id number]]  (Normal) Collected: 05/17/22 1710    Lab Status: Final result Specimen: Blood from Arm, Left Updated: 05/17/22 1909     CK-MB Index <1 0 %      CK-MB 1 8 ng/mL     Protime-INR [[id number]]  (Normal) Collected: 05/17/22 1710    Lab Status: Final result Specimen: Blood from Arm, Left Updated: 05/17/22 1846     Protime 13 4 seconds      INR 1 04    APTT [[id number]]  (Normal) Collected: 05/17/22 1710    Lab Status: Final result Specimen: Blood from Arm, Left Updated: 05/17/22 1846     PTT 28 seconds     CK (with reflex to MB) [[id number]]  (Normal) Collected: 05/17/22 1710    Lab Status: Final result Specimen: Blood from Arm, Left Updated: 05/17/22 1758     Total  U/L     HS Troponin 0hr (reflex protocol) [[id number]]  (Normal) Collected: 05/17/22 1710    Lab Status: Final result Specimen: Blood from Arm, Left Updated: 05/17/22 1751     hs TnI 0hr 7 ng/L     Comprehensive metabolic panel [[id number]]  (Abnormal) Collected: 05/17/22 1710    Lab Status: Final result Specimen: Blood from Arm, Left Updated: 05/17/22 1740     Sodium 137 mmol/L      Potassium 3 7 mmol/L      Chloride 99 mmol/L      CO2 23 mmol/L      ANION GAP 15 mmol/L      BUN 12 mg/dL      Creatinine 1 02 mg/dL      Glucose 87 mg/dL      Calcium 9 6 mg/dL      AST 30 U/L      ALT 29 U/L      Alkaline Phosphatase 96 U/L      Total Protein 7 6 g/dL      Albumin 4 2 g/dL      Total Bilirubin 0 74 mg/dL      eGFR 80 ml/min/1 73sq m     Narrative:      [place] guidelines for Chronic Kidney Disease (CKD):     Stage 1 with normal or high GFR (GFR > 90 mL/min/1 73 square meters)    Stage 2 Mild CKD (GFR = 60-89 mL/min/1 73 square meters)    Stage 3A Moderate CKD (GFR = 45-59 mL/min/1 73 square meters)    Stage 3B Moderate CKD (GFR = 30-44 mL/min/1 73 square meters)    Stage 4 Severe CKD (GFR = 15-29 mL/min/1 73 square meters)    Stage 5 End Stage CKD (GFR <15 mL/min/1 73 square meters)  Note: GFR calculation is accurate only with a steady state creatinine    Ethanol [[id number]]  (Normal) Collected: 05/17/22 1710    Lab Status: Final result Specimen: Blood from Arm, Left Updated: 05/17/22 1739     Ethanol Lvl <3 mg/dL     CBC and differential [[id number]]  (Abnormal) Collected: 05/17/22 1710    Lab Status: Final result Specimen: Blood from Arm, Left Updated: 05/17/22 1724     WBC 12 20 Thousand/uL      RBC 5 49 Million/uL      Hemoglobin 16 2 g/dL      Hematocrit 46 9 %      MCV 85 fL MCH 29 5 pg      MCHC 34 5 g/dL      RDW 13 9 %      MPV 9 7 fL      Platelets 414 Thousands/uL      nRBC 0 /100 WBCs      Neutrophils Relative 79 %      Immat GRANS % 1 %      Lymphocytes Relative 12 %      Monocytes Relative 8 %      Eosinophils Relative 0 %      Basophils Relative 0 %      Neutrophils Absolute 9 66 Thousands/µL      Immature Grans Absolute 0 06 Thousand/uL      Lymphocytes Absolute 1 47 Thousands/µL      Monocytes Absolute 0 93 Thousand/µL      Eosinophils Absolute 0 05 Thousand/µL      Basophils Absolute 0 03 Thousands/µL     Fingerstick Glucose (POCT) [[id number]]  (Normal) Collected: 05/17/22 1701    Lab Status: Final result Updated: 05/17/22 1704     POC Glucose 92 mg/dl     UA (URINE) with reflex to Scope [[id number]]     Lab Status: No result Specimen: Urine                  CT thoracic spine without contrast   Final Result by Dominique Dumont MD (05/17 1830)      Schmorl's nodes at the superior endplates of T2, T3 and possibly T4 of indeterminate age  Workstation performed: [id number]         CT cervical spine without contrast   Final Result by Dominique Dumont MD (05/17 1822)      No cervical spine fracture or traumatic malalignment  Fluid in the sphenoid sinuses  Workstation performed: [id number]         CT head wo contrast   Final Result by Juan Francisco Isidro DO (05/17 1808)      No acute intracranial abnormality  Microangiopathic changes                    Workstation performed: [id number]         CT lumbar spine without contrast    (Results Pending)              Procedures  ECG 12 Lead Documentation Only    Date/Time: 5/17/2022 5:11 PM  Performed by: Kumar Feldman MD  Authorized by: Kumar Feldman MD     Indications / Diagnosis:  Altered mental status, fall  ECG reviewed by me, the ED Provider: yes    Patient location:  ED  Previous ECG:     Previous ECG:  Compared to current    Similarity:  No change  Interpretation:     Interpretation: abnormal Rate:     ECG rate:  92    ECG rate assessment: normal    Rhythm:     Rhythm: sinus rhythm    Ectopy:     Ectopy: none    QRS:     QRS axis:  Normal  Conduction:     Conduction: abnormal      Abnormal conduction: complete LBBB    ST segments:     ST segments:  Non-specific  T waves:     T waves: non-specific               ED Course                                             MDM  Number of Diagnoses or Management Options  Diagnosis management comments: Discussed with neurology on call, pt  With altered mental status, some off and on twitching movements, no sensation on left side of body and left arm twitching and jerking and drift  I'm unable to get a hold of family, unknown last known normal   Will not call stroke alert and ok to do CT instead of CTA  Pt  With h/o seizure, alcohol abuse, and hemorrhagic stroke  1930 - pt  Continued with body twitching off and on, he is aware of the shaking  Will give ativan and Keppra load  I did discuss with neurology on call a second time, he does not feel pt  Needs to go to [place] for emergent MRI, can be admitted and neuro checks here and MRI tomorrow if needed  Pt  Was able to feel pinprick on both sides of body  hospitalist to admit  Disposition  Final diagnoses:   Fall, initial encounter   Altered mental status   Seizure ([facility] )   Stroke-like symptoms     Time reflects when diagnosis was documented in both MDM as applicable and the Disposition within this note     Time User Action Codes Description Comment    9/20/6352  7:84 PM Koko Santiago Add [I47  Bre Jacobsen Fall, initial encounter     7/38/6579  0:75 PM Koko Rich Add [A15 39] Altered mental status     9/56/5371  5:12 PM Koko Rich Add [H43 6] Seizure ([facility] [address] )     3/20/3001  3:69 PM Rocio Spina A Add [A00 72] Stroke-like symptoms       ED Disposition     ED Disposition   Admit    Condition   Stable    Date/Time   Tue May 17, 2022  7:37 PM    Comment   Case was discussed with **neurology* and the patient's admission status was agreed to be Admission Status: inpatient status to the service of Dr Naina Pimentelspitalchasidy*   Follow-up Information    None         Patient's Medications   Discharge Prescriptions    No medications on file       No discharge procedures on file      PDMP Review     None          ED Provider  Electronically Signed by           Lizbeth Thibodeaux MD  41/19/87 [id number]

## 2022-05-18 ENCOUNTER — APPOINTMENT (INPATIENT)
Dept: RADIOLOGY | Facility: HOSPITAL | Age: 57
DRG: 040 | End: 2022-05-18
Payer: COMMERCIAL

## 2022-05-18 ENCOUNTER — PREP FOR PROCEDURE (OUTPATIENT)
Dept: OTHER | Facility: HOSPITAL | Age: 57
End: 2022-05-18

## 2022-05-18 DIAGNOSIS — I63.9 CVA (CEREBRAL VASCULAR ACCIDENT) (HCC): Primary | ICD-10-CM

## 2022-05-18 PROBLEM — E83.42 HYPOMAGNESEMIA: Status: ACTIVE | Noted: 2022-05-18

## 2022-05-18 LAB
CHOLEST SERPL-MCNC: 179 MG/DL
EST. AVERAGE GLUCOSE BLD GHB EST-MCNC: 105 MG/DL
HBA1C MFR BLD: 5.3 %
HDLC SERPL-MCNC: 35 MG/DL
LDLC SERPL CALC-MCNC: 107 MG/DL (ref 0–100)
MAGNESIUM SERPL-MCNC: 1.1 MG/DL (ref 1.6–2.6)
TRIGL SERPL-MCNC: 184 MG/DL

## 2022-05-18 PROCEDURE — 97163 PT EVAL HIGH COMPLEX 45 MIN: CPT

## 2022-05-18 PROCEDURE — 70496 CT ANGIOGRAPHY HEAD: CPT

## 2022-05-18 PROCEDURE — G1004 CDSM NDSC: HCPCS

## 2022-05-18 PROCEDURE — 85303 CLOT INHIBIT PROT C ACTIVITY: CPT | Performed by: PSYCHIATRY & NEUROLOGY

## 2022-05-18 PROCEDURE — 81241 F5 GENE: CPT | Performed by: PSYCHIATRY & NEUROLOGY

## 2022-05-18 PROCEDURE — 99232 SBSQ HOSP IP/OBS MODERATE 35: CPT | Performed by: FAMILY MEDICINE

## 2022-05-18 PROCEDURE — 85705 THROMBOPLASTIN INHIBITION: CPT | Performed by: PSYCHIATRY & NEUROLOGY

## 2022-05-18 PROCEDURE — 99291 CRITICAL CARE FIRST HOUR: CPT | Performed by: PSYCHIATRY & NEUROLOGY

## 2022-05-18 PROCEDURE — 85306 CLOT INHIBIT PROT S FREE: CPT | Performed by: PSYCHIATRY & NEUROLOGY

## 2022-05-18 PROCEDURE — 86147 CARDIOLIPIN ANTIBODY EA IG: CPT | Performed by: PSYCHIATRY & NEUROLOGY

## 2022-05-18 PROCEDURE — 97116 GAIT TRAINING THERAPY: CPT

## 2022-05-18 PROCEDURE — 81240 F2 GENE: CPT | Performed by: PSYCHIATRY & NEUROLOGY

## 2022-05-18 PROCEDURE — 85300 ANTITHROMBIN III ACTIVITY: CPT | Performed by: PSYCHIATRY & NEUROLOGY

## 2022-05-18 PROCEDURE — 85305 CLOT INHIBIT PROT S TOTAL: CPT | Performed by: PSYCHIATRY & NEUROLOGY

## 2022-05-18 PROCEDURE — 70498 CT ANGIOGRAPHY NECK: CPT

## 2022-05-18 PROCEDURE — 86146 BETA-2 GLYCOPROTEIN ANTIBODY: CPT | Performed by: PSYCHIATRY & NEUROLOGY

## 2022-05-18 PROCEDURE — 85732 THROMBOPLASTIN TIME PARTIAL: CPT | Performed by: PSYCHIATRY & NEUROLOGY

## 2022-05-18 PROCEDURE — 85613 RUSSELL VIPER VENOM DILUTED: CPT | Performed by: PSYCHIATRY & NEUROLOGY

## 2022-05-18 PROCEDURE — 70551 MRI BRAIN STEM W/O DYE: CPT

## 2022-05-18 PROCEDURE — 97167 OT EVAL HIGH COMPLEX 60 MIN: CPT

## 2022-05-18 PROCEDURE — 85670 THROMBIN TIME PLASMA: CPT | Performed by: PSYCHIATRY & NEUROLOGY

## 2022-05-18 RX ORDER — ATORVASTATIN CALCIUM 80 MG/1
80 TABLET, FILM COATED ORAL
Status: DISCONTINUED | OUTPATIENT
Start: 2022-05-18 | End: 2022-05-23 | Stop reason: HOSPADM

## 2022-05-18 RX ORDER — KETOROLAC TROMETHAMINE 30 MG/ML
30 INJECTION, SOLUTION INTRAMUSCULAR; INTRAVENOUS ONCE
Status: COMPLETED | OUTPATIENT
Start: 2022-05-18 | End: 2022-05-18

## 2022-05-18 RX ORDER — MAGNESIUM SULFATE HEPTAHYDRATE 40 MG/ML
2 INJECTION, SOLUTION INTRAVENOUS ONCE
Status: COMPLETED | OUTPATIENT
Start: 2022-05-18 | End: 2022-05-18

## 2022-05-18 RX ORDER — CLOPIDOGREL BISULFATE 75 MG/1
75 TABLET ORAL DAILY
Status: DISCONTINUED | OUTPATIENT
Start: 2022-05-19 | End: 2022-05-23 | Stop reason: HOSPADM

## 2022-05-18 RX ORDER — ATORVASTATIN CALCIUM 40 MG/1
40 TABLET, FILM COATED ORAL
Status: DISCONTINUED | OUTPATIENT
Start: 2022-05-18 | End: 2022-05-18

## 2022-05-18 RX ORDER — CLOPIDOGREL BISULFATE 75 MG/1
300 TABLET ORAL ONCE
Status: COMPLETED | OUTPATIENT
Start: 2022-05-18 | End: 2022-05-18

## 2022-05-18 RX ORDER — BUTALBITAL, ACETAMINOPHEN AND CAFFEINE 50; 325; 40 MG/1; MG/1; MG/1
1 TABLET ORAL EVERY 4 HOURS PRN
Status: DISCONTINUED | OUTPATIENT
Start: 2022-05-18 | End: 2022-05-23 | Stop reason: HOSPADM

## 2022-05-18 RX ADMIN — HEPARIN SODIUM 5000 UNITS: 5000 INJECTION INTRAVENOUS; SUBCUTANEOUS at 21:37

## 2022-05-18 RX ADMIN — LEVETIRACETAM 1500 MG: 500 TABLET, FILM COATED ORAL at 21:37

## 2022-05-18 RX ADMIN — IOHEXOL 65 ML: 350 INJECTION, SOLUTION INTRAVENOUS at 16:42

## 2022-05-18 RX ADMIN — KETOROLAC TROMETHAMINE 30 MG: 30 INJECTION, SOLUTION INTRAMUSCULAR at 15:28

## 2022-05-18 RX ADMIN — SODIUM CHLORIDE 200 MG: 9 INJECTION, SOLUTION INTRAVENOUS at 12:58

## 2022-05-18 RX ADMIN — ASPIRIN 325 MG: 325 TABLET ORAL at 00:14

## 2022-05-18 RX ADMIN — ACETAMINOPHEN 650 MG: 325 TABLET, FILM COATED ORAL at 00:14

## 2022-05-18 RX ADMIN — HEPARIN SODIUM 5000 UNITS: 5000 INJECTION INTRAVENOUS; SUBCUTANEOUS at 00:14

## 2022-05-18 RX ADMIN — METOPROLOL SUCCINATE 100 MG: 100 TABLET, EXTENDED RELEASE ORAL at 09:04

## 2022-05-18 RX ADMIN — AMLODIPINE BESYLATE 10 MG: 10 TABLET ORAL at 00:14

## 2022-05-18 RX ADMIN — ASPIRIN 81 MG CHEWABLE TABLET 81 MG: 81 TABLET CHEWABLE at 09:04

## 2022-05-18 RX ADMIN — AMLODIPINE BESYLATE 10 MG: 10 TABLET ORAL at 21:37

## 2022-05-18 RX ADMIN — ACETAMINOPHEN 650 MG: 325 TABLET, FILM COATED ORAL at 21:36

## 2022-05-18 RX ADMIN — ACETAMINOPHEN 650 MG: 325 TABLET, FILM COATED ORAL at 09:06

## 2022-05-18 RX ADMIN — CLOPIDOGREL BISULFATE 300 MG: 75 TABLET ORAL at 15:27

## 2022-05-18 RX ADMIN — THIAMINE HCL TAB 100 MG 100 MG: 100 TAB at 09:04

## 2022-05-18 RX ADMIN — ATORVASTATIN CALCIUM 80 MG: 80 TABLET ORAL at 15:59

## 2022-05-18 RX ADMIN — HEPARIN SODIUM 5000 UNITS: 5000 INJECTION INTRAVENOUS; SUBCUTANEOUS at 05:51

## 2022-05-18 RX ADMIN — LEVETIRACETAM 1500 MG: 500 TABLET, FILM COATED ORAL at 09:04

## 2022-05-18 RX ADMIN — FENOFIBRATE 145 MG: 145 TABLET, FILM COATED ORAL at 09:04

## 2022-05-18 RX ADMIN — HEPARIN SODIUM 5000 UNITS: 5000 INJECTION INTRAVENOUS; SUBCUTANEOUS at 15:28

## 2022-05-18 RX ADMIN — PANTOPRAZOLE SODIUM 20 MG: 20 TABLET, DELAYED RELEASE ORAL at 05:51

## 2022-05-18 RX ADMIN — MAGNESIUM SULFATE HEPTAHYDRATE 2 G: 40 INJECTION, SOLUTION INTRAVENOUS at 12:36

## 2022-05-18 NOTE — H&P
[address] 1965, 59 y o  male MRN: [id number]  Unit/Bed#: ED 01 Encounter: [id number]  Primary Care Provider: No primary care provider on file  Date and time admitted to hospital: 5/17/2022  4:32 PM    * Stroke-like symptoms  Assessment & Plan  Patient presents after being found on the ground at home, incontinent and disoriented  · Possibly due to postictal state from seizure vs stroke  · CT head shows no acute intracranial abnormalities  · CT spine with no acute fractures or dislocation  · Pt With intermittent twitching of trunk and extremities but fully awake and conversant in ED  · Given Ativan and Keppra 1000 mg  · CK normal  · Check Keppra level, lipid panel, hemoglobin A1c  · Restart Keppra 1500 mg b i d   · Aspirin 325 now, 81 mg daily  · Seizure, fall precautions  · PT/OT eval  · Discussed with neurology  · No need for emergent MRI  · Frequent neuro checks, nonemergent MRI tomorrow   · Further recommendations appreciated    Seizure disorder [facility])  Assessment & Plan  Patient on Keppra 1500 mg b i d  From known seizure disorder  · Unsure if patient is compliant with medication  · Check Keppra level  · Restart home dose  · Seizure, fall precautions  · Neurology consult    History of alcohol abuse  Assessment & Plan  No recent alcohol use  · Continue thiamine    Hyperlipidemia  Assessment & Plan  Continue Tricor    Hypertension  Assessment & Plan  Continue amlodipine, metoprolol    VTE Pharmacologic Prophylaxis: VTE Score: 3 Moderate Risk (Score 3-4) - Pharmacological DVT Prophylaxis Ordered: heparin  Code Status:  Full code  Discussion with family: Updated  (sister) via phone  Anticipated Length of Stay: Patient will be admitted on an inpatient basis with an anticipated length of stay of greater than 2 midnights secondary to Stroke-like symptoms      Total Time for Visit, including Counseling / Coordination of Care: 45 minutes Greater than 50% of this total time spent on direct patient counseling and coordination of care  Chief Complaint:  Fall    History of Present Illness:  Bennett Marrero is a 59 y o  male with a PMH of seizure disorder, hypertension, hyperlipidemia, GERD who presents with fall at home  Per patient he says that the wind blew his front door open and hit him in the forehead  He states that he then fell to the ground and EMS was called by his niece  I spoke with his sister on the phone who states that he has had multiple seizures in the past couple days  She called the  yesterday to have a wellness check done as he was not answering and stated he was having a seizure but refused to come to the hospital   The patient's niece went to the house today and found the patient on the floor  The patient was incontinent of urine and thought to have had a seizure  The patient's sister states that he lives alone and has had trouble caring for himself lately  She states he has had multiple pills on the counter and thinks that he is missing doses of medication  She states she has not been himself since being discharged from the hospital previously  She is inquiring  about possible placement at discharge  Patient denies any pain currently  He denies any fevers, chills, chest pain, palpitations, shortness of breath, cough, abdominal pain  He is unsure if he has taken his Keppra as prescribed  Patient denies any recent alcohol, tobacco use  Review of Systems:  Review of Systems   Constitutional: Negative for chills and fever  Eyes: Negative  Respiratory: Negative for cough and shortness of breath  Cardiovascular: Negative for chest pain and palpitations  Gastrointestinal: Negative for abdominal pain  Endocrine: Negative  Genitourinary: Negative for dysuria, frequency and hematuria  Musculoskeletal: Negative for back pain and neck pain  Allergic/Immunologic: Negative      Neurological: Positive for seizures and weakness  Hematological: Negative  Psychiatric/Behavioral: Positive for confusion  Past Medical and Surgical History:   Past Medical History:   Diagnosis Date    Biceps tendonitis, unspecified laterality 09/17/2007    Bone cyst 11/22/2011    Bronchiectasis ([address] ) 10/02/2006    Bursitis 11/17/2005    Chest pain 04/29/2009    Chronic renal failure 11/22/2011    Diverticulitis of colon 10/09/2007    Generalized anxiety disorder 06/08/2006    GERD (gastroesophageal reflux disease)     Gout     Hyperlipidemia     Hypertension     Lateral epicondylitis, unspecified elbow     Last Assessed: 11/29/2013    Low magnesium levels     Lyme disease 11/09/2009    Pneumonia     Last Assessed: 1/7/2013       Past Surgical History:   Procedure Laterality Date    KNEE ARTHROSCOPY      Therapeutic    OLECRANON BURSA EXCISION         Meds/Allergies:  Prior to Admission medications    Medication Sig Start Date End Date Taking? Authorizing Provider   amLODIPine (NORVASC) 10 mg tablet Take 10 mg by mouth daily at bedtime  Historical Provider, MD   Butalbital-APAP-Caffeine (Fioricet) -40 MG CAPS Take 1 tablet by mouth every 12 (twelve) hours as needed (Headache) for up to 5 days 5/12/22 5/17/22  Karen Pineda MD   fenofibrate (TRICOR) 145 mg tablet Take 1 tablet (145 mg total) by mouth daily 3/5/18   NOAM Persaud   levETIRAcetam (KEPPRA) 750 mg tablet Take 2 tablets (1,500 mg total) by mouth every 12 (twelve) hours 5/11/22 6/10/22  Karen Pineda MD   metoprolol succinate (TOPROL-XL) 100 mg 24 hr tablet Take 1 tablet (100 mg total) by mouth daily 3/5/18   NOAM Persaud   omeprazole (PriLOSEC) 20 mg delayed release capsule Take 20 mg by mouth daily  Historical Provider, MD   thiamine 100 MG tablet Take 1 tablet (100 mg total) by mouth in the morning  5/12/22 6/11/22  Karen Pineda MD     I have reviewed home medications with patient personally      Allergies: Allergies   Allergen Reactions    Codeine Hives     Tolerates morphine    Penicillins     Hydrocodone Rash     Reaction Date: 2007;        Social History:  Marital Status: /Civil Union   Occupation:   Patient Pre-hospital Living Situation: Home  Patient Pre-hospital Level of Mobility: walks  Patient Pre-hospital Diet Restrictions: none  Substance Use History:   Social History     Substance and Sexual Activity   Alcohol Use Yes    Alcohol/week: 6 0 standard drinks    Types: 6 Glasses of wine per week    Comment: 20-states he has not had alcohol since 2021     Social History     Tobacco Use   Smoking Status Former Smoker    Packs/day: 0 50    Quit date: 5/10/2022    Years since quittin 0   Smokeless Tobacco Never Used   Tobacco Comment    cigarette nicotine dependence     Social History     Substance and Sexual Activity   Drug Use No       Family History:  Family History   Problem Relation Age of Onset    Cancer Mother         Colon       Physical Exam:     Vitals:   Blood Pressure: 137/72 (22)  Pulse: 96 (22)  Temperature: 98 8 °F (37 1 °C) (22)  Temp Source: Oral (22)  Respirations: 20 (22)  SpO2: 99 % (22)    Physical Exam  Vitals reviewed  Constitutional:       Appearance: He is well-developed  HENT:      Head: Normocephalic and atraumatic  Eyes:      General: No visual field deficit  Conjunctiva/sclera: Conjunctivae normal    Cardiovascular:      Rate and Rhythm: Normal rate and regular rhythm  Heart sounds: No murmur heard  Pulmonary:      Effort: Pulmonary effort is normal  No respiratory distress  Breath sounds: Normal breath sounds  Abdominal:      Palpations: Abdomen is soft  Tenderness: There is no abdominal tenderness  Skin:     General: Skin is warm and dry  Neurological:      Mental Status: He is alert and oriented to person, place, and time  Cranial Nerves:  No dysarthria or facial asymmetry  Sensory: Sensory deficit present  Gait: Gait abnormal       Comments: Weakness of left upper and lower extremity, no seizure activity noted on exam         Additional Data:     Lab Results:  Results from last 7 days   Lab Units 05/17/22  1710   WBC Thousand/uL 12 20*   HEMOGLOBIN g/dL 16 2   HEMATOCRIT % 46 9   PLATELETS Thousands/uL 275   NEUTROS PCT % 79*   LYMPHS PCT % 12*   MONOS PCT % 8   EOS PCT % 0     Results from last 7 days   Lab Units 05/17/22  1710   SODIUM mmol/L 137   POTASSIUM mmol/L 3 7   CHLORIDE mmol/L 99*   CO2 mmol/L 23   BUN mg/dL 12   CREATININE mg/dL 1 02   ANION GAP mmol/L 15*   CALCIUM mg/dL 9 6   ALBUMIN g/dL 4 2   TOTAL BILIRUBIN mg/dL 0 74   ALK PHOS U/L 96   ALT U/L 29   AST U/L 30   GLUCOSE RANDOM mg/dL 87     Results from last 7 days   Lab Units 05/17/22  1710   INR  1 04     Results from last 7 days   Lab Units 05/17/22  1701   POC GLUCOSE mg/dl 92               Imaging: Reviewed radiology reports from this admission including: CT head  CT lumbar spine without contrast   Final Result by Queen Rene MD (05/17 2045)      No acute fracture or dislocation  Multilevel degenerative changes  Workstation performed: [id number]         CT thoracic spine without contrast   Final Result by Samia Dodson MD (05/17 1830)      Schmorl's nodes at the superior endplates of T2, T3 and possibly T4 of indeterminate age  Workstation performed: [id number]         CT cervical spine without contrast   Final Result by Samia Dodson MD (05/17 1822)      No cervical spine fracture or traumatic malalignment  Fluid in the sphenoid sinuses  Workstation performed: [id number]         CT head wo contrast   Final Result by Cassandra Norris DO (05/17 1808)      No acute intracranial abnormality  Microangiopathic changes                    Workstation performed: [id number]         MRI Inpatient Order    (Results Pending) EKG and Other Studies Reviewed on Admission:   · EKG: NSR  HR 90     ** Please Note: This note has been constructed using a voice recognition system   **

## 2022-05-18 NOTE — PLAN OF CARE
Problem: Prexisting or High Potential for Compromised Skin Integrity  Goal: Skin integrity is maintained or improved  Description: INTERVENTIONS:  - Identify patients at risk for skin breakdown  - Assess and monitor skin integrity  - Assess and monitor nutrition and hydration status  - Monitor labs   - Assess for incontinence   - Turn and reposition patient  - Assist with mobility/ambulation  - Relieve pressure over bony prominences  - Avoid friction and shearing  - Provide appropriate hygiene as needed including keeping skin clean and dry  - Evaluate need for skin moisturizer/barrier cream  - Collaborate with interdisciplinary team   - Patient/family teaching  - Consider wound care consult   Outcome: Progressing     Problem: MOBILITY - ADULT  Goal: Maintain or return to baseline ADL function  Description: INTERVENTIONS:  -  Assess patient's ability to carry out ADLs; assess patient's baseline for ADL function and identify physical deficits which impact ability to perform ADLs (bathing, care of mouth/teeth, toileting, grooming, dressing, etc )  - Assess/evaluate cause of self-care deficits   - Assess range of motion  - Assess patient's mobility; develop plan if impaired  - Assess patient's need for assistive devices and provide as appropriate  - Encourage maximum independence but intervene and supervise when necessary  - Involve family in performance of ADLs  - Assess for home care needs following discharge   - Consider OT consult to assist with ADL evaluation and planning for discharge  - Provide patient education as appropriate  Outcome: Progressing  Goal: Maintains/Returns to pre admission functional level  Description: INTERVENTIONS:  - Perform BMAT or MOVE assessment daily    - Set and communicate daily mobility goal to care team and patient/family/caregiver  - Collaborate with rehabilitation services on mobility goals if consulted  - Perform Range of Motion 4 times a day    - Reposition patient every 2 hours   - Dangle patient 3 times a day  - Stand patient 3 times a day  - Ambulate patient 3 times a day  - Out of bed to chair 3 times a day   - Out of bed for meals 3 times a day  - Out of bed for toileting  - Record patient progress and toleration of activity level   Outcome: Progressing     Problem: Potential for Falls  Goal: Patient will remain free of falls  Description: INTERVENTIONS:  - Educate patient/family on patient safety including physical limitations  - Instruct patient to call for assistance with activity   - Consult OT/PT to assist with strengthening/mobility   - Keep Call bell within reach  - Keep bed low and locked with side rails adjusted as appropriate  - Keep care items and personal belongings within reach  - Initiate and maintain comfort rounds  - Make Fall Risk Sign visible to staff  - Offer Toileting every 2 Hours, in advance of need  - Initiate/Maintain bed alarm  - Obtain necessary fall risk management equipment: bed alarm  - Apply yellow socks and bracelet for high fall risk patients  - Consider moving patient to room near nurses station  Outcome: Progressing     Problem: PAIN - ADULT  Goal: Verbalizes/displays adequate comfort level or baseline comfort level  Description: Interventions:  - Encourage patient to monitor pain and request assistance  - Assess pain using appropriate pain scale  - Administer analgesics based on type and severity of pain and evaluate response  - Implement non-pharmacological measures as appropriate and evaluate response  - Consider cultural and social influences on pain and pain management  - Notify physician/advanced practitioner if interventions unsuccessful or patient reports new pain  Outcome: Progressing     Problem: INFECTION - ADULT  Goal: Absence or prevention of progression during hospitalization  Description: INTERVENTIONS:  - Assess and monitor for signs and symptoms of infection  - Monitor lab/diagnostic results  - Monitor all insertion sites, i e  indwelling lines, tubes, and drains  - Monitor endotracheal if appropriate and nasal secretions for changes in amount and color  - [place] appropriate cooling/warming therapies per order  - Administer medications as ordered  - Instruct and encourage patient and family to use good hand hygiene technique  - Identify and instruct in appropriate isolation precautions for identified infection/condition  Outcome: Progressing     Problem: SAFETY ADULT  Goal: Maintain or return to baseline ADL function  Description: INTERVENTIONS:  -  Assess patient's ability to carry out ADLs; assess patient's baseline for ADL function and identify physical deficits which impact ability to perform ADLs (bathing, care of mouth/teeth, toileting, grooming, dressing, etc )  - Assess/evaluate cause of self-care deficits   - Assess range of motion  - Assess patient's mobility; develop plan if impaired  - Assess patient's need for assistive devices and provide as appropriate  - Encourage maximum independence but intervene and supervise when necessary  - Involve family in performance of ADLs  - Assess for home care needs following discharge   - Consider OT consult to assist with ADL evaluation and planning for discharge  - Provide patient education as appropriate  Outcome: Progressing  Goal: Maintains/Returns to pre admission functional level  Description: INTERVENTIONS:  - Perform BMAT or MOVE assessment daily    - Set and communicate daily mobility goal to care team and patient/family/caregiver  - Collaborate with rehabilitation services on mobility goals if consulted  - Perform Range of Motion 4 times a day  - Reposition patient every 2 hours    - Dangle patient 3 times a day  - Stand patient 3 times a day  - Ambulate patient 3 times a day  - Out of bed to chair 3 times a day   - Out of bed for meals 3 times a day  - Out of bed for toileting  - Record patient progress and toleration of activity level   Outcome: Progressing  Goal: Patient will remain free of falls  Description: INTERVENTIONS:  - Educate patient/family on patient safety including physical limitations  - Instruct patient to call for assistance with activity   - Consult OT/PT to assist with strengthening/mobility   - Keep Call bell within reach  - Keep bed low and locked with side rails adjusted as appropriate  - Keep care items and personal belongings within reach  - Initiate and maintain comfort rounds  - Make Fall Risk Sign visible to staff  - Offer Toileting every 2 Hours, in advance of need  - Initiate/Maintain bed alarm  - Obtain necessary fall risk management equipment: yellow socks  - Apply yellow socks and bracelet for high fall risk patients  - Consider moving patient to room near nurses station  Outcome: Progressing     Problem: DISCHARGE PLANNING  Goal: Discharge to home or other facility with appropriate resources  Description: INTERVENTIONS:  - Identify barriers to discharge w/patient and caregiver  - Arrange for needed discharge resources and transportation as appropriate  - Identify discharge learning needs (meds, wound care, etc )  - Arrange for interpretive services to assist at discharge as needed  - Refer to Case Management Department for coordinating discharge planning if the patient needs post-hospital services based on physician/advanced practitioner order or complex needs related to functional status, cognitive ability, or social support system  Outcome: Progressing     Problem: Knowledge Deficit  Goal: Patient/family/caregiver demonstrates understanding of disease process, treatment plan, medications, and discharge instructions  Description: Complete learning assessment and assess knowledge base    Interventions:  - Provide teaching at level of understanding  - Provide teaching via preferred learning methods  Outcome: Progressing     Problem: Neurological Deficit  Goal: Neurological status is stable or improving  Description: Interventions:  - Monitor and assess patient's level of consciousness, motor function, sensory function, and level of assistance needed for ADLs  - Monitor and report changes from baseline  Collaborate with interdisciplinary team to initiate plan and implement interventions as ordered  - Provide and maintain a safe environment  - Consider seizure precautions  - Consider fall precautions  - Consider aspiration precautions  - Consider bleeding precautions  Outcome: Progressing     Problem: Activity Intolerance/Impaired Mobility  Goal: Mobility/activity is maintained at optimum level for patient  Description: Interventions:  - Assess and monitor patient  barriers to mobility and need for assistive/adaptive devices  - Assess patient's emotional response to limitations  - Collaborate with interdisciplinary team and initiate plans and interventions as ordered  - Encourage independent activity per ability   - Maintain proper body alignment  - Perform active/passive rom as tolerated/ordered  - Plan activities to conserve energy   - Turn patient as appropriate  Outcome: Progressing     Problem: Communication Impairment  Goal: Ability to express needs and understand communication  Description: Assess patient's communication skills and ability to understand information  Patient will demonstrate use of effective communication techniques, alternative methods of communication and understanding even if not able to speak  - Encourage communication and provide alternate methods of communication as needed  - Collaborate with case management/ for discharge needs  - Include patient/family/caregiver in decisions related to communication  Outcome: Progressing     Problem: Potential for Aspiration  Goal: Non-ventilated patient's risk of aspiration is minimized  Description: Assess and monitor vital signs, respiratory status, and labs (WBC)  Monitor for signs of aspiration (tachypnea, cough, rales, wheezing, cyanosis, fever)      - Assess and monitor patient's ability to swallow  - Place patient up in chair to eat if possible  - HOB up at 90 degrees to eat if unable to get patient up into chair   - Supervise patient during oral intake  - Instruct patient/ family to take small bites  - Instruct patient/ family to take small single sips when taking liquids  - Follow patient-specific strategies generated by speech pathologist   Outcome: Progressing     Problem: Nutrition  Goal: Nutrition/Hydration status is improving  Description: Monitor and assess patient's nutrition/hydration status for malnutrition (ex- brittle hair, bruises, dry skin, pale skin and conjunctiva, muscle wasting, smooth red tongue, and disorientation)  Collaborate with interdisciplinary team and initiate plan and interventions as ordered  Monitor patient's weight and dietary intake as ordered or per policy  Utilize nutrition screening tool and intervene per policy  Determine patient's food preferences and provide high-protein, high-caloric foods as appropriate  - Assist patient with eating   - Allow adequate time for meals   - Encourage patient to take dietary supplement as ordered  - Collaborate with clinical nutritionist   - Include patient/family/caregiver in decisions related to nutrition    Outcome: Progressing

## 2022-05-18 NOTE — ASSESSMENT & PLAN NOTE
Patient on Keppra 1500 mg b i d   From known seizure disorder  · Unsure if patient is compliant with medication  · Check Keppra level  · Restart home dose  · Seizure, fall precautions  · Neurology consult

## 2022-05-18 NOTE — ASSESSMENT & PLAN NOTE
Patient presents after being found on the ground at home, incontinent and disoriented  · Possibly due to postictal state from seizure vs stroke  · CT head shows no acute intracranial abnormalities  · CT spine with no acute fractures or dislocation  · Pt With intermittent twitching of trunk and extremities but fully awake and conversant in ED  · Given Ativan and Keppra 1000 mg  · CK normal  · Check Keppra level, lipid panel, hemoglobin A1c  · Restart Keppra 1500 mg b i d   · Aspirin 325 now, 81 mg daily  · Seizure, fall precautions  · PT/OT eval  · Discussed with neurology  · No need for emergent MRI  · Frequent neuro checks, nonemergent MRI tomorrow   · Further recommendations appreciated

## 2022-05-18 NOTE — PHYSICAL THERAPY NOTE
PHYSICAL THERAPY EVALUATION/TREATMENT      05/18/22 1030   Note Type   Note type Evaluation   Pain Assessment   Pain Assessment Tool 0-10   Pain Score 9   Pain Location/Orientation Location: Head   Restrictions/Precautions   Other Precautions Pain; Fall Risk;Bed Alarm; Chair Alarm   Home Living   Type of [place] to enter with rails; One level  (Five [place]ALIA)   Home Equipment Cane  (Rollator)   Prior Function   Level of [place] Independent with ADLs and functional mobility   Lives With Alone   Receives Help From Family   ADL Assistance Independent   Falls in the last 6 months 1 to 4   Comments Pt ambulates with a cane prior to admission   General   Additional Pertinent History Pt is admitted after being found on the ground at home, incontinent and disoriented, possibly having seizures   Family/Caregiver Present No   Cognition   Overall Cognitive Status WFL   Arousal/Participation Cooperative   Orientation Level Oriented X4   Following Commands Follows multistep commands with increased time or repetition   Comments While interviewing pt, pt holding his head to the right and has difficulty moving his head to the left   Subjective   Subjective Pt reporting 10/10 pain to his head and 9/10 generalized   RLE Assessment   RLE Assessment WFL  (4-to 4/5)   LLE Assessment   LLE Assessment WFL  (Hip 3/5; knee and ankle 3+ to 4-/5)   Coordination   Movements are Fluid and Coordinated 0   Coordination and Movement Description Min incoordination LLE with testing but moderate incoordination/ataxia with ambulation   Heel to Shin Impaired   Light Touch   RLE Light Touch Grossly intact   LLE Light Touch Impaired   Proprioception   RLE Proprioception Grossly intact   LLE Proprioception Impaired   Bed Mobility   Supine to Sit 4  Minimal assistance   Additional items Assist x 1;Verbal cues; Increased time required;LE management  (Increased tone noted left upper extremity with supine to sit)   Transfers   Sit to Stand   (Min/mod assist)   Additional items Assist x 1;Verbal cues   Stand to Sit   (Min/mod assist)   Additional items Assist x 1;Verbal cues   Ambulation/Elevation   Gait pattern Ataxia;Knees flexed  (Moderate loss of balance to left side)   Gait Assistance 3  Moderate assist   Additional items Assist x 1;Verbal cues; Tactile cues   Assistive Device None  (Moderate handhold assist)   Distance 15 ft with change in direction   Balance   Static Sitting   (F-/F)   Static Standing   (P+/F-)   Dynamic Standing Poor   Ambulatory Poor   Activity Tolerance   Activity Tolerance Patient limited by fatigue;Treatment limited secondary to medical complications (Comment)  (Moderate loss of balance; ataxia)   Assessment   Problem List Decreased strength;Decreased range of motion;Decreased endurance; Impaired balance;Decreased mobility; Decreased coordination; Impaired judgement;Decreased safety awareness; Impaired tone; Impaired sensation   Assessment Patient seen for Physical Therapy evaluation  Patient admitted with Stroke-like symptoms  Comorbidities affecting patient's physical performance include:  Hypertension, hyperlipidemia, seizure disorder, history of alcohol abuse, seizure history, rhabdomyolysis, history of cerebral hemorrhage, sirs, depression, left bundle branch block  Personal factors affecting patient at time of initial evaluation include: lives in one story house, ambulating with assistive device, stairs to enter home, inability to navigate community distances, inability to navigate level surfaces without external assistance, inability to perform dynamic tasks in community, limited home support, positive fall history, limited insight into impairments, inability to perform ADLS, inability to perform IADLS  and inability to live alone   Prior to admission, patient was independent with functional mobility with cane, independent with ADLS, living alone in one story home with 5 steps to enter and ambulating household distance  Please find objective findings from Physical Therapy assessment regarding body systems outlined above with impairments and limitations including weakness, decreased ROM, impaired balance, decreased endurance, impaired coordination, gait deviations, pain, decreased activity tolerance, decreased functional mobility tolerance, altered sensation, decreased safety awareness, impaired judgement and fall risk  The Barthel Index was used as a functional outcome tool presenting with a score of Barthel Index Score: 25 today indicating marked limitations of functional mobility and ADLS  Patient's clinical presentation is currently unstable/unpredictable as seen in patient's presentation of vital sign response, changing level of pain, increased fall risk, new onset of impairment of functional mobility, decreased endurance and new onset of weakness  Pt would benefit from continued Physical Therapy treatment to address deficits as defined above and maximize level of functional mobility  As demonstrated by objective findings, the assigned level of complexity for this evaluation is high  The patient's AM-[facility] Basic Mobility Inpatient Short Form Raw Score is 13  A Raw score of less than or equal to 16 suggests the patient may benefit from discharge to post-acute rehabilitation services  Please also refer to the recommendation of the Physical Therapist for safe discharge planning     Goals   Patient Goals To go home   STG Expiration Date 05/25/22   Short Term Goal #1 Bed mobility-S; transfers-Min assist   Short Term Goal #2 Pt will ambulate with a cane and minimal assistance short, functional distances; balance with a cane will increase to at least F-/F for safe gait mobility and to decrease fall risk   LTG Expiration Date 06/01/22   Long Term Goal #1 Bed mobility-I; transfers-I   Long Term Goal #2 Pt will ambulate with a cane functional household distances-I; balance with a cane will be F+ or greater to decrease fall risk and for safe mobility; up/down 5 steps with a railing so pt can enter/exit his home - I   Plan   Treatment/Interventions ADL retraining;Functional transfer training;LE strengthening/ROM; Elevations; Therapeutic exercise; Endurance training;Patient/family training;Equipment eval/education; Bed mobility;Gait training; Compensatory technique education   PT Frequency Other (Comment)  (5x/wk)   Recommendation   PT Discharge Recommendation Post acute rehabilitation services   Additional Comments Pt is not safe for discharge home  If pt goes home he is at a very high risk for falls and subsequent readmission  Pt has limited insight into his impairments, balance issues and overall deficits  AM-PAC Basic Mobility Inpatient   Turning in Bed Without Bedrails 3   Lying on Back to Sitting on Edge of Flat Bed 3   Moving Bed to Chair 2   Standing Up From Chair 2   Walk in Room 2   Climb 3-5 Stairs 1   Basic Mobility Inpatient Raw Score 13   Basic Mobility Standardized Score 33 99   Highest Level Of Mobility   [facility] Goal 4: Move to chair/commode   -[facility] Achieved 6: Walk 10 steps or more   Barthel Index   Feeding 5   Bathing 0   Grooming Score 0   Dressing Score 0   Bladder Score 5   Bowels Score 5   Toilet Use Score 5   Transfers (Bed/Chair) Score 5   Mobility (Level Surface) Score 0   Stairs Score 0   Barthel Index Score 25   Additional Treatment Session   Start Time 1030   End Time 1040   Treatment Assessment S:  I do better with my cane O:  Pt transfers sit to stand with Min/mod assist of 1 and ambulates with a cane 15 ft with change in direction with Min/mod assist of 1  Rest   Pt transfers a 2nd time and ambulates with a RW 10 ft with change in direction but with mod/max assist of 1 as pt leaning more heavily to the left side with the RW versus a cane  Rest   Pt ambulated 10 ft with change in direction with a cane for a 2nd time and moderate assist of 1   A:  Pt presents with limited insight into his impairments, balance, and overall deficits  Pt is at high risk for falls and is not safe for discharge home; if pt goes home he will be at risk for subsequent readmission  Pt is appropriate for post acute rehab services and will benefit from continued skilled physical therapy to increase his strength, improve his balance, and increase his endurance, gait, functional mobility, gait and safety  End of Consult   Patient Position at End of Consult Bedside chair;Bed/Chair alarm activated; All needs within reach   [facility] Insurance Number  Benita Santos PT [id number]     Portions of the documentation may have been created using voice recognition software  Occasional wrong word or sound alike substitutions may have occurred due to the inherent limitation of the voice recognition software  Read the chart carefully and recognize, using context, where substitutions have occurred

## 2022-05-18 NOTE — PROGRESS NOTES
Jordin [address]  Progress Note - Giacomo Crease 1965, 59 y o  male MRN: [id number]  Unit/Bed#: [address] [place] [address] Encounter: [id number]  Primary Care Provider: No primary care provider on file  Date and time admitted to hospital: 5/17/2022  4:32 PM    Hypomagnesemia  Assessment & Plan  Magnesium 1 1  Will replete  History of alcohol abuse  Assessment & Plan  No recent alcohol use  · Continue thiamine    Seizure disorder [facility])  Assessment & Plan  Patient on Keppra 1500 mg b i d  From known seizure disorder  · Unsure if patient is compliant with medication  · Check Keppra level  · Restart home dose  · Seizure, fall precautions  · Neurology consult    5/18: MRI shows findings suspicious for recent infarct in the posterior right MCA distribution and right posterior cerebral artery distribution  Findings may also be related to seizures  Follow up with recommendations from neurology  Hyperlipidemia  Assessment & Plan  Continue Tricor    Hypertension  Assessment & Plan  Continue amlodipine, metoprolol    * Stroke-like symptoms  Assessment & Plan  Patient presents after being found on the ground at home, incontinent and disoriented  · Possibly due to postictal state from seizure vs stroke  · CT head shows no acute intracranial abnormalities  · CT spine with no acute fractures or dislocation  · Pt With intermittent twitching of trunk and extremities but fully awake and conversant in ED  · Given Ativan and Keppra 1000 mg  · CK normal  · Check Keppra level, lipid panel, hemoglobin A1c  · Restart Keppra 1500 mg b i d   · Aspirin 325 now, 81 mg daily  · Seizure, fall precautions  · PT/OT eval  · Discussed with neurology  · No need for emergent MRI  · Frequent neuro checks, nonemergent MRI tomorrow   · Further recommendations appreciated    5/18: MRI as noted above  Follow up with recommendations from neurology  VTE Pharmacologic Prophylaxis: VTE Score: 3 heparin prophylaxis      Code Status: Level 1 - Full Code    Subjective:   Patient seen and examined at bedside  No acute events overnight  Patient reports feeling better since admission  Patient currently denies any chest pain or shortness of breath  Objective:     Vitals:   Temp (24hrs), Av 5 °F (36 9 °C), Min:98 1 °F (36 7 °C), Max:99 1 °F (37 3 °C)    Temp:  [98 1 °F (36 7 °C)-99 1 °F (37 3 °C)] 98 1 °F (36 7 °C)  HR:  [] 98  Resp:  [17-22] 18  BP: (117-174)/() 119/73  SpO2:  [88 %-100 %] 97 %  Body mass index is 27 89 kg/m²  Input and Output Summary (last 24 hours):   No intake or output data in the 24 hours ending 22 1328    Physical Exam:   Physical Exam  HENT:      Head: Normocephalic  Mouth/Throat:      Mouth: Mucous membranes are moist    Eyes:      Extraocular Movements: Extraocular movements intact  Cardiovascular:      Rate and Rhythm: Normal rate  Pulmonary:      Effort: Pulmonary effort is normal  No respiratory distress  Abdominal:      Palpations: Abdomen is soft  Tenderness: There is no abdominal tenderness  Skin:     General: Skin is warm  Neurological:      Mental Status: He is alert and oriented to person, place, and time     Psychiatric:         Mood and Affect: Mood normal          Behavior: Behavior normal        Additional Data:     Labs:  Results from last 7 days   Lab Units 22  1710   WBC Thousand/uL 12 20*   HEMOGLOBIN g/dL 16 2   HEMATOCRIT % 46 9   PLATELETS Thousands/uL 275   NEUTROS PCT % 79*   LYMPHS PCT % 12*   MONOS PCT % 8   EOS PCT % 0     Results from last 7 days   Lab Units 22  1710   SODIUM mmol/L 137   POTASSIUM mmol/L 3 7   CHLORIDE mmol/L 99*   CO2 mmol/L 23   BUN mg/dL 12   CREATININE mg/dL 1 02   ANION GAP mmol/L 15*   CALCIUM mg/dL 9 6   ALBUMIN g/dL 4 2   TOTAL BILIRUBIN mg/dL 0 74   ALK PHOS U/L 96   ALT U/L 29   AST U/L 30   GLUCOSE RANDOM mg/dL 87     Results from last 7 days   Lab Units 22  1710   INR  1 04     Results from last 7 days Lab Units 05/17/22  1701   POC GLUCOSE mg/dl 92     Results from last 7 days   Lab Units 05/17/22  2357   HEMOGLOBIN A1C % 5 3           Lines/Drains:  Invasive Devices  Report    Peripheral Intravenous Line  Duration           Peripheral IV 05/17/22 Left Arm <1 day                  Telemetry:  Telemetry Orders (From admission, onward)             48 Hour Telemetry Monitoring  (ED Bridging Orders Panel)  Continuous x 48 hours        References:    Telemetry Guidelines   Question:  Reason for 48 Hour Telemetry  Answer:  Acute CVA (<24 hrs old, hemispheric strokes, selected brainstem strokes, cardiac arrhythmias)                        Imaging: Reviewed      Recent Cultures (last 7 days):         Last 24 Hours Medication List:   Current Facility-Administered Medications   Medication Dose Route Frequency Provider Last Rate    acetaminophen  650 mg Oral Q6H PRN Tai Marr PA-C      amLODIPine  10 mg Oral HS Sheri VecJUAN spear      aspirin  81 mg Oral Daily Sheri VecJUAN spear      atorvastatin  80 mg Oral Daily With [employer], [address]      butalbital-acetaminophen-caffeine  1 tablet Oral Q4H PRN NOAM Enriquez      clopidogrel  300 mg Oral Once Ortiz Endy, [address]      [START ON 5/19/2022] clopidogrel  75 mg Oral Daily [address] [place], [address]      fenofibrate  145 mg Oral Daily Tai Marr PA-C      heparin (porcine)  5,000 Units Subcutaneous Q8H [address] Sheri VecJUAN spear      ketorolac  30 mg Intravenous Once Ortiz Endy, [address]      [START ON 5/19/2022] lacosamide (VIMPAT) IVPB  100 mg Intravenous Q12H [address] [place], CRNP      levETIRAcetam  1,500 mg Oral Q12H Sheri JUAN White      magnesium oxide  400 mg Oral BID Aletha Carmichael MD      magnesium sulfate  2 g Intravenous Once NOAM Enriquez      metoprolol succinate  100 mg Oral Daily Tai Marr PA-C      ondansetron  4 mg Intravenous Q6H PRN Earlis Blanks, PA-C      pantoprazole  20 mg Oral Early Morning Earlis Blanks, PA-C      thiamine  100 mg Oral Daily Earlis Blanks, JUAN          Today, Patient Was Seen By: Jazmin Pendleton MD    **Please Note: This note may have been constructed using a voice recognition system  **

## 2022-05-18 NOTE — UTILIZATION REVIEW
Initial Clinical Review    Admission: Date/Time/Statement:   Admission Orders (From admission, onward)     Ordered        05/17/22 1938  INPATIENT ADMISSION  Once                      Orders Placed This Encounter   Procedures    INPATIENT ADMISSION     Standing Status:   Standing     Number of Occurrences:   1     Order Specific Question:   Level of Care     Answer:   Med Surg [16]     Order Specific Question:   Estimated length of stay     Answer:   More than 2 Midnights     Order Specific Question:   Certification     Answer:   I certify that inpatient services are medically necessary for this patient for a duration of greater than two midnights  See H&P and MD Progress Notes for additional information about the patient's course of treatment  ED Arrival Information     Expected   -    Arrival   5/17/2022 16:32    Acuity   Urgent            Means of arrival   Ambulance    Escorted by   [facility]    Admission type   Urgent            Arrival complaint   [place]           Chief Complaint   Patient presents with    Fall     Patient states he collapsed and fell - states was on the floor for several hours  States he thinks he was having seizures (states he was "convulsing, but awake the whole time")  Requesting  for help in the home  Initial Presentation: 59 y o  male to presents to ed from home for evaluation and treatment of collapse and fall  Patient states the wind blew his door opened which hit him in the head causing him to fall to the ground  His sister reports patient has had multiple seizures over a few days  His niece found him down today incontinent and disoriented  She found medication untaken  PMHX:  HTN, SEIZURE DISORDER  Clinical assessment significant for left sided weakness and intermittent twitching of trunk and extremities  WBC 12 20  Initially treated with iv ativan x1, iv keppra loading dose  Admit to inpatient  Med surg  Date: 5- 18-22   Day 2:  Inpatient med surg    Check Keppra level  Restart Keppra po bid  Initiate seizure precautions  Obtain PT/OT evaluations  Monitor neuro checks and on telemetry  Consult neurology  MRI brain        ED Triage Vitals   05/17/22 1642 05/17/22 1642 05/17/22 1642 05/17/22 1642 05/17/22 1642   99 1 °F (37 3 °C) 104 19 133/86 96 %      Tympanic Monitor         10 - Worst Possible Pain          05/17/22 90 7 kg (200 lb)     Additional Vital Signs:       Date/Time Temp Pulse Resp BP MAP (mmHg) SpO2 O2 Device   05/18/22 09:05:07 -- 98 -- 119/73 88 97 % --   05/18/22 05:53:26 -- 86 18 -- -- 96 % --   05/18/22 03:40:35 98 1 °F (36 7 °C) 100 17 119/81 94 95 % None (Room air)   05/18/22 0235 98 2 °F (36 8 °C) 97 17 119/79 92 96 % None (Room air)   05/18/22 01:12:26 98 5 °F (36 9 °C) 96 18 117/82 94 95 % None (Room air)   05/17/22 2330 -- -- -- -- -- 96 % None (Room air)   05/17/22 23:11:52 98 4 °F (36 9 °C) 102 18 124/89 101 97 % --   05/17/22 2230 98 8 °F (37 1 °C) 96 20 137/72 99 99 % --   05/17/22 2224 -- 98 -- 130/71 94 98 % --   05/17/22 2200 -- 94 20 144/79 105 100 % --   05/17/22 2152 -- 94 -- 145/79 104 99 % --   05/17/22 2137 -- 90 -- 131/76 97 98 % --   05/17/22 2124 -- 98 -- -- -- 98 % --   05/17/22 2122 -- 96 -- 147/76 105 97 % --   05/17/22 2107 -- 84 -- 146/83 107 99 % --   05/17/22 2054 -- 90 -- -- -- 98 % --   05/17/22 1922 -- 96 -- -- -- 89 % Abnormal  --   05/17/22 1916 -- 100 -- 148/82 109 -- --   05/17/22 1907 -- 104 -- -- -- 100 % --   05/17/22 1901 -- 106   Abnormal  -- 147/80 108 97 % --   05/17/22 1854 -- 102 -- -- -- -- --   05/17/22 1852 -- 104 -- -- -- -- --   05/17/22 1846 -- 104 -- -- -- -- --   05/17/22 1845 -- 104 20 150/85 113 97 % None (Room air)   05/17/22 1837 -- 106   Abnormal  -- -- -- -- --   05/17/22 1831 -- 104 -- -- -- 100 % --   05/17/22 1830 -- 98 22 165/95 122 98 % None (Room air)   05/17/22 1824 -- 104 -- -- -- 99 % --   05/17/22 1822 -- 94 -- -- -- 98 % --   05/17/22 1816 -- 92 -- -- -- 99 % --   05/17/22 1815 -- 102 20 157/106   Abnormal  121 98 % None (Room air)   05/17/22 1807 -- 94 -- -- -- -- --   05/17/22 1801 -- 102 -- -- -- 88 % Abnormal  --   05/17/22 1800 -- 96 18 174/110   Abnormal  134 97 % None (Room air)   05/17/22 1642 99 1 °F (37 3 °C) 104 19 133/86 -- 96 % None (Room air)             Pertinent Labs/Diagnostic Test Results:       CT lumbar spine without contrast   Final  (05/17 2045)      No acute fracture or dislocation  Multilevel degenerative changes  CT thoracic spine without contrast   Final (05/17 1830)      Schmorl's nodes at the superior endplates of T2, T3 and possibly T4 of indeterminate age  CT cervical spine without contrast   Final  (05/17 1822)      No cervical spine fracture or traumatic malalignment  Fluid in the sphenoid sinuses  CT head wo contrast   Final  (05/17 1808)      No acute intracranial abnormality  Microangiopathic changes            Results from last 7 days   Lab Units 05/17/22  1710 05/12/22  0520   WBC Thousand/uL 12 20* 6 99   HEMOGLOBIN g/dL 16 2 13 5   HEMATOCRIT % 46 9 39 4   PLATELETS Thousands/uL 275 177   NEUTROS ABS Thousands/µL 9 66*  --          Results from last 7 days   Lab Units 05/17/22 1710 05/12/22  0520   SODIUM mmol/L 137 140   POTASSIUM mmol/L 3 7 3 8   CHLORIDE mmol/L 99* 103   CO2 mmol/L 23 28   ANION GAP mmol/L 15* 9   BUN mg/dL 12 10   CREATININE mg/dL 1 02 1 16   EGFR ml/min/1 73sq m 80 68   CALCIUM mg/dL 9 6 9 0   MAGNESIUM mg/dL  --  1 4*     Results from last 7 days   Lab Units 05/17/22  1710 05/12/22  0520   AST U/L 30 78*   ALT U/L 29 39   ALK PHOS U/L 96 77   TOTAL PROTEIN g/dL 7 6 6 3*   ALBUMIN g/dL 4 2 3 2*   TOTAL BILIRUBIN mg/dL 0 74 0 50     Results from last 7 days   Lab Units 05/17/22  1701   POC GLUCOSE mg/dl 92     Results from last 7 days   Lab Units 05/17/22  1710 05/12/22  0520   GLUCOSE RANDOM mg/dL 87 113           Results from last 7 days Lab Units 05/17/22  1710 05/12/22  0520   CK TOTAL U/L 295 2,656*   CK MB INDEX % <1 0 <1 0   CK MB ng/mL 1 8 1 1     Results from last 7 days   Lab Units 05/17/22  2233 05/17/22  1936 05/17/22  1710   HS TNI 0HR ng/L  --   --  7   HS TNI 2HR ng/L  --  8  --    HSTNI D2 ng/L  --  1  --    HS TNI 4HR ng/L 7  --   --    HSTNI D4 ng/L 0  --   --          Results from last 7 days   Lab Units 05/17/22  1710   PROTIME seconds 13 4   INR  1 04   PTT seconds 28         Results from last 7 days   Lab Units 05/17/22  1710   ETHANOL LVL mg/dL <3         ED Treatment:   Medication Administration from 05/17/2022 1631 to 05/17/2022 2303       Date/Time Order Dose Route Action     05/17/2022 1837 LORazepam (ATIVAN) injection 1 mg 1 mg Intravenous Given     05/17/2022 1858 levETIRAcetam (KEPPRA) 1,000 mg in sodium chloride 0 9 % 100 mL IVPB 1,000 mg Intravenous New Bag        Past Medical History:   Diagnosis Date    Biceps tendonitis, unspecified laterality 09/17/2007    Bone cyst 11/22/2011    Bronchiectasis ([facility] [address] ) 10/02/2006    Bursitis 11/17/2005    Chest pain 04/29/2009    Chronic renal failure 11/22/2011    Diverticulitis of colon 10/09/2007    Generalized anxiety disorder 06/08/2006    GERD (gastroesophageal reflux disease)     Gout     Hyperlipidemia     Hypertension     Lateral epicondylitis, unspecified elbow     Last Assessed: 11/29/2013    Low magnesium levels     Lyme disease 11/09/2009    Pneumonia     Last Assessed: 1/7/2013     Present on Admission:   [facility]   Hypertension   Hyperlipidemia   History of alcohol abuse      Admitting Diagnosis:     Seizure ([facility] ) [R56 9]  Syncope [R55]  Altered mental status [R41 82]  Stroke-like symptoms [R29 90]    Age/Sex: 59 y o  male    Scheduled Medications:    amLODIPine, 10 mg, Oral, HS  aspirin, 81 mg, Oral, Daily  atorvastatin, 40 mg, Oral, Daily With Dinner  fenofibrate, 145 mg, Oral, Daily  heparin (porcine), 5,000 Units, Subcutaneous, Q8H [facility]  levETIRAcetam, 1,500 mg, Oral, Q12H  metoprolol succinate, 100 mg, Oral, Daily  pantoprazole, 20 mg, Oral, Early Morning  thiamine, 100 mg, Oral, Daily      Continuous IV Infusions:     PRN Meds:  acetaminophen, 650 mg, Oral, Q6H PRN  ondansetron, 4 mg, Intravenous, Q6H PRN        IP CONSULT TO NEUROLOGY  IP CONSULT TO CASE MANAGEMENT    Network Utilization Review Department  ATTENTION: Please call with any questions or concerns to [phone] and carefully listen to the prompts so that you are directed to the right person  All voicemails are confidential   Dav Babcock all requests for admission clinical reviews, approved or denied determinations and any other requests to dedicated fax number below belonging to the campus where the patient is receiving treatment   List of dedicated fax numbers for the Facilities:  [address] DENIALS (Administrative/Medical Necessity) [phone]   [address] (Maternity/NICU/Pediatrics) [phone]   [address]  [address] [facility] [place] [address] [address] [place] [address] [address] [facility] [phone]

## 2022-05-18 NOTE — CONSULTS
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  4  HLD  5  Falls  6  H/o ETOH use  7  H/o medication non adherence    -Monitored on Telemetry  -Neuro Assessments   -BASA 81 mg daily  -Lipitor 80 mg daily  -Plavix 300 mg today, 75 mg daily starting 05/19/2022  -continue Keppra 1500 mg b i d   -will add adjunct therapy with Vimpat 200 mg bolus then 100 mg b i d   -patient can have Toradol 30 mg IV now  -can have Fioricet for headaches q 4 hours p r n  While in hospital  -MRI of the brain-completed  -CTA head and neck-discussed with Dr Jeovanny Rutherford necessity for CTA related to multiple vascular distribution infarcts  -echocardiogram completed at last hospitalization 05/09/2022 with 65% ejection fraction and normal atrium  -will get BEBO and loop recorder evaluation for central source  -labs:  Lipid profile, A1c, magnesium, CBC in a m   -PT/OT/ST per stroke pathways  -sw systems in acute rehab placement post CVA  Patient is a 61-year-old male with PMH significant for HLD, HTN, EtOH abuse, hypo magnesium, seizures and right MCA territory CVA with IPH  Patient previously been discharged from hospital on 05/12/2022, patient diagnosed with syncope/seizure disorder with traumatic rhabdomyolysis  He was continued on his Keppra dosing 1500 mg b i d  And provided medication for blood pressure control and headache with Fioricet    Patient had been home for approximately 5 days, patient family reported that he had not been quite himself during this period of time  According to reports from patient's sister, he had been having seizure activity for a couple of days at home  He was not answering his phone therefore she called 911 for a well check, patient found having seizure at that time was incontinent and confused  Patient recommended going to the ER however patient declined at that time  Was also reported patient having shaking/jerking movements in his head and shoulders however was fully awake during this time  Patient reported following day, a chasity of wind blew open his door, it hit him in the head knocking him over  Patient believes he lost some consciousness before falling to the ground  Stated he was unable to get up at that time  Family came to check on him, he was still on the floor refer EMS was called to be transported to the hospital   Upon arrival to the hospital, patient was found to have weakness with ataxia, reports of left-sided numbness and shaking  He reported pain  In his low back and left hip  Question if patient was postictal during this evaluation, Neurology was consulted  Recommendations for patient admission for further workup under the stroke pathway given patient's left-sided sensory changes as well as his ataxia  Patient was sent for MRI this a m , showing possible recent MCA/PCA territory infarct  Differential also seizures  On exam patient did present with left-sided focal deficit noting left upper extremity weakness and decreased sensation  He had some ataxia noted to the left upper and lower extremity  Patient reports being limited in the lower extremities due to complaints of pain in his low back and left hip area  Patient had reported having blurred vision, on exam patient appears to have left HH  Patient's EOM is intact although does have some delay in right eye gaze into nasal view    Patient reported having fall last evening, states that he is having difficulties walking  PT did work with him today, he does have ataxia and was encouraged to use the cane  Patient states he does feel more stable  Given patient's MRI findings, review of patient's video EEG in 2020 showing to focal seizures from right posterior temporal region as well as multiple subclinical seizure activity and prior CTA showing 40% carotid stenosis from approximately 1 year ago, will get CTA of the head and neck  Will also recommend BEBO with loop recorder to evaluate for central source of CVA  Patient remains on Keppra 1500 mg b i d , will add Vimpat 200 mg loading dose today followed by 100 mg b i d  If patient should have breakthrough seizure activity while in this dosing, may need to increase to 150 mg b i d   Patient loaded with full-dose aspirin as well as Plavix 300 mg, will be maintained on dual in platelet therapies baby aspirin 81 mg daily as well as Plavix 75 mg daily  Patient remains on high-dose statin with Lipitor 80 mg daily  PT/OT working with patient regarding gait, strengthening and ataxia  [employer] will need follow up in 8 weeks with general attending or advance practitioner  He will not require outpatient neurological testing  HPI:  Julio Ralphns  Ashlyn Castaneda is a 63yo male with PMH of CHATO, GERD, HLD, HTN, ETOH abuse, hypomagnesemia, Rt MCA territory CVA with IPH and seizures  Pt was recently in hospital with onset of seizure activity  Pt received Keppra load and continued on his home dose of 1500mg bid  Pt continue to have issues with low magnesium which he had replacement  Pt ETOH level was <3, however, had recent hospital stay prior and was suspected that he was having increased activity related to ETOH withdrawal and likely non adherence of his AED regimen  Pt was seizure free during hospital stay on his regimen  He was offered STR due to weakness of the legs 2nd to falls and back pain, however, pt had declined and preferred to go home      Pt since returned to the ED on 5/17/2022 after being found on the floor in his home by his family  Pt seemed confused and incontinent of urine at that time  Pt family had reported that he has had an increase in his seizure activity over the past few days and they were not sure if he was taking his medications or not  He was seen a day prior by the police after sister called for a well check, they noted seizure activity but pt had refused to go to the hospital at that time  Pt family advised pt to come to hospital on 5/18/2022 and pt was then agreeable  Pt presented to the hospital with reports of weakness, ataxia, Lt sided numbness and increased episodes of shaking, while pt was fully awake  Pt reported that he recently had head injury at home when the wind blew his door open on him and hit him in the head  He reports this is what caused him to fall to the floor and he believes that he did have a few seconds of LOC  On arrival, pt , WBC 12 2   /86  CT of Head and full spine without acute changes, fractures or malalignments  On Call Neurology advised pt for admission under the stroke pathway, pt received IV ativan for seizure activity, rebolus of Keppra 1G and resumption of his Keppra dose in the ED  Pt also received a full dose asa and will start on BASA daily going forward  Spoke with pt today, he appears to have more clarity than during prior stay  He noted that he is getting his medications and taking his Keppra without any significant issues or SE  He noted that his pharmacy is not [facility] as previously stated, he has been using [facility]   Pt sees Dr Vishal Flores from [facility], did admit it has been a couple of years since he was last seen  Pt noted that he had been having abnormal movement of his shoulders and head with a shaking movement and noted feeling tremulous inside his body recently  Noted that this sensation is bilateral and denied any movement to the b/l LE    Pt stated this is mostly when he lays down to rest and that this is on and off, no pattern to it  Pt is awake when it happens  Pt also reports ongoing headache since his initial injury prior to his last admission where he hit his head in his bathroom  He has had issues with noise sensitivities and blurring in his Lt eye  Pt stated that he has no prior history of migraine or headache, but has been ongoing for about 2 weeks  Pt reports using Ibuprofen without relief  He has gotten Tylenol in hospital which has also offered him little relief  He noted that he is eating and drinking well, denies use of ETOH since his DC and reports that he did go through Detox recently  Pt reports that his sister does cook for him  Pt take Vitamin B and C at home  He is not sure if he is on Magnesium at home but thinks he was told to start taking it  Currently pt reports ongoing headache and blurring of his vision  He noted that he has pain to the Lt hip and does have some weakness in the LUE  On exam, pt has Lt sided weakness, UE >LE with ataxia on exam   He has decrease in sensation to the LUE compared to the RUE and has ataxia on exam to the LUE and mild ataxia noted to the LLE, although pt reports pain with exam   Pt has decreased but equal reflexes to the LE  Deferred gait testing due to ataxia and fall risk  Pt reported trying to go to BR last night and fell at that time, deferred for PT exam  Pt had reported Lt eye blurred, on exam pt with Lt HH  Pt reported that he was not aware that he had the vision loss but thought he was just blurred  MRI of the Brain reviewed with independent read and discussed with pt  Final read indicative of poss ischemic infarct vs the presence of seizures  Pt does have seizure activity, however, on this admission presents with significant focal deficits  Suspect CVA causing an increase in seizure activity    Pt was initially started on ASA and Statin, will increase to high dose statin and add plavix load for DAPT  D/W Radiology overall concern for vascular etiology vs central etiology of pt CVA  He continues with events in the Posterior Rt hemisphere with CVA, IPH and Seizure focus  Pt has had findings of 40% stenosis in the ICA approx  1yr ago, will get CTA for poss occlusive changes  Will also get BEBO with Loop recorder placement for rule out central cause  Pt also with Mag of 1 1, will give repletion today and follow up level in am     Pt continues with seizure activity at home over past couple of days, will add 2nd adjunct therapy with Keppra, loading Vimpat 200mg today, followed by 100mg bid for now  If pt should have additional seizure activity may need to increase to 150mg bid  Had long discussion with pt re: MRI findings and testing needed for further information and treatment  Pt asking appropriate questions and agreeable for testing and therapies             Past Medical History:   Diagnosis Date    Biceps tendonitis, unspecified laterality 09/17/2007    Bone cyst 11/22/2011    Bronchiectasis ([address] ) 10/02/2006    Bursitis 11/17/2005    Chest pain 04/29/2009    Chronic renal failure 11/22/2011    Diverticulitis of colon 10/09/2007    Generalized anxiety disorder 06/08/2006    GERD (gastroesophageal reflux disease)     Gout     Hyperlipidemia     Hypertension     Lateral epicondylitis, unspecified elbow     Last Assessed: 11/29/2013    Low magnesium levels     Lyme disease 11/09/2009    Pneumonia     Last Assessed: 1/7/2013       Past Surgical History:   Procedure Laterality Date    KNEE ARTHROSCOPY      Therapeutic    OLECRANON BURSA EXCISION         Allergies   Allergen Reactions    Codeine Hives     Tolerates morphine    Penicillins     Hydrocodone Rash     Reaction Date: 17Sep2007;          Current Facility-Administered Medications:     acetaminophen (TYLENOL) tablet 650 mg, 650 mg, Oral, Q6H PRN, Sheri White PA-C, 650 mg at 05/18/22 0906   amLODIPine (NORVASC) tablet 10 mg, 10 mg, Oral, HS, Sheri Vecellio, PA-C, 10 mg at 22 0014    aspirin chewable tablet 81 mg, 81 mg, Oral, Daily, Sheri Vecellio, PA-C, 81 mg at 22 1615    atorvastatin (LIPITOR) tablet 40 mg, 40 mg, Oral, Daily With Dinner, Gavin Fulling, CRNP    fenofibrate (TRICOR) tablet 145 mg, 145 mg, Oral, Daily, Sheri Vecellio, PA-C, 145 mg at 22 4761    heparin (porcine) subcutaneous injection 5,000 Units, 5,000 Units, Subcutaneous, Q8H [facility], Sheri Vecellio, PA-C, 5,000 Units at 22 0551    levETIRAcetam (KEPPRA) tablet 1,500 mg, 1,500 mg, Oral, Q12H, Sheri Vecellio, PA-C, 1,500 mg at 22 0904    magnesium sulfate 2 g/50 mL IVPB (premix) 2 g, 2 g, Intravenous, Once, Gavin Fulling, CRNP    metoprolol succinate (TOPROL-XL) 24 hr tablet 100 mg, 100 mg, Oral, Daily, Sheri Vecellio, PA-C, 100 mg at 22 0904    ondansetron (ZOFRAN) injection 4 mg, 4 mg, Intravenous, Q6H PRN, Sheri Vecellio, PA-C    pantoprazole (PROTONIX) EC tablet 20 mg, 20 mg, Oral, Early Morning, Sheri Vecellio, PA-C, 20 mg at 22 0551    thiamine tablet 100 mg, 100 mg, Oral, Daily, Sheri Vecellio, PA-C, 100 mg at 22 7250    Social History     Socioeconomic History    Marital status: /Civil Union     Spouse name: Not on file    Number of children: Not on file    Years of education: Not on file    Highest education level: Not on file   Occupational History    Not on file   Tobacco Use    Smoking status: Former Smoker     Packs/day: 0 50     Quit date: 5/10/2022     Years since quittin 0    Smokeless tobacco: Never Used    Tobacco comment: cigarette nicotine dependence   Vaping Use    Vaping Use: Never used   Substance and Sexual Activity    Alcohol use:  Yes     Alcohol/week: 6 0 standard drinks     Types: 6 Glasses of wine per week     Comment: 20-states he has not had alcohol since 2021    Drug use: No    Sexual activity: Yes     Partners: Female   Other Topics Concern    Not on file   Social History Narrative    Not on file     Social Determinants of Health     Financial Resource Strain: Not on file   Food Insecurity: No Food Insecurity    Worried About Running Out of Food in the Last Year: Never true    Hermelinda of Food in the Last Year: Never true   Transportation Needs: No Transportation Needs    Lack of Transportation (Medical): No    Lack of Transportation (Non-Medical): No   Physical Activity: Not on file   Stress: Not on file   Social Connections: Not on file   Intimate Partner Violence: Not on file   Housing Stability: Low Risk     Unable to Pay for Housing in the Last Year: No    Number of Places Lived in the Last Year: 1    Unstable Housing in the Last Year: No       Family History   Problem Relation Age of Onset    Cancer Mother         Colon         Review of systems:  Please see HPI for positive symptoms  Constitutional: No fever, no chills, no weight change  Ocular: No diplopia, no blurred vision, spots/zigzag lines  +vision change  HEENT:  No sore throat or congestion  + headache 9/10  COR:  No chest pain  No palpitations  Lungs:  no sob  GI:  no  nausea, no vomiting, no diarrhea, no constipation, no anorexia  :  No dysuria, frequency, or urgency  No hematuria  Musculoskeletal:  No swelling   + joint pain of the hip, neck and shoulders  +low back pain  +weakness  Skin:  No rash or itching  Psychiatric:  no anxiety, no depression  Endocrine:  No polyuria or polydipsia  Physical examination:  /73   Pulse 98   Temp 98 1 °F (36 7 °C) (Axillary)   Resp 18   Ht 5' 11" (1 803 m)   Wt 90 7 kg (200 lb)   SpO2 97%   BMI 27 89 kg/m²     GENERAL APPEARANCE:  The patient is alert, oriented  HEENT:  Head is normocephalic  Pupils are equal and reactive  NECK:  Supple without lymphadenopathy  HEART:  Regular rate and rhythm    LUNGS: no audible wheezing or stridor heard  ABDOMEN:  Soft, nontender, nondistended with good bowel sounds heard  EXTREMITIES:  Without cyanosis, clubbing or edema  Mental status: The patient is alert, attentive, and oriented  Speech is clear and fluent, good repetition, comprehension, and naming    +asking appropriate questions  Cranial nerves:  CN II: Visual fields are with Lt HH, Lt eye >Rt eye  Fundoscopic exam is normal with sharp discs and no vascular changes  +anisocoria, Rt >Lt are 3/2 mm and reactive to light  CN III, IV, VI: At primary gaze, there is inward delay to the Rt eye  CN V: Facial sensation is intact to pinprick in all 3 divisions bilaterally  Corneal responses are intact  CN VII: Face is symmetric with normal eye closure and smile  CN VIII: Hearing is diminished bilaterally to rubbing fingers  CN IX, X: Palate elevates symmetrically  Phonation is normal   CN XI: Head turning and shoulder shrug are intact  CN XII: Tongue is midline with normal movements and no atrophy  Motor: There is minimal pronator drift of LUE with slight rotation to out-stretched arms  Muscle bulk and tone are normal    Muscle exam  Arm Right Left Leg Right Left   Deltoid 5/5 3+/5 Iliopsoas 5/5 4/5   Biceps 5/5 3+/5 Quads 5/5 4/5   Triceps 5/5 3+/5 Hamstrings 5/5 4/5   Wrist Extension 5/5 4/5 Ankle Dorsi Flexion 5/5 5/5   Wrist Flexion 5/5 4/5 Ankle Plantar Flexion 5/5 5/5        Reflexes    RJ BJ TJ KJ AJ Plantars Davis's   Right 1+ 1+ 1+ tr tr Downgoing Not present   Left 1+ 1+ 1+ tr tr Downgoing Not present      Sensory:  Light touch, Temperature, position sense, and vibration sense are slightly diminished in the LUE  Coordination:  Rapid alternating movements and fine finger movements are intact  There is + dysmetria on finger-to-nose and heel-knee-shin limited due to reports of LLE pain at hip  There are no abnormal or extraneous movements     Romberg deferred, pt had recent fall and is fall risk  Gait/Stance:  Deferred, ambulating with PT today    Lab Results   Component Value Date    WBC 12 20 (H) 05/17/2022    HGB 16 2 05/17/2022    HCT 46 9 05/17/2022    MCV 85 05/17/2022     05/17/2022     Lab Results   Component Value Date    HGBA1C 5 3 05/17/2022     Lab Results   Component Value Date    ALT 29 05/17/2022    AST 30 05/17/2022    ALKPHOS 96 05/17/2022    BILITOT 0 7 08/24/2016     Lab Results   Component Value Date    GLUCOSE 102 08/04/2021    CALCIUM 9 6 05/17/2022     08/24/2016    K 3 7 05/17/2022    CO2 23 05/17/2022    CL 99 (L) 05/17/2022    BUN 12 05/17/2022    CREATININE 1 02 05/17/2022     Chol 179      Review of reports and notes reveal:  Independent Interpretation of images or specimens:  CT head wo contrast  Result Date: 5/17/2022  No acute intracranial abnormality  Microangiopathic changes  Workstation performed: [id number]     CT cervical spine without contrast  Result Date: 5/17/2022  No cervical spine fracture or traumatic malalignment  Fluid in the sphenoid sinuses  Workstation performed: [id number]     CT thoracic spine without contrast  Result Date: 5/17/2022  Schmorl's nodes at the superior endplates of T2, T3 and possibly T4 of indeterminate age  Workstation performed: [id number]     CT lumbar spine without contrast  Result Date: 5/17/2022  No acute fracture or dislocation  Multilevel degenerative changes  Workstation performed: [id number]     MRI brain wo contrast  Result Date: 5/18/2022  Findings suspicious for recent infarct in the posterior right MCA distribution and right posterior cerebral artery distributions (fetal PCA noted on prior CTA)  Findings may also be related to seizures  No acute hemorrhage  No mass effect, shift or herniation  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  Absent flow void in the right transverse and sigmoid sinuses, chronically occluded based on CTA from 2021     The study was marked in [facility] for immediate notification  Workstation performed: [id number]           Thank you for this consult  Total time of encounter: 70 Minutes  More than 50% of time was spent in counseling and coordination of care of patient

## 2022-05-18 NOTE — ED NOTES
Patient resting comfortably on stretcher  Respirations even and unlabored  No distress noted at this time  No episodes of uncontrolled spatic movements noted X 10 minutes  Will continue to monitor        Yahir Fajardo RN  05/17/22 5082

## 2022-05-18 NOTE — CASE MANAGEMENT
Case Management Assessment & Discharge Planning Note    Patient name Nasir Beltre  Location [address] [place] 407-* MRN [id number]  : 1965 Date 2022       Current Admission Date: 2022  Current Admission Diagnosis:Stroke-like symptoms   Patient Active Problem List    Diagnosis Date Noted    Hypomagnesemia 2022    Stroke-like symptoms 2022    Hypokalemia 05/10/2022    Contusion of right foot     Right foot pain     Arthralgia of right foot     Chronic tophaceous gout of right foot     Syncopal episodes 2022    SIRS (systemic inflammatory response syndrome) ([facility] ) 2022    Acute gout of multiple sites 2021    Hypertension 2021    Hyperlipidemia 2021    Tobacco abuse 2021    GERD (gastroesophageal reflux disease) 2021    Depression 2021    Left bundle branch block 2021    Thrombocytopenia (HCC) 2021    Prolonged Q-T interval on ECG 2021    Rhabdomyolysis 2021    Breakthrough seizure ([facility] ) 2021    Seizure disorder ([facility] ) 2021    History of cerebral hemorrhage 2021    History of alcohol abuse 2021    Hypocalcemia 2017    Lactic acidosis 2017    Rhabdomyolysis 2017    Nicotine dependence 2017    Dyslipidemia 2017    Seizure ([facility] ) 2017    Diarrhea 2016    Blood in stool 2016    Hypertension     Hyperlipidemia     GERD (gastroesophageal reflux disease)     Compression neuropathy of lower extremity 02/10/2016      LOS (days): 1  Geometric Mean LOS (GMLOS) (days):   Days to GMLOS:     OBJECTIVE:  PATIENT READMITTED TO HOSPITAL  Risk of Unplanned Readmission Score: 13 45   Current admission status: Inpatient  Referral Reason: SNF, Initial    Preferred Pharmacy:   [address], [address], [address]   [place] [address] [address]  Phone: [phone] Fax: [address] [facility] #[id number], [address] [facility] [address] [address]  Phone: [phone] Fax: [phone]    Renetta Jackson [address] [address]  Phone: [phone] Fax: [phone]    [facility] [facility] [address] [facility]  [address] [facility]  [address] [address]  Phone: [phone] Fax: [phone]    Primary Care Provider: No primary care provider on file  Primary Insurance: BLUE CROSS  Secondary Insurance:     ASSESSMENT:  Active Health Care Proxies     [facility] - Sister   Primary Phone: [phone] (Home)          Advance Directives  Does patient have a [address]?: No  Was patient offered paperwork?: Yes  Does patient currently have a Health Care decision maker?: Yes, please see Health Care Proxy section  Does patient have Advance Directives?: No  Was patient offered paperwork?: Yes  Primary Contact: Celio Billings (sister) [phone]    Readmission Root Cause  30 Day Readmission: Yes  Who directed you to return to the hospital?: Family  Did you understand whom to contact if you had questions or problems?: Yes  Did you get your prescriptions before you left the hospital?: No  Reason[de-identified] Not preferred pharmacy  Were you able to get your prescriptions filled when you left the hospital?: Yes  Did you take your medications as prescribed?: No  Were you able to get to your follow-up appointments?: No  Reason[de-identified] Readmitted prior to appointment  During previous admission, was a post-acute recommendation made?: Yes  What post-acute resources were offered?: STR, Offered, but declined  Patient was readmitted due to: s/p fall possible seizures, hit his head on door  Action Plan: Patient agrees to [facility] and referrals were sent in [place]  Patient will need f/u at SNF dc for medication teaching and set up  DME needed SC and BSC   Daughters will be more involved especially where med management comes into play  Patient Information  Admitted from[de-identified] Home  Mental Status: Alert  During Assessment patient was accompanied by: Daughter  Assessment information provided by[de-identified] Patient, Daughter  Primary Caregiver: Self  Support Systems: Self, Children, Family members  [place] of Residence: [address] do you live in?: [address] [place] [address] entry access options   Select all that apply : Stairs  Number of steps to enter home : 5  Do the steps have railings?: Yes  Type of Current Residence: [place]  In the last 12 months, was there a time when you were not able to pay the mortgage or rent on time?: No  In the last 12 months, how many places have you lived?: 1  In the last 12 months, was there a time when you did not have a steady place to sleep or slept in a shelter (including now)?: No  Homeless/housing insecurity resource given?: N/A  Living Arrangements: Lives Alone  Is patient a ?: No    Activities of Daily Living Prior to Admission  Functional Status: Independent  Completes ADLs independently?: Yes  Ambulates independently?: Yes  Does patient use assisted devices?: Yes  Assisted Devices (DME) used: [facility]  Does patient currently own DME?: Yes  What DME does the patient currently own?: [employer] [facility]  Does patient have a history of Outpatient Therapy (PT/OT)?: No  Does the patient have a history of Short-Term Rehab?: No  Does patient have a history of HHC?: No  Does patient currently have [facility]?: No    Patient Information Continued  Income Source: Pension/residential  Does patient have prescription coverage?: Yes  Within the past 12 months, you worried that your food would run out before you got the money to buy more : Never true  Within the past 12 months, the food you bought just didn't last and you didn't have money to get more : Never true  Food insecurity resource given?: N/A  Does patient receive dialysis treatments?: No  Does patient have a history of substance abuse?: No  Does patient have a history of Mental Health Diagnosis?: No    PHQ 2/9 Screening   Reviewed PHQ 2/9 Depression Screening Score?: No    Means of Transportation  Means of Transport to Appts[de-identified] Family transport  In the past 12 months, has lack of transportation kept you from medical appointments or from getting medications?: No  In the past 12 months, has lack of transportation kept you from meetings, work, or from getting things needed for daily living?: No  Was application for public transport provided?: N/A    DISCHARGE DETAILS:  Discharge planning discussed with[de-identified] Patient and his daughters HANNAH and Evelio at bedside  Freedom of Choice: Yes  Comments - Freedom of Choice: [facility] referral placed and list of STR's provided to each daughter  CM contacted family/caregiver?: Yes  Were Treatment Team discharge recommendations reviewed with patient/caregiver?: Yes  Did patient/caregiver verbalize understanding of patient care needs?: Yes  Were patient/caregiver advised of the risks associated with not following Treatment Team discharge recommendations?: Yes    Contacts  Patient Contacts: Simón Rivera (daughter) lives closest to patient and will be main contact  Relationship to Patient[de-identified] Family  Contact Method: Phone  Phone Number: [phone]  Reason/Outcome: Emergency Contact, Discharge Planning, Continuity of [facility]         Is the patient interested in [employer] at discharge?: No    DME Referral Provided  Referral made for DME?: No    Other Referral/Resources/Interventions Provided:  Interventions: Short Term Rehab    Would you like to participate in our [address] service program?  : No - Declined    Treatment Team Recommendation: Short Term Rehab  Discharge Destination Plan[de-identified] Short Term Rehab  Transport at Discharge : [address] met with patient and his daughters Mp Dumont and Marcos Zheng  Per patient and sisters Mp Dumont will be the main contact   She lives closer and can be more involved  Discharge planning, available services and the role of CM were all discussed  Patient is a 30 day readmission and STR was recommended and patient declined  They are aware STR is being recommended this admission as well and they states understanding  They all agree at this time that patient will need STR prior to return home  They were provided with STR choice and daughters and patient states they do not know all of the facilities in the area  They were agreeable to a blanket referral being sent out and both daughters were provided with a list of STR facilities  CM will f/u tomorrow to discuss available beds and their preference once lists are reviewed  CM to follow

## 2022-05-18 NOTE — ED NOTES
Patient has multiple uncontrolled spastic movement noted to B/L upper arms  Patient remains alert and talking to nurse during short 15-30 second episodes  Dr Jake Landaverde aware of patient episodes  No further orders at this time  Will continue to monitor        Yahir Fajardo RN  05/17/22 3729

## 2022-05-18 NOTE — ASSESSMENT & PLAN NOTE
Patient presents after being found on the ground at home, incontinent and disoriented  · Possibly due to postictal state from seizure vs stroke  · CT head shows no acute intracranial abnormalities  · CT spine with no acute fractures or dislocation  · Pt With intermittent twitching of trunk and extremities but fully awake and conversant in ED  · Given Ativan and Keppra 1000 mg  · CK normal  · Check Keppra level, lipid panel, hemoglobin A1c  · Restart Keppra 1500 mg b i d   · Aspirin 325 now, 81 mg daily  · Seizure, fall precautions  · PT/OT eval  · Discussed with neurology  · No need for emergent MRI  · Frequent neuro checks, nonemergent MRI tomorrow   · Further recommendations appreciated    5/18: MRI as noted above  Follow up with recommendations from neurology

## 2022-05-18 NOTE — ASSESSMENT & PLAN NOTE
Patient on Keppra 1500 mg b i d  From known seizure disorder  · Unsure if patient is compliant with medication  · Check Keppra level  · Restart home dose  · Seizure, fall precautions  · Neurology consult    5/18: MRI shows findings suspicious for recent infarct in the posterior right MCA distribution and right posterior cerebral artery distribution  Findings may also be related to seizures  Follow up with recommendations from neurology

## 2022-05-18 NOTE — PLAN OF CARE
Problem: Prexisting or High Potential for Compromised Skin Integrity  Goal: Skin integrity is maintained or improved  Description: INTERVENTIONS:  - Identify patients at risk for skin breakdown  - Assess and monitor skin integrity  - Assess and monitor nutrition and hydration status  - Monitor labs   - Assess for incontinence   - Turn and reposition patient  - Assist with mobility/ambulation  - Relieve pressure over bony prominences  - Avoid friction and shearing  - Provide appropriate hygiene as needed including keeping skin clean and dry  - Evaluate need for skin moisturizer/barrier cream  - Collaborate with interdisciplinary team   - Patient/family teaching  - Consider wound care consult   Outcome: Progressing     Problem: Potential for Falls  Goal: Patient will remain free of falls  Description: INTERVENTIONS:  - Educate patient/family on patient safety including physical limitations  - Instruct patient to call for assistance with activity   - Consult OT/PT to assist with strengthening/mobility   - Keep Call bell within reach  - Keep bed low and locked with side rails adjusted as appropriate  - Keep care items and personal belongings within reach  - Initiate and maintain comfort rounds  - Make Fall Risk Sign visible to staff  - Offer Toileting every 2 Hours, in advance of need  - Initiate/Maintain bed and chair alarm  - Apply yellow socks and bracelet for high fall risk patients  - Consider moving patient to room near nurses station  Outcome: Progressing

## 2022-05-18 NOTE — OCCUPATIONAL THERAPY NOTE
OT EVALUATION       05/18/22 1100   Note Type   Note type Evaluation   Restrictions/Precautions   Other Precautions Chair Alarm; Bed Alarm; Fall Risk;Pain   Pain Assessment   Pain Assessment Tool 0-10   Pain Score 9   Pain Location/Orientation Location: Head  (R shoulder, knee)   Home Living   Type of [address] [place] [address] One level  (5 ORALIA)   Bathroom Shower/Tub Tub/shower unit   Bathroom Toilet Standard   Bathroom Equipment Grab bars in [address]  (rollator)   Prior Function   Level of [place] Independent with ADLs and functional mobility   Lives With Alone   Receives Help From Family   ADL Assistance Independent   IADLs Needs assistance  (sister)   Falls in the last 6 months 1 to 4   Comments Pt is admitted after being found on the ground at home, incontinent and disoriented, possibly having seizures   ADL   Eating Assistance 5  Supervision/Setup   Grooming Assistance 4  Minimal Assistance   UB Bathing Assistance 3  Moderate Assistance   LB Bathing Assistance 2  Maximal Assistance   UB Dressing Assistance 2  Maximal Assistance   LB Dressing Assistance 2  Maximal Assistance   Toileting Assistance  2  Maximal Assistance   Bed Mobility   Supine to Sit 4  Minimal assistance   Additional items Assist x 1;Verbal cues   Transfers   Sit to Stand 3  Moderate assistance   Additional items Assist x 1   Stand to Sit 3  Moderate assistance   Additional items Assist x 1   Functional Mobility   Functional Mobility 3  Moderate assistance   Additional Comments moderate loss of balance to left with hand hold assist, + tone R arm with flexed posturing   Additional items Hand hold assistance   Balance   Static Sitting Fair -   Dynamic Sitting Poor   Static Standing Poor   Dynamic Standing Poor -   Activity Tolerance   Activity Tolerance Patient limited by fatigue;Treatment limited secondary to medical complications (Comment); Patient limited by pain  (balance loss, ataxia, decreased coordination, visual changes)   RUE Assessment   RUE Assessment WFL   LUE Assessment   LUE Assessment   (WFL AROM, grossly MMT 4-/5)   Hand Function   Gross Motor Coordination Impaired  (LUE)   Fine Motor Coordination Impaired  (LUE)   Sensation   Light Touch Severe deficits in the LUE;Partial deficits in the LLE   Sharp/Dull Partial deficits in the LLE   Proprioception   Proprioception Severe deficits in the LLE   Vision-Basic Assessment   Current Vision Wears glasses only for reading   Vision - Complex Assessment   Ocular Range of Motion WFL   Head Position Head turned; Head tilt  (head up and to the right supine due to neck pain per pt)   Tracking Able to track stimulus in all quads without difficulty   Convergence Breaks at 7 from nose   Visual Fields   (difficulty detecting stimuli to left)   Acuity   (unable to read name badge without glasses)   Visual Screen Results   (pt reports blurry vision to L)   Perception   Inattention/Neglect Cues to attend left visual field;Cues to attend to left side of body   Spatial Orientation walking into furniture on R   Motor Planning Cues to use objects appropriately   Cognition   Overall Cognitive Status [facility]   Arousal/Participation Cooperative   Attention Attends with cues to redirect   Orientation Level Oriented X4   Following Commands Follows multistep commands with increased time or repetition   Comments pt with poor insight to deficits and poor safety awareness   Assessment   Limitation Decreased ADL status; Decreased UE strength;Decreased Safe judgement during ADL;Decreased endurance;Decreased sensation;Visual deficit; Decreased fine motor control;Decreased self-care trans;Decreased high-level ADLs  (decreased balance and mobility)   Prognosis Good   Assessment Patient evaluated by Occupational Therapy  Patient admitted with Stroke-like symptoms    The patients occupational profile, medical and therapy history includes a extensive additional review of physical, cognitive, or psychosocial history related to current functional performance  Comorbidities affecting functional mobility and ADLS include: anxiety, gout and hypertension  Prior to admission, patient was independent with functional mobility with cane, independent with ADLS and requiring assist for IADLS  The evaluation identifies the following performance deficits: weakness, impaired balance, decreased endurance, decreased coordination, increased fall risk, new onset of impairment of functional mobility, decreased ADLS, decreased IADLS, pain, decreased activity tolerance, decreased safety awareness, impaired judgement, impaired tone, decreased sensation, decreased strength and visual deficits, that result in activity limitations and/or participation restrictions  This evaluation requires clinical decision making of high complexity, because the patient presents with comorbidites that affect occupational performance and required significant modification of tasks or assistance with consideration of multiple treatment options  The patient's raw score on the -PAC Daily Activity inpatient short form low function score is 17, standardized score is Low Function Daily Activity Standardized Score: 28 95  Patients with a standardized score less than 39 4 are likely to benefit from discharge to post-acute rehab services  Please refer to the recommendation of the Occupational Therapist for safe discharge planning  Patient will benefit from skilled Occupational Therapy services to address above deficits and facilitate a safe return to prior level of function  Goals   Patient Goals to go home   STG Time Frame   (1-7 days)   Short Term Goal  Goals established to promote Patient Goals: to go home:  Patient will increase standing tolerance to 3 minutes during ADL task to decrease assistance level and decrease fall risk; Patient will increase bed mobility to supervision in preparation for ADLS and transfers;  Patient will increase functional mobility to and from bathroom with assistive device with min assist to increase performance with ADLS and to use a toilet; Patient will tolerate 10 minutes of UE ROM/strengthening/fine motor coordination to increase general activity tolerance and performance in ADLS/IADLS; Patient will improve functional activity tolerance to 10 minutes of sustained functional tasks to increase participation in basic self-care and decrease assistance level;   Patient will increase dynamic sitting balance to poor+ to improve the ability to sit at edge of bed or on a chair for ADLS;  Patient will increase dynamic standing balance to poor to improve postural stability and decrease fall risk during standing ADLS and transfers  Patient will demonstrate compensatory techniques for visual deficit with minimal verbal cues to increase safety awareness, increase independence with ADLS and decrease fall risk  LTG Time Frame   (8-14 days)   Long Term Goal Patient will increase standing tolerance to 6 minutes during ADL task to decrease assistance level and decrease fall risk; Patient will increase bed mobility to independent in preparation for ADLS and transfers; Patient will increase functional mobility to and from bathroom with assistive device with supervision to increase performance with ADLS and to use a toilet; Patient will tolerate 20 minutes of UE ROM/strengthening/fine motor coordination to increase general activity tolerance and performance in ADLS/IADLS; Patient will improve functional activity tolerance to 20 minutes of sustained functional tasks to increase participation in basic self-care and decrease assistance level;   Patient will increase dynamic sitting balance to fair- to improve the ability to sit at edge of bed or on a chair for ADLS;  Patient will increase dynamic standing balance to poor+ to improve postural stability and decrease fall risk during standing ADLS and transfers    Patient will demonstrate compensatory techniques for visual deficit without verbal cues to increase safety awareness, increase independence with ADLS and decrease fall risk  Pt will score >/= 14/24 on AM-PAC Daily Activity Inpatient scale to promote safe independence with ADLs and functional mobility; Pt will score >/= 55/100 on Barthel Index in order to decrease caregiver assistance needed and increase ability to perform ADLs and functional mobility  Functional Transfer Goals   Pt Will Perform All Functional Transfers   (STG min assist LTG supervision)   ADL Goals   Pt Will Perform Eating   (STG independent)   Pt Will Perform Grooming   (STG supervision LTG Independent)   Pt Will Perform Bathing   (STG mod assist LTG min assist)   Pt Will Perform UE Dressing   (STG mod assist LTG min assist)   Pt Will Perform LE Dressing   (STG mod assist LTG min assist)   Pt Will Perform Toileting   (STG mod assist LTG min assist)   Plan   Treatment Interventions ADL retraining;Functional transfer training;UE strengthening/ROM; Endurance training;Visual perceptual retraining;Patient/family training;Equipment evaluation/education; Neuromuscular reeducation; Fine motor coordination activities; Compensatory technique education;Continued evaluation; Activityengagement   Goal Expiration Date 06/01/22   OT Frequency 5x/wk   Recommendation   OT Discharge Recommendation Post acute rehabilitation services   AM-[facility] Daily Activity Inpatient   Lower Body Dressing 1   Bathing 1   Toileting 1   Upper Body Dressing 1   Grooming 3   Eating 3   Daily Activity Raw Score 10   Turning Head Towards Sound 3   Follow Simple Instructions 4   Low Function Daily Activity Raw Score 17   Low Function Daily Activity Standardized Score 28 95   AM-PAC Applied Cognition Inpatient   Following a Speech/Presentation 4   Understanding Ordinary Conversation 4   Taking Medications 2   Remembering Where Things Are Placed or Put Away 3   Remembering List of 4-5 Errands 3   Taking Care of Complicated Tasks 2   Applied Cognition Raw Score 18   Applied Cognition Standardized Score 38 07   Barthel Index   Feeding 5   Bathing 0   Grooming Score 0   Dressing Score 0   Bladder Score 5   Bowels Score 5   Toilet Use Score 5   Transfers (Bed/Chair) Score 5   Mobility (Level Surface) Score 0   Stairs Score 0   Barthel Index Score 25   Modified [place] Scale   Modified [place] Scale 4   Licensure   [place] License Number  Ashley Hendrix Blas [address] OTR/L [id number]

## 2022-05-19 ENCOUNTER — APPOINTMENT (INPATIENT)
Dept: NON INVASIVE DIAGNOSTICS | Facility: HOSPITAL | Age: 57
DRG: 040 | End: 2022-05-19
Payer: COMMERCIAL

## 2022-05-19 DIAGNOSIS — I63.531 CEREBROVASCULAR ACCIDENT (CVA) DUE TO STENOSIS OF RIGHT POSTERIOR CEREBRAL ARTERY (HCC): Primary | ICD-10-CM

## 2022-05-19 PROBLEM — F17.200 SMOKING: Status: ACTIVE | Noted: 2021-08-06

## 2022-05-19 PROBLEM — N17.9 ACUTE KIDNEY INJURY (HCC): Status: ACTIVE | Noted: 2022-05-19

## 2022-05-19 PROBLEM — R53.1 WEAKNESS: Status: ACTIVE | Noted: 2022-05-19

## 2022-05-19 PROBLEM — IMO0001 SMOKING: Status: ACTIVE | Noted: 2021-08-06

## 2022-05-19 PROBLEM — I63.9 CVA (CEREBRAL VASCULAR ACCIDENT) (HCC): Status: ACTIVE | Noted: 2022-05-19

## 2022-05-19 LAB
ALBUMIN SERPL BCP-MCNC: 3.4 G/DL (ref 3.5–5)
ALP SERPL-CCNC: 85 U/L (ref 46–116)
ALT SERPL W P-5'-P-CCNC: 23 U/L (ref 12–78)
ANION GAP SERPL CALCULATED.3IONS-SCNC: 14 MMOL/L (ref 4–13)
AST SERPL W P-5'-P-CCNC: 23 U/L (ref 5–45)
BASOPHILS # BLD AUTO: 0.03 THOUSANDS/ΜL (ref 0–0.1)
BASOPHILS NFR BLD AUTO: 0 % (ref 0–1)
BILIRUB SERPL-MCNC: 0.39 MG/DL (ref 0.2–1)
BUN SERPL-MCNC: 28 MG/DL (ref 5–25)
CALCIUM ALBUM COR SERPL-MCNC: 9.3 MG/DL (ref 8.3–10.1)
CALCIUM SERPL-MCNC: 8.8 MG/DL (ref 8.3–10.1)
CHLORIDE SERPL-SCNC: 102 MMOL/L (ref 100–108)
CO2 SERPL-SCNC: 20 MMOL/L (ref 21–32)
CREAT SERPL-MCNC: 1.92 MG/DL (ref 0.6–1.3)
DEPRECATED AT III PPP: 95 % OF NORMAL (ref 92–136)
EOSINOPHIL # BLD AUTO: 0.17 THOUSAND/ΜL (ref 0–0.61)
EOSINOPHIL NFR BLD AUTO: 2 % (ref 0–6)
ERYTHROCYTE [DISTWIDTH] IN BLOOD BY AUTOMATED COUNT: 14.1 % (ref 11.6–15.1)
GFR SERPL CREATININE-BSD FRML MDRD: 37 ML/MIN/1.73SQ M
GLUCOSE SERPL-MCNC: 113 MG/DL (ref 65–140)
HCT VFR BLD AUTO: 40.2 % (ref 36.5–49.3)
HGB BLD-MCNC: 13.7 G/DL (ref 12–17)
IMM GRANULOCYTES # BLD AUTO: 0.03 THOUSAND/UL (ref 0–0.2)
IMM GRANULOCYTES NFR BLD AUTO: 0 % (ref 0–2)
LYMPHOCYTES # BLD AUTO: 1.23 THOUSANDS/ΜL (ref 0.6–4.47)
LYMPHOCYTES NFR BLD AUTO: 12 % (ref 14–44)
MAGNESIUM SERPL-MCNC: 1.7 MG/DL (ref 1.6–2.6)
MCH RBC QN AUTO: 29.6 PG (ref 26.8–34.3)
MCHC RBC AUTO-ENTMCNC: 34.1 G/DL (ref 31.4–37.4)
MCV RBC AUTO: 87 FL (ref 82–98)
MONOCYTES # BLD AUTO: 0.81 THOUSAND/ΜL (ref 0.17–1.22)
MONOCYTES NFR BLD AUTO: 8 % (ref 4–12)
NEUTROPHILS # BLD AUTO: 8.21 THOUSANDS/ΜL (ref 1.85–7.62)
NEUTS SEG NFR BLD AUTO: 78 % (ref 43–75)
NRBC BLD AUTO-RTO: 0 /100 WBCS
PLATELET # BLD AUTO: 256 THOUSANDS/UL (ref 149–390)
PMV BLD AUTO: 10.5 FL (ref 8.9–12.7)
POTASSIUM SERPL-SCNC: 3.3 MMOL/L (ref 3.5–5.3)
PROT C AG ACT/NOR PPP IA: >150 % OF NORMAL (ref 60–150)
PROT S ACT/NOR PPP: 91 % (ref 71–117)
PROT SERPL-MCNC: 6.2 G/DL (ref 6.4–8.2)
RBC # BLD AUTO: 4.63 MILLION/UL (ref 3.88–5.62)
SODIUM SERPL-SCNC: 136 MMOL/L (ref 136–145)
WBC # BLD AUTO: 10.48 THOUSAND/UL (ref 4.31–10.16)

## 2022-05-19 PROCEDURE — 0JH632Z INSERTION OF MONITORING DEVICE INTO CHEST SUBCUTANEOUS TISSUE AND FASCIA, PERCUTANEOUS APPROACH: ICD-10-PCS | Performed by: INTERNAL MEDICINE

## 2022-05-19 PROCEDURE — 97110 THERAPEUTIC EXERCISES: CPT

## 2022-05-19 PROCEDURE — 33285 INSJ SUBQ CAR RHYTHM MNTR: CPT | Performed by: INTERNAL MEDICINE

## 2022-05-19 PROCEDURE — 85025 COMPLETE CBC W/AUTO DIFF WBC: CPT | Performed by: FAMILY MEDICINE

## 2022-05-19 PROCEDURE — 99223 1ST HOSP IP/OBS HIGH 75: CPT | Performed by: INTERNAL MEDICINE

## 2022-05-19 PROCEDURE — 97116 GAIT TRAINING THERAPY: CPT

## 2022-05-19 PROCEDURE — 93321 DOPPLER ECHO F-UP/LMTD STD: CPT | Performed by: INTERNAL MEDICINE

## 2022-05-19 PROCEDURE — 99232 SBSQ HOSP IP/OBS MODERATE 35: CPT | Performed by: FAMILY MEDICINE

## 2022-05-19 PROCEDURE — 93312 ECHO TRANSESOPHAGEAL: CPT | Performed by: INTERNAL MEDICINE

## 2022-05-19 PROCEDURE — 93312 ECHO TRANSESOPHAGEAL: CPT

## 2022-05-19 PROCEDURE — 97535 SELF CARE MNGMENT TRAINING: CPT

## 2022-05-19 PROCEDURE — C1764 EVENT RECORDER, CARDIAC: HCPCS | Performed by: INTERNAL MEDICINE

## 2022-05-19 PROCEDURE — 99233 SBSQ HOSP IP/OBS HIGH 50: CPT | Performed by: PSYCHIATRY & NEUROLOGY

## 2022-05-19 PROCEDURE — 80053 COMPREHEN METABOLIC PANEL: CPT | Performed by: FAMILY MEDICINE

## 2022-05-19 PROCEDURE — 93325 DOPPLER ECHO COLOR FLOW MAPG: CPT | Performed by: INTERNAL MEDICINE

## 2022-05-19 PROCEDURE — 83735 ASSAY OF MAGNESIUM: CPT | Performed by: NURSE PRACTITIONER

## 2022-05-19 DEVICE — LOOP RECORDER REVEAL LINQ II SYS DEVICE ONLY: Type: IMPLANTABLE DEVICE | Status: FUNCTIONAL

## 2022-05-19 RX ORDER — SODIUM CHLORIDE 9 MG/ML
75 INJECTION, SOLUTION INTRAVENOUS CONTINUOUS
Status: DISCONTINUED | OUTPATIENT
Start: 2022-05-19 | End: 2022-05-20

## 2022-05-19 RX ORDER — LIDOCAINE 50 MG/G
1 PATCH TOPICAL DAILY
Status: COMPLETED | OUTPATIENT
Start: 2022-05-19 | End: 2022-05-19

## 2022-05-19 RX ORDER — LIDOCAINE HYDROCHLORIDE 20 MG/ML
INJECTION, SOLUTION EPIDURAL; INFILTRATION; INTRACAUDAL; PERINEURAL AS NEEDED
Status: DISCONTINUED | OUTPATIENT
Start: 2022-05-19 | End: 2022-05-19

## 2022-05-19 RX ORDER — POTASSIUM CHLORIDE 20 MEQ/1
40 TABLET, EXTENDED RELEASE ORAL ONCE
Status: COMPLETED | OUTPATIENT
Start: 2022-05-19 | End: 2022-05-19

## 2022-05-19 RX ORDER — PROPOFOL 10 MG/ML
INJECTION, EMULSION INTRAVENOUS AS NEEDED
Status: DISCONTINUED | OUTPATIENT
Start: 2022-05-19 | End: 2022-05-19

## 2022-05-19 RX ORDER — KETOROLAC TROMETHAMINE 30 MG/ML
15 INJECTION, SOLUTION INTRAMUSCULAR; INTRAVENOUS EVERY 6 HOURS PRN
Status: DISCONTINUED | OUTPATIENT
Start: 2022-05-19 | End: 2022-05-19

## 2022-05-19 RX ORDER — KETOROLAC TROMETHAMINE 30 MG/ML
15 INJECTION, SOLUTION INTRAMUSCULAR; INTRAVENOUS ONCE
Status: COMPLETED | OUTPATIENT
Start: 2022-05-19 | End: 2022-05-19

## 2022-05-19 RX ADMIN — PROPOFOL 20 MG: 10 INJECTION, EMULSION INTRAVENOUS at 13:34

## 2022-05-19 RX ADMIN — BUTALBITAL, ACETAMINOPHEN AND CAFFEINE 1 TABLET: 50; 325; 40 TABLET ORAL at 16:13

## 2022-05-19 RX ADMIN — FENOFIBRATE 145 MG: 145 TABLET, FILM COATED ORAL at 09:18

## 2022-05-19 RX ADMIN — MAGNESIUM OXIDE TAB 400 MG (241.3 MG ELEMENTAL MG) 400 MG: 400 (241.3 MG) TAB at 09:17

## 2022-05-19 RX ADMIN — PROPOFOL 100 MG: 10 INJECTION, EMULSION INTRAVENOUS at 13:26

## 2022-05-19 RX ADMIN — MAGNESIUM OXIDE TAB 400 MG (241.3 MG ELEMENTAL MG) 400 MG: 400 (241.3 MG) TAB at 17:18

## 2022-05-19 RX ADMIN — PROPOFOL 30 MG: 10 INJECTION, EMULSION INTRAVENOUS at 13:31

## 2022-05-19 RX ADMIN — THIAMINE HCL TAB 100 MG 100 MG: 100 TAB at 09:17

## 2022-05-19 RX ADMIN — PANTOPRAZOLE SODIUM 20 MG: 20 TABLET, DELAYED RELEASE ORAL at 06:31

## 2022-05-19 RX ADMIN — PROPOFOL 50 MG: 10 INJECTION, EMULSION INTRAVENOUS at 13:27

## 2022-05-19 RX ADMIN — VERAPAMIL HYDROCHLORIDE 120 MG: 120 TABLET, FILM COATED, EXTENDED RELEASE ORAL at 21:18

## 2022-05-19 RX ADMIN — SODIUM CHLORIDE 100 MG: 9 INJECTION, SOLUTION INTRAVENOUS at 12:09

## 2022-05-19 RX ADMIN — KETOROLAC TROMETHAMINE 15 MG: 30 INJECTION, SOLUTION INTRAMUSCULAR at 03:12

## 2022-05-19 RX ADMIN — CLOPIDOGREL BISULFATE 75 MG: 75 TABLET ORAL at 09:17

## 2022-05-19 RX ADMIN — ASPIRIN 81 MG CHEWABLE TABLET 81 MG: 81 TABLET CHEWABLE at 09:17

## 2022-05-19 RX ADMIN — SODIUM CHLORIDE: 0.9 INJECTION, SOLUTION INTRAVENOUS at 13:18

## 2022-05-19 RX ADMIN — HEPARIN SODIUM 5000 UNITS: 5000 INJECTION INTRAVENOUS; SUBCUTANEOUS at 15:17

## 2022-05-19 RX ADMIN — SODIUM CHLORIDE 50 ML/HR: 0.9 INJECTION, SOLUTION INTRAVENOUS at 09:19

## 2022-05-19 RX ADMIN — LEVETIRACETAM 1500 MG: 500 TABLET, FILM COATED ORAL at 21:15

## 2022-05-19 RX ADMIN — LIDOCAINE 5% 1 PATCH: 700 PATCH TOPICAL at 06:30

## 2022-05-19 RX ADMIN — PROPOFOL 50 MG: 10 INJECTION, EMULSION INTRAVENOUS at 13:28

## 2022-05-19 RX ADMIN — SODIUM CHLORIDE 100 MG: 9 INJECTION, SOLUTION INTRAVENOUS at 23:49

## 2022-05-19 RX ADMIN — METOPROLOL SUCCINATE 100 MG: 100 TABLET, EXTENDED RELEASE ORAL at 09:18

## 2022-05-19 RX ADMIN — POTASSIUM CHLORIDE 40 MEQ: 20 TABLET, EXTENDED RELEASE ORAL at 09:16

## 2022-05-19 RX ADMIN — ATORVASTATIN CALCIUM 80 MG: 80 TABLET ORAL at 17:18

## 2022-05-19 RX ADMIN — HEPARIN SODIUM 5000 UNITS: 5000 INJECTION INTRAVENOUS; SUBCUTANEOUS at 21:15

## 2022-05-19 RX ADMIN — SODIUM CHLORIDE 100 MG: 9 INJECTION, SOLUTION INTRAVENOUS at 00:01

## 2022-05-19 RX ADMIN — LEVETIRACETAM 1500 MG: 500 TABLET, FILM COATED ORAL at 09:17

## 2022-05-19 RX ADMIN — POTASSIUM CHLORIDE 40 MEQ: 20 TABLET, EXTENDED RELEASE ORAL at 17:18

## 2022-05-19 RX ADMIN — LIDOCAINE HYDROCHLORIDE 90 MG: 20 INJECTION, SOLUTION EPIDURAL; INFILTRATION; INTRACAUDAL; PERINEURAL at 13:26

## 2022-05-19 RX ADMIN — SODIUM CHLORIDE 75 ML/HR: 0.9 INJECTION, SOLUTION INTRAVENOUS at 23:57

## 2022-05-19 NOTE — ASSESSMENT & PLAN NOTE
· Home regimen includes amlodipine 10 mg daily and metoprolol succinate 100 mg daily  · Amlodipine held for permissive hypertension  Resume at discharge  · Continue metoprolol  · Also takes verapamil

## 2022-05-19 NOTE — ASSESSMENT & PLAN NOTE
Patient presents after being found on the ground at home, incontinent and disoriented  · Possibly due to postictal state from seizure vs stroke  · CT head shows no acute intracranial abnormalities  · CT spine with no acute fractures or dislocation  · CTA head/neck:  Subtle evolving cortical hypodensities in the right occipital lobe, correlating to some of the cortical diffusion restriction noted on the MRI  Findings are worrisome for evolving infarction or perhaps postictal edema  · MRI brain:  Suspicious for recent infarct in the posterior right MCA distribution and right posterior cerebral artery distributions  Findings may also be related seizures  No acute hemorrhage  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  · S/p Ativan and Keppra 1000 mg in ED  · CK normal  · Restarted Keppra 1500 mg b i d   · Seizure, fall precautions  · Neurology consulted and appreciate their recommendations  · Unclear whether event is seizure vs stroke based on findings on imaging  · Recommend repeat MRI in 2-3 months to check for evolution  · S/p BEBO with no evidence of PFO or thrombus  · S/p Loop recorder implantation  · Recommend DAPT for 21 days then daily ASA  · Continue Lipitor 80 mg while in hospital   Discharge on 40 mg daily    · Thrombosis panel:  · Beta-2 glycoprotein antibodies: Negative  · Cardiolipin antibodies: Negative  · Protein C activity:  Elevated  · Proteic S activity: Normal  · Anti thrombin 3 activity:  Normal  · Factor 5 Leiden:  Pending  · Prothrombin gene mutation:  Pending  · Lupus anticoagulant:  Pending

## 2022-05-19 NOTE — UTILIZATION REVIEW
Initial Clinical Review    Admission: Date/Time/Statement:   Admission Orders (From admission, onward)     Ordered        05/17/22 1938  INPATIENT ADMISSION  Once                      Orders Placed This Encounter   Procedures    INPATIENT ADMISSION     Standing Status:   Standing     Number of Occurrences:   1     Order Specific Question:   Level of Care     Answer:   Med Surg [16]     Order Specific Question:   Estimated length of stay     Answer:   More than 2 Midnights     Order Specific Question:   Certification     Answer:   I certify that inpatient services are medically necessary for this patient for a duration of greater than two midnights  See H&P and MD Progress Notes for additional information about the patient's course of treatment  ED Arrival Information     Expected   -    Arrival   5/17/2022 16:32    Acuity   Urgent            Means of arrival   Ambulance    Escorted by   [facility]    Admission type   Urgent            Arrival complaint   [place]           Chief Complaint   Patient presents with    Fall     Patient states he collapsed and fell - states was on the floor for several hours  States he thinks he was having seizures (states he was "convulsing, but awake the whole time")  Requesting  for help in the home  Initial Presentation: 59 y o  male to presents to ed from home for evaluation and treatment of collapse and fall  Patient states the wind blew his door opened which hit him in the head causing him to fall to the ground  His sister reports patient has had multiple seizures over a few days  His niece found him down today incontinent and disoriented  She found medication untaken  PMHX:  HTN, SEIZURE DISORDER  Clinical assessment significant for left sided weakness and intermittent twitching of trunk and extremities  WBC 12 20  Initially treated with iv ativan x1, iv keppra loading dose  Admit to inpatient  Med surg  Date: 5- 18-22   Day 2:  Inpatient med surg    Check Keppra level  Restart Keppra po bid  Initiate seizure precautions  Obtain PT/OT evaluations  Monitor neuro checks and on telemetry  Consult neurology  MRI brain  Per neuro:  1  Recent Rt Posterior MCA and Rt PCA distribution  2  Seizures  3  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  4  HLD  5  Falls  6  H/o ETOH use  7  H/o medication non adherence     -Monitored on Telemetry  -Neuro Assessments   -BASA 81 mg daily  -Lipitor 80 mg daily  -Plavix 300 mg today, 75 mg daily starting 05/19/2022  -continue Keppra 1500 mg b i d   -will add adjunct therapy with Vimpat 200 mg bolus then 100 mg b i d   -patient can have Toradol 30 mg IV now  -can have Fioricet for headaches q 4 hours p r n   While in hospital  -MRI of the brain-completed  -CTA head and neck-discussed with Dr Diane Morgan necessity for CTA related to multiple vascular distribution infarcts  -echocardiogram completed at last hospitalization 05/09/2022 with 65% ejection fraction and normal atrium  -will get BEBO and loop recorder evaluation for central source  -labs:  Lipid profile, A1c, magnesium, CBC in a m   -PT/OT/ST per stroke pathways    ED Triage Vitals   05/17/22 1642 05/17/22 1642 05/17/22 1642 05/17/22 1642 05/17/22 1642   99 1 °F (37 3 °C) 104 19 133/86 96 %      Tympanic Monitor         10 - Worst Possible Pain          05/17/22 90 7 kg (200 lb)     Additional Vital Signs:       Date/Time Temp Pulse Resp BP MAP (mmHg) SpO2 O2 Device   05/18/22 09:05:07 -- 98 -- 119/73 88 97 % --   05/18/22 05:53:26 -- 86 18 -- -- 96 % --   05/18/22 03:40:35 98 1 °F (36 7 °C) 100 17 119/81 94 95 % None (Room air)   05/18/22 0235 98 2 °F (36 8 °C) 97 17 119/79 92 96 % None (Room air)   05/18/22 01:12:26 98 5 °F (36 9 °C) 96 18 117/82 94 95 % None (Room air)   05/17/22 2330 -- -- -- -- -- 96 % None (Room air)   05/17/22 23:11:52 98 4 °F (36 9 °C) 102 18 124/89 101 97 % --   05/17/22 2230 98 8 °F (37 1 °C) 96 20 137/72 99 99 % --   05/17/22 2224 -- 98 -- 130/71 94 98 % --   05/17/22 2200 -- 94 20 144/79 105 100 % --   05/17/22 2152 -- 94 -- 145/79 104 99 % --   05/17/22 2137 -- 90 -- 131/76 97 98 % --   05/17/22 2124 -- 98 -- -- -- 98 % --   05/17/22 2122 -- 96 -- 147/76 105 97 % --   05/17/22 2107 -- 84 -- 146/83 107 99 % --   05/17/22 2054 -- 90 -- -- -- 98 % --   05/17/22 1922 -- 96 -- -- -- 89 % Abnormal  --   05/17/22 1916 -- 100 -- 148/82 109 -- --   05/17/22 1907 -- 104 -- -- -- 100 % --   05/17/22 1901 -- 106   Abnormal  -- 147/80 108 97 % --   05/17/22 1854 -- 102 -- -- -- -- --   05/17/22 1852 -- 104 -- -- -- -- --   05/17/22 1846 -- 104 -- -- -- -- --   05/17/22 1845 -- 104 20 150/85 113 97 % None (Room air)   05/17/22 1837 -- 106   Abnormal  -- -- -- -- --   05/17/22 1831 -- 104 -- -- -- 100 % --   05/17/22 1830 -- 98 22 165/95 122 98 % None (Room air)   05/17/22 1824 -- 104 -- -- -- 99 % --   05/17/22 1822 -- 94 -- -- -- 98 % --   05/17/22 1816 -- 92 -- -- -- 99 % --   05/17/22 1815 -- 102 20 157/106   Abnormal  121 98 % None (Room air)   05/17/22 1807 -- 94 -- -- -- -- --   05/17/22 1801 -- 102 -- -- -- 88 % Abnormal  --   05/17/22 1800 -- 96 18 174/110   Abnormal  134 97 % None (Room air)   05/17/22 1642 99 1 °F (37 3 °C) 104 19 133/86 -- 96 % None (Room air)             Pertinent Labs/Diagnostic Test Results:       CT lumbar spine without contrast   Final  (05/17 2045)      No acute fracture or dislocation  Multilevel degenerative changes  CT thoracic spine without contrast   Final (05/17 1830)      Schmorl's nodes at the superior endplates of T2, T3 and possibly T4 of indeterminate age  CT cervical spine without contrast   Final  (05/17 1822)      No cervical spine fracture or traumatic malalignment  Fluid in the sphenoid sinuses  CT head wo contrast   Final  (05/17 1808)      No acute intracranial abnormality  Microangiopathic changes            Results from last 7 days   Lab Units 05/19/22  0550 05/17/22  1710   WBC Thousand/uL 10 48* 12 20*   HEMOGLOBIN g/dL 13 7 16 2   HEMATOCRIT % 40 2 46 9   PLATELETS Thousands/uL 256 275   NEUTROS ABS Thousands/µL 8 21* 9 66*         Results from last 7 days   Lab Units 05/19/22  0054 05/17/22  1710   SODIUM mmol/L 136 137   POTASSIUM mmol/L 3 3* 3 7   CHLORIDE mmol/L 102 99*   CO2 mmol/L 20* 23   ANION GAP mmol/L 14* 15*   BUN mg/dL 28* 12   CREATININE mg/dL 1 92* 1 02   EGFR ml/min/1 73sq m 37 80   CALCIUM mg/dL 8 8 9 6   MAGNESIUM mg/dL 1 7 1 1*     Results from last 7 days   Lab Units 05/19/22  0054 05/17/22  1710   AST U/L 23 30   ALT U/L 23 29   ALK PHOS U/L 85 96   TOTAL PROTEIN g/dL 6 2* 7 6   ALBUMIN g/dL 3 4* 4 2   TOTAL BILIRUBIN mg/dL 0 39 0 74     Results from last 7 days   Lab Units 05/17/22  1701   POC GLUCOSE mg/dl 92     Results from last 7 days   Lab Units 05/19/22  0054 05/17/22  1710   GLUCOSE RANDOM mg/dL 113 87           Results from last 7 days   Lab Units 05/17/22  1710   CK TOTAL U/L 295   CK MB INDEX % <1 0   CK MB ng/mL 1 8     Results from last 7 days   Lab Units 05/17/22  2233 05/17/22  1936 05/17/22  1710   HS TNI 0HR ng/L  --   --  7   HS TNI 2HR ng/L  --  8  --    HSTNI D2 ng/L  --  1  --    HS TNI 4HR ng/L 7  --   --    HSTNI D4 ng/L 0  --   --          Results from last 7 days   Lab Units 05/17/22  1710   PROTIME seconds 13 4   INR  1 04   PTT seconds 28         Results from last 7 days   Lab Units 05/17/22  1710   ETHANOL LVL mg/dL <3         ED Treatment:   Medication Administration from 05/17/2022 1631 to 05/17/2022 2303       Date/Time Order Dose Route Action     05/17/2022 1837 LORazepam (ATIVAN) injection 1 mg 1 mg Intravenous Given     05/17/2022 1858 levETIRAcetam (KEPPRA) 1,000 mg in sodium chloride 0 9 % 100 mL IVPB 1,000 mg Intravenous New Bag        Past Medical History:   Diagnosis Date    Biceps tendonitis, unspecified laterality 09/17/2007    Bone cyst 11/22/2011    Bronchiectasis ([facility] ) 10/02/2006    Bursitis 11/17/2005    Chest pain 04/29/2009    Chronic renal failure 11/22/2011    Diverticulitis of colon 10/09/2007    Generalized anxiety disorder 06/08/2006    GERD (gastroesophageal reflux disease)     Gout     Hyperlipidemia     Hypertension     Lateral epicondylitis, unspecified elbow     Last Assessed: 11/29/2013    Low magnesium levels     Lyme disease 11/09/2009    Pneumonia     Last Assessed: 1/7/2013     Present on Admission:   Seizure disorder ([place] [id number] )   Hypertension   Hyperlipidemia   History of alcohol abuse      Admitting Diagnosis:     Seizure ([place] [id number] ) [R56 9]  Syncope [R55]  Altered mental status [R41 82]  Stroke-like symptoms [R29 90]    Age/Sex: 59 y o  male    Scheduled Medications:    amLODIPine, 10 mg, Oral, HS  aspirin, 81 mg, Oral, Daily  atorvastatin, 80 mg, Oral, Daily With Dinner  clopidogrel, 75 mg, Oral, Daily  fenofibrate, 145 mg, Oral, Daily  heparin (porcine), 5,000 Units, Subcutaneous, Q8H LUCIA  lacosamide (VIMPAT) IVPB, 100 mg, Intravenous, Q12H  levETIRAcetam, 1,500 mg, Oral, Q12H  lidocaine, 1 patch, Topical, Daily  magnesium oxide, 400 mg, Oral, BID  metoprolol succinate, 100 mg, Oral, Daily  pantoprazole, 20 mg, Oral, Early Morning  thiamine, 100 mg, Oral, Daily      Continuous IV Infusions:     PRN Meds:  acetaminophen, 650 mg, Oral, Q6H PRN  butalbital-acetaminophen-caffeine, 1 tablet, Oral, Q4H PRN  ondansetron, 4 mg, Intravenous, Q6H PRN        IP CONSULT TO NEUROLOGY  IP CONSULT TO CASE MANAGEMENT  IP CONSULT TO CARDIOLOGY    Network Utilization Review Department  ATTENTION: Please call with any questions or concerns to [phone] and carefully listen to the prompts so that you are directed to the right person   All voicemails are confidential   Yanely Valentin all requests for admission clinical reviews, approved or denied determinations and any other requests to dedicated fax number below belonging to the campus where the patient is receiving treatment   List of dedicated fax numbers for the Facilities:  [address] DENIALS (Administrative/Medical Necessity) [phone]   [address]  (Maternity/NICU/Pediatrics) [phone]   [address] [place] [address]  [address] [facility] [place] [address] [id number] [place] [address] [address] [phone]

## 2022-05-19 NOTE — ASSESSMENT & PLAN NOTE
· PT/OT recommending inpatient rehab again  Patient had refused on prior admission but is agreeable now  Will discharge to [facility] with insurance authorization obtained

## 2022-05-19 NOTE — PLAN OF CARE
Problem: OCCUPATIONAL THERAPY ADULT  Goal: Performs self-care activities at highest level of function for planned discharge setting  See evaluation for individualized goals  Description: Treatment Interventions: ADL retraining, Functional transfer training, UE strengthening/ROM, Endurance training, Visual perceptual retraining, Patient/family training, Equipment evaluation/education, Neuromuscular reeducation, Fine motor coordination activities, Compensatory technique education, Continued evaluation, Activityengagement          See flowsheet documentation for full assessment, interventions and recommendations  Outcome: Progressing  Note: Limitation: Decreased ADL status, Decreased UE strength, Decreased Safe judgement during ADL, Decreased endurance, Decreased sensation, Visual deficit, Decreased fine motor control, Decreased self-care trans, Decreased high-level ADLs (decreased balance and mobility)  Prognosis: Good  Assessment: Patient seen for OT treatment  Patient with several deficits including vision, neglect, proprioception, increased tone LUE, decreased coordination, poor balance sitting and standing  Patient with poor insight to deficits  Patient requires max assist for toileting and LB ADLS  Patient will benefit from STR and continued OT services to maximize functional performance with ADLS       OT Discharge Recommendation: Post acute rehabilitation services

## 2022-05-19 NOTE — PROGRESS NOTES
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  4  HLD  5  Falls  6  H/o ETOH use  7  H/o medication non adherence     -Monitored on Telemetry  -Neuro Assessments   -BASA 81 mg daily  -Lipitor 80 mg daily  -Plavix 300 mg today, 75 mg daily starting 05/19/2022  -continue Keppra 1500 mg b i d   -will add adjunct therapy with Vimpat 200 mg bolus then 100 mg b i d   -can have Fioricet for headaches q 4 hours p r n  While in hospital  -Will discontinue Norvasc to initiate Verapamil CR 120mg nightly for vascular headache  -MRI of the brain-completed  -CTA head and neck-was completed, noted subtle evolving cortical hypodensities in right occipital lobe, correlating w/cortical diffusion restriction noted on MRI  Concerning evolving infarction or perhaps post ictal edema  Infectious/Inflammatory processes ie cerebritis are differential diagnosis  Mild to moderate atherosclerotic dz in major arteries of the neck is unchanged  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 retrospectively similar to the previous CTA from 2021    -echocardiogram completed at last hospitalization 05/09/2022 with 65% ejection fraction and normal atrium  -BEBO and loop recorder evaluation for central source- No thrombus seen, no shunt noted  ILR placed   -labs:  Lipid profile, A1c, magnesium, CBC in a m   -PT/OT/ST per stroke pathways  -sw systems in acute rehab placement post CVA  -Follow up MRI of the Brain in 2 months re: CVA evolution and changes     Patient is a 49-year-old male with PMH significant for HLD, HTN, EtOH abuse, hypo magnesium, seizures and right MCA territory CVA with IPH    Patient previously been discharged from hospital on 05/12/2022, patient diagnosed with syncope/seizure disorder with traumatic rhabdomyolysis  He was continued on his Keppra dosing 1500 mg b i d  And provided medication for blood pressure control and headache with Fioricet  Patient had been home for approximately 5 days, patient family reported that he had not been quite himself during this period of time  According to reports from patient's sister, he had been having seizure activity for a couple of days at home  He was not answering his phone therefore she called 911 for a well check, patient found having seizure at that time was incontinent and confused  Patient recommended going to the ER however patient declined at that time  Was also reported patient having shaking/jerking movements in his head and shoulders however was fully awake during this time  Patient reported following day, a chasity of wind blew open his door, it hit him in the head knocking him over  Patient believes he lost some consciousness before falling to the ground  Stated he was unable to get up at that time  Family came to check on him, he was still on the floor refer EMS was called to be transported to the hospital   Upon arrival to the hospital, patient was found to have weakness with ataxia, reports of left-sided numbness and shaking  He reported pain  In his low back and left hip  Pt with left-sided sensory changes as well as his ataxia  MRI  possible recent MCA/PCA territory infarct  Differential also seizures  Exam patient had left-sided focal deficit noting left upper extremity weakness and decreased sensation  + ataxia noted to the left upper and lower extremity  LE exam limited in the lower extremities due to complaints of pain in his low back and left hip area  Patient had reported having blurred vision, on exam patient appears to have left HH  Patient's EOM is intact although does have some delay in right eye gaze into nasal view    Today pt is relatively unchanged with exception to improved sensation, although seems to have more pronounced Lt sided neglect with seated positioning and use of LUE  CTA done due to concern for vascular occlusive disease as his prior stroke, seizure focus and current strokes involve same areas of the Rt hemisphere  Pt found to have chronic thrombosis of the rt transverse and sigmoid sinuse, stable from prior study  Pt had BEBO today with no finding of thrombus, ILR placed for further monitoring  Patient remains on Keppra 1500 mg b i d , with Vimpat 100 mg b i d  If patient should have breakthrough seizure activity while in this dosing, may need to increase to 150 mg b i d  Pt continues on DAPT with BASA and Plavix likely for 3 months then to monotherapy of BASA as pt was not on AP therapy previously  Will need to follow for arrhythmia and treatment if needed with [facility] therapies  PT/OT and recommendations for Acute Rehab for stroke recovery  Will DC Norvasc and start on Verapamil CR 120mg nightly and can continue on Fioricet for vascular migraine management  Will need to have follow up MRI in 2 months to eval for evolution of CVA      Meryle Sermons will need follow up in 8 weeks with general attending or advance practitioner  He will not require outpatient neurological testing        Chief Complaint:    Subjective:   Pt reports having pain all thru the body and no significant relief with his headache after Toradol and "whatever pills that they are giving me" are not working at all  Pt was prescribed Fioricet, however does not appear as though he was receiving this but receiving Tylenol  Pt made aware of Fioricet orders  Pt feels he is doing well, does have limitations with walking and has generalized pain that he reports is limiting him       Past Medical History:   Diagnosis Date    Biceps tendonitis, unspecified laterality 09/17/2007    Bone cyst 11/22/2011    Bronchiectasis ([facility] [address] ) 10/02/2006    Bursitis 11/17/2005    Chest pain 04/29/2009    Chronic renal failure 11/22/2011    Diverticulitis of colon 10/09/2007    Generalized anxiety disorder 2006    GERD (gastroesophageal reflux disease)     Gout     Hyperlipidemia     Hypertension     Lateral epicondylitis, unspecified elbow     Last Assessed: 2013    Low magnesium levels     Lyme disease 2009    Pneumonia     Last Assessed: 2013     Social History     Socioeconomic History    Marital status: /Civil Union     Spouse name: Not on file    Number of children: Not on file    Years of education: Not on file    Highest education level: Not on file   Occupational History    Not on file   Tobacco Use    Smoking status: Former Smoker     Packs/day: 0 50     Quit date: 5/10/2022     Years since quittin 0    Smokeless tobacco: Never Used    Tobacco comment: cigarette nicotine dependence   Vaping Use    Vaping Use: Never used   Substance and Sexual Activity    Alcohol use: Yes     Alcohol/week: 6 0 standard drinks     Types: 6 Glasses of wine per week     Comment: 20-states he has not had alcohol since 2021    Drug use: No    Sexual activity: Yes     Partners: Female   Other Topics Concern    Not on file   Social History Narrative    Not on file     Social Determinants of Health     Financial Resource Strain: Not on file   Food Insecurity: No Food Insecurity    Worried About Running Out of Food in the Last Year: Never true    Hermelinda of Food in the Last Year: Never true   Transportation Needs: No Transportation Needs    Lack of Transportation (Medical): No    Lack of Transportation (Non-Medical):  No   Physical Activity: Not on file   Stress: Not on file   Social Connections: Not on file   Intimate Partner Violence: Not on file   Housing Stability: Low Risk     Unable to Pay for Housing in the Last Year: No    Number of Places Lived in the Last Year: 1    Unstable Housing in the Last Year: No     Family History   Problem Relation Age of Onset    Cancer Mother         Colon ROS:  Please see HPI for positive symptoms  No fever, no chills, no weight change    +generalized pt in the head, neck, shoulders LUE, back, hips and knees  Ocular: No diplopia, no blurred vision, spot/zigzag lines   HEENT:  No sore throat or congestion  No neck pain    + headache 9/10  COR:  No chest pain  No palpitations  Lungs:  no sob  GI:  no  nausea, no vomiting, no diarrhea, no constipation, no anorexia  :  No dysuria, frequency, or urgency  No hematuria  Musculoskeletal:  No joint pain or swelling   Skin:  No rash or itching  Psychiatric:  no anxiety, no depression  Endocrine:  No polyuria or polydipsia  Objective:  /64   Pulse 72   Temp 98 4 °F (36 9 °C)   Resp 15   Ht 5' 11" (1 803 m)   Wt 90 7 kg (200 lb)   SpO2 97%   BMI 27 89 kg/m²     General: alert   Mental status: oriented x3  Attention: normal  Knowledge: fair  Language and Speech: normal  Cranial nerves:   CN II: Visual fields with Lt HH  Fundoscopic exam is normal with sharp discs and no vascular changes  Pupils- + anisocoria R>L, 3/2 and reactive to light  CN III, IV, VI: At primary gaze, there is delay in adduction of the rt eye  CN V: Facial sensation is intact in all 3 divisions bilaterally  Corneal responses are intact  CN VII: Face is symmetrical, with normal eye closure and smile  CN VIII: Hearing is diminished to rubbing fingers  CN IX, X: Palate elevates symmetrically  Phonation is normal   CN XI: Head turning and shoulder shrug are intact  CN XII: Tongue is midline with normal movements and no atrophy  Motor: There is + pronator drift of out-stretched arms  Pt with difficulty positioning Lt arm and hand, reports pain with movement in  the arm and hands  Muscle bulk and tone are normal     + limitation to the LUE and LLE  Reports of pain in the Lt hip and notable LUE neglect      Muscle exam  Arm Right Left Leg Right Left   Deltoid 5/5 3+/5 Iliopsoas 5/5 3+/5   Biceps 5/5 3+/5 Quads 5/5 4/5   Triceps 5/5 3+/5 Hamstrings 5/5 4/5   Wrist Extension 5/5 3/5 Ankle Dorsi Flexion 5/5 4+/5   Wrist Flexion 5/5 3/5 Ankle Plantar Flexion 5/5 4/5       Sensory: normal to light touch, temperature, vibration  Proprioception intact  Gait: unsteady  Coordination: finger to nose and heel to toe ataxic to the LUE     Reflexes    RJ BJ TJ KJ AJ Plantars Davis's   Right 2+ 2+ 2+ 1+ 1+ Downgoing Not present   Left 2+ 2+ 2+ 1+ 1+ Downgoing Not present      Heart: Regular rate and rhythm  Lung: no audible wheezing or stridor heard  Abd: soft, non-tender, non-distended with positive bowel sounds in all quads  Skin: dry and intact    Labs:      Lab Results   Component Value Date    WBC 10 48 (H) 05/19/2022    HGB 13 7 05/19/2022    HCT 40 2 05/19/2022    MCV 87 05/19/2022     05/19/2022     Lab Results   Component Value Date    HGBA1C 5 3 05/17/2022     Lab Results   Component Value Date    ALT 23 05/19/2022    AST 23 05/19/2022    ALKPHOS 85 05/19/2022    BILITOT 0 7 08/24/2016     Lab Results   Component Value Date    GLUCOSE 102 08/04/2021    CALCIUM 8 8 05/19/2022     08/24/2016    K 3 3 (L) 05/19/2022    CO2 20 (L) 05/19/2022     05/19/2022    BUN 28 (H) 05/19/2022    CREATININE 1 92 (H) 05/19/2022         Review of reports and notes reveal:   Independent review of films/reports:  CTA head and neck w wo contrast  Result Date: 5/18/2022  1  Subtle evolving cortical hypodensities in the right occipital lobe, correlating to some of the cortical diffusion restriction noted on the MRI earlier today  Findings are worrisome for evolving infarction or perhaps post ictal edema  Other infectious or inflammatory processes including cerebritis are in the differential diagnosis  Further clinical assessment advised  2   Scattered mild to moderate atherosclerotic disease in the major arteries of the neck is unchanged     3   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 retrospectively similar to the previous CTA from 2021  4   No acute intracranial hemorrhage  Workstation performed: [id number]     CT head wo contrast  Result Date: 5/17/2022  No acute intracranial abnormality  Microangiopathic changes  Workstation performed: [id number]     CT cervical spine without contrast  Result Date: 5/17/2022  No cervical spine fracture or traumatic malalignment  Fluid in the sphenoid sinuses  Workstation performed: [id number]     CT thoracic spine without contrast  Result Date: 5/17/2022  Schmorl's nodes at the superior endplates of T2, T3 and possibly T4 of indeterminate age  Workstation performed: [id number]     CT lumbar spine without contrast  Result Date: 5/17/2022  No acute fracture or dislocation  Multilevel degenerative changes  Workstation performed: [id number]     MRI brain wo contrast  Result Date: 5/18/2022  Findings suspicious for recent infarct in the posterior right MCA distribution and right posterior cerebral artery distributions (fetal PCA noted on prior CTA)  Findings may also be related to seizures  No acute hemorrhage  No mass effect, shift or herniation  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  Absent flow void in the right transverse and sigmoid sinuses, chronically occluded based on CTA from 2021  The study was marked in [facility] for immediate notification  Workstation performed: [id number]         Thank you for this consult  Total time of encounter:  40 min  More than 50% of the time was used in counseling and/or coordination of care  Extent of couseling and/or coordination of care with the pt re: diagnostic findings, plans for continuing evaluation and treatments with medications and procedures  D/W cardiology and medical team as well as Neurology attending

## 2022-05-19 NOTE — CASE MANAGEMENT
Case Management Discharge Planning Note    Patient name Karina De Jesus  Location [address] Shama Osman 407-* MRN [id number]  : 1965 Date 2022       Current Admission Date: 2022  Current Admission Diagnosis:Stroke-like symptoms   Patient Active Problem List    Diagnosis Date Noted    CVA [facility]cerebral vascular accident) ([facility] ) 2022    Acute kidney injury ([place] [address] ) 2022    Weakness 2022    Hypomagnesemia 2022    Stroke-like symptoms 2022    Hypokalemia 05/10/2022    Contusion of right foot     Right foot pain     Arthralgia of right foot     Chronic tophaceous gout of right foot     Syncopal episodes 2022    SIRS (systemic inflammatory response syndrome) ([place] [address] ) 2022    Acute gout of multiple sites 2021    Hypertension 2021    Hyperlipidemia 2021    Smoking 2021    GERD (gastroesophageal reflux disease) 2021    Depression 2021    Left bundle branch block 2021    Thrombocytopenia (HCC) 2021    Prolonged Q-T interval on ECG 2021    Rhabdomyolysis 2021    Breakthrough seizure ([place] [address] ) 2021    Seizure disorder ([place] [address] ) 2021    History of cerebral hemorrhage 2021    History of alcohol abuse 2021    Hypocalcemia 2017    Lactic acidosis 2017    Rhabdomyolysis 2017    Nicotine dependence 2017    Dyslipidemia 2017    Seizure ([facility] ) 2017    Diarrhea 2016    Blood in stool 2016    Hypertension     Hyperlipidemia     GERD (gastroesophageal reflux disease)     Compression neuropathy of lower extremity 02/10/2016      LOS (days): 2  Geometric Mean LOS (GMLOS) (days):   Days to GMLOS:     OBJECTIVE:  Risk of Unplanned Readmission Score: 17 34   Current admission status: Inpatient   Preferred Pharmacy:   [facility] [place], [address], [address] [facility] [address]  Phone: [phone] Fax: [phone]    [address] #[id number], [facility], [address]  Phone: [phone] Fax: [phone]    Renetta Jackson [address] [address]  Phone: [phone] Fax: [address] [place] [address], [place] - [address]  [facility]  [address] [place] [address]  Phone: [phone] Fax: [phone]    Primary Care Provider: No primary care provider on file  Primary Insurance: BLUE CROSS  Secondary Insurance:     DISCHARGE DETAILS:  Discharge planning discussed with[de-identified] Fifi Rosenthal (daughter) [phone]  Freedom of Choice: Yes  Comments - Freedom of Choice: STR-blanket referral sent (accepted at [facility], [facility], [facility], [facility], [facility], [facility] and [facility])-daughter aware and list mailed to her   She will e-mail back preferences  CM contacted family/caregiver?: Yes  Were Treatment Team discharge recommendations reviewed with patient/caregiver?: Yes  Did patient/caregiver verbalize understanding of patient care needs?: Yes  Were patient/caregiver advised of the risks associated with not following Treatment Team discharge recommendations?: Yes    Contacts  Patient Contacts: Fifi Rosenthal (daughter)  Relationship to Patient[de-identified] Family  Contact Method: Phone  Phone Number: [phone]  Reason/Outcome: Emergency Contact, Discharge Planning, Continuity of [facility]         Is the patient interested in [facility] at discharge?: No    DME Referral Provided  Referral made for DME?: No    Other Referral/Resources/Interventions Provided:  Interventions: Short Term Rehab    Would you like to participate in our [address] service program?  : No - Declined    Treatment Team Recommendation: Short Term Rehab  Discharge Destination Plan[de-identified] Short Term Rehab  Transport at Discharge :  (TBD)     CM spoke with patients anaya Krause and discussed dc planning and made her aware of accepting facilities  She requests CM e-mail them to her at [email or web address] and they were faxed  She states she received a call from a [employer] at [place] [phone]  She said she didn't know what to do  She thought maybe she had to ask for permission to have patient go to [facility]  CM offered to make this phone call for her and follow up  CM called Jaci Boxer and there was no answer  A message was left and awaiting a call back      CM to follow in the am

## 2022-05-19 NOTE — ANESTHESIA PREPROCEDURE EVALUATION
Procedure:  BEBO    Relevant Problems   CARDIO   (+) Hyperlipidemia   (+) Hypertension   (+) Left bundle branch block      GI/HEPATIC   (+) GERD (gastroesophageal reflux disease)      HEMATOLOGY   (+) Thrombocytopenia (HCC)      MUSCULOSKELETAL   (+) Acute gout of multiple sites   (+) Chronic tophaceous gout of right foot   (+) Rhabdomyolysis      NEURO/PSYCH   (+) Breakthrough seizure (HCC)   (+) CVA (cerebral vascular accident) ([facility] [address] )   (+) Depression   (+) History of alcohol abuse   (+) History of cerebral hemorrhage   (+) Seizure (HCC)   (+) Seizure disorder (HCC)      PULMONARY   (+) Smoking        Physical Exam    Airway    Mallampati score: II  TM Distance: >3 FB  Neck ROM: full     Dental       Cardiovascular  Cardiovascular exam normal    Pulmonary  Pulmonary exam normal     Other Findings        Anesthesia Plan  ASA Score- 3     Anesthesia Type- IV sedation with anesthesia with ASA Monitors  Additional Monitors:   Airway Plan:           Plan Factors-    Chart reviewed  Patient summary reviewed  Obstructive sleep apnea risk education given perioperatively  Induction- intravenous  Postoperative Plan-     Informed Consent- Anesthetic plan and risks discussed with patient  I personally reviewed this patient with the CRNA  Discussed and agreed on the Anesthesia Plan with the CRNA             Admitted with stroke  Need to r/o embolic from heart      Angeles Bowens  Echo complete with contrast if indicated  Order# [id number]  Reading physician: Xavier Tate DO Ordering physician: Felice Carlos DO Study date: 21     Name: Angeles Bowens                       : 1965  MRN: [id number]                       Age: 59 y o    Patient Status: Inpatient          Gender: male    Vitals    Height Weight BSA (Calculated - m2) BP Pulse              PACS Images     Show images for Echo complete with contrast if indicated          Indications  Priority: Routine  Other, Please Specify in Comments   Comments: Prolonged Qtc with LBBB, no previous echo         Interpretation Summary    [facility]  [facility], [address] [place] [address]  [phone]     Transthoracic Echocardiogram  2D, M-mode, Doppler, and Color Doppler     Study date:  05-Aug-2021     Patient: Kika Baer  MR number: [id number]  Account number: [de-identified]  : 1965  Age: 60 years  Gender: Male  Status: Inpatient  Location: Bedside  Height: 73 in  Weight: 171 6 lb  BP: 114/ 69 mmHg     Indications: Prolonged Q-T Interval     Diagnoses: R94 31 - Abnormal electrocardiogram [ECG] [EKG]     Sonographer:  Yaya Montilla RDCS  Referring Physician:  Ortiz Everett DO  Group:  [facility]  Cardiology Fellow: Kavon Howell MD  Interpreting Physician:  Xavier Tate DO     SUMMARY     SUMMARY:  Asymmetric hypertrophy of septum consider cardiac MRI to exclude HCM      LEFT VENTRICLE:  Systolic function was normal  Ejection fraction was estimated to be 65 %  There was moderate hypokinesis of the basal-mid anteroseptal, basal-mid inferoseptal, and basal-mid inferior wall(s)  Wall thickness was moderately increased  There was moderate assymetrical hypertrophy of the septum  Doppler parameters were consistent with abnormal left ventricular relaxation (grade 1 diastolic dysfunction)      VENTRICULAR SEPTUM:  There was mild dyssynergic motion  These changes are consistent with LBBB      TRICUSPID VALVE:  There was trace regurgitation    Pulmonary artery systolic pressure was within the normal range (26 mmHg)      HISTORY: PRIOR HISTORY: Alcohol Abuse, Seizures

## 2022-05-19 NOTE — DISCHARGE INSTR - ACTIVITY
Make an appointment with cardiologist 10-14 days after discharge  Keep dressing over chest until seen by the cardiologist  If you develop redness, swelling or drainage from the procedure site, contact5 the cardiologist  If you develop severe pain form procedure site, contact your cardiologist    If you develop a fever of 100 degrees  or greater, contact the cardiologist,  As it may be sign of infection

## 2022-05-19 NOTE — OCCUPATIONAL THERAPY NOTE
OT TREATMENT       05/19/22 1033   Note Type   Note Type Treatment   Restrictions/Precautions   Other Precautions Chair Alarm; Bed Alarm; Fall Risk;Pain;Visual impairment   Pain Assessment   Pain Assessment Tool 0-10   Pain Score 8   Pain Location/Orientation Location: Generalized  (L arm, head and neck)   ADL   Eating Assistance 5  Supervision/Setup   Eating Deficit Beverage management   Eating Comments water in cup with straw from bedside table   Grooming Assistance 4  Minimal Assistance   Grooming Deficit Teeth care;Wash/dry face;Setup; Increased time to complete  (applying deoderant)   Grooming Comments pt required assist and increased time to open containers and control L UE for toothpaste  Patient is R handed therefore utilized RUE for most tasks   Toileting Assistance  2  Maximal Assistance   Toileting Deficit Verbal cueing; Bedside commode   Toileting Comments pt with poor static sitting balance on commode requiring max assist to L  Patient urinated on commode but unsuccessful with BM  Patient was able to complete BM hygiene with max assist for balance  Bed Mobility   Supine to Sit 3  Moderate assistance   Additional items LE management   Sit to Supine 2  Maximal assistance   Additional items LE management   Transfers   Sit to Stand 3  Moderate assistance   Additional items Assist x 1;Verbal cues   Stand to Sit 3  Moderate assistance   Additional items Assist x 1;Verbal cues   Stand pivot 2  Maximal assistance   Additional items Assist x 1;Verbal cues  (to and from commode)   Functional Mobility   Functional Mobility 2  Maximal assistance   Additional Comments few steps to and from commode with poor- balance to L   Additional items Hand hold assistance   ROM - Left Upper Extremities    L Shoulder AROM; Flexion   L Elbow AROM;Elbow flexion;Elbow extension   L Wrist AROM; Wrist flexion;Wrist extension   L Hand AROM; Thumb; Index finger; Long finger;Ring finger;Little finger   L Weight/Reps/Sets 5 times each supine with head of bed raised   Neuromuscular Education   Trunk Control poor sitting balance static on commode and edge if bed with balance loss to L   Comments increased tone in LUE throughout session with flexed fingers unless cued   Coordination   Gross Motor decreased LUE with decreased proprioception to arm in space able to identify 60% of the time  Fine Motor decreased ability to open containers  Patient ataxic  Cognition   Comments pt with poor insight to deficits and decreased safety awareness  Patient cooperative and repeating same phrase throughout session  Vision   Low Vision Education; Adaptations   Visual Field Cut Compensatory techniques;Education   Visual Field Cut Comments pt with difficulty identifying stimuli to left without turning head  Patient able to identify stimuli to L at midline only when testing peripheral vision   Vision Comments pt with neglect to left but able to identify items with verbal cues, compensatory techniques and increased time  Activity Tolerance   Activity Tolerance Patient limited by fatigue;Patient limited by pain   Assessment   Assessment Patient seen for OT treatment  Patient with several deficits including vision, neglect, proprioception, increased tone LUE, decreased coordination, poor balance sitting and standing  Patient with poor insight to deficits  Patient requires max assist for toileting and LB ADLS  Patient will benefit from STR and continued OT services to maximize functional performance with ADLS  The patient's raw score on the AM-PAC Daily Activity inpatient short form is 11, standardized score is 29 04, less than 39 4  Patients at this level are likely to benefit from DC to post-acute rehabilitation services  Please refer to the recommendation of the Occupational Therapist for safe DC planning  Plan   Treatment Interventions ADL retraining;Functional transfer training;UE strengthening/ROM; Endurance training;Patient/family training;Equipment evaluation/education; Activityengagement; Compensatory technique education   OT Frequency 5x/wk   Recommendation   OT Discharge Recommendation Post acute rehabilitation services   AM-PAC Daily Activity Inpatient   Lower Body Dressing 1   Bathing 1   Toileting 1   Upper Body Dressing 2   Grooming 3   Eating 3   Daily Activity Raw Score 11   Daily Activity Standardized Score (Calc for Raw Score >=11) 29 04   AM-PAC Applied Cognition Inpatient   Following a Speech/Presentation 4   Understanding Ordinary Conversation 4   Taking Medications 2   Remembering Where Things Are Placed or Put Away 3   Remembering List of 4-5 Errands 3   Taking Care of Complicated Tasks 2   Applied Cognition Raw Score 18   Applied Cognition Standardized Score 76 20   Licensure   [place] License Number  Angelia Lomeli [facility] [address] [address] OTR/L [id number]

## 2022-05-19 NOTE — ANESTHESIA POSTPROCEDURE EVALUATION
Post-Op Assessment Note    CV Status:  Stable    Pain management: adequate     Mental Status:  Alert and awake   Hydration Status:  Euvolemic   PONV Controlled:  Controlled   Airway Patency:  Patent      Post Op Vitals Reviewed: Yes      Staff: Anesthesiologist, CRNA         No complications documented      BP   97/61   Temp      Pulse  73   Resp   12   SpO2   100

## 2022-05-19 NOTE — QUICK NOTE
NIH testing done       NIHSS:  1a Level of Consciousness: 0 = Alert   1b  LOC Questions: 0 = Answers both correctly   1c  LOC Commands: 0 = Obeys both correctly   2  Best Gaze: 0 = Normal   3  Visual: 1 = Partial hemianopia   4  Facial Palsy: 0=Normal symmetric movement   5a  Motor Right Arm: 0=No drift, limb holds 90 (or 45) degrees for full 10 seconds   5b  Motor Left Arm: 2=Some effort against gravity, limb cannot get to or maintain (if cured) 90 (or 45) degrees, drifts down to bed, but has some effort against gravity   6a  Motor Right Le=No drift, limb holds 90 (or 45) degrees for full 10 seconds   6b  Motor Left Le=Drift, limb holds 90 (or 45) degrees but drifts down before full 10 seconds: does not hit bed   7  Limb Ataxia:  2=Present in two limbs   8  Sensory: 1=Mild to moderate sensory loss; patient feels pinprick is less sharp or is dull on the affected side; there is a loss of superficial pain with pinprick but patient is aware He is being touched   9  Best Language:  0=No aphasia, normal   10  Dysarthria: 0=Normal articulation   11  Extinction and Inattention (formerly Neglect):  1=Visual, tactile, auditory, spatial or personal inattention or extinction to bilateral simultaneous stimulation in one of the sensory modalities   Total Score: 8     Time NIHSS was completed: 11am 2022

## 2022-05-19 NOTE — PROGRESS NOTES
[address]  Progress Note - Jamaal Lopezjoao 1965, 59 y o  male MRN: [id number]  Unit/Bed#: [facility] CATH LAB ROOM Encounter: [id number]  Primary Care Provider: No primary care provider on file  Date and time admitted to hospital: 5/17/2022  4:32 PM    Acute kidney injury [facility])  Assessment & Plan  Creatinine 1 92  IV fluid hydration ordered  Will continue to monitor  CVA (cerebral vascular accident) [facility])  Assessment & Plan  Recent Rt Posterior MCA and Rt PCA distribution  Neuro following  Plan for BEBO today and loop recorder if BEBO negative for thrombus  Hypomagnesemia  Assessment & Plan  Magnesium improved to 1 7 today  Hypokalemia  Assessment & Plan  Potassium 3 3  Replete  History of alcohol abuse  Assessment & Plan  No recent alcohol use  · Continue thiamine    Seizure disorder [facility])  Assessment & Plan  Patient on Keppra 1500 mg b i d  From known seizure disorder  · Unsure if patient is compliant with medication  · Check Keppra level  · Restart home dose  · Seizure, fall precautions  · Neurology consult    - MRI shows findings suspicious for recent infarct in the posterior right MCA distribution and right posterior cerebral artery distribution  Findings may also be related to seizures  Follow up with recommendations from neurology      Hyperlipidemia  Assessment & Plan  Continue Tricor    Hypertension  Assessment & Plan  Continue amlodipine, metoprolol    * Stroke-like symptoms  Assessment & Plan  Patient presents after being found on the ground at home, incontinent and disoriented  · Possibly due to postictal state from seizure vs stroke  · CT head shows no acute intracranial abnormalities  · CT spine with no acute fractures or dislocation  · Pt With intermittent twitching of trunk and extremities but fully awake and conversant in ED  · Given Ativan and Keppra 1000 mg  · CK normal  · Check Keppra level, lipid panel, hemoglobin A1c  · Restart Keppra 1500 mg b i d  · Aspirin 325 now, 81 mg daily  · Seizure, fall precautions  · PT/OT eval  · Discussed with neurology  · No need for emergent MRI  · Frequent neuro checks, nonemergent MRI tomorrow   · Further recommendations appreciated    : MRI as noted above  Follow up with recommendations from neurology  VTE Pharmacologic Prophylaxis: VTE Score: 3 heparin prophylaxis  Code Status: Level 1 - Full Code    Subjective:   Patient seen and examined at bedside  No acute events overnight  No new complaints  Plan for BEBO today  Objective:     Vitals:   Temp (24hrs), Av 8 °F (36 6 °C), Min:97 3 °F (36 3 °C), Max:98 1 °F (36 7 °C)    Temp:  [97 3 °F (36 3 °C)-98 1 °F (36 7 °C)] 98 1 °F (36 7 °C)  HR:  [61-79] 75  Resp:  [19-22] 22  BP: (115-137)/(63-78) 128/63  SpO2:  [94 %-100 %] 94 %  Body mass index is 27 89 kg/m²  Input and Output Summary (last 24 hours): Intake/Output Summary (Last 24 hours) at 2022 1328  Last data filed at 2022 0001  Gross per 24 hour   Intake 210 ml   Output --   Net 210 ml       Physical Exam:   Physical Exam  HENT:      Head: Normocephalic  Mouth/Throat:      Mouth: Mucous membranes are moist    Eyes:      Extraocular Movements: Extraocular movements intact  Cardiovascular:      Rate and Rhythm: Normal rate  Pulmonary:      Effort: Pulmonary effort is normal  No respiratory distress  Abdominal:      Palpations: Abdomen is soft  Tenderness: There is no abdominal tenderness  Skin:     General: Skin is warm  Neurological:      Mental Status: He is alert and oriented to person, place, and time     Psychiatric:         Mood and Affect: Mood normal          Behavior: Behavior normal        Additional Data:     Labs:  Results from last 7 days   Lab Units 22  0550   WBC Thousand/uL 10 48*   HEMOGLOBIN g/dL 13 7   HEMATOCRIT % 40 2   PLATELETS Thousands/uL 256   NEUTROS PCT % 78*   LYMPHS PCT % 12*   MONOS PCT % 8   EOS PCT % 2     Results from last 7 days   Lab Units 05/19/22  0054   SODIUM mmol/L 136   POTASSIUM mmol/L 3 3*   CHLORIDE mmol/L 102   CO2 mmol/L 20*   BUN mg/dL 28*   CREATININE mg/dL 1 92*   ANION GAP mmol/L 14*   CALCIUM mg/dL 8 8   ALBUMIN g/dL 3 4*   TOTAL BILIRUBIN mg/dL 0 39   ALK PHOS U/L 85   ALT U/L 23   AST U/L 23   GLUCOSE RANDOM mg/dL 113     Results from last 7 days   Lab Units 05/17/22  1710   INR  1 04     Results from last 7 days   Lab Units 05/17/22  1701   POC GLUCOSE mg/dl 92     Results from last 7 days   Lab Units 05/17/22  2357   HEMOGLOBIN A1C % 5 3           Lines/Drains:  Invasive Devices  Report    Peripheral Intravenous Line  Duration           Peripheral IV 05/18/22 Left;Ventral (anterior) Wrist <1 day                  Telemetry:  Telemetry Orders (From admission, onward)             48 Hour Telemetry Monitoring  (ED Bridging Orders Panel)  Continuous x 48 hours        References:    Telemetry Guidelines   Question:  Reason for 48 Hour Telemetry  Answer:  Acute CVA (<24 hrs old, hemispheric strokes, selected brainstem strokes, cardiac arrhythmias)                        Imaging: Reviewed      Recent Cultures (last 7 days):         Last 24 Hours Medication List:   Current Facility-Administered Medications   Medication Dose Route Frequency Provider Last Rate    acetaminophen  650 mg Oral Q6H PRN Herb Wills PA-C      amLODIPine  10 mg Oral HS Sheri JUAN White      aspirin  81 mg Oral Daily Sheri JUAN White      atorvastatin  80 mg Oral Daily With [employer], [address]      butalbital-acetaminophen-caffeine  1 tablet Oral Q4H PRN NOAM Espinoza      clopidogrel  75 [facility] Daily [facility] [place], [address]      fenofibrate  145 mg Oral Daily Herb Wills PA-C      heparin (porcine)  5,000 Units Subcutaneous Q8H [address] Shrei JUAN White      lacosamide (VIMPAT) IVPB  100 mg Intravenous Q12H NOAM Espinoza 100 mg (05/19/22 1209)   Stephanie Becerra levETIRAcetam  1,500 mg Oral Q12H Sheri JUAN White      lidocaine  1 patch Topical Daily NOAM Connolly      magnesium oxide  400 mg Oral BID Catarino Shepherd MD      metoprolol succinate  100 mg Oral Daily Sheri White PA-C      ondansetron  4 mg Intravenous Q6H PRN Chip Talavera PA-C      pantoprazole  20 mg Oral Early Morning Chip Talavera PA-C      potassium chloride  40 mEq Oral Once Catarino Shepherd MD      sodium chloride  75 mL/hr Intravenous Continuous Catarino Shepherd MD 75 mL/hr (05/19/22 1321)    thiamine  100 mg Oral Daily Chip Talavera PA-C          Today, Patient Was Seen By: Catarino Shepherd MD    **Please Note: This note may have been constructed using a voice recognition system  **

## 2022-05-19 NOTE — CONSULTS
Consultation - Cardiology   Shivam Tena 59 y o  male MRN: [id number]  Unit/Bed#: [address] [place] [address] Encounter: [id number]    Assessment/Plan     Assessment:  1  Recent right posterior CVA  2  Seizures  3  Hypertension  4  Dyslipidemia  5  History of EtOH use  6  History of medication non adherence  7  Acute kidney injury most likely secondary to use of NSAIDs for pain and NPO status    Plan:  Patient has been admitted to the hospitalist service  1  BEBO to evaluate for cardiac embolic source of stroke has been requested by Neurology and is scheduled for later on today  2  Will also tentatively plan for loop recorder implant if BEBO is negative    3  Hold NSAIDs and start gentle IV hydration with normal saline 50 mL/hour while patient is NPO  4  Patient started on dual anti-platelet therapy and statins per Neurology in addition to anti epileptic medications     5  Continue Toprol  mg p o  Daily as patient was taking in the outpatient setting    6  Continue Norvasc 10 mg p o  As patient was taking in the outpatient setting  Would avoid ACE/ARB at this time due to acute kidney injury    History of Present Illness   Physician Requesting Consult: Renato Perez MD  Reason for Consult / Principal Problem:  Patient admitted with stroke, request for BEBO and loop      HPI: Shivam Tena is a 59y o  year old male who presented to the emergency room after being found down outside of his house  They think he had been laying on the ground for possibly 3 hours  States that his screen door blew open by the wind and when he went to close it the door flu back in hit him in the head causing him to fall  He did note loss of consciousness and was found incontinent of urine on arrival of his niece and EMS to his home  Patient has a history for hypertension, dyslipidemia, GERD, seizure disorder and history of alcohol use    It was noted in August of 2020 patient was admitted to [facility] after suffering a subarachnoid hemorrhage secondary to a fall due to his seizure  Patient was recently admitted to the hospital earlier this month after sustaining a fall in which he suffered a nasal bone fracture and questionable small avulsion type fracture of his 1st metatarsal of his right foot  MRI of the brain performed during this admission is suspicious for recent infarct involving the posterior right MCA distribution and right posterior cerebral artery distributions  Patient also noted on admission that he has been unable to care for himself because he cannot walk due to [facility] body pain  Echocardiogram performed on 05/09/2022 demonstrated EF of 65%, 12 lead EKG shows sinus rhythm with left bundle-branch block which was 1st noted in February of 2020  Telemetry reviewed demonstrates sinus rhythm without any ectopy, arrhythmias or pauses    Inpatient consult to Cardiology  Consult performed by: NOAM Arias  Consult ordered by: Pattie Gilman MD          Review of Systems   Constitutional: Positive for activity change and fatigue  Negative for fever  HENT: Negative  Negative for congestion, facial swelling, sinus pain and tinnitus  Eyes: Negative  Negative for photophobia and visual disturbance  Respiratory: Negative  Negative for cough and shortness of breath  Cardiovascular: Negative  Negative for chest pain, palpitations and leg swelling  Gastrointestinal: Negative  Negative for abdominal distention, diarrhea, nausea and vomiting  Endocrine: Negative  Negative for polydipsia, polyphagia and polyuria  Genitourinary: Negative  Negative for difficulty urinating  Musculoskeletal: Positive for arthralgias, back pain, gait problem and myalgias  Neurological: Positive for seizures and weakness  Negative for syncope  Hematological: Negative  Psychiatric/Behavioral: Negative          Historical Information   Past Medical History:   Diagnosis Date    Biceps tendonitis, unspecified laterality 2007    Bone cyst 2011    Bronchiectasis ([address] ) 10/02/2006    Bursitis 2005    Chest pain 2009    Chronic renal failure 2011    Diverticulitis of colon 10/09/2007    Generalized anxiety disorder 2006    GERD (gastroesophageal reflux disease)     Gout     Hyperlipidemia     Hypertension     Lateral epicondylitis, unspecified elbow     Last Assessed: 2013    Low magnesium levels     Lyme disease 2009    Pneumonia     Last Assessed: 2013     Past Surgical History:   Procedure Laterality Date    KNEE ARTHROSCOPY      Therapeutic    OLECRANON BURSA EXCISION       Social History     Substance and Sexual Activity   Alcohol Use Yes    Alcohol/week: 6 0 standard drinks    Types: 6 Glasses of wine per week    Comment: 20-states he has not had alcohol since 2021     Social History     Substance and Sexual Activity   Drug Use No     E-Cigarette/Vaping    E-Cigarette Use Never User      E-Cigarette/Vaping Substances     Social History     Tobacco Use   Smoking Status Former Smoker    Packs/day: 0 50    Quit date: 5/10/2022    Years since quittin 0   Smokeless Tobacco Never Used   Tobacco Comment    cigarette nicotine dependence     Family History:   Family History   Problem Relation Age of Onset    Cancer Mother         Colon       Meds/Allergies   all current active meds have been reviewed, current meds:   Current Facility-Administered Medications   Medication Dose Route Frequency    acetaminophen (TYLENOL) tablet 650 mg  650 mg Oral Q6H PRN    amLODIPine (NORVASC) tablet 10 mg  10 mg Oral HS    aspirin chewable tablet 81 mg  81 mg Oral Daily    atorvastatin (LIPITOR) tablet 80 mg  80 mg Oral Daily With Dinner    butalbital-acetaminophen-caffeine (FIORICET,ESGIC) -40 mg per tablet 1 tablet  1 tablet Oral Q4H PRN    clopidogrel (PLAVIX) tablet 75 mg  75 mg Oral Daily    fenofibrate (TRICOR) tablet 145 mg  145 mg Oral Daily    heparin (porcine) subcutaneous injection 5,000 Units  5,000 Units Subcutaneous Q8H [address]    lacosamide (VIMPAT) 100 mg in sodium chloride 0 9 % 100 mL IVPB  100 mg Intravenous Q12H    levETIRAcetam (KEPPRA) tablet 1,500 mg  1,500 mg Oral Q12H    lidocaine (LIDODERM) 5 % patch 1 patch  1 patch Topical Daily    magnesium oxide (MAG-OX) tablet 400 mg  400 mg Oral BID    metoprolol succinate (TOPROL-XL) 24 hr tablet 100 mg  100 mg Oral Daily    ondansetron (ZOFRAN) injection 4 mg  4 mg Intravenous Q6H PRN    pantoprazole (PROTONIX) EC tablet 20 mg  20 mg Oral Early Morning    potassium chloride (K-DUR,KLOR-CON) CR tablet 40 mEq  40 mEq Oral Once    sodium chloride 0 9 % infusion  50 mL/hr Intravenous Continuous    thiamine tablet 100 mg  100 mg Oral Daily    and PTA meds:   Prior to Admission Medications   Prescriptions Last Dose Informant Patient Reported? Taking? Butalbital-APAP-Caffeine (Fioricet) -40 MG CAPS   No No   Sig: Take 1 tablet by mouth every 12 (twelve) hours as needed (Headache) for up to 5 days   amLODIPine (NORVASC) 10 mg tablet   Yes No   Sig: Take 10 mg by mouth daily at bedtime  fenofibrate (TRICOR) 145 mg tablet   No No   Sig: Take 1 tablet (145 mg total) by mouth daily   levETIRAcetam (KEPPRA) 750 mg tablet   No No   Sig: Take 2 tablets (1,500 mg total) by mouth every 12 (twelve) hours   metoprolol succinate (TOPROL-XL) 100 mg 24 hr tablet   No No   Sig: Take 1 tablet (100 mg total) by mouth daily   omeprazole (PriLOSEC) 20 mg delayed release capsule   Yes No   Sig: Take 20 mg by mouth daily  thiamine 100 MG tablet   No No   Sig: Take 1 tablet (100 mg total) by mouth in the morning        Facility-Administered Medications: None     Allergies   Allergen Reactions    Codeine Hives     Tolerates morphine    Penicillins     Hydrocodone Rash     Reaction Date: 17Sep2007;        Objective   Vitals: Blood pressure 115/65, pulse 62, temperature 97 9 °F (36 6 °C), resp  rate 19, height 5' 11" (1 803 m), weight 90 7 kg (200 lb), SpO2 100 %  Orthostatic Blood Pressures    Flowsheet Row Most Recent Value   Blood Pressure 115/65 filed at 05/19/2022 0827   Patient Position - Orthostatic VS Lying filed at 05/18/2022 0340            Intake/Output Summary (Last 24 hours) at 5/19/2022 0827  Last data filed at 5/19/2022 0001  Gross per 24 hour   Intake 210 ml   Output --   Net 210 ml       Invasive Devices  Report    Peripheral Intravenous Line  Duration           Peripheral IV 05/18/22 Left;Ventral (anterior) Wrist <1 day                Physical Exam  Vitals and nursing note reviewed  Constitutional:       General: He is not in acute distress  Appearance: Normal appearance  He is normal weight  HENT:      Right Ear: External ear normal       Left Ear: External ear normal    Eyes:      General: No scleral icterus  Right eye: No discharge  Left eye: No discharge  Cardiovascular:      Rate and Rhythm: Normal rate and regular rhythm  Pulses: Normal pulses  Heart sounds: Normal heart sounds  No murmur heard  Pulmonary:      Effort: Pulmonary effort is normal  No respiratory distress  Breath sounds: Normal breath sounds  No wheezing, rhonchi or rales  Abdominal:      General: Bowel sounds are normal  There is no distension  Palpations: Abdomen is soft  Musculoskeletal:      Right lower leg: No edema  Left lower leg: No edema  Skin:     General: Skin is warm and dry  Capillary Refill: Capillary refill takes less than 2 seconds  Neurological:      General: No focal deficit present  Mental Status: He is alert  Psychiatric:         Mood and Affect: Mood normal          Lab Results:   I have personally reviewed pertinent lab results      CBC with diff:   Results from last 7 days   Lab Units 05/19/22  0550   WBC Thousand/uL 10 48*   RBC Million/uL 4 63   HEMOGLOBIN g/dL 13 7   HEMATOCRIT % 40 2   MCV fL 87 MCH pg 29 6   MCHC g/dL 34 1   RDW % 14 1   MPV fL 10 5   PLATELETS Thousands/uL 256     CMP:   Results from last 7 days   Lab Units 05/19/22  0054   SODIUM mmol/L 136   CHLORIDE mmol/L 102   CO2 mmol/L 20*   BUN mg/dL 28*   CREATININE mg/dL 1 92*   CALCIUM mg/dL 8 8   AST U/L 23   ALT U/L 23   ALK PHOS U/L 85   EGFR ml/min/1 73sq m 37     HS Troponin:   0   Lab Value Date/Time    HSTNI0 7 05/17/2022 1710    HSTNI2 8 05/17/2022 1936    HSTNI4 7 05/17/2022 2233    HSTNI4 52 (H) 05/07/2022 2339     BNP:   Results from last 7 days   Lab Units 05/19/22  0054   POTASSIUM mmol/L 3 3*   CHLORIDE mmol/L 102   CO2 mmol/L 20*   BUN mg/dL 28*   CREATININE mg/dL 1 92*   CALCIUM mg/dL 8 8   EGFR ml/min/1 73sq m 37     Coags:   Results from last 7 days   Lab Units 05/17/22  1710   PTT seconds 28   INR  1 04     TSH:     Magnesium:   Results from last 7 days   Lab Units 05/19/22  0054   MAGNESIUM mg/dL 1 7     Lipid Profile:   Results from last 7 days   Lab Units 05/17/22  2357   HDL mg/dL 35*   LDL CALC mg/dL 107*   TRIGLYCERIDES mg/dL 184*     Imaging: I have personally reviewed pertinent reports      EKG:  Sinus rhythm with left bundle-branch block which was 1st noted in February 2020  VTE Prophylaxis: Sequential compression device Giovany Meals)     Code Status: Level 1 - Full Code  Advance Directive and Living Will:      Power of :    POLST:      Rebeka Hancock   Cardiology

## 2022-05-19 NOTE — PHYSICAL THERAPY NOTE
PT TREATMENT     05/19/22 1100   Note Type   Note Type Treatment   Pain Assessment   Pain Assessment Tool 0-10   Pain Score 10 - Worst Possible Pain   Pain Location/Orientation Location: Head   Restrictions/Precautions   Other Precautions Fall Risk;Bed Alarm; Chair Alarm;Pain   General   Chart Reviewed Yes   Family/Caregiver Present No   Subjective   Subjective Pt reports 10/10 pain in head   Bed Mobility   Supine to Sit 3  Moderate assistance   Additional items Assist x 1;Verbal cues   Sit to Supine 3  Moderate assistance   Additional items Assist x 1;Verbal cues   Additional Comments While seated EOB, pt worked on static sitting balance - pt losing balance posterior and to the L side   Transfers   Sit to Stand 3  Moderate assistance   Additional items Assist x 1;Verbal cues   Stand to Sit 3  Moderate assistance   Additional items Assist x 1;Verbal cues   Additional Comments STS transfer x 2 reps, working on static standing balance with cane - pt leans heavily to left side  Ambulation/Elevation   Gait pattern   (leans heavily to Left side)   Gait Assistance 3  Moderate assist   Additional items Assist x 1;Verbal cues; Tactile cues   Assistive Device Rolling walker   Distance 4-5 steps to head of bed x 2 reps with rest inbetween   Balance   Static Sitting   (P+/F-)   Static Standing   (P/P+)   Dynamic Standing   (P/P+)   Ambulatory   (P/P+)   Activity Tolerance   Activity Tolerance Patient limited by fatigue;Patient limited by pain;Treatment limited secondary to medical complications (Comment)  (loss of balance)   Exercises   Heelslides AAROM; Left;10 reps  (working on control)   Hip Abduction AAROM; Left;10 reps  (working on control)   Assessment   Assessment Pt continues with mod to max balance loss to L side with sitting EOB, standing and gait with cane  Pt needs max/mod A to correct in standing and min A to correct in sitting   Pt remains appropriate for post acute rehab services when medically stable for dc to increase his strength, coordination, balance, endurance and gait  The patient's [facility] Basic Mobility Inpatient Short Form Raw Score is 12  A Raw score of less than or equal to 16 suggests the patient may benefit from discharge to post-acute rehabilitation services  Please also refer to the recommendation of the Physical Therapist for safe discharge planning  Plan   Treatment/Interventions ADL retraining;Functional transfer training;LE strengthening/ROM; Elevations; Therapeutic exercise; Endurance training;Patient/family training;Equipment eval/education; Bed mobility;Gait training   PT Frequency Other (Comment)  (daily)   Recommendation   PT Discharge Recommendation Post acute rehabilitation services   [facility] Basic Mobility Inpatient   Turning in Bed Without Bedrails 3   Lying on Back to Sitting on Edge of Flat Bed 2   Moving Bed to Chair 2   Standing Up From Chair 2   Walk in Room 2   Climb 3-5 Stairs 1   Basic Mobility Inpatient Raw Score 12   Basic Mobility Standardized Score 32 23   Highest Level Of Mobility   JH-HLM Goal 4: Move to chair/commode   JH-HLM Achieved 5: Stand (1 or more minutes)   Education   Education Provided Mobility training;Assistive device   Patient Explanation/teachback used; Reinforcement needed   End of Consult   Patient Position at End of Consult Supine;Bed/Chair alarm activated; All needs within reach   [facility] Insurance Number  [address] [place] [address] [id number]

## 2022-05-19 NOTE — ASSESSMENT & PLAN NOTE
· Recent Rt Posterior MCA and Rt PCA distribution  · Unclear why patient has had strokes  Work up in progress  · Loop recorder has been implanted  Follow up with cardiology as an out-patient for monitoring  · Continue ASA and Plavix x21 days then ASA alone  · Continue Lipitor and Tricor

## 2022-05-19 NOTE — ASSESSMENT & PLAN NOTE
Patient on Keppra 1500 mg b i d  From known seizure disorder  · Unsure if patient is compliant with medication  · Keppra level: pending  · Seizure, fall precautions  · Neurology consulted and appreciate their recommendations  · Vimpat 100 mg BID added

## 2022-05-20 ENCOUNTER — APPOINTMENT (INPATIENT)
Dept: RADIOLOGY | Facility: HOSPITAL | Age: 57
DRG: 040 | End: 2022-05-20
Payer: COMMERCIAL

## 2022-05-20 PROBLEM — E87.6 HYPOKALEMIA: Status: RESOLVED | Noted: 2022-05-10 | Resolved: 2022-05-20

## 2022-05-20 PROBLEM — N17.9 ACUTE KIDNEY INJURY (HCC): Status: RESOLVED | Noted: 2022-05-19 | Resolved: 2022-05-20

## 2022-05-20 LAB
ALBUMIN SERPL BCP-MCNC: 3.1 G/DL (ref 3.5–5)
ALP SERPL-CCNC: 76 U/L (ref 46–116)
ALT SERPL W P-5'-P-CCNC: 21 U/L (ref 12–78)
ANION GAP SERPL CALCULATED.3IONS-SCNC: 9 MMOL/L (ref 4–13)
AST SERPL W P-5'-P-CCNC: 21 U/L (ref 5–45)
B2 GLYCOPROT1 IGA SERPL IA-ACNC: 0.9
B2 GLYCOPROT1 IGG SERPL IA-ACNC: 0.7
B2 GLYCOPROT1 IGM SERPL IA-ACNC: <2.9
BASOPHILS # BLD AUTO: 0.02 THOUSANDS/ΜL (ref 0–0.1)
BASOPHILS NFR BLD AUTO: 0 % (ref 0–1)
BILIRUB SERPL-MCNC: 0.4 MG/DL (ref 0.2–1)
BUN SERPL-MCNC: 13 MG/DL (ref 5–25)
CALCIUM ALBUM COR SERPL-MCNC: 9.4 MG/DL (ref 8.3–10.1)
CALCIUM SERPL-MCNC: 8.7 MG/DL (ref 8.3–10.1)
CARDIOLIPIN IGA SER IA-ACNC: 1.2
CARDIOLIPIN IGG SER IA-ACNC: 2
CARDIOLIPIN IGM SER IA-ACNC: 0.8
CHLORIDE SERPL-SCNC: 109 MMOL/L (ref 100–108)
CO2 SERPL-SCNC: 23 MMOL/L (ref 21–32)
CREAT SERPL-MCNC: 1 MG/DL (ref 0.6–1.3)
EOSINOPHIL # BLD AUTO: 0.1 THOUSAND/ΜL (ref 0–0.61)
EOSINOPHIL NFR BLD AUTO: 2 % (ref 0–6)
ERYTHROCYTE [DISTWIDTH] IN BLOOD BY AUTOMATED COUNT: 14.1 % (ref 11.6–15.1)
FLUAV RNA RESP QL NAA+PROBE: NEGATIVE
FLUBV RNA RESP QL NAA+PROBE: NEGATIVE
GFR SERPL CREATININE-BSD FRML MDRD: 82 ML/MIN/1.73SQ M
GLUCOSE SERPL-MCNC: 90 MG/DL (ref 65–140)
HCT VFR BLD AUTO: 38.2 % (ref 36.5–49.3)
HGB BLD-MCNC: 12.9 G/DL (ref 12–17)
IMM GRANULOCYTES # BLD AUTO: 0.02 THOUSAND/UL (ref 0–0.2)
IMM GRANULOCYTES NFR BLD AUTO: 0 % (ref 0–2)
LYMPHOCYTES # BLD AUTO: 1.16 THOUSANDS/ΜL (ref 0.6–4.47)
LYMPHOCYTES NFR BLD AUTO: 24 % (ref 14–44)
MAGNESIUM SERPL-MCNC: 1.5 MG/DL (ref 1.6–2.6)
MCH RBC QN AUTO: 29.3 PG (ref 26.8–34.3)
MCHC RBC AUTO-ENTMCNC: 33.8 G/DL (ref 31.4–37.4)
MCV RBC AUTO: 87 FL (ref 82–98)
MONOCYTES # BLD AUTO: 0.43 THOUSAND/ΜL (ref 0.17–1.22)
MONOCYTES NFR BLD AUTO: 9 % (ref 4–12)
NEUTROPHILS # BLD AUTO: 3.15 THOUSANDS/ΜL (ref 1.85–7.62)
NEUTS SEG NFR BLD AUTO: 65 % (ref 43–75)
NRBC BLD AUTO-RTO: 0 /100 WBCS
PLATELET # BLD AUTO: 230 THOUSANDS/UL (ref 149–390)
PMV BLD AUTO: 10.2 FL (ref 8.9–12.7)
POTASSIUM SERPL-SCNC: 4 MMOL/L (ref 3.5–5.3)
PROT SERPL-MCNC: 5.9 G/DL (ref 6.4–8.2)
RBC # BLD AUTO: 4.41 MILLION/UL (ref 3.88–5.62)
RSV RNA RESP QL NAA+PROBE: NEGATIVE
SARS-COV-2 RNA RESP QL NAA+PROBE: NEGATIVE
SODIUM SERPL-SCNC: 141 MMOL/L (ref 136–145)
WBC # BLD AUTO: 4.88 THOUSAND/UL (ref 4.31–10.16)

## 2022-05-20 PROCEDURE — 80053 COMPREHEN METABOLIC PANEL: CPT | Performed by: FAMILY MEDICINE

## 2022-05-20 PROCEDURE — 97530 THERAPEUTIC ACTIVITIES: CPT

## 2022-05-20 PROCEDURE — 99232 SBSQ HOSP IP/OBS MODERATE 35: CPT | Performed by: INTERNAL MEDICINE

## 2022-05-20 PROCEDURE — 73564 X-RAY EXAM KNEE 4 OR MORE: CPT

## 2022-05-20 PROCEDURE — 99232 SBSQ HOSP IP/OBS MODERATE 35: CPT | Performed by: PHYSICIAN ASSISTANT

## 2022-05-20 PROCEDURE — 0241U HB NFCT DS VIR RESP RNA 4 TRGT: CPT | Performed by: PHYSICIAN ASSISTANT

## 2022-05-20 PROCEDURE — 97110 THERAPEUTIC EXERCISES: CPT

## 2022-05-20 PROCEDURE — 83735 ASSAY OF MAGNESIUM: CPT | Performed by: FAMILY MEDICINE

## 2022-05-20 PROCEDURE — 85025 COMPLETE CBC W/AUTO DIFF WBC: CPT | Performed by: FAMILY MEDICINE

## 2022-05-20 RX ORDER — OXYCODONE HYDROCHLORIDE 5 MG/1
5 TABLET ORAL EVERY 4 HOURS PRN
Status: DISCONTINUED | OUTPATIENT
Start: 2022-05-20 | End: 2022-05-23 | Stop reason: HOSPADM

## 2022-05-20 RX ORDER — METHOCARBAMOL 500 MG/1
750 TABLET, FILM COATED ORAL EVERY 6 HOURS SCHEDULED
Status: DISCONTINUED | OUTPATIENT
Start: 2022-05-20 | End: 2022-05-23 | Stop reason: HOSPADM

## 2022-05-20 RX ORDER — OXYCODONE HYDROCHLORIDE 10 MG/1
10 TABLET ORAL EVERY 4 HOURS PRN
Status: DISCONTINUED | OUTPATIENT
Start: 2022-05-20 | End: 2022-05-23 | Stop reason: HOSPADM

## 2022-05-20 RX ORDER — MAGNESIUM SULFATE HEPTAHYDRATE 40 MG/ML
2 INJECTION, SOLUTION INTRAVENOUS ONCE
Status: COMPLETED | OUTPATIENT
Start: 2022-05-20 | End: 2022-05-20

## 2022-05-20 RX ADMIN — BUTALBITAL, ACETAMINOPHEN AND CAFFEINE 1 TABLET: 50; 325; 40 TABLET ORAL at 09:48

## 2022-05-20 RX ADMIN — MAGNESIUM SULFATE HEPTAHYDRATE 2 G: 40 INJECTION, SOLUTION INTRAVENOUS at 12:42

## 2022-05-20 RX ADMIN — CLOPIDOGREL BISULFATE 75 MG: 75 TABLET ORAL at 09:36

## 2022-05-20 RX ADMIN — THIAMINE HCL TAB 100 MG 100 MG: 100 TAB at 09:36

## 2022-05-20 RX ADMIN — MAGNESIUM OXIDE TAB 400 MG (241.3 MG ELEMENTAL MG) 400 MG: 400 (241.3 MG) TAB at 18:25

## 2022-05-20 RX ADMIN — MAGNESIUM OXIDE TAB 400 MG (241.3 MG ELEMENTAL MG) 400 MG: 400 (241.3 MG) TAB at 09:36

## 2022-05-20 RX ADMIN — FENOFIBRATE 145 MG: 145 TABLET, FILM COATED ORAL at 09:35

## 2022-05-20 RX ADMIN — LEVETIRACETAM 1500 MG: 500 TABLET, FILM COATED ORAL at 21:01

## 2022-05-20 RX ADMIN — HEPARIN SODIUM 5000 UNITS: 5000 INJECTION INTRAVENOUS; SUBCUTANEOUS at 21:02

## 2022-05-20 RX ADMIN — METOPROLOL SUCCINATE 100 MG: 100 TABLET, EXTENDED RELEASE ORAL at 09:36

## 2022-05-20 RX ADMIN — OXYCODONE HYDROCHLORIDE 10 MG: 10 TABLET ORAL at 21:02

## 2022-05-20 RX ADMIN — LEVETIRACETAM 1500 MG: 500 TABLET, FILM COATED ORAL at 09:36

## 2022-05-20 RX ADMIN — ACETAMINOPHEN 650 MG: 325 TABLET, FILM COATED ORAL at 09:48

## 2022-05-20 RX ADMIN — METHOCARBAMOL TABLETS 750 MG: 500 TABLET, COATED ORAL at 12:14

## 2022-05-20 RX ADMIN — METHOCARBAMOL TABLETS 750 MG: 500 TABLET, COATED ORAL at 18:25

## 2022-05-20 RX ADMIN — PANTOPRAZOLE SODIUM 20 MG: 20 TABLET, DELAYED RELEASE ORAL at 05:24

## 2022-05-20 RX ADMIN — VERAPAMIL HYDROCHLORIDE 120 MG: 120 TABLET, FILM COATED, EXTENDED RELEASE ORAL at 21:02

## 2022-05-20 RX ADMIN — OXYCODONE HYDROCHLORIDE 10 MG: 10 TABLET ORAL at 14:53

## 2022-05-20 RX ADMIN — ATORVASTATIN CALCIUM 80 MG: 80 TABLET ORAL at 16:30

## 2022-05-20 RX ADMIN — ASPIRIN 81 MG CHEWABLE TABLET 81 MG: 81 TABLET CHEWABLE at 09:36

## 2022-05-20 RX ADMIN — HEPARIN SODIUM 5000 UNITS: 5000 INJECTION INTRAVENOUS; SUBCUTANEOUS at 05:24

## 2022-05-20 RX ADMIN — SODIUM CHLORIDE 100 MG: 9 INJECTION, SOLUTION INTRAVENOUS at 11:19

## 2022-05-20 RX ADMIN — HEPARIN SODIUM 5000 UNITS: 5000 INJECTION INTRAVENOUS; SUBCUTANEOUS at 14:49

## 2022-05-20 NOTE — PLAN OF CARE
Problem: OCCUPATIONAL THERAPY ADULT  Goal: Performs self-care activities at highest level of function for planned discharge setting  See evaluation for individualized goals  Description: Treatment Interventions: ADL retraining, Functional transfer training, UE strengthening/ROM, Endurance training, Visual perceptual retraining, Patient/family training, Equipment evaluation/education, Neuromuscular reeducation, Fine motor coordination activities, Compensatory technique education, Continued evaluation, Activityengagement          See flowsheet documentation for full assessment, interventions and recommendations  Outcome: Progressing  Note: Limitation: Decreased ADL status, Decreased UE strength, Decreased Safe judgement during ADL, Decreased endurance, Decreased sensation, Visual deficit, Decreased fine motor control, Decreased self-care trans, Decreased high-level ADLs (decreased balance and mobility)  Prognosis: Good  Assessment: Patient seen for OT treatment this AM  Patient initially lethargic but increased alertness with increased time and activity  Patient continues to present with significant L sided deficits including: weakness, impaired sensation, impaired proprioception, impaired coordination, decrease balance, impaired vision and visual attention  Patient currently requires overall max assist for ADLs; mod assist of 2 for transfers  Patient with good participation in therapy session today  Difficulty maintaining midline in sitting and standing during functional activities, pt reports feeling 'off balance' and observed with increased lateral lean to left when sitting or standing  The patient's raw score on the AM-PAC Daily Activity inpatient short form is 14, standardized score is 33 39, less than 39 4  Patients at this level are likely to benefit from discharge to post-acute rehabilitation services  Please refer to the recommendation of the Occupational Therapist for safe discharge planning   At end of session, patient seated in recliner chair with all needs met, chair alarm set, family at bedside  Continue OT per POC       OT Discharge Recommendation: Post acute rehabilitation services

## 2022-05-20 NOTE — OCCUPATIONAL THERAPY NOTE
Occupational Therapy Treatment Note       05/20/22 1140   Note Type   Note Type Treatment   Restrictions/Precautions   Other Precautions Cognitive; Chair Alarm; Bed Alarm;Telemetry; Fall Risk  (L sided visual deficits)   Pain Assessment   Pain Assessment Tool 0-10   Pain Score 10 - Worst Possible Pain   Pain Location/Orientation Location: Generalized   Bed Mobility   Supine to Sit 3  Moderate assistance   Additional items Assist x 1;Verbal cues   Transfers   Sit to Stand 2  Maximal assistance  (Mod-Max)   Additional items Assist x 1;Verbal cues   Stand to Sit 2  Maximal assistance  (Mod-Max)   Additional items Assist x 1;Verbal cues   Additional Comments STS from EOB   Functional Mobility   Functional Mobility 3  Moderate assistance  (Assist x 2)   Additional Comments Few steps from bed to chair with hand hold assist, mod assist x 2; max verbal, tactile, and visual cues for safe transfer technique, cues for trunk extension and safe posture   Neuromuscular Education   Trunk Control Static sitting balance activity seated EOB: Increased practice to establish and maintain centered and upright functional seated position; pt with lateral lean to left; required max verbal and visual cues to maintain achieve midline in sitting, able to achieve for few seconds then leaning back to left without constant verbal and visual cues; pt educated to mirror self to therapist (seated directly in front of patient in order to gauge midline);  Dynamic sitting balance activity then completed seated EOB: reaching forward in various planes of direction to reach for and retrieve ADL items from therapist while challenging balance; patient required overall min assist to maintain safe balance in sitting; max verbal and visual cues to return posture to midline with each reach attempt; Patient also required max verbal cues for attention to left visual field in order to visually locate therapist/ADL items when in L visual field   Cognition   Overall Cognitive Status WFL   Arousal/Participation Alert; Cooperative   Attention Attends with cues to redirect  (Easily distracted)   Orientation Level Oriented to person;Oriented to place   Following Commands Follows multistep commands with increased time or repetition   Activity Tolerance   Activity Tolerance Patient limited by fatigue;Patient limited by pain;Treatment limited secondary to medical complications (Comment)   Assessment   Assessment Patient seen for OT treatment this AM  Patient initially lethargic but increased alertness with increased time and activity  Patient continues to present with significant L sided deficits including: weakness, impaired sensation, impaired proprioception, impaired coordination, decrease balance, impaired vision and visual attention  Patient currently requires overall max assist for ADLs; mod assist of 2 for transfers  Patient with good participation in therapy session today  Difficulty maintaining midline in sitting and standing during functional activities, pt reports feeling 'off balance' and observed with increased lateral lean to left when sitting or standing  The patient's raw score on the AM-PAC Daily Activity inpatient short form is 14, standardized score is 33 39, less than 39 4  Patients at this level are likely to benefit from discharge to post-acute rehabilitation services  Please refer to the recommendation of the Occupational Therapist for safe discharge planning  At end of session, patient seated in recliner chair with all needs met, chair alarm set, family at bedside  Continue OT per POC     Plan   OT Frequency 5x/wk   Recommendation   OT Discharge Recommendation Post acute rehabilitation services   AM-PAC Daily Activity Inpatient   Lower Body Dressing 2   Bathing 2   Toileting 2   Upper Body Dressing 2   Grooming 3   Eating 3   Daily Activity Raw Score 14   Daily Activity Standardized Score (Calc for Raw Score >=11) 33 39   AM-PAC Applied Cognition Inpatient Following a Speech/Presentation 3   Understanding Ordinary Conversation 4   Taking Medications 2   Remembering Where Things Are Placed or Put Away 3   Remembering List of 4-5 Errands 3   Taking Care of Complicated Tasks 2   Applied Cognition Raw Score 17   Applied Cognition Standardized Score 00 35   Licensure   [place] License Number  [place] , OTR/L [id number]

## 2022-05-20 NOTE — PHYSICAL THERAPY NOTE
PT TREATMENT     05/20/22 1215   Note Type   Note Type Treatment   Pain Assessment   Pain Assessment Tool Moore-Baker FACES   Moore-Baker FACES Pain Rating 8  (neck area and "all over")   Restrictions/Precautions   Other Precautions Chair Alarm; Bed Alarm; Fall Risk;Pain  (L sided decreased sensation and coordination)   General   Chart Reviewed Yes   Family/Caregiver Present No   Cognition   Arousal/Participation Cooperative   Subjective   Subjective Patient states increased neck pain and limited ability to sleep   Bed Mobility   Supine to Sit 3  Moderate assistance   Additional items Assist x 1;Verbal cues;LE management   Transfers   Sit to Stand   (Mod to max)   Additional items Assist x 1;Verbal cues   Stand to Sit 3  Moderate assistance   Additional items Assist x 1;Verbal cues   Additional Comments Cuing required an due to decreased sensation and proprioception for safety   Ambulation/Elevation   Gait Assistance 3  Moderate assist   Additional items Assist x 2;Verbal cues; Tactile cues   Assistive Device   (Handhold assist)   Distance 5 ft with change in direction from bed to chair  Antalgic type gait patterning and ataxic at times   Balance   Static Sitting   (F/F-)   Dynamic Sitting Fair -   Static Standing Poor +   Dynamic Standing Poor   Ambulatory Poor   Exercises   Hamstring Stretch Supine;5 reps;Bilateral;PROM   Quad Sets Supine;5 reps;Bilateral   Heelslides Supine;10 reps;Bilateral   Hip Flexion Sitting;10 reps;Bilateral   Hip Abduction Sitting;10 reps;Bilateral   Knee AROM Short Arc Quad Supine;10 reps;Bilateral   Knee AROM Long Arc Quad Sitting;10 reps;Bilateral   Ankle Pumps Sitting;10 reps;Bilateral   Neuro re-ed All exercise completed in alternating fashion as possible coordination and re-education   Balance training  Sidestepping completed for balance and coordination as well as sitting balance activity completed     Assessment   Assessment Patient cooperative and motivated and demonstrating improving sitting balance although with continued decreased coordination and sensation in both left upper and lower extremity with visual impairment as well on left fields  Patient will benefit from continued physical therapy with progression as tolerated  The patient's AM-PAC Basic Mobility Inpatient Short Form Raw Score is 11  A Raw score of less than or equal to 16 suggests the patient may benefit from discharge to post-acute rehabilitation services  Please also refer to the recommendation of the Physical Therapist for safe discharge planning  Plan   Treatment/Interventions ADL retraining;Functional transfer training;LE strengthening/ROM; Elevations; Therapeutic exercise; Endurance training;Patient/family training;Equipment eval/education; Bed mobility;Gait training; Compensatory technique education   PT Frequency Other (Comment)  (Daily)   Recommendation   PT Discharge Recommendation Post acute rehabilitation services   AM-PAC Basic Mobility Inpatient   Turning in Bed Without Bedrails 3   Lying on Back to Sitting on Edge of Flat Bed 2   Moving Bed to Chair 2   Standing Up From Chair 2   Walk in Room 1   Climb 3-5 Stairs 1   Basic Mobility Inpatient Raw Score 11   Basic Mobility Standardized Score 30 25   Highest Level Of Mobility   JH-HLM Goal 4: Move to chair/commode   JH-HLM Achieved 6: Walk 10 steps or more   Education   Patient Explanation/teachback used; Reinforcement needed   [employer] License Number  Rosanna Vargas PT [id number]

## 2022-05-20 NOTE — PROGRESS NOTES
Progress Note - Cardiology   [facility]     Bridger Davila 59 y o  male MRN: [id number]  : 1965  Unit/Bed#: [address] [place] [address] Encounter: [id number]    ASSESSMENT:   S/p BEBO and loop recorder implantation for AF surveillance in patient with CVA  Loop recorder implantation site without any complications    Subacute right PCA territory CVA   Right MCA IPH     Neurology requested BEBO and ILR       Seizure disorder   History of alcohol abuse   Hypertension   Dyslipidemia   DORA   Noncompliance           RECOMMENDATIONS:   Continue management as per primary service and Neurology                Subjective / Objective:     Review of Systems   Denies any cardiac symptoms like chest pain, dyspnea or syncope  Vitals: Blood pressure 139/67, pulse 70, temperature 97 7 °F (36 5 °C), resp  rate 18, height 5' 11" (1 803 m), weight 90 7 kg (200 lb), SpO2 100 %  Vitals:    22 2311 22 1401   Weight: 90 7 kg (200 lb) 90 7 kg (200 lb)     Body mass index is 27 89 kg/m²  BP Readings from Last 3 Encounters:   22 139/67   22 136/66   22 119/67     Orthostatic Blood Pressures    Flowsheet Row Most Recent Value   Blood Pressure 139/67 filed at 2022 0753   Patient Position - Orthostatic VS Lying filed at 2022 0828        I/O        0701   0700  0701   0700  0701   0700    P  O  100      I V  (mL/kg) 10 (0 1)      IV Piggyback 100      Total Intake(mL/kg) 210 (2 3)      Urine (mL/kg/hr)  700 (0 3)     Blood  0     Total Output  700     Net +210 -700                Invasive Devices  Report    Peripheral Intravenous Line  Duration           Peripheral IV 22 Left;Ventral (anterior) Wrist 1 day                  Intake/Output Summary (Last 24 hours) at 2022 1038  Last data filed at 2022 0442  Gross per 24 hour   Intake --   Output 700 ml   Net -700 ml         Physical Exam:     Neurologic:  Awake and alert X 3  Constitutional: Well developed, well nourished,  With no acute distress  Eyes:  PERRL, conjunctiva normal   HENT:  Atraumatic, external ears normal, nose normal,   NECK: Normal range of motion, no tenderness, neck is supple , No JVP  Respiratory:  Bilateral air entry, mostly clear to auscultation  Cardiovascular: Regular S1-S2 with a I/VI ejection systolic murmur  No pericardial rub or gallop audible  Loop recorder implantation site without any complications  No erythema, oozing/bleeding  GI:  Soft, nondistended, audible bowel sounds, nontender, no hepatosplenomegaly appreciated  Musculoskeletal:  No tenderness, no deformities  Extremities:  No appreciable pitting edema   Distal pulses are present  Psychiatric:  Speech and behavior appropriate             Medications/ Allergies:     Current Facility-Administered Medications   Medication Dose Route Frequency Provider Last Rate    acetaminophen  650 mg Oral Q6H PRN Josefa Casper PA-C      aspirin  81 mg Oral Daily Sheri VecJUAN spear      atorvastatin  80 mg Oral Daily With [employer], [address]      butalbital-acetaminophen-caffeine  1 tablet Oral Q4H PRN Conrado Fothergill, CRNP      clopidogrel  75 mg Oral Daily [address] [facility], [address]      fenofibrate  145 mg Oral Daily Josefa Casper PA-C      heparin (porcine)  5,000 Units Subcutaneous Q8H [address] Sherijulianna White PA-C      lacosamide (VIMPAT) IVPB  100 mg Intravenous Q12H [facility], CRNP 100 mg (05/19/22 9729)    levETIRAcetam  1,500 mg Oral Q12H Sheri White PA-C      magnesium oxide  400 mg Oral BID Prabha Lorenzana MD      methocarbamol  750 mg Oral HOS[facility] [place], [place]      metoprolol succinate  100 mg Oral Daily Sheri White PA-C      ondansetron  4 mg Intravenous Q6H PRN Josefa Casper PA-C      oxyCODONE  10 mg Oral Q4H PRN Law Dave PA-C      oxyCODONE  5 mg Oral Q4H PRN Law Dave PA-C      pantoprazole 20 mg Oral Early Morning Chip Talavera PA-C      sodium chloride  75 mL/hr Intravenous Continuous Catarino Shepherd MD 75 mL/hr (05/19/22 4331)    thiamine  100 mg Oral Daily Sheri JUAN White      verapamil  120 mg Oral HS Oneda Shows, CRNP       acetaminophen, 650 mg, Q6H PRN  butalbital-acetaminophen-caffeine, 1 tablet, Q4H PRN  ondansetron, 4 mg, Q6H PRN  oxyCODONE, 10 mg, Q4H PRN  oxyCODONE, 5 mg, Q4H PRN      Allergies   Allergen Reactions    Codeine Hives     Tolerates morphine    Penicillins     Hydrocodone Rash     Reaction Date: 17Sep2007;            Labs:   Troponins:  Results from last 7 days   Lab Units 05/17/22  1710   CK TOTAL U/L 295   CK MB INDEX % <1 0       CBC with diff:  Results from last 7 days   Lab Units 05/20/22  0532 05/19/22  0550 05/17/22  1710   WBC Thousand/uL 4 88 10 48* 12 20*   HEMOGLOBIN g/dL 12 9 13 7 16 2   HEMATOCRIT % 38 2 40 2 46 9   MCV fL 87 87 85   PLATELETS Thousands/uL 230 256 275   MCH pg 29 3 29 6 29 5   MCHC g/dL 33 8 34 1 34 5   RDW % 14 1 14 1 13 9   MPV fL 10 2 10 5 9 7   NRBC AUTO /100 WBCs 0 0 0       CMP:  Results from last 7 days   Lab Units 05/20/22  0532 05/19/22  0054 05/17/22  1710   SODIUM mmol/L 141 136 137   POTASSIUM mmol/L 4 0 3 3* 3 7   CHLORIDE mmol/L 109* 102 99*   CO2 mmol/L 23 20* 23   ANION GAP mmol/L 9 14* 15*   BUN mg/dL 13 28* 12   CREATININE mg/dL 1 00 1 92* 1 02   CALCIUM mg/dL 8 7 8 8 9 6   AST U/L 21 23 30   ALT U/L 21 23 29   ALK PHOS U/L 76 85 96   TOTAL PROTEIN g/dL 5 9* 6 2* 7 6   ALBUMIN g/dL 3 1* 3 4* 4 2   TOTAL BILIRUBIN mg/dL 0 40 0 39 0 74   EGFR ml/min/1 73sq m 82 37 80       Magnesium:  Results from last 7 days   Lab Units 05/20/22  0532 05/19/22  0054 05/17/22  1710   MAGNESIUM mg/dL 1 5* 1 7 1 1*     Coags:  Results from last 7 days   Lab Units 05/17/22  1710   PTT seconds 28   INR  1 04     TSH:    No components found for: TSH3  Lipid Profile:  Results from last 7 days   Lab Units 05/17/22  2357   CHOLESTEROL mg/dL 179   TRIGLYCERIDES mg/dL 184*   HDL mg/dL 35*   LDL CALC mg/dL 107*     Hgb A1c:  Results from last 7 days   Lab Units 05/17/22  2357   HEMOGLOBIN A1C % 5 3     NT-proBNP: No results for input(s): NTBNP in the last 72 hours  Imaging & Testing   I have personally reviewed pertinent reports  CTA head and neck w wo contrast    Result Date: 5/18/2022  Narrative: CTA NECK AND BRAIN WITH AND WITHOUT CONTRAST INDICATION: Neuro deficit, acute, stroke suspected recent MCA/PCA territory infarcts with increased seizure activity found down on the ground at home, incontinent and disoriented  Status post collapse and fall  COMPARISON:   5/18/2022; 8/4/2021; 5/17/2022 TECHNIQUE:  Routine CT imaging of the Brain without contrast   Post contrast imaging was performed after administration of iodinated contrast through the neck and brain  Post contrast axial 0 625 mm images timed to opacify the arterial system  3D rendering was performed on an independent workstation  MIP reconstructions performed  Coronal reconstructions were performed of the noncontrast portion of the brain  Radiation dose length product (DLP) for this visit:  1294 06 mGy-cm   This examination, like all CT scans performed in the [facility], was performed utilizing techniques to minimize radiation dose exposure, including the use of iterative reconstruction and automated exposure control  IV Contrast:  65 mL of iohexol (OMNIPAQUE)  IMAGE QUALITY:   Diagnostic FINDINGS: NONCONTRAST BRAIN PARENCHYMA: Subtle evolving hypodensities medially in the right occipital lobe seen on image 19, series 2 correlating finding on image 77, series 300 correlate to some of the diffusion abnormalities noted on the MRI performed earlier today  More laterally in the posterior aspect of the right temporal lobe there are parenchymal hypodensities which are similar to the CT performed last evening    There is no acute intracranial hemorrhage  Elsewhere numerous periventricular and subcortical hypodensities noted  Encephalomalacia/gliosis related to chronic basal ganglia and left thalamic lacunar infarctions again noted  VENTRICLES AND EXTRA-AXIAL SPACES:  Mild prominence of the frontal horns lateral ventricle stable  VISUALIZED ORBITS AND PARANASAL SINUSES:  Unremarkable  CERVICAL VASCULATURE AORTIC ARCH AND GREAT VESSELS:  Mild atherosclerotic disease of the arch, proximal great vessels and visualized subclavian vessels  No significant stenosis  There is variant branching anatomy of the great vessels with a common trunk for the brachiocephalic and left common carotid arteries, a so-called bovine aortic arch  RIGHT VERTEBRAL ARTERY CERVICAL SEGMENT:  Moderate stenosis at the origin  Findings are similar to the previous CTA  The vessel is normal in caliber throughout the neck  LEFT VERTEBRAL ARTERY CERVICAL SEGMENT:  Normal origin  The vessel is normal in caliber throughout the neck  RIGHT EXTRACRANIAL CAROTID SEGMENT:  Right common carotid artery is patent  Mild to moderate approximately 40 % stenosis of the right internal carotid artery is stable  Distally the vessel is patent  LEFT EXTRACRANIAL CAROTID SEGMENT:  Left common carotid artery is patent  Mild to moderate approximately 40-50% stenosis left internal carotid artery is stable  Distally the vessel is patent  NASCET criteria was used to determine the degree of internal carotid artery diameter stenosis  INTRACRANIAL VASCULATURE INTERNAL CAROTID ARTERIES: Atherosclerotic calcifications of the cavernous segment of the internal carotid artery are mild  Normal ophthalmic artery origins  Normal ICA terminus  ANTERIOR CIRCULATION:  Symmetric A1 segments and anterior cerebral arteries with normal enhancement  Normal anterior communicating artery   MIDDLE CEREBRAL ARTERY CIRCULATION:  M1 segment and middle cerebral artery branches demonstrate normal enhancement bilaterally  DISTAL VERTEBRAL ARTERIES:  Normal distal vertebral arteries  Posterior inferior cerebellar artery origins are normal  Normal vertebral basilar junction  BASILAR ARTERY:  Basilar artery is normal in caliber  Normal superior cerebellar arteries  POSTERIOR CEREBRAL ARTERIES: Both posterior cerebral arteries arises from the basilar tip  Both arteries demonstrate normal enhancement  Normal posterior communicating arteries  VENOUS STRUCTURES:  There is nonenhancement of the right transverse and sigmoid sinuses, similar to the previous CTA in retrospect indicative of chronic sinus thrombosis  NON VASCULAR ANATOMY BONY STRUCTURES:  Moderate spondylotic changes noted  SOFT TISSUES OF THE NECK:  Unremarkable  THORACIC INLET:  Normal      Impression: 1  Subtle evolving cortical hypodensities in the right occipital lobe, correlating to some of the cortical diffusion restriction noted on the MRI earlier today  Findings are worrisome for evolving infarction or perhaps post ictal edema  Other infectious or inflammatory processes including cerebritis are in the differential diagnosis  Further clinical assessment advised  2   Scattered mild to moderate atherosclerotic disease in the major arteries of the neck is unchanged  3   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 retrospectively similar to the previous CTA from 2021  4   No acute intracranial hemorrhage  Workstation performed: [id number]     XR foot 3+ vw right    Result Date: 5/8/2022  Narrative: RIGHT FOOT INDICATION:   fall, right toe pain  COMPARISON:  None VIEWS:  XR FOOT 3+ VW RIGHT Images: 3 FINDINGS: Small bony densities medial to the head of the 1st metatarsal may represent small avulsion-type fractures  No significant degenerative changes  No lytic or blastic osseous lesion  Soft tissue swelling medial to the 1st metatarsal      Impression: 1    Soft tissue swelling medial to the head of the 1st metatarsal  2   Small bony densities medial to the head of the 1st metatarsal may represent small avulsion-type fractures  The study was marked in EPIC for significant notification  Workstation performed: [id number]     CT head wo contrast    Result Date: 5/17/2022  Narrative: CT BRAIN - WITHOUT CONTRAST INDICATION:   Neuro deficit, acute, stroke suspected stroke, trauma  COMPARISON:  CT head 5/7/2022, MRI brain 8/7/2021 TECHNIQUE:  CT examination of the brain was performed  In addition to axial images, sagittal and coronal 2D reformatted images were created and submitted for interpretation  Radiation dose length product (DLP) for this visit:  959 56 mGy-cm  This examination, like all CT scans performed in the [facility], was performed utilizing techniques to minimize radiation dose exposure, including the use of iterative reconstruction and automated exposure control  IMAGE QUALITY:  Diagnostic  FINDINGS: PARENCHYMA:  No intracranial mass, mass effect or midline shift  No CT signs of acute infarction  No acute parenchymal hemorrhage  Old lacunar infarcts left thalamus and anterior bilateral basal ganglia  Encephalomalacia posterior right temporal lobe,  similar  Age-related cortical atrophy  Moderate periventricular and subcortical white matter hypodensity which is nonspecific although stable and most compatible with chronic small vessel ischemic disease  Vascular calcifications  VENTRICLES AND EXTRA-AXIAL SPACES:  Normal for the patient's age  VISUALIZED ORBITS AND PARANASAL SINUSES:  Unremarkable  CALVARIUM AND EXTRACRANIAL SOFT TISSUES:  Normal      Impression: No acute intracranial abnormality  Microangiopathic changes  Workstation performed: [id number]     CT head without contrast    Result Date: 5/7/2022  Narrative: CT BRAIN - WITHOUT CONTRAST INDICATION:   Head trauma, moderate-severe fall  COMPARISON:  4/13/2022 TECHNIQUE:  CT examination of the brain was performed    In addition to axial images, sagittal and coronal 2D reformatted images were created and submitted for interpretation  Radiation dose length product (DLP) for this visit:  973 mGy-cm   This examination, like all CT scans performed in the [facility], was performed utilizing techniques to minimize radiation dose exposure, including the use of iterative reconstruction and automated exposure control  IMAGE QUALITY:  Diagnostic  FINDINGS: PARENCHYMA: No hemorrhage, extra-axial fluid collection mass effect or midline shift  Gray-white matter differentiation preserved  Nonspecific patchy periventricular and subcortical white matter lucencies most commonly related to changes of microangiopathy  Chronic left thalamic and basal ganglia lacunar infarcts and right posterior-inferior temporal encephalomalacia  VENTRICLES AND EXTRA-AXIAL SPACES:  Stable size and configuration  Mild volume loss  VISUALIZED ORBITS AND PARANASAL SINUSES: Sinuses are well aerated  Globes and orbits are within normal limits  CALVARIUM AND EXTRACRANIAL SOFT TISSUES:  Mild left frontal/supraorbital soft tissue swelling  No skull fracture  Mastoid air cells are well aerated  Impression: No acute intracranial abnormality  Chronic, nonemergent findings above  Workstation performed: [id number]     CT facial bones without contrast    Result Date: 5/7/2022  Narrative: CT FACIAL BONES WITHOUT INTRAVENOUS CONTRAST INDICATION:   Facial trauma, blunt fall  COMPARISON: None  TECHNIQUE:  Axial CT images were obtained through the facial bones with additional sagittal and coronal reconstructions  Radiation dose length product (DLP) for this visit:  428 mGy-cm   This examination, like all CT scans performed in the [facility], was performed utilizing techniques to minimize radiation dose exposure, including the use of iterative reconstruction and automated exposure control  IMAGE QUALITY:  Diagnostic   FINDINGS: FACIAL BONES:  Minimally displaced left inferior nasal bone fracture best seen axial images (9/89- 95)  No additional fracture identified  Preserved alignment  Normal bone mineralization  ORBITS:  Normal position, attenuation and morphology of globes and lenses  No preseptal soft tissue swelling  Intraorbital fat is preserved  Extraocular muscles, optic nerve sheath complexes and lacrimal glands are within normal limits  SINUSES:  Mild mucosal thickening  No fluid levels  SOFT TISSUES:  Mild supraorbital/frontal soft tissue swelling  Visualized brain shows microangiopathy and old lacunar infarcts  Please see today's CT brain report  No acute findings in the visualized neck  The study was marked in [facility] for immediate notification  Impression: Nasal bone fracture  Workstation performed: [id number]     CT cervical spine without contrast    Result Date: 5/17/2022  Narrative: CT CERVICAL SPINE - WITHOUT CONTRAST INDICATION:   Neck trauma, focal neuro deficit or paresthesia (Age 16-64y) fall  COMPARISON:  5/7/2022 TECHNIQUE:  CT examination of the cervical spine was performed without intravenous contrast   Contiguous axial images were obtained  Sagittal and coronal reconstructions were performed  Radiation dose length product (DLP) for this visit:  652 43 mGy-cm   This examination, like all CT scans performed in the [facility], was performed utilizing techniques to minimize radiation dose exposure, including the use of iterative  reconstruction and automated exposure control  IMAGE QUALITY:  Diagnostic  FINDINGS: ALIGNMENT:  There is straightening of normal cervical lordosis  No subluxation or compression deformity  VERTEBRAL BODIES:  No fracture  DEGENERATIVE CHANGES:  Moderate multilevel cervical degenerative changes are noted  No critical central canal stenosis  PREVERTEBRAL AND PARASPINAL SOFT TISSUES:  Unremarkable  THORACIC INLET:  Normal  Incidental fluid in the sphenoid sinuses       Impression: No cervical spine fracture or traumatic malalignment  Fluid in the sphenoid sinuses  Workstation performed: [id number]     CT cervical spine without contrast    Result Date: 5/7/2022  Narrative: CT CERVICAL SPINE - WITHOUT CONTRAST INDICATION:   Neck trauma, intoxicated or obtunded (Age >= 16y) fall  COMPARISON:  None  TECHNIQUE:  CT examination of the cervical spine was performed without intravenous contrast   Contiguous axial images were obtained  Sagittal and coronal reconstructions were performed  Radiation dose length product (DLP) for this visit:  432 mGy-cm   This examination, like all CT scans performed in the [place], was performed utilizing techniques to minimize radiation dose exposure, including the use of iterative reconstruction and automated exposure control  IMAGE QUALITY:  Diagnostic  FINDINGS: ALIGNMENT:  Within normal limits  VERTEBRAL BODIES:  No fracture or suspicious bone lesion  DEGENERATIVE CHANGES:  Mild to moderate degenerative disc disease and facet osteoarthritis  No evidence of critical stenosis  PREVERTEBRAL AND PARASPINAL SOFT TISSUES:  No prevertebral soft tissue swelling  No acute findings in the visualized brain or neck  Please see today's CT brain report  THORACIC INLET:  No acute findings  Please see today's CT chest report     Impression: No cervical spine fracture or traumatic malalignment  Workstation performed: [id number]     CT thoracic spine without contrast    Result Date: 5/17/2022  Narrative: CT THORACIC SPINE INDICATION:   Mid-back pain, neuro deficit fall, pain  COMPARISON:  None  TECHNIQUE:  Contiguous axial images were obtained  Sagittal and coronal reconstructions were performed  Radiation dose length product (DLP) for this visit:  875 35 mGy-cm     This examination, like all CT scans performed in the [place], was performed utilizing techniques to minimize radiation dose exposure, including the use of iterative  reconstruction and automated exposure control  IMAGE QUALITY:  Diagnostic  FINDINGS: ALIGNMENT:  Normal alignment of the thoracic spine  No subluxation  VERTEBRAL BODIES: There are mild focal depressions of the superior endplates of the T2, T3 and possibly T4 vertebral bodies  These may represent small Schmorl's nodes  However, age is indeterminate  DEGENERATIVE CHANGES:  Mild multilevel degenerative disc disease  PARASPINAL SOFT TISSUES: Normal      Impression: Schmorl's nodes at the superior endplates of T2, T3 and possibly T4 of indeterminate age  Workstation performed: [id number]     CT lumbar spine without contrast    Result Date: 5/17/2022  Narrative: CT LUMBAR SPINE INDICATION:   Low back pain, trauma fall, pain  COMPARISON: None  TECHNIQUE:  Contiguous axial images through the lumbar spine were obtained  Sagittal and coronal reconstructions were performed  Radiation dose length product (DLP) for this visit:  902 52 mGy-cm   This examination, like all CT scans performed in the [facility], was performed utilizing techniques to minimize radiation dose exposure, including the use of iterative  reconstruction and automated exposure control  IMAGE QUALITY:  Diagnostic  FINDINGS: ALIGNMENT:  There are 5 lumbar type vertebral bodies  Normal alignment of the lumbar spine  No spondylolisthesis or spondylolysis  VERTEBRAL BODIES:  No fracture  No lytic or blastic lesion  DEGENERATIVE CHANGES: Multilevel degenerative changes causing multilevel neural foraminal spinal canal stenosis most prominent spinal canal stenosis at L2-L3, L1-L2 and L5-S1  Neural foraminal narrowing appears most severe at L5-S1  PARASPINAL SOFT TISSUES:   Normal      Impression: No acute fracture or dislocation  Multilevel degenerative changes  Workstation performed: [id number]     MRI brain wo contrast    Result Date: 5/18/2022  Narrative: MRI BRAIN WITHOUT CONTRAST INDICATION: r/o stroke  History of seizures   COMPARISON: Head CT dated 5/17/2022 and 5/7/2022 as well as MRI dated 8/7/2021  Also compared with CTA dated 8/4/2021  TECHNIQUE:  Sagittal T1, axial T2, axial FLAIR, axial T1, axial [place] and axial diffusion imaging  IMAGE QUALITY:  Diagnostic  FINDINGS: BRAIN PARENCHYMA: There is cortical restricted diffusion in the right temporal, parietal and occipital lobes and posterior insula with mild gyral swelling favored to represent acute/subacute infarct  Findings could also be post ictus  This involves the posterior right MCA distribution as well as the right PCA distribution which can be explained by fetal origin of right PCA that is seen on prior CTA  Underlying focal encephalomalacia with hemosiderin deposition in the posterior right temporal lobe is stable since 2021  Chronic lacunar infarcts in the bilateral thalamus and right basal ganglia  T2/FLAIR hyperintensities in the periventricular and subcortical white matter likely represent moderately advanced chronic microangiopathy  VENTRICLES:  Mild volume loss  No hydrocephalus  SELLA AND PITUITARY GLAND:  Normal  ORBITS:  Normal  PARANASAL SINUSES:  Normal  VASCULATURE:  Absent flow void in the right transverse and sigmoid sinuses as well as the proximal right IJ  In retrospect, right transverse and sigmoid sinuses appear to be chronically occluded occluded on CTA from 2021, however sinuses were patent and MRI from 2017  Flow voids of the major vessels are otherwise visualized  CALVARIUM AND SKULL BASE:  Normal  EXTRACRANIAL SOFT TISSUES:  Normal      Impression: Findings suspicious for recent infarct in the posterior right MCA distribution and right posterior cerebral artery distributions (fetal PCA noted on prior CTA)  Findings may also be related to seizures  No acute hemorrhage  No mass effect, shift or herniation  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   Absent flow void in the right transverse and sigmoid sinuses, chronically occluded based on CTA from 2021  The study was marked in EPIC for immediate notification  Workstation performed: [id number]     CT chest abdomen pelvis w contrast    Result Date: 5/7/2022  Narrative: CT CHEST, ABDOMEN AND PELVIS WITH IV CONTRAST INDICATION:   Polytrauma, blunt trauma  COMPARISON:  8/4/2021 TECHNIQUE: CT examination of the chest, abdomen and pelvis was performed  Axial, sagittal, and coronal 2D reformatted images were created from the source data and submitted for interpretation  Radiation dose length product (DLP) for this visit:  857 mGy-cm   This examination, like all CT scans performed in the [facility], was performed utilizing techniques to minimize radiation dose exposure, including the use of iterative reconstruction and automated exposure control  IV Contrast:  100 mL of iohexol (OMNIPAQUE) Enteric Contrast: Enteric contrast was not administered  FINDINGS: CHEST LUNGS:  Mild emphysema and dependent atelectasis  Patent airways  PLEURA:  Within normal limits  HEART/GREAT VESSELS:  Atherosclerosis  Developmental bovine-type aortic arch branching  Top normal heart size  MEDIASTINUM AND ARLENE:  Within normal limits  CHEST WALL AND LOWER NECK:   Chronic 1 8 cm right axillary nodule most compatible with a benign sebaceous or epidermal inclusion cyst   No acute findings  Mild gynecomastia  ABDOMEN LIVER/BILIARY TREE:  Within normal limits  GALLBLADDER:  Chronic gallstones without gallbladder wall thickening or pericholecystic fluid  SPLEEN:  Within normal limits  PANCREAS:  Within normal limits  ADRENAL GLANDS:  Within normal limits  KIDNEYS/URETERS:  Within normal limits  STOMACH AND BOWEL:  Within normal limits  APPENDIX:  No evidence of acute appendicitis  ABDOMINOPELVIC CAVITY:  No abnormal air, fluid or enlarged lymph nodes  VESSELS:  Atherosclerotic changes  No aneurysm or acute finding  PELVIS: REPRODUCTIVE ORGANS:  prostate and seminal vesicles within normal limits   URINARY BLADDER:  Within normal limits  ABDOMINAL WALL/INGUINAL REGIONS:  Within normal limits  OSSEOUS STRUCTURES:  No acute or suspicious findings  Impression: No acute abnormalities in the chest, abdomen or pelvis  Chronic and nonemergent findings above  Workstation performed: [id number]     Cardiac ep lab eps/ablations    Result Date: 5/19/2022  Narrative: Loop recorder placement Jamaal Cantor [id number] 5/19/2022 Surgeon: Joss Han MD Cardiologist: Procedure(s): Loop recorder implant Indications: Procedure Details: The risks, benefits, complications,  alternative treatment options, and expected outcomes were discussed with the patient    The patient concurred with the proposed plan, giving informed consent  Patient was prepped and draped in the usual strict sterile fashion  Patient was given adequate amount of local anesthesia   A small skin incision off less than 0 5 cm made in the fifth intercostal space in left parasternal area  Then a Medtronic loop recorder was successfully placed in a standard fashion without any complications  Once loop recorder was implanted pocket was closed with absorbable suture and surgical glue  Hemostasis was reverified  Loop recorder data: Medtronic LINQ serial number [id number] Estimated Blood Loss: :Minimal      Complications:  None        Disposition: To [facility] area in stable condition        Condition: Stable Dr Brennan Zepeda MD [facility] [place] "This note has been constructed using a voice recognition system  Therefore there may be syntax, spelling, and/or grammatical errors  Please call if you have any questions  "    Echo complete w/ contrast if indicated    Result Date: 5/9/2022  Narrative: Stephanie Becerra  Left Ventricle: Left ventricular cavity size is normal  Wall thickness is moderately increased  The left ventricular ejection fraction is 65% by visual estimation  Systolic function is normal  Wall motion is normal  Diastolic function is mildly abnormal, consistent with grade I (abnormal) relaxation   There is concentric remodeling    IVS: There is abnormal septal motion consistent with left bundle branch block    Aortic Valve: The valve appears sclerotic    Tricuspid Valve: There is mild regurgitation  The right ventricular systolic pressure is normal  The estimated right ventricular systolic pressure is 69 71 mmHg  BEBO    Result Date: 2022  Narrative: [facility]  Left Ventricle: Systolic function is normal    Left Atrium: There is no thrombus    Right Atrium: No thrombus seen   Atrial Septum: No interatrial shunt seen with agitated saline/bubble study   Left Atrial Appendage: There is no thrombus    Mitral Valve: There is no evidence of regurgitation  Cardiac testing:   Results for orders placed during the hospital encounter of 21    Echo complete with contrast if indicated    Narrative  [facility]  [address]  [phone]    Transthoracic Echocardiogram  2D, M-mode, Doppler, and Color Doppler    Study date:  05-Aug-2021    Patient: Magy Asencio  MR number: [id number]  Account number: [de-identified]  : 1965  Age: 60 years  Gender: Male  Status: Inpatient  Location: Bedside  Height: 73 in  Weight: 171 6 lb  BP: 114/ 69 mmHg    Indications: Prolonged Q-T Interval    Diagnoses: R94 31 - Abnormal electrocardiogram [ECG] [EKG]    Sonographer:  Macho Fraser RDCS  Referring Physician:  Delmy Cantor DO  Group:  [facility]  Cardiology Fellow: Daniel Louis MD  Interpreting Physician:  Rita Christopher DO    SUMMARY    SUMMARY:  Asymmetric hypertrophy of septum consider cardiac MRI to exclude HCM  LEFT VENTRICLE:  Systolic function was normal  Ejection fraction was estimated to be 65 %  There was moderate hypokinesis of the basal-mid anteroseptal, basal-mid inferoseptal, and basal-mid inferior wall(s)  Wall thickness was moderately increased    There was moderate assymetrical hypertrophy of the septum  Doppler parameters were consistent with abnormal left ventricular relaxation (grade 1 diastolic dysfunction)  VENTRICULAR SEPTUM:  There was mild dyssynergic motion  These changes are consistent with LBBB  TRICUSPID VALVE:  There was trace regurgitation  Pulmonary artery systolic pressure was within the normal range (26 mmHg)  HISTORY: PRIOR HISTORY: Alcohol Abuse, Seizures    PROCEDURE: The procedure was performed at the bedside  This was a routine study  The transthoracic approach was used  The study included complete 2D imaging, M-mode, complete spectral Doppler, and color Doppler  The heart rate was 90 bpm,  at the start of the study  Images were obtained from the parasternal, apical, subcostal, and suprasternal notch acoustic windows  Echocardiographic views were limited due to lung interference  Image quality was adequate  LEFT VENTRICLE: Size was normal  Systolic function was normal  Ejection fraction was estimated to be 65 %  There was moderate hypokinesis of the basal-mid anteroseptal, basal-mid inferoseptal, and basal-mid inferior wall(s)  Wall thickness  was moderately increased  There was moderate assymetrical hypertrophy of the septum  DOPPLER: Doppler parameters were consistent with abnormal left ventricular relaxation (grade 1 diastolic dysfunction)  VENTRICULAR SEPTUM: There was mild dyssynergic motion  These changes are consistent with LBBB  RIGHT VENTRICLE: The size was normal  Systolic function was normal     LEFT ATRIUM: Size was normal     RIGHT ATRIUM: Size was normal     MITRAL VALVE: Valve structure was normal  There was normal leaflet separation  DOPPLER: The transmitral velocity was within the normal range  There was no evidence for stenosis  There was trace regurgitation  AORTIC VALVE: The valve was trileaflet  Leaflets exhibited normal thickness, mild calcification, and normal cuspal separation  DOPPLER: Transaortic velocity was within the normal range  There was no evidence for stenosis  There was no  regurgitation  TRICUSPID VALVE: The valve structure was normal  There was normal leaflet separation  DOPPLER: The transtricuspid velocity was within the normal range  There was no evidence for stenosis  There was trace regurgitation  Pulmonary artery  systolic pressure was within the normal range (26 mmHg)  Estimated peak PA pressure was 26 mmHg  PULMONIC VALVE: Leaflets exhibited normal thickness, no calcification, and normal cuspal separation  DOPPLER: The transpulmonic velocity was within the normal range  There was no regurgitation  PERICARDIUM: There was no pericardial effusion  AORTA: The root exhibited normal size (3 3 cm)  The ascending aorta was normal in size (3 6 cm)  SYSTEMIC VEINS: IVC: The inferior vena cava was not well visualized  SYSTEM MEASUREMENT TABLES    2D  %FS: 40 09 %  Ao Diam: 3 28 cm  Ao asc: 3 58 cm  EDV(Teich): 59 59 ml  EF(Teich): 71 55 %  ESV(Teich): 16 95 ml  IVSd: 1 95 cm  LA Diam: 3 94 cm  LAAs A4C: 10 78 cm2  LAESV A-L A4C: 22 53 ml  LAESV MOD A4C: 20 27 ml  LALs A4C: 4 37 cm  LVEDV MOD A4C: 69 11 ml  LVEF MOD A4C: 66 94 %  LVESV MOD A4C: 22 85 ml  LVIDd: 3 74 cm  LVIDs: 2 24 cm  LVLd A4C: 8 66 cm  LVLs A4C: 6 91 cm  LVPWd: 0 96 cm  RAEDV A-L: 26 33 ml  RAEDV MOD: 24 78 ml  RALd: 4 15 cm  RVIDd: 3 28 cm  SV MOD A4C: 46 27 ml  SV(Teich): 42 64 ml    CW  AV Env  Ti: 268 7 ms  AV VTI: 27 83 cm  AV Vmax: 1 37 m/s  AV Vmean: 1 04 m/s  AV maxP 56 mmHg  AV meanP 77 mmHg  TR Vmax: 2 29 m/s  TR maxP 02 mmHg    MM  TAPSE: 2 28 cm    PW  LVOT Env  Ti: 269 46 ms  LVOT VTI: 21 75 cm  LVOT Vmax: 1 18 m/s  LVOT Vmean: 0 81 m/s  LVOT maxP 56 mmHg  LVOT meanPG: 3 01 mmHg    [facility]    Prepared and electronically signed by    Ira Chu DO  Signed 06-Aug-2021 10:26:07            Dr Joy Abdullahi MD, [facility] [place]      "This note has been constructed using a voice recognition system  Therefore there may be syntax, spelling, and/or grammatical errors   Please call if you have any questions  "

## 2022-05-20 NOTE — PLAN OF CARE
Problem: Potential for Falls  Goal: Patient will remain free of falls  Description: INTERVENTIONS:  - Educate patient/family on patient safety including physical limitations  - Instruct patient to call for assistance with activity   - Consult OT/PT to assist with strengthening/mobility   - Keep Call bell within reach  - Keep bed low and locked with side rails adjusted as appropriate  - Keep care items and personal belongings within reach  - Initiate and maintain comfort rounds  - Make Fall Risk Sign visible to staff  - Offer Toileting every  2  Hours, in advance of need  - Initiate/Maintain bed alarm    Problem: PAIN - ADULT  Goal: Verbalizes/displays adequate comfort level or baseline comfort level  Description: Interventions:  - Encourage patient to monitor pain and request assistance  - Assess pain using appropriate pain scale  - Administer analgesics based on type and severity of pain and evaluate response  - Implement non-pharmacological measures as appropriate and evaluate response  - Consider cultural and social influences on pain and pain management  - Notify physician/advanced practitioner if interventions unsuccessful or patient reports new pain  Outcome: Progressing     Problem: DISCHARGE PLANNING  Goal: Discharge to home or other facility with appropriate resources  Description: INTERVENTIONS:  - Identify barriers to discharge w/patient and caregiver  - Arrange for needed discharge resources and transportation as appropriate  - Identify discharge learning needs (meds, wound care, etc )  - Arrange for interpretive services to assist at discharge as needed  - Refer to Case Management Department for coordinating discharge planning if the patient needs post-hospital services based on physician/advanced practitioner order or complex needs related to functional status, cognitive ability, or social support system  Outcome: Progressing     Problem: Knowledge Deficit  Goal: Patient/family/caregiver demonstrates understanding of disease process, treatment plan, medications, and discharge instructions  Description: Complete learning assessment and assess knowledge base    Interventions:  - Provide teaching at level of understanding  - Provide teaching via preferred learning methods  Outcome: Progressing     - Apply yellow socks and bracelet for high fall risk patients  - Consider moving patient to room near nurses station  Outcome: Progressing

## 2022-05-20 NOTE — PLAN OF CARE
Problem: Prexisting or High Potential for Compromised Skin Integrity  Goal: Skin integrity is maintained or improved  Description: INTERVENTIONS:  - Identify patients at risk for skin breakdown  - Assess and monitor skin integrity  - Assess and monitor nutrition and hydration status  - Monitor labs   - Assess for incontinence   - Turn and reposition patient  - Assist with mobility/ambulation  - Relieve pressure over bony prominences  - Avoid friction and shearing  - Provide appropriate hygiene as needed including keeping skin clean and dry  - Evaluate need for skin moisturizer/barrier cream  - Collaborate with interdisciplinary team   - Patient/family teaching  - Consider wound care consult   Outcome: Progressing     Problem: MOBILITY - ADULT  Goal: Maintain or return to baseline ADL function  Description: INTERVENTIONS:  -  Assess patient's ability to carry out ADLs; assess patient's baseline for ADL function and identify physical deficits which impact ability to perform ADLs (bathing, care of mouth/teeth, toileting, grooming, dressing, etc )  - Assess/evaluate cause of self-care deficits   - Assess range of motion  - Assess patient's mobility; develop plan if impaired  - Assess patient's need for assistive devices and provide as appropriate  - Encourage maximum independence but intervene and supervise when necessary  - Involve family in performance of ADLs  - Assess for home care needs following discharge   - Consider OT consult to assist with ADL evaluation and planning for discharge  - Provide patient education as appropriate  Outcome: Progressing  Goal: Maintains/Returns to pre admission functional level  Description: INTERVENTIONS:  - Perform BMAT or MOVE assessment daily    - Set and communicate daily mobility goal to care team and patient/family/caregiver  - Collaborate with rehabilitation services on mobility goals if consulted  - Perform Range of Motion 3 times a day    - Reposition patient every 2 hours   - Dangle patient 3 times a day  - Stand patient 3 times a day  - Ambulate patient 4 times a day  - Out of bed to chair 3 times a day   - Out of bed for meals 3 times a day  - Out of bed for toileting  - Record patient progress and toleration of activity level   Outcome: Progressing     Problem: Potential for Falls  Goal: Patient will remain free of falls  Description: INTERVENTIONS:  - Educate patient/family on patient safety including physical limitations  - Instruct patient to call for assistance with activity   - Consult OT/PT to assist with strengthening/mobility   - Keep Call bell within reach  - Keep bed low and locked with side rails adjusted as appropriate  - Keep care items and personal belongings within reach  - Initiate and maintain comfort rounds  - Make Fall Risk Sign visible to staff  - Offer Toileting every 2 Hours, in advance of need  - Initiate/Maintain bed and chair alarm  - Obtain necessary fall risk management equipment:   - Apply yellow socks and bracelet for high fall risk patients  - Consider moving patient to room near nurses station  Outcome: Progressing     Problem: PAIN - ADULT  Goal: Verbalizes/displays adequate comfort level or baseline comfort level  Description: Interventions:  - Encourage patient to monitor pain and request assistance  - Assess pain using appropriate pain scale  - Administer analgesics based on type and severity of pain and evaluate response  - Implement non-pharmacological measures as appropriate and evaluate response  - Consider cultural and social influences on pain and pain management  - Notify physician/advanced practitioner if interventions unsuccessful or patient reports new pain  Outcome: Progressing     Problem: INFECTION - ADULT  Goal: Absence or prevention of progression during hospitalization  Description: INTERVENTIONS:  - Assess and monitor for signs and symptoms of infection  - Monitor lab/diagnostic results  - Monitor all insertion sites, i e  indwelling lines, tubes, and drains  - Monitor endotracheal if appropriate and nasal secretions for changes in amount and color  - [place] appropriate cooling/warming therapies per order  - Administer medications as ordered  - Instruct and encourage patient and family to use good hand hygiene technique  - Identify and instruct in appropriate isolation precautions for identified infection/condition  Outcome: Progressing     Problem: SAFETY ADULT  Goal: Maintain or return to baseline ADL function  Description: INTERVENTIONS:  -  Assess patient's ability to carry out ADLs; assess patient's baseline for ADL function and identify physical deficits which impact ability to perform ADLs (bathing, care of mouth/teeth, toileting, grooming, dressing, etc )  - Assess/evaluate cause of self-care deficits   - Assess range of motion  - Assess patient's mobility; develop plan if impaired  - Assess patient's need for assistive devices and provide as appropriate  - Encourage maximum independence but intervene and supervise when necessary  - Involve family in performance of ADLs  - Assess for home care needs following discharge   - Consider OT consult to assist with ADL evaluation and planning for discharge  - Provide patient education as appropriate  Outcome: Progressing  Goal: Maintains/Returns to pre admission functional level  Description: INTERVENTIONS:  - Perform BMAT or MOVE assessment daily    - Set and communicate daily mobility goal to care team and patient/family/caregiver  - Collaborate with rehabilitation services on mobility goals if consulted  - Perform Range of Motion 3 times a day    -Goal: Patient will remain free of falls  Description: INTERVENTIONS:  - Educate patient/family on patient safety including physical limitations  - Instruct patient to call for assistance with activity   - Consult OT/PT to assist with strengthening/mobility   - Keep Call bell within reach  - Keep bed low and locked with side rails adjusted as appropriate     Problem: Activity Intolerance/Impaired Mobility  Goal: Mobility/activity is maintained at optimum level for patient  Description: Interventions:  - Assess and monitor patient  barriers to mobility and need for assistive/adaptive devices  - Assess patient's emotional response to limitations  - Collaborate with interdisciplinary team and initiate plans and interventions as ordered  - Encourage independent activity per ability   - Maintain proper body alignment  - Perform active/passive rom as tolerated/ordered  - Plan activities to conserve energy   - Turn patient as appropriate  Outcome: Progressing     Problem: Potential for Aspiration  Goal: Non-ventilated patient's risk of aspiration is minimized  Description: Assess and monitor vital signs, respiratory status, and labs (WBC)  Monitor for signs of aspiration (tachypnea, cough, rales, wheezing, cyanosis, fever)  - Assess and monitor patient's ability to swallow  - Place patient up in chair to eat if possible  - HOB up at 90 degrees to eat if unable to get patient up into chair   - Supervise patient during oral intake  - Instruct patient/ family to take small bites  - Instruct patient/ family to take small single sips when taking liquids    - Follow patient-specific strategies generated by speech pathologist   Outcome: Progressing

## 2022-05-20 NOTE — CASE MANAGEMENT
Case Management Discharge Planning Note    Patient name Angeles Bowens  Location [address] [place] [address]-* MRN [id number]  : 1965 Date 2022       Current Admission Date: 2022  Current Admission Diagnosis:Stroke-like symptoms   Patient Active Problem List    Diagnosis Date Noted    CVA [facility]cerebral vascular accident) ([facility] ) 2022    [facility] ) 2022    Weakness 2022    Hypomagnesemia 2022    Stroke-like symptoms 2022    Hypokalemia 05/10/2022    Contusion of right foot     Right foot pain     Arthralgia of right foot     Chronic tophaceous gout of right foot     Syncopal episodes 2022    SIRS (systemic inflammatory response syndrome) ([place] [address] ) 2022    Acute gout of multiple sites 2021    Hypertension 2021    Hyperlipidemia 2021    Smoking 2021    GERD (gastroesophageal reflux disease) 2021    Depression 2021    Left bundle branch block 2021    Thrombocytopenia (HCC) 2021    Prolonged Q-T interval on ECG 2021    Rhabdomyolysis 2021    Breakthrough seizure ([facility] ) 2021    Seizure disorder ([place] [address] ) 2021    History of cerebral hemorrhage 2021    History of alcohol abuse 2021    Hypocalcemia 2017    Lactic acidosis 2017    Rhabdomyolysis 2017    Nicotine dependence 2017    Dyslipidemia 2017    Seizure ([facility] ) 2017    Diarrhea 2016    Blood in stool 2016    Hypertension     Hyperlipidemia     GERD (gastroesophageal reflux disease)     Compression neuropathy of lower extremity 02/10/2016      LOS (days): 3  Geometric Mean LOS (GMLOS) (days):   Days to GMLOS:     OBJECTIVE:  Risk of Unplanned Readmission Score: 14 39         Current admission status: Inpatient   Preferred Pharmacy:   [facility] [place], [address], [address] [place] [address]  Phone: [phone] Fax: [phone]    [id number], [facility], [address]  Phone: [phone] Fax: [phone]    Renetta Jackson [address] [address]  Phone: [phone] Fax: [facility], [place] - [facility]  [address] [facility]  [address] [place] [address]  Phone: [phone] Fax: [phone]    Primary Care Provider: No primary care provider on file  Primary Insurance: BLUE CROSS  Secondary Insurance:     DISCHARGE DETAILS:                                                                                                               Facility Insurance Auth Number: Authorization Pending from [address] [place] [address] for patient to be transferred to [facility] at [facility] (\[facility]\""#[id number])  Request with clinicals submitted via fax to [phone]

## 2022-05-20 NOTE — CASE MANAGEMENT
Case Management Discharge Planning Note    Patient name Irene Vela  Location [address] 407/[address] [place] 407-* MRN [id number]  : 1965 Date 2022       Current Admission Date: 2022  Current Admission Diagnosis:Stroke-like symptoms   Patient Active Problem List    Diagnosis Date Noted    CVA (cerebral vascular accident) ([facility] ) 2022    Weakness 2022    Hypomagnesemia 2022    Stroke-like symptoms 2022    Contusion of right foot     Right foot pain     Arthralgia of right foot     Chronic tophaceous gout of right foot     Syncopal episodes 2022    SIRS (systemic inflammatory response syndrome) ([place] [address] ) 2022    Acute gout of multiple sites 2021    Hypertension 2021    Hyperlipidemia 2021    Smoking 2021    GERD (gastroesophageal reflux disease) 2021    Depression 2021    Left bundle branch block 2021    Thrombocytopenia (HCC) 2021    Prolonged Q-T interval on ECG 2021    Rhabdomyolysis 2021    Breakthrough seizure ([facility] ) 2021    Seizure disorder ([place] [address] ) 2021    History of cerebral hemorrhage 2021    History of alcohol abuse 2021    Hypocalcemia 2017    Lactic acidosis 2017    Rhabdomyolysis 2017    Nicotine dependence 2017    Dyslipidemia 2017    Seizure ([facility] ) 2017    Diarrhea 2016    Blood in stool 2016    Hypertension     Hyperlipidemia     GERD (gastroesophageal reflux disease)     Compression neuropathy of lower extremity 02/10/2016      LOS (days): 3  Geometric Mean LOS (GMLOS) (days):   Days to GMLOS:     OBJECTIVE:  Risk of Unplanned Readmission Score: 14 89   Current admission status: Inpatient   Preferred Pharmacy:   [address], [address], [address]   [place] [address] [address]  Phone: [phone] Fax: [facility] #[id number], [address] [facility] [address] [address]  Phone: [phone] Fax: [phone]    [address]etta [place] [address]  [facility]  [address] [address]  Phone: [phone] Fax: [phone] - 1983 [place], [place] - [address]  [address] [facility]  [address] [place] [address]  Phone: [phone] Fax: [phone]    Primary Care Provider: No primary care provider on file  Primary Insurance: BLUE CROSS  Secondary Insurance:     DISCHARGE DETAILS:  Discharge planning discussed with[de-identified] Severo Calk (daughter) [phone]  Freedom of Choice: Yes  Comments - [place] of Choice: [address]  CM contacted family/caregiver?: Yes  Were Treatment Team discharge recommendations reviewed with patient/caregiver?: Yes  Did patient/caregiver verbalize understanding of patient care needs?: Yes  Were patient/caregiver advised of the risks associated with not following Treatment Team discharge recommendations?: Yes    Contacts  Patient Contacts: Severo Calk (daughter)  Relationship to Patient[de-identified] Family  Contact Method: Phone  Phone Number: [phone]  Reason/Outcome: Continuity of Care, Emergency Contact, Discharge [address]         Is the patient interested in [facility] at discharge?: No    DME Referral Provided  Referral made for DME?: No    Would you like to participate in our [address] service program?  : No - Declined    Treatment Team Recommendation: Short Term Rehab  Discharge Destination Plan[de-identified] Short Term Rehab  Transport at Discharge : [facility] , [facility] Name, [address] : [address]  Receiving Facility/Agency Phone Number: [phone]     Chart was reviewed and case was discussed in MDR's and patient is anticipated for dc today  CM spoke with patient and his daughter Lakesha Regan and their first preference is for patient to go to [facility]   CM spoke with Santi Casas at [address] and they cannot take patient at their [facility] facility until Monday  Patients daughter was made aware and her next preference is [facility] called [place] with [place] and she states they have a bed and can accept patient once [facility] is obtained  Ins auth request was submitted/taked to [address] [place] [address] in [place] CM to follow

## 2022-05-20 NOTE — PROGRESS NOTES
[place] [address]  Progress Note - Galilea De La Cruz 1965, 59 y o  male MRN: [id number]  Unit/Bed#: [address] [place] [address] Encounter: [id number]  Primary Care Provider: No primary care provider on file  Date and time admitted to hospital: 5/17/2022  4:32 PM    * Stroke-like symptoms  Assessment & Plan  Patient presents after being found on the ground at home, incontinent and disoriented  · Possibly due to postictal state from seizure vs stroke  · CT head shows no acute intracranial abnormalities  · CT spine with no acute fractures or dislocation  · CTA head/neck:  Subtle evolving cortical hypodensities in the right occipital lobe, correlating to some of the cortical diffusion restriction noted on the MRI  Findings are worrisome for evolving infarction or perhaps postictal edema  · MRI brain:  Suspicious for recent infarct in the posterior right MCA distribution and right posterior cerebral artery distributions  Findings may also be related seizures  No acute hemorrhage  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  · S/p Ativan and Keppra 1000 mg in ED  · CK normal  · Restarted Keppra 1500 mg b i d   · Seizure, fall precautions  · Neurology consulted and appreciate their recommendations  · Unclear whether event is seizure vs stroke based on findings on imaging  · Recommend repeat MRI in 2-3 months to check for evolution  · S/p BEBO with no evidence of PFO or thrombus  · S/p Loop recorder implantation  · Recommend DAPT for 21 days then daily ASA  · Continue Lipitor 80 mg while in hospital   Discharge on 40 mg daily    · Thrombosis panel:  · Beta-2 glycoprotein antibodies: Negative  · Cardiolipin antibodies: Negative  · Protein C activity:  Elevated  · Proteic S activity: Normal  · Anti thrombin 3 activity:  Normal  · Factor 5 Leiden:  Pending  · Prothrombin gene mutation:  Pending  · Lupus anticoagulant:  Pending    Weakness  Assessment & Plan  · PT/OT recommending inpatient rehab again  Patient had refused on prior admission but is agreeable now  Will discharge to [facility] with insurance authorization obtained  CVA (cerebral vascular accident) ([facility] )  Assessment & Plan  · Recent Rt Posterior MCA and Rt PCA distribution  · Unclear why patient has had strokes  Work up in progress  · Loop recorder has been implanted  Follow up with cardiology as an out-patient for monitoring  · Continue ASA and Plavix x21 days then ASA alone  · Continue Lipitor and Tricor  Hypomagnesemia  Assessment & Plan  · Magnesium today:  1 5  · Replete and monitor  History of alcohol abuse  Assessment & Plan  No recent alcohol use  · Continue thiamine    Seizure disorder [facility])  Assessment & Plan  Patient on Keppra 1500 mg b i d  From known seizure disorder  · Unsure if patient is compliant with medication  · Keppra level: pending  · Seizure, fall precautions  · Neurology consulted and appreciate their recommendations  · Vimpat 100 mg BID added  Hyperlipidemia  Assessment & Plan  Continue Tricor    Hypertension  Assessment & Plan  · Home regimen includes amlodipine 10 mg daily and metoprolol succinate 100 mg daily  · Amlodipine held for permissive hypertension  Resume at discharge  · Continue metoprolol  · Also takes verapamil  Acute kidney injury (HCC)-resolved as of 5/20/2022  Assessment & Plan  · Creatinine 1 92 on admission  Started on IVFs with improvement back to baseline  Hypokalemia-resolved as of 5/20/2022  Assessment & Plan  · Potassium on admission:  3 3  · Resolved after supplementation  VTE Pharmacologic Prophylaxis: VTE Score: 3 Moderate Risk (Score 3-4) - Pharmacological DVT Prophylaxis Ordered: heparin  Patient Centered Rounds: I performed bedside rounds with nursing staff today    Discussions with Specialists or Other Care Team Provider: None    Education and Discussions with Family / Patient: Updated  (daughter) via phone  Time Spent for Care: 30 minutes  More than 50% of total time spent on counseling and coordination of care as described above  Current Length of Stay: 3 day(s)  Current Patient Status: Inpatient   Certification Statement: The patient will continue to require additional inpatient hospital stay due to pending insurance authorization for rehab placement  Discharge Plan: Anticipate discharge later today or tomorrow to rehab facility  Code Status: Level 1 - Full Code    Subjective:   Patient is complaining of diffuse pain in his head, shoulders, back, knees  Likely secondary to fall and seizure  No improvement with Tylenol or Fioricet  Patient reports he is compliant with his Keppra at home  Objective:     Vitals:   Temp (24hrs), Av 1 °F (36 7 °C), Min:97 7 °F (36 5 °C), Max:98 6 °F (37 °C)    Temp:  [97 7 °F (36 5 °C)-98 6 °F (37 °C)] 97 7 °F (36 5 °C)  HR:  [65-88] 70  Resp:  [15-22] 18  BP: (112-141)/(63-81) 139/67  SpO2:  [94 %-100 %] 100 %  Body mass index is 27 89 kg/m²  Input and Output Summary (last 24 hours): Intake/Output Summary (Last 24 hours) at 2022 1029  Last data filed at 2022 0442  Gross per 24 hour   Intake --   Output 700 ml   Net -700 ml       Physical Exam:   Physical Exam  Constitutional:       General: He is not in acute distress  Appearance: Normal appearance  He is normal weight  HENT:      Head: Normocephalic and atraumatic  Mouth/Throat:      Mouth: Mucous membranes are moist    Eyes:      General: No scleral icterus  Extraocular Movements: Extraocular movements intact  Cardiovascular:      Rate and Rhythm: Normal rate and regular rhythm  Heart sounds: No murmur heard  Pulmonary:      Effort: Pulmonary effort is normal  No respiratory distress  Breath sounds: Normal breath sounds  No wheezing, rhonchi or rales  Abdominal:      General: Bowel sounds are normal       Palpations: Abdomen is soft  Tenderness: There is no abdominal tenderness  Musculoskeletal:         General: Normal range of motion  Cervical back: Normal range of motion  Skin:     General: Skin is warm  Findings: No rash  Neurological:      General: No focal deficit present  Mental Status: He is alert and oriented to person, place, and time  Psychiatric:         Mood and Affect: Mood normal          Behavior: Behavior normal         Additional Data:     Labs:  Results from last 7 days   Lab Units 05/20/22  0532   WBC Thousand/uL 4 88   HEMOGLOBIN g/dL 12 9   HEMATOCRIT % 38 2   PLATELETS Thousands/uL 230   NEUTROS PCT % 65   LYMPHS PCT % 24   MONOS PCT % 9   EOS PCT % 2     Results from last 7 days   Lab Units 05/20/22  0532   SODIUM mmol/L 141   POTASSIUM mmol/L 4 0   CHLORIDE mmol/L 109*   CO2 mmol/L 23   BUN mg/dL 13   CREATININE mg/dL 1 00   ANION GAP mmol/L 9   CALCIUM mg/dL 8 7   ALBUMIN g/dL 3 1*   TOTAL BILIRUBIN mg/dL 0 40   ALK PHOS U/L 76   ALT U/L 21   AST U/L 21   GLUCOSE RANDOM mg/dL 90     Results from last 7 days   Lab Units 05/17/22  1710   INR  1 04     Results from last 7 days   Lab Units 05/17/22  1701   POC GLUCOSE mg/dl 92     Results from last 7 days   Lab Units 05/17/22  2357   HEMOGLOBIN A1C % 5 3           Lines/Drains:  Invasive Devices  Report    Peripheral Intravenous Line  Duration           Peripheral IV 05/18/22 Left;Ventral (anterior) Wrist 1 day                  Telemetry:  Telemetry Orders (From admission, onward)             24 Hour Telemetry Monitoring  Continuous x 24 Hours ([place])        References:    Telemetry Guidelines   Question:  Reason for 24 Hour Telemetry  Answer:  Acute CVA (>24 hrs old)                 Telemetry Reviewed: Normal Sinus Rhythm  Indication for Continued Telemetry Use: No indication for continued use  Will discontinue            Imaging: Reviewed radiology reports from this admission including: CT head and MRI brain    Recent Cultures (last 7 days): Last 24 Hours Medication List:   Current Facility-Administered Medications   Medication Dose Route Frequency Provider Last Rate    acetaminophen  650 mg Oral Q6H PRN Amado Hawthorne PA-C      aspirin  81 mg Oral Daily Sherinikolai White PA-C      atorvastatin  80 mg Oral Daily With [employer], [address]      butalbital-acetaminophen-caffeine  1 tablet Oral Q4H PRN NOAM Horan      clopidogrel  75 mg Oral Daily [address] [facility], [address]      fenofibrate  145 mg Oral Daily Amado Hawthorne PA-C      heparin (porcine)  5,000 Units Subcutaneous Q8H [address] Sheri White PA-C      lacosamide (VIMPAT) IVPB  100 mg Intravenous Q12H [address] [place], CAROLNP 100 mg (05/19/22 7707)    levETIRAcetam  1,500 mg Oral Q12H Sheri White PA-C      magnesium oxide  400 mg Oral BID Rise MD Sharonda      methocarbamol  750 mg Oral [facility] Elias [place]anc[place], [place]      metoprolol succinate  100 mg Oral Daily Sheri White PA-C      ondansetron  4 mg Intravenous Q6H PRN Amado Hawthorne PA-C      oxyCODONE  10 mg Oral Q4H PRN Elias Mancilla PA-C      oxyCODONE  5 mg Oral Q4H PRN Elias Mancilla PA-C      pantoprazole  20 mg Oral Early Morning Amado Hawthorne PA-C      sodium chloride  75 mL/hr Intravenous Continuous Rise MD Sharonda 75 mL/hr (05/19/22 3028)    thiamine  100 mg Oral Daily Sheri White PA-C      verapamil  120 mg Oral HS NOAM Horan          Today, Patient Was Seen By: Elias Mancilla PA-C    **Please Note: This note may have been constructed using a voice recognition system  **

## 2022-05-21 LAB
ANION GAP SERPL CALCULATED.3IONS-SCNC: 7 MMOL/L (ref 4–13)
APTT SCREEN TO CONFIRM RATIO: 1.17 RATIO (ref 0–1.34)
ATRIAL RATE: 92 BPM
BUN SERPL-MCNC: 7 MG/DL (ref 5–25)
CALCIUM SERPL-MCNC: 9.3 MG/DL (ref 8.3–10.1)
CHLORIDE SERPL-SCNC: 109 MMOL/L (ref 100–108)
CO2 SERPL-SCNC: 24 MMOL/L (ref 21–32)
CONFIRM APTT/NORMAL: 33.7 SEC (ref 0–47.6)
CREAT SERPL-MCNC: 1.01 MG/DL (ref 0.6–1.3)
GFR SERPL CREATININE-BSD FRML MDRD: 81 ML/MIN/1.73SQ M
GLUCOSE SERPL-MCNC: 91 MG/DL (ref 65–140)
LA PPP-IMP: NORMAL
MAGNESIUM SERPL-MCNC: 1.8 MG/DL (ref 1.6–2.6)
P AXIS: 48 DEGREES
POTASSIUM SERPL-SCNC: 3.7 MMOL/L (ref 3.5–5.3)
PR INTERVAL: 152 MS
PROT S ACT/NOR PPP: 83 % (ref 61–136)
PROT S PPP-ACNC: 95 % (ref 60–150)
QRS AXIS: 6 DEGREES
QRSD INTERVAL: 132 MS
QT INTERVAL: 392 MS
QTC INTERVAL: 484 MS
SCREEN APTT: 39 SEC (ref 0–51.9)
SCREEN DRVVT: 39.9 SEC (ref 0–47)
SODIUM SERPL-SCNC: 140 MMOL/L (ref 136–145)
T WAVE AXIS: 85 DEGREES
THROMBIN TIME: 16.7 SEC (ref 0–23)
VENTRICULAR RATE: 92 BPM

## 2022-05-21 PROCEDURE — 93010 ELECTROCARDIOGRAM REPORT: CPT | Performed by: INTERNAL MEDICINE

## 2022-05-21 PROCEDURE — 97530 THERAPEUTIC ACTIVITIES: CPT

## 2022-05-21 PROCEDURE — 83735 ASSAY OF MAGNESIUM: CPT | Performed by: NURSE PRACTITIONER

## 2022-05-21 PROCEDURE — 97535 SELF CARE MNGMENT TRAINING: CPT

## 2022-05-21 PROCEDURE — 97112 NEUROMUSCULAR REEDUCATION: CPT

## 2022-05-21 PROCEDURE — 80048 BASIC METABOLIC PNL TOTAL CA: CPT | Performed by: NURSE PRACTITIONER

## 2022-05-21 PROCEDURE — 97110 THERAPEUTIC EXERCISES: CPT

## 2022-05-21 PROCEDURE — 97116 GAIT TRAINING THERAPY: CPT

## 2022-05-21 PROCEDURE — 99232 SBSQ HOSP IP/OBS MODERATE 35: CPT | Performed by: NURSE PRACTITIONER

## 2022-05-21 RX ADMIN — MAGNESIUM OXIDE TAB 400 MG (241.3 MG ELEMENTAL MG) 400 MG: 400 (241.3 MG) TAB at 18:09

## 2022-05-21 RX ADMIN — SODIUM CHLORIDE 100 MG: 9 INJECTION, SOLUTION INTRAVENOUS at 23:36

## 2022-05-21 RX ADMIN — OXYCODONE HYDROCHLORIDE 10 MG: 10 TABLET ORAL at 21:55

## 2022-05-21 RX ADMIN — DICLOFENAC SODIUM TOPICAL GEL, 1%, 2 G: 10 GEL TOPICAL at 18:09

## 2022-05-21 RX ADMIN — CLOPIDOGREL BISULFATE 75 MG: 75 TABLET ORAL at 08:39

## 2022-05-21 RX ADMIN — DICLOFENAC SODIUM TOPICAL GEL, 1%, 2 G: 10 GEL TOPICAL at 22:07

## 2022-05-21 RX ADMIN — PANTOPRAZOLE SODIUM 20 MG: 20 TABLET, DELAYED RELEASE ORAL at 05:35

## 2022-05-21 RX ADMIN — THIAMINE HCL TAB 100 MG 100 MG: 100 TAB at 08:38

## 2022-05-21 RX ADMIN — METHOCARBAMOL TABLETS 750 MG: 500 TABLET, COATED ORAL at 00:08

## 2022-05-21 RX ADMIN — OXYCODONE HYDROCHLORIDE 10 MG: 10 TABLET ORAL at 08:39

## 2022-05-21 RX ADMIN — MAGNESIUM OXIDE TAB 400 MG (241.3 MG ELEMENTAL MG) 400 MG: 400 (241.3 MG) TAB at 08:41

## 2022-05-21 RX ADMIN — SODIUM CHLORIDE 100 MG: 9 INJECTION, SOLUTION INTRAVENOUS at 12:07

## 2022-05-21 RX ADMIN — METOPROLOL SUCCINATE 100 MG: 100 TABLET, EXTENDED RELEASE ORAL at 08:39

## 2022-05-21 RX ADMIN — FENOFIBRATE 145 MG: 145 TABLET, FILM COATED ORAL at 08:39

## 2022-05-21 RX ADMIN — LEVETIRACETAM 1500 MG: 500 TABLET, FILM COATED ORAL at 21:55

## 2022-05-21 RX ADMIN — HEPARIN SODIUM 5000 UNITS: 5000 INJECTION INTRAVENOUS; SUBCUTANEOUS at 21:56

## 2022-05-21 RX ADMIN — LEVETIRACETAM 1500 MG: 500 TABLET, FILM COATED ORAL at 08:39

## 2022-05-21 RX ADMIN — VERAPAMIL HYDROCHLORIDE 120 MG: 120 TABLET, FILM COATED, EXTENDED RELEASE ORAL at 21:56

## 2022-05-21 RX ADMIN — ASPIRIN 81 MG CHEWABLE TABLET 81 MG: 81 TABLET CHEWABLE at 08:39

## 2022-05-21 RX ADMIN — METHOCARBAMOL TABLETS 750 MG: 500 TABLET, COATED ORAL at 05:39

## 2022-05-21 RX ADMIN — METHOCARBAMOL TABLETS 750 MG: 500 TABLET, COATED ORAL at 12:09

## 2022-05-21 RX ADMIN — ATORVASTATIN CALCIUM 80 MG: 80 TABLET ORAL at 18:12

## 2022-05-21 RX ADMIN — OXYCODONE HYDROCHLORIDE 10 MG: 10 TABLET ORAL at 13:59

## 2022-05-21 RX ADMIN — OXYCODONE HYDROCHLORIDE 10 MG: 10 TABLET ORAL at 18:12

## 2022-05-21 RX ADMIN — METHOCARBAMOL TABLETS 750 MG: 500 TABLET, COATED ORAL at 18:09

## 2022-05-21 RX ADMIN — SODIUM CHLORIDE 100 MG: 9 INJECTION, SOLUTION INTRAVENOUS at 00:08

## 2022-05-21 RX ADMIN — HEPARIN SODIUM 5000 UNITS: 5000 INJECTION INTRAVENOUS; SUBCUTANEOUS at 05:34

## 2022-05-21 RX ADMIN — HEPARIN SODIUM 5000 UNITS: 5000 INJECTION INTRAVENOUS; SUBCUTANEOUS at 13:59

## 2022-05-21 RX ADMIN — METHOCARBAMOL TABLETS 750 MG: 500 TABLET, COATED ORAL at 23:34

## 2022-05-21 NOTE — POST FALL NOTE
Post Fall Note    No data recorded    Brief Description of Events  Pt found on the floor on hands and knees by Bethel Chacon RN around 2230 p m  she heard bed alarm and rushed to patients room, I was in Rapid response in 418 at the time of fall, Seen by the hospitalist, no external injuries seen at this time  Last Recorded Vitals  Blood pressure 128/71, pulse 70, temperature 98 5 °F (36 9 °C), resp  rate 16, height 5' 11" (1 803 m), weight 90 7 kg (200 lb), SpO2 96 %        Principal Problem:    Stroke-like symptoms  Active Problems:    Hypertension    Hyperlipidemia    Seizure disorder (HCC)    History of alcohol abuse    Smoking    Hypomagnesemia    CVA (cerebral vascular accident) ([facility] )    Weakness

## 2022-05-21 NOTE — QUICK NOTE
Called to room as nurse found patient on his hands and knees on the floor  Patient states he was trying to get from the commode to the chair  He is complaining of right knee pain with movement  Tender to palpation over right patella  No swelling, obvious fracture noted  He denies any acute pain elsewhere  He denies any head injury or LOC  Xray knee ordered and will f/u results  Patient educated that he needs to wait until someone can assist him before getting up

## 2022-05-21 NOTE — PROGRESS NOTES
Jordin [address]  Progress Note - Giacomo Crease 1965, 59 y o  male MRN: [id number]  Unit/Bed#: [address] [place] [address] Encounter: [id number]  Primary Care Provider: No primary care provider on file  Date and time admitted to hospital: 5/17/2022  4:32 PM    * Stroke-like symptoms  Assessment & Plan  Patient presents after being found on the ground at home, incontinent and disoriented  · Possibly due to postictal state from seizure vs stroke  · CT head shows no acute intracranial abnormalities  · CT spine with no acute fractures or dislocation  · CTA head/neck:  Subtle evolving cortical hypodensities in the right occipital lobe, correlating to some of the cortical diffusion restriction noted on the MRI  Findings are worrisome for evolving infarction or perhaps postictal edema  · MRI brain:  Suspicious for recent infarct in the posterior right MCA distribution and right posterior cerebral artery distributions  Findings may also be related seizures  No acute hemorrhage  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  · S/p Ativan and Keppra 1000 mg in ED  · CK normal  · Restarted Keppra 1500 mg b i d   · Seizure, fall precautions  · Neurology consulted and appreciate their recommendations  · Unclear whether event is seizure vs stroke based on findings on imaging  · Recommend repeat MRI in 2-3 months to check for evolution  · S/p BEBO with no evidence of PFO or thrombus  · S/p Loop recorder implantation  · Recommend DAPT for 21 days then daily ASA  · Continue Lipitor 80 mg while in hospital   Discharge on 40 mg daily    · Thrombosis panel:  · Beta-2 glycoprotein antibodies: Negative  · Cardiolipin antibodies: Negative  · Protein C activity:  Elevated  · Proteic S activity: Normal  · Anti thrombin 3 activity:  Normal  · Factor 5 Leiden:  Pending  · Prothrombin gene mutation:  Pending  · Lupus anticoagulant:  Pending    CVA (cerebral vascular accident) [facility])  [facility]  · Recent Rt Posterior MCA and Rt PCA distribution  · Unclear why patient has had strokes  Work up in progress  · Loop recorder has been implanted  Follow up with cardiology as an out-patient for monitoring  · Continue ASA and Plavix x21 days then ASA alone  · Continue Lipitor and Tricor  Weakness  Assessment & Plan  · PT/OT recommending inpatient rehab again  Patient had refused on prior admission but is agreeable now  Will discharge to [facility] with insurance authorization obtained  · Discussed with case management awaiting authorization from insurance company    Hypomagnesemia  Assessment & Plan  · Magnesium 5/20:  1 5  · Replete and monitor  Seizure disorder [facility])  Assessment & Plan  Patient on Keppra 1500 mg b i d  From known seizure disorder  · Unsure if patient is compliant with medication  · Keppra level: pending  · Seizure, fall precautions  · Neurology consulted and appreciate their recommendations  · Vimpat 100 mg BID added  History of alcohol abuse  Assessment & Plan  No recent alcohol use  · Continue thiamine    Hypertension  Assessment & Plan  · Home regimen includes amlodipine 10 mg daily and metoprolol succinate 100 mg daily  · Amlodipine held for permissive hypertension  Resume at discharge  · Continue metoprolol  · Also takes verapamil  Hyperlipidemia  Assessment & Plan  Continue Tricor    Smoking  Assessment & Plan  Encouraged smoking cessation, continue with nicotine patch          VTE Pharmacologic Prophylaxis:   Pharmacologic: Heparin  Mechanical VTE Prophylaxis in Place: Yes    Patient Centered Rounds: I have performed bedside rounds with nursing staff today  Discussions with Specialists or Other Care Team Provider: Yes  Education and Discussions with Family / Patient:Yes  Time Spent for Care: 30 minutes  More than 50% of total time spent on counseling and coordination of care as described above      Current Length of Stay: 4 day(s)  Current Patient Status: Inpatient     Discharge Plan:  Medically stable, awaiting authorization from insurance company as per discussion with case management    Code Status: Level 1 - Full Code      Subjective:   Patient seen sitting up in bed resting comfortably reports he slept well, is looking forward to moving on to short-term rehab  Denies any headache or dizziness, no shortness of breath  Overall is feeling better  Still does have some discomfort in his neck, offered lidocaine patch however he declined  Did discuss using Voltaren cream, patient is agreeable to this  Objective:     Vitals:   Temp (24hrs), Av 9 °F (36 6 °C), Min:97 5 °F (36 4 °C), Max:98 5 °F (36 9 °C)    Temp:  [97 5 °F (36 4 °C)-98 5 °F (36 9 °C)] 97 5 °F (36 4 °C)  HR:  [60-70] 60  Resp:  [16-18] 16  BP: (127-148)/(63-85) 148/81  SpO2:  [96 %-97 %] 97 %  Body mass index is 27 89 kg/m²  Input and Output Summary (last 24 hours): Intake/Output Summary (Last 24 hours) at 2022 1700  Last data filed at 2022 0354  Gross per 24 hour   Intake --   Output 600 ml   Net -600 ml        Physical Exam:     Physical Exam  Vitals and nursing note reviewed  HENT:      Head: Normocephalic  Nose: Nose normal       Mouth/Throat:      Mouth: Mucous membranes are moist    Eyes:      Extraocular Movements: Extraocular movements intact  Conjunctiva/sclera: Conjunctivae normal    Cardiovascular:      Rate and Rhythm: Normal rate and regular rhythm  Pulses: Normal pulses  Heart sounds: Normal heart sounds  Pulmonary:      Effort: Respiratory distress present  Breath sounds: Normal breath sounds  Abdominal:      General: Bowel sounds are normal  There is no distension  Palpations: Abdomen is soft  Tenderness: There is no abdominal tenderness  Genitourinary:     Comments: Voiding spontaneously  Musculoskeletal:         General: Normal range of motion        Cervical back: Normal range of motion  Skin:     General: Skin is warm and dry  Capillary Refill: Capillary refill takes less than 2 seconds  Neurological:      General: No focal deficit present  Mental Status: He is alert and oriented to person, place, and time  Psychiatric:         Mood and Affect: Mood normal          Behavior: Behavior normal          Thought Content: Thought content normal          Judgment: Judgment normal          Additional Data:     Labs:    Results from last 7 days   Lab Units 05/20/22  0532 05/19/22  0550 05/17/22  1710   WBC Thousand/uL 4 88 10 48* 12 20*   HEMOGLOBIN g/dL 12 9 13 7 16 2   HEMATOCRIT % 38 2 40 2 46 9   PLATELETS Thousands/uL 230 256 275   NEUTROS PCT % 65 78* 79*     Results from last 7 days   Lab Units 05/21/22  0831 05/20/22  0532 05/19/22  0054 05/17/22  1710   SODIUM mmol/L 140 141 136 137   POTASSIUM mmol/L 3 7 4 0 3 3* 3 7   CHLORIDE mmol/L 109* 109* 102 99*   CO2 mmol/L 24 23 20* 23   BUN mg/dL 7 13 28* 12   CREATININE mg/dL 1 01 1 00 1 92* 1 02   CALCIUM mg/dL 9 3 8 7 8 8 9 6   TOTAL BILIRUBIN mg/dL  --  0 40 0 39 0 74   ALK PHOS U/L  --  76 85 96   ALT U/L  --  21 23 29   AST U/L  --  21 23 30     Results from last 7 days   Lab Units 05/17/22  1710   INR  1 04         Lab Results   Component Value Date/Time    HGBA1C 5 3 05/17/2022 11:57 PM     Results from last 7 days   Lab Units 05/17/22  1701   POC GLUCOSE mg/dl 92           * I Have Reviewed All Lab Data Listed Above  * Additional Pertinent Lab Tests Reviewed: All Labs Within Last 24 Hours Reviewed    Imaging:     CTA head and neck w wo contrast   Final Result by Tran Patricia MD (05/18 1806)         1  Subtle evolving cortical hypodensities in the right occipital lobe, correlating to some of the cortical diffusion restriction noted on the MRI earlier today  Findings are worrisome for evolving infarction or perhaps post ictal edema    Other    infectious or inflammatory processes including cerebritis are in the differential diagnosis  Further clinical assessment advised  2   Scattered mild to moderate atherosclerotic disease in the major arteries of the neck is unchanged  3   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 retrospectively similar to the previous CTA from 2021  4   No acute intracranial hemorrhage  Workstation performed: [id number]         MRI brain wo contrast   Final Result by Zurdo Harris MD (05/18 1045)      Findings suspicious for recent infarct in the posterior right MCA distribution and right posterior cerebral artery distributions (fetal PCA noted on prior CTA)  Findings may also be related to seizures  No acute hemorrhage  No mass effect, shift or herniation  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  Absent flow void in the right transverse and sigmoid sinuses, chronically occluded based on CTA from 2021  The study was marked in [facility] for immediate notification  Workstation performed: [id number]         CT lumbar spine without contrast   Final Result by Ivory Robles MD (05/17 2045)      No acute fracture or dislocation  Multilevel degenerative changes  Workstation performed: [id number]         CT thoracic spine without contrast   Final Result by Ric Cortez MD (05/17 1830)      Schmorl's nodes at the superior endplates of T2, T3 and possibly T4 of indeterminate age  Workstation performed: [id number]         CT cervical spine without contrast   Final Result by Ric Cortez MD (05/17 1822)      No cervical spine fracture or traumatic malalignment  Fluid in the sphenoid sinuses  Workstation performed: [id number]         CT head wo contrast   Final Result by Isaiah Marks DO (05/17 1808)      No acute intracranial abnormality  Microangiopathic changes                    Workstation performed: [id number]         XR knee 4+ vw right injury    (Results Pending)     Imaging Reports Reviewed by myself    Cultures:   Blood Culture:   Lab Results   Component Value Date    BLOODCX No Growth After 5 Days  08/05/2021     Urine Culture:   Lab Results   Component Value Date    URINECX No Growth <1000 cfu/mL 08/05/2021     Sputum Culture: No components found for: SPUTUMCX  Wound Culture: No results found for: WOUNDCULT    Last 24 Hours Medication List:   Current Facility-Administered Medications   Medication Dose Route Frequency Provider Last Rate    acetaminophen  650 mg Oral Q6H PRN Terrial Franc, PA-GEORGE      aspirin  81 mg Oral Daily Sheri Vecellio, PA-GOERGE      atorvastatin  80 mg Oral Daily With [employer], [address]      butalbital-acetaminophen-caffeine  1 tablet Oral Q4H PRN Alric NOAM Singh      clopidogrel  75 mg Oral Daily [address] [place], [address]      fenofibrate  145 mg Oral Daily Terrial Franc, JUAN      heparin (porcine)  5,000 Units Subcutaneous Q8H [facility] Sheri White PA-C      lacosamide (VIMPAT) IVPB  100 mg Intravenous Q12H Alric CAROL SinghNP 100 mg (05/21/22 1207)    levETIRAcetam  1,500 mg Oral Q12H Sheri JUAN White      magnesium oxide  400 mg Oral BID Robert Conrad MD      methocarbamol  750 mg Oral Q6H [facility] [place]      metoprolol succinate  100 mg Oral Daily Sheri Vecrainer, PA-GEORGE      ondansetron  4 mg Intravenous Q6H PRN [facility], PA-C      oxyCODONE  10 mg Oral Q4H PRN [facility], PA-C      oxyCODONE  5 mg Oral Q4H PRN [facility], PA-GEORGE      pantoprazole  20 mg Oral Early Morning Terrial Franc, PA-C      thiamine  100 mg Oral Daily Terrial Franc, PA-C      verapamil  120 mg Oral HS Alric NOAM Singh          Today, Patient Was Seen By: NOAM Echeverria    ** Please Note: Dragon 360 Dictation voice to text software may have been used in the creation of this document   **

## 2022-05-21 NOTE — PLAN OF CARE
Problem: PAIN - ADULT  Goal: Verbalizes/displays adequate comfort level or baseline comfort level  Description: Interventions:  - Encourage patient to monitor pain and request assistance  - Assess pain using appropriate pain scale  - Administer analgesics based on type and severity of pain and evaluate response  - Implement non-pharmacological measures as appropriate and evaluate response  - Consider cultural and social influences on pain and pain management  - Notify physician/advanced practitioner if interventions unsuccessful or patient reports new pain  Outcome: Progressing     Problem: Potential for Falls  Goal: Patient will remain free of falls  Description: INTERVENTIONS:  - Educate patient/family on patient safety including physical limitations  - Instruct patient to call for assistance with activity   - Consult OT/PT to assist with strengthening/mobility   - Keep Call bell within reach  - Keep bed low and locked with side rails adjusted as appropriate  - Keep care items and personal belongings within reach  - Initiate and maintain comfort rounds  - Make Fall Risk Sign visible to staff  - Offer Toileting every 2 Hours, in advance of need  - Initiate/Maintain bed alarm  Problem: PAIN - ADULT  Goal: Verbalizes/displays adequate comfort level or baseline comfort level  Description: Interventions:  - Encourage patient to monitor pain and request assistance  - Assess pain using appropriate pain scale  - Administer analgesics based on type and severity of pain and evaluate response  - Implement non-pharmacological measures as appropriate and evaluate response  - Consider cultural and social influences on pain and pain management  - Notify physician/advanced practitioner if interventions unsuccessful or patient reports new pain  Outcome: Progressing     - Apply yellow socks and bracelet for high fall risk patients  - Consider moving patient to room near nurses station  Outcome: Progressing

## 2022-05-21 NOTE — PLAN OF CARE
Problem: Prexisting or High Potential for Compromised Skin Integrity  Goal: Skin integrity is maintained or improved  Description: INTERVENTIONS:  - Identify patients at risk for skin breakdown  - Assess and monitor skin integrity  - Assess and monitor nutrition and hydration status  - Monitor labs   - Assess for incontinence   - Turn and reposition patient  - Assist with mobility/ambulation  - Relieve pressure over bony prominences  - Avoid friction and shearing  - Provide appropriate hygiene as needed including keeping skin clean and dry  - Evaluate need for skin moisturizer/barrier cream  - Collaborate with interdisciplinary team   - Patient/family teaching  - Consider wound care consult   Outcome: Progressing     Problem: MOBILITY - ADULT  Goal: Maintain or return to baseline ADL function  Description: INTERVENTIONS:  -  Assess patient's ability to carry out ADLs; assess patient's baseline for ADL function and identify physical deficits which impact ability to perform ADLs (bathing, care of mouth/teeth, toileting, grooming, dressing, etc )  - Assess/evaluate cause of self-care deficits   - Assess range of motion  - Assess patient's mobility; develop plan if impaired  - Assess patient's need for assistive devices and provide as appropriate  - Encourage maximum independence but intervene and supervise when necessary  - Involve family in performance of ADLs  - Assess for home care needs following discharge   - Consider OT consult to assist with ADL evaluation and planning for discharge  - Provide patient education as appropriate  Outcome: Progressing  Goal: Maintains/Returns to pre admission functional level  Description: INTERVENTIONS:  - Perform BMAT or MOVE assessment daily    - Set and communicate daily mobility goal to care team and patient/family/caregiver  - Collaborate with rehabilitation services on mobility goals if consulted  - Perform Range of Motion 3 times a day    - Reposition patient every 2 hours   - Dangle patient 3 times a day  - Stand patient 3 times a day  - Ambulate patient 3 times a day  - Out of bed to chair 3 times a day   - Out of bed for meals 3 times a day  - Out of bed for toileting  - Record patient progress and toleration of activity level   Outcome: Progressing     Problem: Potential for Falls  Goal: Patient will remain free of falls  Description: INTERVENTIONS:  - Educate patient/family on patient safety including physical limitations  - Instruct patient to call for assistance with activity   - Consult OT/PT to assist with strengthening/mobility   - Keep Call bell within reach  - Keep bed low and locked with side rails adjusted as appropriate  - Keep care items and personal belongings within reach  - Initiate and maintain comfort rounds  - Make Fall Risk Sign visible to staff  - Offer Toileting every 2 Hours, in advance of need  - Initiate/Maintain bed alarm  - Obtain necessary fall risk management equipment: bed alarm, yellow socks   - Apply yellow socks and bracelet for high fall risk patients  - Consider moving patient to room near nurses station  Outcome: Progressing     Problem: PAIN - ADULT  Goal: Verbalizes/displays adequate comfort level or baseline comfort level  Description: Interventions:  - Encourage patient to monitor pain and request assistance  - Assess pain using appropriate pain scale  - Administer analgesics based on type and severity of pain and evaluate response  - Implement non-pharmacological measures as appropriate and evaluate response  - Consider cultural and social influences on pain and pain management  - Notify physician/advanced practitioner if interventions unsuccessful or patient reports new pain  Outcome: Progressing     Problem: INFECTION - ADULT  Goal: Absence or prevention of progression during hospitalization  Description: INTERVENTIONS:  - Assess and monitor for signs and symptoms of infection  - Monitor lab/diagnostic results  - Monitor all insertion sites, i e  indwelling lines, tubes, and drains  - Monitor endotracheal if appropriate and nasal secretions for changes in amount and color  - [place] appropriate cooling/warming therapies per order  - Administer medications as ordered  - Instruct and encourage patient and family to use good hand hygiene technique  - Identify and instruct in appropriate isolation precautions for identified infection/condition  Outcome: Progressing     Problem: SAFETY ADULT  Goal: Maintain or return to baseline ADL function  Description: INTERVENTIONS:  -  Assess patient's ability to carry out ADLs; assess patient's baseline for ADL function and identify physical deficits which impact ability to perform ADLs (bathing, care of mouth/teeth, toileting, grooming, dressing, etc )  - Assess/evaluate cause of self-care deficits   - Assess range of motion  - Assess patient's mobility; develop plan if impaired  - Assess patient's need for assistive devices and provide as appropriate  - Encourage maximum independence but intervene and supervise when necessary  - Involve family in performance of ADLs  - Assess for home care needs following discharge   - Consider OT consult to assist with ADL evaluation and planning for discharge  - Provide patient education as appropriate  Outcome: Progressing  Goal: Maintains/Returns to pre admission functional level  Description: INTERVENTIONS:  - Perform BMAT or MOVE assessment daily    - Set and communicate daily mobility goal to care team and patient/family/caregiver  - Collaborate with rehabilitation services on mobility goals if consulted  - Perform Range of Motion 4 times a day  - Reposition patient every 3 hours    - Dangle patient 2 times a day  - Stand patient 2 times a day  - Ambulate patient 2 times a day  - Out of bed to chair 2 times a day   - Out of bed for meals 2 times a day  - Out of bed for toileting  - Record patient progress and toleration of activity level   Outcome: Progressing  Goal: Patient will remain free of falls  Description: INTERVENTIONS:  - Educate patient/family on patient safety including physical limitations  - Instruct patient to call for assistance with activity   - Consult OT/PT to assist with strengthening/mobility   - Keep Call bell within reach  - Keep bed low and locked with side rails adjusted as appropriate  - Keep care items and personal belongings within reach  - Initiate and maintain comfort rounds  - Make Fall Risk Sign visible to staff  - Offer Toileting every 2 Hours, in advance of need  - Initiate/Maintain bed alarm  - Obtain necessary fall risk management equipment: bed alarm, yellow socks   - Apply yellow socks and bracelet for high fall risk patients  - Consider moving patient to room near nurses station  Outcome: Progressing     Problem: DISCHARGE PLANNING  Goal: Discharge to home or other facility with appropriate resources  Description: INTERVENTIONS:  - Identify barriers to discharge w/patient and caregiver  - Arrange for needed discharge resources and transportation as appropriate  - Identify discharge learning needs (meds, wound care, etc )  - Arrange for interpretive services to assist at discharge as needed  - Refer to Case Management Department for coordinating discharge planning if the patient needs post-hospital services based on physician/advanced practitioner order or complex needs related to functional status, cognitive ability, or social support system  Outcome: Progressing     Problem: Knowledge Deficit  Goal: Patient/family/caregiver demonstrates understanding of disease process, treatment plan, medications, and discharge instructions  Description: Complete learning assessment and assess knowledge base    Interventions:  - Provide teaching at level of understanding  - Provide teaching via preferred learning methods  Outcome: Progressing     Problem: Neurological Deficit  Goal: Neurological status is stable or improving  Description: Interventions:  - Monitor and assess patient's level of consciousness, motor function, sensory function, and level of assistance needed for ADLs  - Monitor and report changes from baseline  Collaborate with interdisciplinary team to initiate plan and implement interventions as ordered  - Provide and maintain a safe environment  - Consider seizure precautions  - Consider fall precautions  - Consider aspiration precautions  - Consider bleeding precautions  Outcome: Progressing     Problem: Activity Intolerance/Impaired Mobility  Goal: Mobility/activity is maintained at optimum level for patient  Description: Interventions:  - Assess and monitor patient  barriers to mobility and need for assistive/adaptive devices  - Assess patient's emotional response to limitations  - Collaborate with interdisciplinary team and initiate plans and interventions as ordered  - Encourage independent activity per ability   - Maintain proper body alignment  - Perform active/passive rom as tolerated/ordered  - Plan activities to conserve energy   - Turn patient as appropriate  Outcome: Progressing     Problem: Communication Impairment  Goal: Ability to express needs and understand communication  Description: Assess patient's communication skills and ability to understand information  Patient will demonstrate use of effective communication techniques, alternative methods of communication and understanding even if not able to speak  - Encourage communication and provide alternate methods of communication as needed  - Collaborate with case management/ for discharge needs  - Include patient/family/caregiver in decisions related to communication  Outcome: Progressing     Problem: Potential for Aspiration  Goal: Non-ventilated patient's risk of aspiration is minimized  Description: Assess and monitor vital signs, respiratory status, and labs (WBC)    Monitor for signs of aspiration (tachypnea, cough, rales, wheezing, cyanosis, fever)  - Assess and monitor patient's ability to swallow  - Place patient up in chair to eat if possible  - HOB up at 90 degrees to eat if unable to get patient up into chair   - Supervise patient during oral intake  - Instruct patient/ family to take small bites  - Instruct patient/ family to take small single sips when taking liquids  - Follow patient-specific strategies generated by speech pathologist   Outcome: Progressing     Problem: Nutrition  Goal: Nutrition/Hydration status is improving  Description: Monitor and assess patient's nutrition/hydration status for malnutrition (ex- brittle hair, bruises, dry skin, pale skin and conjunctiva, muscle wasting, smooth red tongue, and disorientation)  Collaborate with interdisciplinary team and initiate plan and interventions as ordered  Monitor patient's weight and dietary intake as ordered or per policy  Utilize nutrition screening tool and intervene per policy  Determine patient's food preferences and provide high-protein, high-caloric foods as appropriate  - Assist patient with eating   - Allow adequate time for meals   - Encourage patient to take dietary supplement as ordered  - Collaborate with clinical nutritionist   - Include patient/family/caregiver in decisions related to nutrition    Outcome: Progressing

## 2022-05-21 NOTE — CASE MANAGEMENT
Case Management Progress Note    Patient name Bennett Marrero  Location [address] 407/4 [facility] 407-* MRN [id number]  : 1965 Date 2022       LOS (days): 4  Geometric Mean LOS (GMLOS) (days):   Days to [facility]:        OBJECTIVE:        Current admission status: Inpatient  Preferred Pharmacy:   [facility] [place], [address], [address]   [place] [address]  Phone: [phone] Fax: [phone]    [address] #[id number], [address]  Phone: [phone] Fax: [phone]    [facility] [address] [place] [address] [address] [facility] [address]  Phone: [phone] Fax: [phone] [place] [address],  [facility] [address] [place] [address]  Phone: [phone] Fax: [phone]    Primary Care Provider: No primary care provider on file  Primary Insurance: BLUE CROSS  Secondary Insurance:     PROGRESS NOTE:    CM checked with christin, and called [facility], authorization for rehab at this time is still pending  CM could only find approved authorizations for inpatient stay at this time

## 2022-05-21 NOTE — OCCUPATIONAL THERAPY NOTE
Occupational Therapy Treatment Note       05/21/22 0955   Note Type   Note Type Treatment   Restrictions/Precautions   Other Precautions Cognitive; Chair Alarm; Bed Alarm; Fall Risk;Pain;Visual impairment  (L sided inattention)   Pain Assessment   Pain Assessment Tool Moore-Baker FACES   Moore-Baker FACES Pain Rating 6   Pain Location/Orientation Orientation: Left; Location: Arm  (and head/neck)   ADL   Eating Assistance 3  Moderate Assistance   Eating Comments Set up with breakfast; needs assist to cut up [place] toast; assist to open syrup container; limited by LUE incoordination   Bed Mobility   Supine to Sit 3  Moderate assistance   Additional items Assist x 1; Increased time required   Transfers   Sit to Stand 3  Moderate assistance   Additional items Assist x 1;Verbal cues; Impulsive   Stand to Sit 3  Moderate assistance   Additional items Assist x 1;Verbal cues; Impulsive   Additional Comments STS x several trials throughout session; cane in RUE for support once standing attained   Toilet Transfers   Toilet Transfer Type To and from   Toilet Transfer to Standard bedside commode   Toilet Transfer Technique Ambulating   Toilet Transfers Moderate assistance  (Assist x 2)   Toilet Transfers Comments With SPC; max verbal and tactile cues for safe hand placement during transfer; patient several times attempting to abandon cane and reach for commode prior to being safe enough distance near commode; poor carryover of education despite multiple repetitions   Neuromuscular Education   Weight Bearing Technique Yes   LUE Weight Bearing Forearm seated  (and hand)   Trunk Control Static sitting balance activity seated EOB: Increased practice and repetitions to establish and maintain upright functional seated midline position; pt with lateral lean to left, and posterior lean; required max verbal and visual cues to maintain achieve midline in sitting, able to achieve for few seconds then returning  leaning to left or posteriorly without constant verbal and visual cues; Patient with increased difficulty with this task today as compared to previous OT session; Emphasized weight bearing through LUE and LLE during task to assist with achieving midline positioning however patient often leaving LUE and LLE floating above bed or floor, decreased awareness of L hemibody today; Dynamic sitting balance activity with focus on weightbearing through LUE then completed seated EOB: leaning forward, laterally, posteriorly in all planes of direction while maintaining weight bearing through LUE on bed to increase proprioceptive input to LUE; Required max assist to maintain WBing through LUE; min assist to maintain safe dynamic balance during activity; max verbal, tactile, and visual cues to return to midline with each shift in position   Cognition   Overall Cognitive Status Impaired   Arousal/Participation Alert; Cooperative   Attention Difficulty attending to directions  (Very easily distracted)   Orientation Level Oriented to person;Oriented to place;Oriented to time   Following Commands Follows one step commands with increased time or repetition   Comments Patient with very poor awareness and insight into current functional limitations; Patient stating to therapist at start of session, "I can walk just fine"; patient with difficulty describing current functional limitations and their impact on his current status; Increased time spent educating patient on and bringing increased awareness current deficits as well as current safety risks and limitations; patient verbalizes understanding yet poor carryover of education when performing functional tasks;  Patient is highly distractible, requires constant verbal cues for redirection to task, very poor sustained attention   Activity Tolerance   Activity Tolerance Patient limited by fatigue;Treatment limited secondary to medical complications (Comment)   Assessment   Assessment Patient seen for OT treatment today with focus on static/dynamic sitting balance to improve performance in ADLs; functional transfer training; education/practice with awareness of current functional limitations as well as interventions to improve awareness of L hemibody  Patient continues to present with significantly impaired L sided visual and body inattention, decreased LUE coordination, decrease LUE sensation and proprioception, increased tone to LUE; Patient educated on using visual cues (visually identifying) LUE in order to ensure safe positioning and posture while doing activities and at rest; Patient able to visually locate LUE when instructed however demonstrates inattention to LUE during functional tasks without reminders; Patient is very impulsive and with very poor understanding and insight into current functional limitations, currently is a high fall risk; The patient's raw score on the AM-PAC Daily Activity inpatient short form is 13, standardized score is 32 03, less than 39 4  Patients at this level are likely to benefit from discharge to post-acute rehabilitation services  Please refer to the recommendation of the Occupational Therapist for safe discharge planning  At end of session patient seated in recliner chair with all needs met, chair alarm set, RN aware of patient status; continue OT per POC     Plan   OT Frequency 5x/wk   Recommendation   OT Discharge Recommendation Post acute rehabilitation services   [facility] Daily Activity Inpatient   Lower Body Dressing 2   Bathing 2   Toileting 2   Upper Body Dressing 2   Grooming 3   Eating 2   Daily Activity Raw Score 13   Daily Activity Standardized Score (Calc for Raw Score >=11) 32 03   [facility] Applied Cognition Inpatient   Following a Speech/Presentation 2   Understanding Ordinary Conversation 3   Taking Medications 2   Remembering Where Things Are Placed or Put Away 3   Remembering List of 4-5 Errands 2   Taking Care of Complicated Tasks 2   Applied Cognition Raw Score 14   Applied Cognition Standardized Score 79 83   Licensure   [address] [facility] License Number  Geno Benites, [place] [id number]

## 2022-05-21 NOTE — PHYSICAL THERAPY NOTE
PT TREATMENT     05/21/22 1005   PT Last Visit   PT Visit Date 05/21/22   Note Type   Note Type Treatment   Pain Assessment   Pain Assessment Tool Moore-Baker FACES   Moore-Baker FACES Pain Rating 6   Pain Location/Orientation Orientation: Left; Location: Arm   Restrictions/Precautions   Weight Bearing Precautions Per Order No   Other Precautions Cognitive; Chair Alarm; Bed Alarm; Fall Risk;Pain;Visual impairment   General   Chart Reviewed Yes   Family/Caregiver Present No   Cognition   Overall Cognitive Status WFL   Arousal/Participation Cooperative   Attention Attends with cues to redirect   Following Commands Follows multistep commands with increased time or repetition   Comments Impulsive; requires cues to focus on task at hand; redirection   Subjective   Subjective "I don't think I should go back to work yet"   Bed Mobility   Additional Comments Pt presents in chair   Transfers   Sit to Stand 3  Moderate assistance   Additional items Assist x 1;Verbal cues   Stand to Sit 3  Moderate assistance   Additional items Assist x 1;Verbal cues   Additional Comments cues for hand placement; attention to task; focus on task   Ambulation/Elevation   Gait pattern   (guarded; unsteady)   Gait Assistance 3  Moderate assist   Additional items Assist x 2;Verbal cues; Tactile cues   Assistive Device Straight cane   Distance 5 ft x 1 6 ft x 2   Ambulation/Elevation Additional Comments cues to get feet under hips, to use SPC properly   Balance   Ambulatory Poor   Activity Tolerance   Activity Tolerance Treatment limited secondary to medical complications (Comment)  (limited by ability to focus on task at hand)   Exercises   Heelslides Supine;10 reps;Bilateral   Knee AROM Long Arc Quad Sitting;10 reps;Bilateral   Ankle Pumps Sitting;10 reps;Bilateral   Balance training  Worked on sitting balance at EOB while completing LAQs : required Mod A or more to maintain   Assessment   Prognosis Good   Problem List Decreased strength;Decreased endurance; Impaired balance;Decreased mobility; Decreased coordination; Impaired judgement;Decreased safety awareness; Impaired vision;Pain   Assessment Pt is very cooperative   Has very limited body awareness with some neglect of left side  Left UE has increased extensor tone requiring cues to unclinch his fist or flex his elbow  Pt states that his arm feels very "shakey on the inside" of his left arm  (no visible shaking on the outside noted)  Pt requires verbal and tactile cues during gait to use SPC and to turn all the way before sitting in chair  Pt in chair at end of session with chair alarm activated  Lunch tray in front  Pt is a good rehab candidate  Cont as per plan  The patient's [facility] Basic Mobility Inpatient Short Form Raw Score is 12  A Raw score of less than or equal to 16 suggests the patient may benefit from discharge to post-acute rehabilitation services  Please also refer to the recommendation of the Physical Therapist for safe discharge planning  Goals   Patient Goals to go to rehab "My daughter is getting me a place and I stay there"   Plan   Treatment/Interventions ADL retraining;Functional transfer training;LE strengthening/ROM; Therapeutic exercise; Endurance training;Cognitive reorientation;Patient/family training;Equipment eval/education; Bed mobility;Gait training;Spoke to nursing;OT   Progress Progressing toward goals   PT Frequency Other (Comment)  (5w)   Recommendation   PT Discharge Recommendation Post acute rehabilitation services   Additional Comments Limited insight to his condition  Would be an excellent acute rehab candidate     [facility] Basic Mobility Inpatient   Turning in Bed Without Bedrails 3   Lying on Back to Sitting on Edge of Flat Bed 2   Moving Bed to Chair 2   Standing Up From Chair 2   Walk in Room 2   Climb 3-5 Stairs 1   Basic Mobility Inpatient Raw Score 12   Basic Mobility Standardized Score 32 23   Highest Level Of Mobility   -[facility] Goal 4: Move to chair/commode   KAYLI-LORRAINE Achieved 6: Walk 10 steps or more   Education   Education Provided Mobility training;Assistive device   Patient Explanation/teachback used;Demonstrates verbal understanding;Reinforcement needed   End of Consult   Patient Position at End of Consult Bedside chair;Bed/Chair alarm activated; All needs within reach   Licensure   [address] Number  Yonatan Coreamarianne Mojicaon PT  [id number]

## 2022-05-21 NOTE — ASSESSMENT & PLAN NOTE
· PT/OT recommending inpatient rehab again  Patient had refused on prior admission but is agreeable now  Will discharge to [facility] with insurance authorization obtained    · Discussed with case management awaiting authorization from insurance company

## 2022-05-22 LAB
ANION GAP SERPL CALCULATED.3IONS-SCNC: 12 MMOL/L (ref 4–13)
BUN SERPL-MCNC: 7 MG/DL (ref 5–25)
CALCIUM SERPL-MCNC: 9.4 MG/DL (ref 8.3–10.1)
CHLORIDE SERPL-SCNC: 106 MMOL/L (ref 100–108)
CO2 SERPL-SCNC: 20 MMOL/L (ref 21–32)
CREAT SERPL-MCNC: 1.07 MG/DL (ref 0.6–1.3)
ERYTHROCYTE [DISTWIDTH] IN BLOOD BY AUTOMATED COUNT: 14.1 % (ref 11.6–15.1)
GFR SERPL CREATININE-BSD FRML MDRD: 75 ML/MIN/1.73SQ M
GLUCOSE SERPL-MCNC: 85 MG/DL (ref 65–140)
HCT VFR BLD AUTO: 41.2 % (ref 36.5–49.3)
HGB BLD-MCNC: 13.7 G/DL (ref 12–17)
MAGNESIUM SERPL-MCNC: 1.5 MG/DL (ref 1.6–2.6)
MCH RBC QN AUTO: 29.5 PG (ref 26.8–34.3)
MCHC RBC AUTO-ENTMCNC: 33.3 G/DL (ref 31.4–37.4)
MCV RBC AUTO: 89 FL (ref 82–98)
PLATELET # BLD AUTO: 230 THOUSANDS/UL (ref 149–390)
PMV BLD AUTO: 9.6 FL (ref 8.9–12.7)
POTASSIUM SERPL-SCNC: 4 MMOL/L (ref 3.5–5.3)
RBC # BLD AUTO: 4.65 MILLION/UL (ref 3.88–5.62)
SODIUM SERPL-SCNC: 138 MMOL/L (ref 136–145)
WBC # BLD AUTO: 6.28 THOUSAND/UL (ref 4.31–10.16)

## 2022-05-22 PROCEDURE — 83735 ASSAY OF MAGNESIUM: CPT | Performed by: NURSE PRACTITIONER

## 2022-05-22 PROCEDURE — 85027 COMPLETE CBC AUTOMATED: CPT | Performed by: NURSE PRACTITIONER

## 2022-05-22 PROCEDURE — 97530 THERAPEUTIC ACTIVITIES: CPT

## 2022-05-22 PROCEDURE — 99232 SBSQ HOSP IP/OBS MODERATE 35: CPT | Performed by: NURSE PRACTITIONER

## 2022-05-22 PROCEDURE — 80048 BASIC METABOLIC PNL TOTAL CA: CPT | Performed by: NURSE PRACTITIONER

## 2022-05-22 RX ORDER — LANOLIN ALCOHOL/MO/W.PET/CERES
3 CREAM (GRAM) TOPICAL
Status: DISCONTINUED | OUTPATIENT
Start: 2022-05-22 | End: 2022-05-23 | Stop reason: HOSPADM

## 2022-05-22 RX ORDER — MAGNESIUM SULFATE HEPTAHYDRATE 40 MG/ML
2 INJECTION, SOLUTION INTRAVENOUS ONCE
Status: COMPLETED | OUTPATIENT
Start: 2022-05-22 | End: 2022-05-22

## 2022-05-22 RX ORDER — KETOROLAC TROMETHAMINE 30 MG/ML
15 INJECTION, SOLUTION INTRAMUSCULAR; INTRAVENOUS ONCE
Status: COMPLETED | OUTPATIENT
Start: 2022-05-22 | End: 2022-05-22

## 2022-05-22 RX ADMIN — SODIUM CHLORIDE 100 MG: 9 INJECTION, SOLUTION INTRAVENOUS at 12:16

## 2022-05-22 RX ADMIN — MAGNESIUM OXIDE TAB 400 MG (241.3 MG ELEMENTAL MG) 400 MG: 400 (241.3 MG) TAB at 08:13

## 2022-05-22 RX ADMIN — VERAPAMIL HYDROCHLORIDE 120 MG: 120 TABLET, FILM COATED, EXTENDED RELEASE ORAL at 21:18

## 2022-05-22 RX ADMIN — ATORVASTATIN CALCIUM 80 MG: 80 TABLET ORAL at 17:37

## 2022-05-22 RX ADMIN — OXYCODONE HYDROCHLORIDE 10 MG: 10 TABLET ORAL at 05:11

## 2022-05-22 RX ADMIN — OXYCODONE HYDROCHLORIDE 10 MG: 10 TABLET ORAL at 09:27

## 2022-05-22 RX ADMIN — CLOPIDOGREL BISULFATE 75 MG: 75 TABLET ORAL at 08:13

## 2022-05-22 RX ADMIN — METHOCARBAMOL TABLETS 750 MG: 500 TABLET, COATED ORAL at 23:47

## 2022-05-22 RX ADMIN — METHOCARBAMOL TABLETS 750 MG: 500 TABLET, COATED ORAL at 17:37

## 2022-05-22 RX ADMIN — PANTOPRAZOLE SODIUM 20 MG: 20 TABLET, DELAYED RELEASE ORAL at 05:11

## 2022-05-22 RX ADMIN — METHOCARBAMOL TABLETS 750 MG: 500 TABLET, COATED ORAL at 05:10

## 2022-05-22 RX ADMIN — BUTALBITAL, ACETAMINOPHEN AND CAFFEINE 1 TABLET: 50; 325; 40 TABLET ORAL at 08:08

## 2022-05-22 RX ADMIN — THIAMINE HCL TAB 100 MG 100 MG: 100 TAB at 08:13

## 2022-05-22 RX ADMIN — HEPARIN SODIUM 5000 UNITS: 5000 INJECTION INTRAVENOUS; SUBCUTANEOUS at 13:42

## 2022-05-22 RX ADMIN — KETOROLAC TROMETHAMINE 15 MG: 30 INJECTION, SOLUTION INTRAMUSCULAR at 16:07

## 2022-05-22 RX ADMIN — DICLOFENAC SODIUM TOPICAL GEL, 1%, 2 G: 10 GEL TOPICAL at 21:19

## 2022-05-22 RX ADMIN — MAGNESIUM SULFATE HEPTAHYDRATE 2 G: 40 INJECTION, SOLUTION INTRAVENOUS at 07:55

## 2022-05-22 RX ADMIN — SODIUM CHLORIDE 100 MG: 9 INJECTION, SOLUTION INTRAVENOUS at 23:50

## 2022-05-22 RX ADMIN — METHOCARBAMOL TABLETS 750 MG: 500 TABLET, COATED ORAL at 12:16

## 2022-05-22 RX ADMIN — DICLOFENAC SODIUM TOPICAL GEL, 1%, 2 G: 10 GEL TOPICAL at 17:38

## 2022-05-22 RX ADMIN — HEPARIN SODIUM 5000 UNITS: 5000 INJECTION INTRAVENOUS; SUBCUTANEOUS at 05:12

## 2022-05-22 RX ADMIN — LEVETIRACETAM 1500 MG: 500 TABLET, FILM COATED ORAL at 21:18

## 2022-05-22 RX ADMIN — ASPIRIN 81 MG CHEWABLE TABLET 81 MG: 81 TABLET CHEWABLE at 08:13

## 2022-05-22 RX ADMIN — DICLOFENAC SODIUM TOPICAL GEL, 1%, 2 G: 10 GEL TOPICAL at 08:18

## 2022-05-22 RX ADMIN — MAGNESIUM OXIDE TAB 400 MG (241.3 MG ELEMENTAL MG) 400 MG: 400 (241.3 MG) TAB at 17:37

## 2022-05-22 RX ADMIN — OXYCODONE HYDROCHLORIDE 5 MG: 5 TABLET ORAL at 21:17

## 2022-05-22 RX ADMIN — FENOFIBRATE 145 MG: 145 TABLET, FILM COATED ORAL at 08:13

## 2022-05-22 RX ADMIN — METOPROLOL SUCCINATE 100 MG: 100 TABLET, EXTENDED RELEASE ORAL at 08:13

## 2022-05-22 RX ADMIN — LEVETIRACETAM 1500 MG: 500 TABLET, FILM COATED ORAL at 09:27

## 2022-05-22 RX ADMIN — Medication 3 MG: at 21:18

## 2022-05-22 RX ADMIN — DICLOFENAC SODIUM TOPICAL GEL, 1%, 2 G: 10 GEL TOPICAL at 12:18

## 2022-05-22 RX ADMIN — HEPARIN SODIUM 5000 UNITS: 5000 INJECTION INTRAVENOUS; SUBCUTANEOUS at 21:18

## 2022-05-22 NOTE — PHYSICAL THERAPY NOTE
PT TREATMENT     05/22/22 1210   PT Last Visit   PT Visit Date 05/22/22   Note Type   Note Type Treatment   Pain Assessment   Pain Assessment Tool Moore-Baker FACES   Moore-Baker FACES Pain Rating 6   Pain Location/Orientation Orientation: Left  (hand/ long finger)   Restrictions/Precautions   Weight Bearing Precautions Per Order No   Other Precautions Cognitive; Chair Alarm; Bed Alarm;Seizure; Fall Risk;Pain   General   Chart Reviewed Yes   Family/Caregiver Present No   Cognition   Overall Cognitive Status WFL   Arousal/Participation Cooperative   Attention Attends with cues to redirect   Following Commands Follows one step commands with increased time or repetition   Comments verbal and tactile cues to focus on task   Subjective   Subjective "I feel like I am going to fall over"   Bed Mobility   Supine to Sit 3  Moderate assistance   Additional items Assist x 1;Verbal cues;LE management; Increased time required   Sit to Supine 3  Moderate assistance   Additional items Assist x 1;Verbal cues;LE management   Additional Comments when transferring to EOB, pt brought over his right leg but could not get his left leg over the edge: was in a flexed position at his hip and knee and could not figure out how to get it to move   Transfers   Sit to Stand 3  Moderate assistance   Additional items Assist x 1;Verbal cues   Stand to Sit 3  Moderate assistance   Additional items Assist x 1;Verbal cues   Ambulation/Elevation   Gait pattern Wide GEOVANNY; Ataxia   Gait Assistance 3  Moderate assist   Additional items Assist x 1;Verbal cues   Assistive Device None   Distance 3 feet along bed to head of bed   Ambulation/Elevation Additional Comments Tried using cane, but pt was too unstable to concentrate on it's use  Used left UE support with PT's right arm around pt's waist to stabilize  Pt was very unsteady and could not maintain his balance for a few sidesteps     Balance   Ambulatory Poor -   Activity Tolerance   Activity Tolerance Treatment limited secondary to medical complications (Comment)  (limited by very poor balance and ataxia)   Exercises   Balance training  in sitting at EOB: worked on static sitting   Assessment   Prognosis Good   Problem List Decreased strength;Decreased endurance; Impaired balance;Decreased mobility; Decreased coordination; Impaired judgement;Decreased safety awareness; Impaired vision;Pain   Assessment Pt continues to have decreased awareness of his left side  Leans to left and/or posteriorly in static sitting  When leaning  backward : would not self correct until made aware of the fact and then he required max A to regain  Pt also leans to left with same results  Pt stood at bedside, to transfer to chair, however pt is too unsteady and unsafe to leave in the chair  States that he is dizzy (RN aware: has been c/o this today)  Pt has great difficulty on his feet  May need to try use of RW for bilateral support; cane not effective today  Tolerated fair  cont to progress as per plan  The patient's AM-[facility] Basic Mobility Inpatient Short Form Raw Score is 10  A Raw score of less than or equal to 16 suggests the patient may benefit from discharge to post-acute rehabilitation services  Please also refer to the recommendation of the Physical Therapist for safe discharge planning  Goals   Patient Goals pt wanted to return to bed: dizzy   Plan   Treatment/Interventions ADL retraining;Functional transfer training;LE strengthening/ROM; Therapeutic exercise; Endurance training;Patient/family training;Equipment eval/education; Bed mobility;Gait training;Spoke to nursing   Progress Progressing toward goals   PT Frequency Other (Comment)  (daily)   Recommendation   PT Discharge Recommendation Post acute rehabilitation services   AM-PAC Basic Mobility Inpatient   Turning in Bed Without Bedrails 3   Lying on Back to Sitting on Edge of Flat Bed 2   Moving Bed to Chair 1   Standing Up From Chair 2   Walk in Room 1   Climb 3-5 Stairs 1   Basic Mobility Inpatient Raw Score 10   Turning Head Towards Sound 3   Follow Simple Instructions 2   Low Function Basic Mobility Raw Score 15   Low Function Basic Mobility Standardized Score 23 9   Highest Level Of Mobility   -HLM Goal 4: Move to chair/commode   -HLM Achieved 3: Sit at edge of bed   End of Consult   Patient Position at End of Consult Supine;Bed/Chair alarm activated; All needs within reach   Licensure   [id number] Number  cecil Murphy PT  [id number]

## 2022-05-22 NOTE — PLAN OF CARE
Problem: Prexisting or High Potential for Compromised Skin Integrity  Goal: Skin integrity is maintained or improved  Description: INTERVENTIONS:  - Identify patients at risk for skin breakdown  - Assess and monitor skin integrity  - Assess and monitor nutrition and hydration status  - Monitor labs   - Assess for incontinence   - Turn and reposition patient  - Assist with mobility/ambulation  - Relieve pressure over bony prominences  - Avoid friction and shearing  - Provide appropriate hygiene as needed including keeping skin clean and dry  - Evaluate need for skin moisturizer/barrier cream  - Collaborate with interdisciplinary team   - Patient/family teaching  - Consider wound care consult   Outcome: Progressing     Problem: MOBILITY - ADULT  Goal: Maintain or return to baseline ADL function  Description: INTERVENTIONS:  -  Assess patient's ability to carry out ADLs; assess patient's baseline for ADL function and identify physical deficits which impact ability to perform ADLs (bathing, care of mouth/teeth, toileting, grooming, dressing, etc )  - Assess/evaluate cause of self-care deficits   - Assess range of motion  - Assess patient's mobility; develop plan if impaired  - Assess patient's need for assistive devices and provide as appropriate  - Encourage maximum independence but intervene and supervise when necessary  - Involve family in performance of ADLs  - Assess for home care needs following discharge   - Consider OT consult to assist with ADL evaluation and planning for discharge  - Provide patient education as appropriate  Outcome: Progressing  Goal: Maintains/Returns to pre admission functional level  Description: INTERVENTIONS:  - Perform BMAT or MOVE assessment daily    - Set and communicate daily mobility goal to care team and patient/family/caregiver  - Collaborate with rehabilitation services on mobility goals if consulted  - Perform Range of Motion 3 times a day    - Reposition patient every 2 hours   - Dangle patient 3 times a day  - Stand patient 3 times a day  - Ambulate patient 3 times a day  - Out of bed to chair 3 times a day   - Out of bed for meals 3 times a day  - Out of bed for toileting  - Record patient progress and toleration of activity level   Outcome: Progressing        Problem: PAIN - ADULT  Goal: Verbalizes/displays adequate comfort level or baseline comfort level  Description: Interventions:  - Encourage patient to monitor pain and request assistance  - Assess pain using appropriate pain scale  - Administer analgesics based on type and severity of pain and evaluate response  - Implement non-pharmacological measures as appropriate and evaluate response  - Consider cultural and social influences on pain and pain management  - Notify physician/advanced practitioner if interventions unsuccessful or patient reports new pain  Outcome: Progressing     Problem: INFECTION - ADULT  Goal: Absence or prevention of progression during hospitalization  Description: INTERVENTIONS:  - Assess and monitor for signs and symptoms of infection  - Monitor lab/diagnostic results  - Monitor all insertion sites, i e  indwelling lines, tubes, and drains  - Monitor endotracheal if appropriate and nasal secretions for changes in amount and color  - Rand appropriate cooling/warming therapies per order  - Administer medications as ordered  - Instruct and encourage patient and family to use good hand hygiene technique  - Identify and instruct in appropriate isolation precautions for identified infection/condition  Outcome: Progressing     Problem: SAFETY ADULT  Goal: Maintain or return to baseline ADL function  Description: INTERVENTIONS:  -  Assess patient's ability to carry out ADLs; assess patient's baseline for ADL function and identify physical deficits which impact ability to perform ADLs (bathing, care of mouth/teeth, toileting, grooming, dressing, etc )  - Assess/evaluate cause of self-care deficits   - Assess range of motion  - Assess patient's mobility; develop plan if impaired  - Assess patient's need for assistive devices and provide as appropriate  - Encourage maximum independence but intervene and supervise when necessary  - Involve family in performance of ADLs  - Assess for home care needs following discharge   - Consider OT consult to assist with ADL evaluation and planning for discharge  - Provide patient education as appropriate  Outcome: Progressing  Goal: Maintains/Returns to pre admission functional level  Description: INTERVENTIONS:  - Perform BMAT or MOVE assessment daily    - Set and communicate daily mobility goal to care team and patient/family/caregiver  - Collaborate with rehabilitation services on mobility goals if consulted  - Perform Range of Motion 3 times a day  - Reposition patient every 2 hours    - Dangle patient 3 times a day  - Stand patient 3 times a day  - Ambulate patient 3 times a day  - Out of bed to chair 3 times a day   - Out of bed for meals 3 times a day  - Out of bed for toileting  - Record patient progress and toleration of activity level   Outcome: Progressing  Goal: Patient will remain free of falls  Description: INTERVENTIONS:  - Educate patient/family on patient safety including physical limitations  - Instruct patient to call for assistance with activity   - Consult OT/PT to assist with strengthening/mobility   - Keep Call bell within reach  - Keep bed low and locked with side rails adjusted as appropriate  - Keep care items and personal belongings within reach  - Initiate and maintain comfort rounds  - Make Fall Risk Sign visible to staff  - Offer Toileting every 2 Hours, in advance of need  - Initiate/Maintain bed alarm  - Obtain necessary fall risk management equipment:   - Apply yellow socks and bracelet for high fall risk patients  - Consider moving patient to room near nurses station  Outcome: Progressing     Problem: DISCHARGE PLANNING  Goal: Discharge to home or other facility with appropriate resources  Description: INTERVENTIONS:  - Identify barriers to discharge w/patient and caregiver  - Arrange for needed discharge resources and transportation as appropriate  - Identify discharge learning needs (meds, wound care, etc )  - Arrange for interpretive services to assist at discharge as needed  - Refer to Case Management Department for coordinating discharge planning if the patient needs post-hospital services based on physician/advanced practitioner order or complex needs related to functional status, cognitive ability, or social support system  Outcome: Progressing     Problem: Knowledge Deficit  Goal: Patient/family/caregiver demonstrates understanding of disease process, treatment plan, medications, and discharge instructions  Description: Complete learning assessment and assess knowledge base  Interventions:  - Provide teaching at level of understanding  - Provide teaching via preferred learning methods  Outcome: Progressing     Problem: Neurological Deficit  Goal: Neurological status is stable or improving  Description: Interventions:  - Monitor and assess patient's level of consciousness, motor function, sensory function, and level of assistance needed for ADLs  - Monitor and report changes from baseline  Collaborate with interdisciplinary team to initiate plan and implement interventions as ordered  - Provide and maintain a safe environment  - Consider seizure precautions  - Consider fall precautions  - Consider aspiration precautions  - Consider bleeding precautions  Outcome: Progressing     Problem: Activity Intolerance/Impaired Mobility  Goal: Mobility/activity is maintained at optimum level for patient  Description: Interventions:  - Assess and monitor patient  barriers to mobility and need for assistive/adaptive devices  - Assess patient's emotional response to limitations    - Collaborate with interdisciplinary team and initiate plans and interventions as ordered  - Encourage independent activity per ability   - Maintain proper body alignment  - Perform active/passive rom as tolerated/ordered  - Plan activities to conserve energy   - Turn patient as appropriate  Outcome: Progressing     Problem: Communication Impairment  Goal: Ability to express needs and understand communication  Description: Assess patient's communication skills and ability to understand information  Patient will demonstrate use of effective communication techniques, alternative methods of communication and understanding even if not able to speak  - Encourage communication and provide alternate methods of communication as needed  - Collaborate with case management/ for discharge needs  - Include patient/family/caregiver in decisions related to communication  Outcome: Progressing     Problem: Potential for Aspiration  Goal: Non-ventilated patient's risk of aspiration is minimized  Description: Assess and monitor vital signs, respiratory status, and labs (WBC)  Monitor for signs of aspiration (tachypnea, cough, rales, wheezing, cyanosis, fever)  - Assess and monitor patient's ability to swallow  - Place patient up in chair to eat if possible  - HOB up at 90 degrees to eat if unable to get patient up into chair   - Supervise patient during oral intake  - Instruct patient/ family to take small bites  - Instruct patient/ family to take small single sips when taking liquids  - Follow patient-specific strategies generated by speech pathologist   Outcome: Progressing     Problem: Nutrition  Goal: Nutrition/Hydration status is improving  Description: Monitor and assess patient's nutrition/hydration status for malnutrition (ex- brittle hair, bruises, dry skin, pale skin and conjunctiva, muscle wasting, smooth red tongue, and disorientation)  Collaborate with interdisciplinary team and initiate plan and interventions as ordered    Monitor patient's weight and dietary intake as ordered or per policy  Utilize nutrition screening tool and intervene per policy  Determine patient's food preferences and provide high-protein, high-caloric foods as appropriate  - Assist patient with eating   - Allow adequate time for meals   - Encourage patient to take dietary supplement as ordered  - Collaborate with clinical nutritionist   - Include patient/family/caregiver in decisions related to nutrition    Outcome: Progressing

## 2022-05-22 NOTE — ASSESSMENT & PLAN NOTE
· Magnesium 5/20:  1 5  · Recheck on 05/22 is 1 5, continue with oral magnesium supplementation and will give additional 2 g IV  · Recheck magnesium in a m

## 2022-05-22 NOTE — ASSESSMENT & PLAN NOTE
· PT/OT recommending inpatient rehab again  Patient had refused on prior admission but is agreeable now  Will discharge to [facility] with insurance authorization obtained    · Discussed with case management awaiting authorization from insurance company  · Follow-up case management

## 2022-05-22 NOTE — PROGRESS NOTES
[facility] 43  Progress Note - [place] Tripathi 1965, 59 y o  male MRN: [id number]  Unit/Bed#: [address] [place] [address] Encounter: [id number]  Primary Care Provider: No primary care provider on file  Date and time admitted to hospital: 5/17/2022  4:32 PM    * Stroke-like symptoms  Assessment & Plan  Patient presents after being found on the ground at home, incontinent and disoriented  · Possibly due to postictal state from seizure vs stroke  · CT head shows no acute intracranial abnormalities  · CT spine with no acute fractures or dislocation  · CTA head/neck:  Subtle evolving cortical hypodensities in the right occipital lobe, correlating to some of the cortical diffusion restriction noted on the MRI  Findings are worrisome for evolving infarction or perhaps postictal edema  · MRI brain:  Suspicious for recent infarct in the posterior right MCA distribution and right posterior cerebral artery distributions  Findings may also be related seizures  No acute hemorrhage  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  · S/p Ativan and Keppra 1000 mg in ED  · CK normal  · Restarted Keppra 1500 mg b i d   · Seizure, fall precautions  · Neurology consulted and appreciate their recommendations  · Unclear whether event is seizure vs stroke based on findings on imaging  · Recommend repeat MRI in 2-3 months to check for evolution  · S/p BEBO with no evidence of PFO or thrombus  · S/p Loop recorder implantation  · Recommend DAPT for 21 days then daily ASA  · Continue Lipitor 80 mg while in hospital   Discharge on 40 mg daily    · Thrombosis panel:  · Beta-2 glycoprotein antibodies: Negative  · Cardiolipin antibodies: Negative  · Protein C activity:  Elevated  · Proteic S activity: Normal  · Anti thrombin 3 activity:  Normal  · Factor 5 Leiden:  Pending  · Prothrombin gene mutation:  Pending  · Lupus anticoagulant:  pending    CVA (cerebral vascular accident) [facility])  [facility]  · Recent Rt Posterior MCA and Rt PCA distribution  · Unclear why patient has had strokes  Work up in progress  · Loop recorder has been implanted  Follow up with cardiology as an out-patient for monitoring  · Continue ASA and Plavix x21 days then ASA alone  · Continue Lipitor and Tricor  Weakness  Assessment & Plan  · PT/OT recommending inpatient rehab again  Patient had refused on prior admission but is agreeable now  Will discharge to [facility] with insurance authorization obtained  · Discussed with case management awaiting authorization from insurance company  · Follow-up case management    Hypomagnesemia  Assessment & Plan  · Magnesium 5/20:  1 5  · Recheck on 05/22 is 1 5, continue with oral magnesium supplementation and will give additional 2 g IV  · Recheck magnesium in a m  Seizure disorder [facility])  Assessment & Plan  Patient on Keppra 1500 mg b i d  From known seizure disorder  · Unsure if patient is compliant with medication  · Keppra level: pending  · Seizure, fall precautions  · Neurology consulted and appreciate their recommendations  · Vimpat 100 mg BID added  History of alcohol abuse  Assessment & Plan  No recent alcohol use  · Continue thiamine    Hypertension  Assessment & Plan  · Home regimen includes amlodipine 10 mg daily and metoprolol succinate 100 mg daily  · Amlodipine held for permissive hypertension  Resume at discharge  · Continue metoprolol  · Also takes verapamil  Hyperlipidemia  Assessment & Plan  Continue Tricor  Heart healthy diet    Smoking  Assessment & Plan  Encouraged smoking cessation, continue with nicotine patch          VTE Pharmacologic Prophylaxis:   Pharmacologic: Heparin  Mechanical VTE Prophylaxis in Place: Yes    Patient Centered Rounds: I have performed bedside rounds with nursing staff today    Discussions with Specialists or Other Care Team Provider: Yes  Education and Discussions with Family / Patient:Yes  Time Spent for Care: 30 minutes  More than 50% of total time spent on counseling and coordination of care as described above  Current Length of Stay: 5 day(s)  Current Patient Status: Inpatient     Discharge Plan:     Code Status: Level 1 - Full Code      Subjective:   Patient seen sitting up in bed watching TV, just had breakfast reports that he feels very good, good appetite no nausea or vomiting  Slept well last night  Reports that he was able to get up out of bed and use the commode with some assistance from nursing  We discussed going to short-term rehab and he continues to be agreeable to this idea  Objective:     Vitals:   Temp (24hrs), Av 1 °F (36 7 °C), Min:97 6 °F (36 4 °C), Max:98 4 °F (36 9 °C)    Temp:  [97 6 °F (36 4 °C)-98 4 °F (36 9 °C)] 98 3 °F (36 8 °C)  HR:  [66-71] 71  Resp:  [18] 18  BP: (144-150)/(77-81) 144/78  SpO2:  [95 %-98 %] 96 %  Body mass index is 27 89 kg/m²  Input and Output Summary (last 24 hours): Intake/Output Summary (Last 24 hours) at 2022 1145  Last data filed at 2022 0830  Gross per 24 hour   Intake 240 ml   Output 300 ml   Net -60 ml        Physical Exam:     Physical Exam  Vitals and nursing note reviewed  Constitutional:       General: He is not in acute distress  HENT:      Head: Normocephalic  Nose: Nose normal       Mouth/Throat:      Mouth: Mucous membranes are moist    Eyes:      Extraocular Movements: Extraocular movements intact  Conjunctiva/sclera: Conjunctivae normal       Pupils: Pupils are equal, round, and reactive to light  Cardiovascular:      Rate and Rhythm: Normal rate and regular rhythm  Pulses: Normal pulses  Heart sounds: Normal heart sounds  Pulmonary:      Effort: Pulmonary effort is normal       Breath sounds: Normal breath sounds  Abdominal:      General: Bowel sounds are normal  There is no distension  Palpations: Abdomen is soft  Tenderness:  There is no abdominal tenderness  Genitourinary:     Comments: Voiding spontaneously  Musculoskeletal:      Cervical back: Normal range of motion  Right lower leg: No edema  Left lower leg: No edema  Comments: Complains of back and neck discomfort   Skin:     General: Skin is warm and dry  Capillary Refill: Capillary refill takes less than 2 seconds  Neurological:      General: No focal deficit present  Mental Status: He is alert and oriented to person, place, and time  Psychiatric:         Mood and Affect: Mood normal          Behavior: Behavior normal          Thought Content: Thought content normal          Judgment: Judgment normal          Additional Data:     Labs:    Results from last 7 days   Lab Units 05/22/22  0630 05/20/22  0532 05/19/22  0550 05/17/22  1710   WBC Thousand/uL 6 28 4 88 10 48* 12 20*   HEMOGLOBIN g/dL 13 7 12 9 13 7 16 2   HEMATOCRIT % 41 2 38 2 40 2 46 9   PLATELETS Thousands/uL 230 230 256 275   NEUTROS PCT %  --  65 78* 79*     Results from last 7 days   Lab Units 05/22/22  0630 05/21/22  0831 05/20/22  0532 05/19/22  0054 05/17/22  1710   SODIUM mmol/L 138 140 141 136 137   POTASSIUM mmol/L 4 0 3 7 4 0 3 3* 3 7   CHLORIDE mmol/L 106 109* 109* 102 99*   CO2 mmol/L 20* 24 23 20* 23   BUN mg/dL 7 7 13 28* 12   CREATININE mg/dL 1 07 1 01 1 00 1 92* 1 02   CALCIUM mg/dL 9 4 9 3 8 7 8 8 9 6   TOTAL BILIRUBIN mg/dL  --   --  0 40 0 39 0 74   ALK PHOS U/L  --   --  76 85 96   ALT U/L  --   --  21 23 29   AST U/L  --   --  21 23 30     Results from last 7 days   Lab Units 05/17/22  1710   INR  1 04         Lab Results   Component Value Date/Time    HGBA1C 5 3 05/17/2022 11:57 PM     Results from last 7 days   Lab Units 05/17/22  1701   POC GLUCOSE mg/dl 92           * I Have Reviewed All Lab Data Listed Above  * Additional Pertinent Lab Tests Reviewed:  All Labs Within Last 24 Hours Reviewed    Imaging:     XR knee 4+ vw right injury   Final Result by Rocio Betancourt MD (05/22 3366) No acute osseous abnormality  Workstation performed: [id number]         CTA head and neck w wo contrast   Final Result by Tamela Gardiner MD (05/18 1806)         1  Subtle evolving cortical hypodensities in the right occipital lobe, correlating to some of the cortical diffusion restriction noted on the MRI earlier today  Findings are worrisome for evolving infarction or perhaps post ictal edema  Other    infectious or inflammatory processes including cerebritis are in the differential diagnosis  Further clinical assessment advised  2   Scattered mild to moderate atherosclerotic disease in the major arteries of the neck is unchanged  3   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 retrospectively similar to the previous CTA from 2021  4   No acute intracranial hemorrhage  Workstation performed: [id number]         MRI brain wo contrast   Final Result by Brunilda Turcios MD (05/18 9885)      Findings suspicious for recent infarct in the posterior right MCA distribution and right posterior cerebral artery distributions (fetal PCA noted on prior CTA)  Findings may also be related to seizures  No acute hemorrhage  No mass effect, shift or herniation  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  Absent flow void in the right transverse and sigmoid sinuses, chronically occluded based on CTA from 2021  The study was marked in [facility] for immediate notification  Workstation performed: [id number]         CT lumbar spine without contrast   Final Result by Lisy Lopez MD (05/17 2045)      No acute fracture or dislocation  Multilevel degenerative changes  Workstation performed: [id number]         CT thoracic spine without contrast   Final Result by Mark Zheng MD (05/17 1830)      Schmorl's nodes at the superior endplates of T2, T3 and possibly T4 of indeterminate age  Workstation performed: [id number]         CT cervical spine without contrast   Final Result by Chitra Carranza MD (05/17 1822)      No cervical spine fracture or traumatic malalignment  Fluid in the sphenoid sinuses  Workstation performed: [id number]         CT head wo contrast   Final Result by Katie Maravilla DO (05/17 1808)      No acute intracranial abnormality  Microangiopathic changes  Workstation performed: [id number]           Imaging Reports Reviewed by myself    Cultures:   Blood Culture:   Lab Results   Component Value Date    BLOODCX No Growth After 5 Days   08/05/2021     Urine Culture:   Lab Results   Component Value Date    URINECX No Growth <1000 cfu/mL 08/05/2021     Sputum Culture: No components found for: SPUTUMCX  Wound Culture: No results found for: WOUNDCULT    Last 24 Hours Medication List:   Current Facility-Administered Medications   Medication Dose Route Frequency Provider Last Rate    acetaminophen  650 mg Oral Q6H PRN Tai Marr PA-C      aspirin  81 mg Oral Daily Sheri JUAN White      atorvastatin  80 mg Oral Daily With [employer], [address]      butalbital-acetaminophen-caffeine  1 tablet Oral Q4H PRN NOAM Enriquez      clopidogrel  75 mg Oral Daily [address] [place], [address]      Diclofenac Sodium  2 g Topical 4x Daily NOAM Savage      fenofibrate  145 mg Oral Daily Tai Marr PA-C      heparin (porcine)  5,000 Units Subcutaneous Q8H [address] Sheri JUAN White      lacosamide (VIMPAT) IVPB  100 mg Intravenous Q12H NOAM Enriquez 100 mg (05/21/22 2336)    levETIRAcetam  1,500 mg Oral Q12H Sherinikolai White PA-C      magnesium oxide  400 mg Oral BID Aletha Carmichael MD      methocarbamol  750 mg Oral [place], [place]      metoprolol succinate  100 mg Oral Daily Sheri JUAN White      ondansetron  4 mg Intravenous Q6H PRN Shell Hopper PA-C      oxyCODONE  10 mg Oral Q4H PRN Keaton Whittington PA-C      oxyCODONE  5 mg Oral Q4H PRN Keaton Whittington PA-C      pantoprazole  20 mg Oral Early Morning Shell Hopper PA-C      thiamine  100 mg Oral Daily Shell Hopper PA-C      verapamil  120 mg Oral HS NOAM Peng          Today, Patient Was Seen By: NOAM Coats    ** Please Note: Dragon 360 Dictation voice to text software may have been used in the creation of this document   **

## 2022-05-22 NOTE — PLAN OF CARE
Problem: Prexisting or High Potential for Compromised Skin Integrity  Goal: Skin integrity is maintained or improved  Description: INTERVENTIONS:  - Identify patients at risk for skin breakdown  - Assess and monitor skin integrity  - Assess and monitor nutrition and hydration status  - Monitor labs   - Assess for incontinence   - Turn and reposition patient  - Assist with mobility/ambulation  - Relieve pressure over bony prominences  - Avoid friction and shearing  - Provide appropriate hygiene as needed including keeping skin clean and dry  - Evaluate need for skin moisturizer/barrier cream  - Collaborate with interdisciplinary team   - Patient/family teaching  - Consider wound care consult   Outcome: Progressing     Problem: MOBILITY - ADULT  Goal: Maintain or return to baseline ADL function  Description: INTERVENTIONS:  -  Assess patient's ability to carry out ADLs; assess patient's baseline for ADL function and identify physical deficits which impact ability to perform ADLs (bathing, care of mouth/teeth, toileting, grooming, dressing, etc )  - Assess/evaluate cause of self-care deficits   - Assess range of motion  - Assess patient's mobility; develop plan if impaired  - Assess patient's need for assistive devices and provide as appropriate  - Encourage maximum independence but intervene and supervise when necessary  - Involve family in performance of ADLs  - Assess for home care needs following discharge   - Consider OT consult to assist with ADL evaluation and planning for discharge  - Provide patient education as appropriate  Outcome: Progressing  Goal: Maintains/Returns to pre admission functional level  Description: INTERVENTIONS:  - Perform BMAT or MOVE assessment daily    - Set and communicate daily mobility goal to care team and patient/family/caregiver  - Collaborate with rehabilitation services on mobility goals if consulted  - Perform Range of Motion 2 times a day    - Reposition patient every 2 hours   - Dangle patient 2 times a day  - Stand patient 2 times a day  - Ambulate patient 2 times a day  - Out of bed to chair 3 times a day   - Out of bed for meals 3 times a day  - Out of bed for toileting  - Record patient progress and toleration of activity level   Outcome: Progressing     Problem: Potential for Falls  Goal: Patient will remain free of falls  Description: INTERVENTIONS:  - Educate patient/family on patient safety including physical limitations  - Instruct patient to call for assistance with activity   - Consult OT/PT to assist with strengthening/mobility   - Keep Call bell within reach  - Keep bed low and locked with side rails adjusted as appropriate  - Keep care items and personal belongings within reach  - Initiate and maintain comfort rounds  - Make Fall Risk Sign visible to staff  - Offer Toileting every 4 Hours, in advance of need  - Initiate/Maintain bed alarm  - Obtain necessary fall risk management equipment: side rail  - Apply yellow socks and bracelet for high fall risk patients  - Consider moving patient to room near nurses station  Outcome: Progressing     Problem: PAIN - ADULT  Goal: Verbalizes/displays adequate comfort level or baseline comfort level  Description: Interventions:  - Encourage patient to monitor pain and request assistance  - Assess pain using appropriate pain scale  - Administer analgesics based on type and severity of pain and evaluate response  - Implement non-pharmacological measures as appropriate and evaluate response  - Consider cultural and social influences on pain and pain management  - Notify physician/advanced practitioner if interventions unsuccessful or patient reports new pain  Outcome: Progressing     Problem: INFECTION - ADULT  Goal: Absence or prevention of progression during hospitalization  Description: INTERVENTIONS:  - Assess and monitor for signs and symptoms of infection  - Monitor lab/diagnostic results  - Monitor all insertion sites, i e  indwelling lines, tubes, and drains  - Monitor endotracheal if appropriate and nasal secretions for changes in amount and color  - [place] appropriate cooling/warming therapies per order  - Administer medications as ordered  - Instruct and encourage patient and family to use good hand hygiene technique  - Identify and instruct in appropriate isolation precautions for identified infection/condition  Outcome: Progressing     Problem: SAFETY ADULT  Goal: Maintain or return to baseline ADL function  Description: INTERVENTIONS:  -  Assess patient's ability to carry out ADLs; assess patient's baseline for ADL function and identify physical deficits which impact ability to perform ADLs (bathing, care of mouth/teeth, toileting, grooming, dressing, etc )  - Assess/evaluate cause of self-care deficits   - Assess range of motion  - Assess patient's mobility; develop plan if impaired  - Assess patient's need for assistive devices and provide as appropriate  - Encourage maximum independence but intervene and supervise when necessary  - Involve family in performance of ADLs  - Assess for home care needs following discharge   - Consider OT consult to assist with ADL evaluation and planning for discharge  - Provide patient education as appropriate  Outcome: Progressing  Goal: Maintains/Returns to pre admission functional level  Description: INTERVENTIONS:  - Perform BMAT or MOVE assessment daily    - Set and communicate daily mobility goal to care team and patient/family/caregiver  - Collaborate with rehabilitation services on mobility goals if consulted  - Perform Range of Motion 2 times a day  - Reposition patient every 2 hours    - Dangle patient 2 times a day  - Stand patient 2 times a day  - Ambulate patient 2 times a day  - Out of bed to chair 3 times a day   - Out of bed for meals 3 times a day  - Out of bed for toileting  - Record patient progress and toleration of activity level   Outcome: Progressing  Goal: Patient will remain free of falls  Description: INTERVENTIONS:  - Educate patient/family on patient safety including physical limitations  - Instruct patient to call for assistance with activity   - Consult OT/PT to assist with strengthening/mobility   - Keep Call bell within reach  - Keep bed low and locked with side rails adjusted as appropriate  - Keep care items and personal belongings within reach  - Initiate and maintain comfort rounds  - Make Fall Risk Sign visible to staff  - Offer Toileting every 4 Hours, in advance of need  - Initiate/Maintain bed alarm  - Obtain necessary fall risk management equipment: side rail  - Apply yellow socks and bracelet for high fall risk patients  - Consider moving patient to room near nurses station  Outcome: Progressing     Problem: DISCHARGE PLANNING  Goal: Discharge to home or other facility with appropriate resources  Description: INTERVENTIONS:  - Identify barriers to discharge w/patient and caregiver  - Arrange for needed discharge resources and transportation as appropriate  - Identify discharge learning needs (meds, wound care, etc )  - Arrange for interpretive services to assist at discharge as needed  - Refer to Case Management Department for coordinating discharge planning if the patient needs post-hospital services based on physician/advanced practitioner order or complex needs related to functional status, cognitive ability, or social support system  Outcome: Progressing     Problem: Knowledge Deficit  Goal: Patient/family/caregiver demonstrates understanding of disease process, treatment plan, medications, and discharge instructions  Description: Complete learning assessment and assess knowledge base    Interventions:  - Provide teaching at level of understanding  - Provide teaching via preferred learning methods  Outcome: Progressing     Problem: Neurological Deficit  Goal: Neurological status is stable or improving  Description: Interventions:  - Monitor and assess patient's level of consciousness, motor function, sensory function, and level of assistance needed for ADLs  - Monitor and report changes from baseline  Collaborate with interdisciplinary team to initiate plan and implement interventions as ordered  - Provide and maintain a safe environment  - Consider seizure precautions  - Consider fall precautions  - Consider aspiration precautions  - Consider bleeding precautions  Outcome: Progressing     Problem: Activity Intolerance/Impaired Mobility  Goal: Mobility/activity is maintained at optimum level for patient  Description: Interventions:  - Assess and monitor patient  barriers to mobility and need for assistive/adaptive devices  - Assess patient's emotional response to limitations  - Collaborate with interdisciplinary team and initiate plans and interventions as ordered  - Encourage independent activity per ability   - Maintain proper body alignment  - Perform active/passive rom as tolerated/ordered  - Plan activities to conserve energy   - Turn patient as appropriate  Outcome: Progressing     Problem: Communication Impairment  Goal: Ability to express needs and understand communication  Description: Assess patient's communication skills and ability to understand information  Patient will demonstrate use of effective communication techniques, alternative methods of communication and understanding even if not able to speak  - Encourage communication and provide alternate methods of communication as needed  - Collaborate with case management/ for discharge needs  - Include patient/family/caregiver in decisions related to communication  Outcome: Progressing     Problem: Potential for Aspiration  Goal: Non-ventilated patient's risk of aspiration is minimized  Description: Assess and monitor vital signs, respiratory status, and labs (WBC)  Monitor for signs of aspiration (tachypnea, cough, rales, wheezing, cyanosis, fever)      - Assess and monitor patient's ability to swallow  - Place patient up in chair to eat if possible  - HOB up at 90 degrees to eat if unable to get patient up into chair   - Supervise patient during oral intake  - Instruct patient/ family to take small bites  - Instruct patient/ family to take small single sips when taking liquids  - Follow patient-specific strategies generated by speech pathologist   Outcome: Progressing     Problem: Nutrition  Goal: Nutrition/Hydration status is improving  Description: Monitor and assess patient's nutrition/hydration status for malnutrition (ex- brittle hair, bruises, dry skin, pale skin and conjunctiva, muscle wasting, smooth red tongue, and disorientation)  Collaborate with interdisciplinary team and initiate plan and interventions as ordered  Monitor patient's weight and dietary intake as ordered or per policy  Utilize nutrition screening tool and intervene per policy  Determine patient's food preferences and provide high-protein, high-caloric foods as appropriate  - Assist patient with eating   - Allow adequate time for meals   - Encourage patient to take dietary supplement as ordered  - Collaborate with clinical nutritionist   - Include patient/family/caregiver in decisions related to nutrition    Outcome: Progressing

## 2022-05-23 VITALS
TEMPERATURE: 98 F | HEART RATE: 65 BPM | OXYGEN SATURATION: 96 % | SYSTOLIC BLOOD PRESSURE: 111 MMHG | HEIGHT: 71 IN | BODY MASS INDEX: 28 KG/M2 | DIASTOLIC BLOOD PRESSURE: 60 MMHG | WEIGHT: 200 LBS | RESPIRATION RATE: 18 BRPM

## 2022-05-23 LAB
ANION GAP SERPL CALCULATED.3IONS-SCNC: 12 MMOL/L (ref 4–13)
BUN SERPL-MCNC: 9 MG/DL (ref 5–25)
CALCIUM SERPL-MCNC: 9.1 MG/DL (ref 8.3–10.1)
CHLORIDE SERPL-SCNC: 107 MMOL/L (ref 100–108)
CO2 SERPL-SCNC: 22 MMOL/L (ref 21–32)
CREAT SERPL-MCNC: 1.14 MG/DL (ref 0.6–1.3)
ERYTHROCYTE [DISTWIDTH] IN BLOOD BY AUTOMATED COUNT: 13.8 % (ref 11.6–15.1)
GFR SERPL CREATININE-BSD FRML MDRD: 70 ML/MIN/1.73SQ M
GLUCOSE SERPL-MCNC: 90 MG/DL (ref 65–140)
HCT VFR BLD AUTO: 39.2 % (ref 36.5–49.3)
HGB BLD-MCNC: 13.3 G/DL (ref 12–17)
MAGNESIUM SERPL-MCNC: 1.7 MG/DL (ref 1.6–2.6)
MCH RBC QN AUTO: 29.4 PG (ref 26.8–34.3)
MCHC RBC AUTO-ENTMCNC: 33.9 G/DL (ref 31.4–37.4)
MCV RBC AUTO: 87 FL (ref 82–98)
PLATELET # BLD AUTO: 230 THOUSANDS/UL (ref 149–390)
PMV BLD AUTO: 9.6 FL (ref 8.9–12.7)
POTASSIUM SERPL-SCNC: 3.7 MMOL/L (ref 3.5–5.3)
RBC # BLD AUTO: 4.52 MILLION/UL (ref 3.88–5.62)
SODIUM SERPL-SCNC: 141 MMOL/L (ref 136–145)
WBC # BLD AUTO: 4.77 THOUSAND/UL (ref 4.31–10.16)

## 2022-05-23 PROCEDURE — 97110 THERAPEUTIC EXERCISES: CPT

## 2022-05-23 PROCEDURE — 99238 HOSP IP/OBS DSCHRG MGMT 30/<: CPT | Performed by: NURSE PRACTITIONER

## 2022-05-23 PROCEDURE — 85027 COMPLETE CBC AUTOMATED: CPT | Performed by: NURSE PRACTITIONER

## 2022-05-23 PROCEDURE — 80048 BASIC METABOLIC PNL TOTAL CA: CPT | Performed by: NURSE PRACTITIONER

## 2022-05-23 PROCEDURE — 83735 ASSAY OF MAGNESIUM: CPT | Performed by: NURSE PRACTITIONER

## 2022-05-23 PROCEDURE — 97116 GAIT TRAINING THERAPY: CPT

## 2022-05-23 RX ORDER — ATORVASTATIN CALCIUM 80 MG/1
80 TABLET, FILM COATED ORAL
Qty: 30 TABLET | Refills: 0 | Status: SHIPPED | OUTPATIENT
Start: 2022-05-23 | End: 2022-05-23 | Stop reason: SDUPTHER

## 2022-05-23 RX ORDER — CLOPIDOGREL BISULFATE 75 MG/1
75 TABLET ORAL DAILY
Qty: 30 TABLET | Refills: 0 | Status: SHIPPED | OUTPATIENT
Start: 2022-05-24 | End: 2022-05-23 | Stop reason: SDUPTHER

## 2022-05-23 RX ORDER — CLOPIDOGREL BISULFATE 75 MG/1
75 TABLET ORAL DAILY
Qty: 16 TABLET | Refills: 0 | Status: SHIPPED | OUTPATIENT
Start: 2022-05-24 | End: 2022-06-09

## 2022-05-23 RX ORDER — ACETAMINOPHEN 325 MG/1
650 TABLET ORAL EVERY 6 HOURS PRN
Qty: 30 TABLET | Refills: 0
Start: 2022-05-23

## 2022-05-23 RX ORDER — ASPIRIN 81 MG/1
81 TABLET, CHEWABLE ORAL DAILY
Qty: 30 TABLET | Refills: 0 | Status: SHIPPED | OUTPATIENT
Start: 2022-05-24

## 2022-05-23 RX ORDER — METHOCARBAMOL 750 MG/1
750 TABLET, FILM COATED ORAL EVERY 6 HOURS SCHEDULED
Qty: 60 TABLET | Refills: 0 | Status: SHIPPED | OUTPATIENT
Start: 2022-05-23

## 2022-05-23 RX ORDER — LANOLIN ALCOHOL/MO/W.PET/CERES
3 CREAM (GRAM) TOPICAL
Qty: 30 TABLET | Refills: 0 | Status: SHIPPED | OUTPATIENT
Start: 2022-05-23

## 2022-05-23 RX ORDER — LACOSAMIDE 100 MG/1
100 TABLET ORAL EVERY 12 HOURS SCHEDULED
Qty: 60 TABLET | Refills: 0 | Status: SHIPPED | OUTPATIENT
Start: 2022-05-23 | End: 2022-06-02

## 2022-05-23 RX ORDER — ATORVASTATIN CALCIUM 80 MG/1
40 TABLET, FILM COATED ORAL
Qty: 30 TABLET | Refills: 0 | Status: SHIPPED | OUTPATIENT
Start: 2022-05-23

## 2022-05-23 RX ADMIN — DICLOFENAC SODIUM TOPICAL GEL, 1%, 2 G: 10 GEL TOPICAL at 08:32

## 2022-05-23 RX ADMIN — DICLOFENAC SODIUM TOPICAL GEL, 1%, 2 G: 10 GEL TOPICAL at 12:06

## 2022-05-23 RX ADMIN — HEPARIN SODIUM 5000 UNITS: 5000 INJECTION INTRAVENOUS; SUBCUTANEOUS at 14:40

## 2022-05-23 RX ADMIN — OXYCODONE HYDROCHLORIDE 10 MG: 10 TABLET ORAL at 08:30

## 2022-05-23 RX ADMIN — ATORVASTATIN CALCIUM 80 MG: 80 TABLET ORAL at 17:23

## 2022-05-23 RX ADMIN — METHOCARBAMOL TABLETS 750 MG: 500 TABLET, COATED ORAL at 12:06

## 2022-05-23 RX ADMIN — ASPIRIN 81 MG CHEWABLE TABLET 81 MG: 81 TABLET CHEWABLE at 08:32

## 2022-05-23 RX ADMIN — CLOPIDOGREL BISULFATE 75 MG: 75 TABLET ORAL at 08:32

## 2022-05-23 RX ADMIN — FENOFIBRATE 145 MG: 145 TABLET, FILM COATED ORAL at 08:27

## 2022-05-23 RX ADMIN — SODIUM CHLORIDE 100 MG: 9 INJECTION, SOLUTION INTRAVENOUS at 12:54

## 2022-05-23 RX ADMIN — MAGNESIUM OXIDE TAB 400 MG (241.3 MG ELEMENTAL MG) 400 MG: 400 (241.3 MG) TAB at 08:27

## 2022-05-23 RX ADMIN — METOPROLOL SUCCINATE 100 MG: 100 TABLET, EXTENDED RELEASE ORAL at 08:27

## 2022-05-23 RX ADMIN — MAGNESIUM OXIDE TAB 400 MG (241.3 MG ELEMENTAL MG) 400 MG: 400 (241.3 MG) TAB at 17:23

## 2022-05-23 RX ADMIN — METHOCARBAMOL TABLETS 750 MG: 500 TABLET, COATED ORAL at 05:24

## 2022-05-23 RX ADMIN — PANTOPRAZOLE SODIUM 20 MG: 20 TABLET, DELAYED RELEASE ORAL at 05:24

## 2022-05-23 RX ADMIN — THIAMINE HCL TAB 100 MG 100 MG: 100 TAB at 08:32

## 2022-05-23 RX ADMIN — LEVETIRACETAM 1500 MG: 500 TABLET, FILM COATED ORAL at 08:32

## 2022-05-23 RX ADMIN — HEPARIN SODIUM 5000 UNITS: 5000 INJECTION INTRAVENOUS; SUBCUTANEOUS at 05:25

## 2022-05-23 RX ADMIN — METHOCARBAMOL TABLETS 750 MG: 500 TABLET, COATED ORAL at 17:24

## 2022-05-23 RX ADMIN — DICLOFENAC SODIUM TOPICAL GEL, 1%, 2 G: 10 GEL TOPICAL at 17:24

## 2022-05-23 RX ADMIN — BUTALBITAL, ACETAMINOPHEN AND CAFFEINE 1 TABLET: 50; 325; 40 TABLET ORAL at 12:05

## 2022-05-23 NOTE — CASE MANAGEMENT
Case Management Discharge Planning Note    Patient name Justin Valenzuela  Location [address]-* MRN [id number]  : 1965 Date 2022       Current Admission Date: 2022  Current Admission Diagnosis:Stroke-like symptoms   Patient Active Problem List    Diagnosis Date Noted    CVA (cerebral vascular accident) ([facility] ) 2022    Weakness 2022    Hypomagnesemia 2022    Stroke-like symptoms 2022    Contusion of right foot     Right foot pain     Arthralgia of right foot     Chronic tophaceous gout of right foot     Syncopal episodes 2022    SIRS (systemic inflammatory response syndrome) ([facility] ) 2022    Acute gout of multiple sites 2021    Hypertension 2021    Hyperlipidemia 2021    Smoking 2021    GERD (gastroesophageal reflux disease) 2021    Depression 2021    Left bundle branch block 2021    Thrombocytopenia (HCC) 2021    Prolonged Q-T interval on ECG 2021    Rhabdomyolysis 2021    [facility] seizure ([facility] ) 2021    Seizure disorder ([place] [address] ) 2021    History of cerebral hemorrhage 2021    History of alcohol abuse 2021    Hypocalcemia 2017    Lactic acidosis 2017    Rhabdomyolysis 2017    Nicotine dependence 2017    Dyslipidemia 2017    Seizure ([facility] ) 2017    Diarrhea 2016    Blood in stool 2016    Hypertension     Hyperlipidemia     GERD (gastroesophageal reflux disease)     Compression neuropathy of lower extremity 02/10/2016      LOS (days): 6  Geometric Mean LOS (GMLOS) (days):   Days to GMLOS:     OBJECTIVE:  Risk of Unplanned Readmission Score: 15 28         Current admission status: Inpatient   Preferred Pharmacy:   [address], [address], [address]    [place] [address] [address]  Phone: [phone] Fax: [phone] [address] [facility] #[id number], [address] [place], [place] [address]  [address]  Phone: [phone] Fax: [phone]    Renetta Jackson [address]957  30[facility] [address] [address]  Phone: [phone] Fax: [phone] [place], [place] - [address]  309 [facility]  [address] [facility] [address]  Phone: [phone] Fax: [phone]    Primary Care Provider: No primary care provider on file      Primary Insurance: BLUE CROSS  Secondary Insurance:     DISCHARGE DETAILS:                                                                                                               Facility Insurance Auth Number: Approved [facility] auth  # [id number] for Complete Care at [facility] MED C & MARY CC x3 days [facility]: 5/23/22 NRD: 5/25/2022 Care cood: Danielle ARMENDARIZ# [phone] Fax clinical review to [phone]

## 2022-05-23 NOTE — ASSESSMENT & PLAN NOTE
· PT/OT recommending inpatient rehab again  Patient had refused on prior admission but is agreeable now  Will discharge to [facility] with insurance authorization obtained    · Discussed with case management awaiting authorization from insurance company  · Follow-up case management

## 2022-05-23 NOTE — CASE MANAGEMENT
Case Management Progress Note    Patient name Karina De Jesus  Location [address] [facility] [address] [place] [address]-* MRN [id number]  : 1965 Date 2022       LOS (days): 6  Geometric Mean LOS (GMLOS) (days):   Days to [facility]:        OBJECTIVE:  Current admission status: Inpatient  Preferred Pharmacy:   [address] [facility] [place], [address], [address]  [place] [address]  Phone: [phone] Fax: [phone]    [address] #[id number], [address]  Phone: [phone] Fax: [phone]    [facility]ly [facility]  [facility] [address] [place] [address] [address]  [address] [address]  Phone: [phone] Fax: [phone] - [place],  [facility] [address] [place] [address]  Phone: [phone] Fax: [phone]    Primary Care Provider: No primary care provider on file  Primary Insurance: BLUE CROSS  Secondary Insurance:     PROGRESS NOTE:    CM checked on authorization status for STR  Spoke with Sabiha Chavez in [facility] [address] support and Mehdi Garces is still pending at this time  CM to follow

## 2022-05-23 NOTE — PHYSICAL THERAPY NOTE
PT TREATMENT     05/23/22 1010   Note Type   Note Type Treatment   Pain Assessment   Pain Assessment Tool 0-10   Pain Score No Pain   Restrictions/Precautions   Other Precautions Visual impairment; Fall Risk;Bed Alarm; Chair Alarm;Cognitive   General   Chart Reviewed Yes   Cognition   Overall Cognitive Status Impaired   Arousal/Participation Cooperative   Following Commands Follows one step commands with increased time or repetition   Subjective   Subjective "I'm itchy all over"  (RN aware)   Bed Mobility   Supine to Sit 4  Minimal assistance   Additional items   (L LE verbal and tactile cues)   Transfers   Sit to Stand 4  Minimal assistance   Additional items Verbal cues; Increased time required   Stand to Sit 4  Minimal assistance   Additional items   (mod verbal cues for safety)   Ambulation/Elevation   Gait pattern   (ataxic, veering R, walking sideways at times)   Gait Assistance 3  Moderate assist   Assistive Device   (rolling walker, hand held, rail in hi)   Distance total of 60 feet   Balance   Static Sitting Fair   Dynamic Sitting Fair -   Static Standing Fair -   Dynamic Standing Poor   Activity Tolerance   Activity Tolerance Patient limited by fatigue   Exercises   Neuro re-ed standing heel raises with UE support on walker x 10, standing marching with UE support on walker x 10 , standing without UE support x 30 seconds, standing with head turns L and R without UE support on walker x 10 each   Assessment   Prognosis Good   Problem List Decreased endurance; Impaired balance;Decreased mobility; Decreased cognition; Impaired judgement;Decreased coordination;Decreased safety awareness; Impaired vision; Impaired sensation   Assessment Patient demonstrates improving strength balance function and gait status post CVA  Patient requires mod assist to stand and ambulate with or without a rolling walker    Patient has visual deficits have a significant effect on his mobility and safety awareness as pt was walking sideways insteady of forward even with verbal and tactile cues  The patient's AM-PAC Basic Mobility Inpatient Short Form Raw Score is 14  A Raw score of less than or equal to 16 suggests the patient may benefit from discharge to post-acute rehabilitation services  Plan   Treatment/Interventions ADL retraining;Functional transfer training;LE strengthening/ROM; Therapeutic exercise;Cognitive reorientation;Patient/family training;Equipment eval/education; Bed mobility;Gait training   Progress Progressing toward goals   PT Frequency Other (Comment)  (daily)   Recommendation   PT Discharge Recommendation Post acute rehabilitation services   AM-PAC Basic Mobility Inpatient   Turning in Bed Without Bedrails 3   Lying on Back to Sitting on Edge of Flat Bed 3   Moving Bed to Chair 2   Standing Up From Chair 3   Walk in Room 2   Climb 3-5 Stairs 1   Basic Mobility Inpatient Raw Score 14   Basic Mobility Standardized Score 35 55   Turning Head Towards Sound 3   Follow Simple Instructions 2   Low Function Basic Mobility Raw Score 19   Low Function Basic Mobility Standardized Score 31 06   Highest Level Of Mobility   JH-HLM Goal 4: Move to chair/commode   JH-HLM Achieved 7: Walk 25 feet or more   End of Consult   Patient Position at End of Consult Bedside chair;Bed/Chair alarm activated; All needs within reach   [facility] License Number  [place] PT [id number]

## 2022-05-23 NOTE — PLAN OF CARE
Problem: Prexisting or High Potential for Compromised Skin Integrity  Goal: Skin integrity is maintained or improved  Description: INTERVENTIONS:  - Identify patients at risk for skin breakdown  - Assess and monitor skin integrity  - Assess and monitor nutrition and hydration status  - Monitor labs   - Assess for incontinence   - Turn and reposition patient  - Assist with mobility/ambulation  - Relieve pressure over bony prominences  - Avoid friction and shearing  - Provide appropriate hygiene as needed including keeping skin clean and dry  - Evaluate need for skin moisturizer/barrier cream  - Collaborate with interdisciplinary team   - Patient/family teaching  - Consider wound care consult   Outcome: Progressing     Problem: MOBILITY - ADULT  Goal: Maintain or return to baseline ADL function  Description: INTERVENTIONS:  -  Assess patient's ability to carry out ADLs; assess patient's baseline for ADL function and identify physical deficits which impact ability to perform ADLs (bathing, care of mouth/teeth, toileting, grooming, dressing, etc )  - Assess/evaluate cause of self-care deficits   - Assess range of motion  - Assess patient's mobility; develop plan if impaired  - Assess patient's need for assistive devices and provide as appropriate  - Encourage maximum independence but intervene and supervise when necessary  - Involve family in performance of ADLs  - Assess for home care needs following discharge   - Consider OT consult to assist with ADL evaluation and planning for discharge  - Provide patient education as appropriate  Outcome: Progressing  Goal: Maintains/Returns to pre admission functional level  Description: INTERVENTIONS:  - Perform BMAT or MOVE assessment daily    - Set and communicate daily mobility goal to care team and patient/family/caregiver  - Collaborate with rehabilitation services on mobility goals if consulted  - Perform Range of Motion 3 times a day    - Reposition patient every 2 hours   - Dangle patient 3 times a day  - Stand patient 3 times a day  - Ambulate patient 3 times a day  - Out of bed to chair 3 times a day   - Out of bed for meals 3 times a day  - Out of bed for toileting  - Record patient progress and toleration of activity level   Outcome: Progressing     Problem: Potential for Falls  Goal: Patient will remain free of falls  Description: INTERVENTIONS:  - Educate patient/family on patient safety including physical limitations  - Instruct patient to call for assistance with activity   - Consult OT/PT to assist with strengthening/mobility   - Keep Call bell within reach  - Keep bed low and locked with side rails adjusted as appropriate  - Keep care items and personal belongings within reach  - Initiate and maintain comfort rounds  - Make Fall Risk Sign visible to staff  - Offer Toileting every 2 Hours, in advance of need  - Initiate/Maintain bed alarm  - Obtain necessary fall risk management equipment  - Apply yellow socks and bracelet for high fall risk patients  - Consider moving patient to room near nurses station  Outcome: Progressing     Problem: PAIN - ADULT  Goal: Verbalizes/displays adequate comfort level or baseline comfort level  Description: Interventions:  - Encourage patient to monitor pain and request assistance  - Assess pain using appropriate pain scale  - Administer analgesics based on type and severity of pain and evaluate response  - Implement non-pharmacological measures as appropriate and evaluate response  - Consider cultural and social influences on pain and pain management  - Notify physician/advanced practitioner if interventions unsuccessful or patient reports new pain  Outcome: Progressing     Problem: SAFETY ADULT  Goal: Maintain or return to baseline ADL function  Description: INTERVENTIONS:  -  Assess patient's ability to carry out ADLs; assess patient's baseline for ADL function and identify physical deficits which impact ability to perform ADLs (bathing, care of mouth/teeth, toileting, grooming, dressing, etc )  - Assess/evaluate cause of self-care deficits   - Assess range of motion  - Assess patient's mobility; develop plan if impaired  - Assess patient's need for assistive devices and provide as appropriate  - Encourage maximum independence but intervene and supervise when necessary  - Involve family in performance of ADLs  - Assess for home care needs following discharge   - Consider OT consult to assist with ADL evaluation and planning for discharge  - Provide patient education as appropriate  Outcome: Progressing  Goal: Maintains/Returns to pre admission functional level  Description: INTERVENTIONS:  - Perform BMAT or MOVE assessment daily    - Set and communicate daily mobility goal to care team and patient/family/caregiver  - Collaborate with rehabilitation services on mobility goals if consulted  - Perform Range of Motion 3 times a day  - Reposition patient every 2 hours    - Dangle patient 3 times a day  - Stand patient 3 times a day  - Ambulate patient 3 times a day  - Out of bed to chair 3 times a day   - Out of bed for meals 3 times a day  - Out of bed for toileting  - Record patient progress and toleration of activity level   Outcome: Progressing  Goal: Patient will remain free of falls  Description: INTERVENTIONS:  - Educate patient/family on patient safety including physical limitations  - Instruct patient to call for assistance with activity   - Consult OT/PT to assist with strengthening/mobility   - Keep Call bell within reach  - Keep bed low and locked with side rails adjusted as appropriate  - Keep care items and personal belongings within reach  - Initiate and maintain comfort rounds  - Make Fall Risk Sign visible to staff  - Offer Toileting every 2 Hours, in advance of need  - Initiate/Maintain bed alarm  - Obtain necessary fall risk management equipmen  - Apply yellow socks and bracelet for high fall risk patients  - Consider moving patient to room near nurses station  Outcome: Progressing     Problem: Knowledge Deficit  Goal: Patient/family/caregiver demonstrates understanding of disease process, treatment plan, medications, and discharge instructions  Description: Complete learning assessment and assess knowledge base  Interventions:  - Provide teaching at level of understanding  - Provide teaching via preferred learning methods  Outcome: Progressing     Problem: Neurological Deficit  Goal: Neurological status is stable or improving  Description: Interventions:  - Monitor and assess patient's level of consciousness, motor function, sensory function, and level of assistance needed for ADLs  - Monitor and report changes from baseline  Collaborate with interdisciplinary team to initiate plan and implement interventions as ordered  - Provide and maintain a safe environment  - Consider seizure precautions  - Consider fall precautions  - Consider aspiration precautions  - Consider bleeding precautions  Outcome: Progressing     Problem: Activity Intolerance/Impaired Mobility  Goal: Mobility/activity is maintained at optimum level for patient  Description: Interventions:  - Assess and monitor patient  barriers to mobility and need for assistive/adaptive devices  - Assess patient's emotional response to limitations  - Collaborate with interdisciplinary team and initiate plans and interventions as ordered  - Encourage independent activity per ability   - Maintain proper body alignment  - Perform active/passive rom as tolerated/ordered  - Plan activities to conserve energy   - Turn patient as appropriate  Outcome: Progressing     Problem: Potential for Aspiration  Goal: Non-ventilated patient's risk of aspiration is minimized  Description: Assess and monitor vital signs, respiratory status, and labs (WBC)  Monitor for signs of aspiration (tachypnea, cough, rales, wheezing, cyanosis, fever)      - Assess and monitor patient's ability to swallow  - Place patient up in chair to eat if possible  - HOB up at 90 degrees to eat if unable to get patient up into chair   - Supervise patient during oral intake  - Instruct patient/ family to take small bites  - Instruct patient/ family to take small single sips when taking liquids  - Follow patient-specific strategies generated by speech pathologist   Outcome: Progressing     Problem: Nutrition  Goal: Nutrition/Hydration status is improving  Description: Monitor and assess patient's nutrition/hydration status for malnutrition (ex- brittle hair, bruises, dry skin, pale skin and conjunctiva, muscle wasting, smooth red tongue, and disorientation)  Collaborate with interdisciplinary team and initiate plan and interventions as ordered  Monitor patient's weight and dietary intake as ordered or per policy  Utilize nutrition screening tool and intervene per policy  Determine patient's food preferences and provide high-protein, high-caloric foods as appropriate  - Assist patient with eating   - Allow adequate time for meals   - Encourage patient to take dietary supplement as ordered  - Collaborate with clinical nutritionist   - Include patient/family/caregiver in decisions related to nutrition    Outcome: Progressing

## 2022-05-23 NOTE — DISCHARGE SUMMARY
[address] Yuan 1965, 59 y o  male MRN: [id number]  Unit/Bed#: [address] [place] [address] Encounter: [id number]  Primary Care Provider: No primary care provider on file  Date and time admitted to hospital: 5/17/2022  4:32 PM    * Stroke-like symptoms  Assessment & Plan  Patient presents after being found on the ground at home, incontinent and disoriented  · Possibly due to postictal state from seizure vs stroke  · CT head shows no acute intracranial abnormalities  · CT spine with no acute fractures or dislocation  · CTA head/neck:  Subtle evolving cortical hypodensities in the right occipital lobe, correlating to some of the cortical diffusion restriction noted on the MRI  Findings are worrisome for evolving infarction or perhaps postictal edema  · MRI brain:  Suspicious for recent infarct in the posterior right MCA distribution and right posterior cerebral artery distributions  Findings may also be related seizures  No acute hemorrhage  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  · S/p Ativan and Keppra 1000 mg in ED  · CK normal  · Restarted Keppra 1500 mg b i d  · Vimpat added for seizure control   · Seizure, fall precautions  · Neurology consulted and appreciate their recommendations  · Unclear whether event is seizure vs stroke based on findings on imaging  · Recommend repeat MRI in 2-3 months to check for evolution  · S/p BEBO with no evidence of PFO or thrombus  · S/p Loop recorder implantation  · Recommend DAPT for 21 days then daily ASA  · Continue Lipitor 80 mg while in hospital   Discharge on 40 mg daily    · Thrombosis panel:  · Beta-2 glycoprotein antibodies: Negative  · Cardiolipin antibodies: Negative  · Protein C activity:  Elevated  · Proteic S activity: Normal  · Anti thrombin 3 activity:  Normal  · Factor 5 Leiden:  Pending  · Prothrombin gene mutation:  Pending  · Lupus anticoagulant: pending    Weakness  Assessment & Plan  · PT/OT recommending inpatient rehab again  Patient had refused on prior admission but is agreeable now  Will discharge to [facility] with insurance authorization obtained  · Discussed with case management awaiting authorization from insurance company  · Follow-up case management    CVA (cerebral vascular accident) [facility])  Assessment & Plan  · Recent Rt Posterior MCA and Rt PCA distribution  · Unclear why patient has had strokes  Work up in progress  · Loop recorder has been implanted  Follow up with cardiology as an out-patient for monitoring  · Continue ASA and Plavix x21 days then ASA alone  · Continue Lipitor and Tricor  Hypomagnesemia  Assessment & Plan  · Magnesium 5/20:  1 5  · Recheck on 05/22 is 1 5, continue with oral magnesium supplementation and will give additional 2 g IV  · Recheck magnesium in a m  Smoking  Assessment & Plan  Encouraged smoking cessation, continue with nicotine patch    History of alcohol abuse  Assessment & Plan  No recent alcohol use  · Continue thiamine    Seizure disorder [facility])  Assessment & Plan  Patient on Keppra 1500 mg b i d  From known seizure disorder  · Unsure if patient is compliant with medication  · Keppra level: pending  · Seizure, fall precautions  · Neurology consulted and appreciate their recommendations  · Vimpat 100 mg BID added by neurology      Hyperlipidemia  Assessment & Plan  Continue Tricor  Heart healthy diet    Hypertension  Assessment & Plan  · Continue verapamil and metoprolol on discharge     Discharging Physician / Practitioner: NOAM Wharton  PCP: No primary care provider on file  Admission Date: 5/17/2022  Discharge Date: 05/23/22    Reason for Admission: Fall (Patient states he collapsed and fell - states was on the floor for several hours  States he thinks he was having seizures (states he was "convulsing, but awake the whole time")    Requesting  for help in the home )        Medical Problems             Resolved Problems  Date Reviewed: 5/23/2022          Resolved    Hypokalemia 5/20/2022     Resolved by  Jay Crandall PA-C    Acute kidney injury ([address] ) 5/20/2022     Resolved by  Jay Crandall PA-C                Consultations During Hospital Stay:  IP CONSULT TO NEUROLOGY  IP CONSULT TO CASE MANAGEMENT  IP CONSULT TO CARDIOLOGY    ·     Significant Findings / Test Results:     ·   Results from last 7 days   Lab Units 05/23/22  0519 05/22/22  0630 05/20/22  0532   WBC Thousand/uL 4 77 6 28 4 88   HEMOGLOBIN g/dL 13 3 13 7 12 9   PLATELETS Thousands/uL 230 230 230     Results from last 7 days   Lab Units 05/23/22  0519 05/22/22  0630 05/21/22  0831 05/20/22  0532 05/19/22  0054 05/17/22  1710   SODIUM mmol/L 141 138 140 141 136 137   POTASSIUM mmol/L 3 7 4 0 3 7 4 0 3 3* 3 7   CHLORIDE mmol/L 107 106 109* 109* 102 99*   CO2 mmol/L 22 20* 24 23 20* 23   BUN mg/dL 9 7 7 13 28* 12   CREATININE mg/dL 1 14 1 07 1 01 1 00 1 92* 1 02   CALCIUM mg/dL 9 1 9 4 9 3 8 7 8 8 9 6   TOTAL BILIRUBIN mg/dL  --   --   --  0 40 0 39 0 74   ALK PHOS U/L  --   --   --  76 85 96   ALT U/L  --   --   --  21 23 29   AST U/L  --   --   --  21 23 30     Results from last 7 days   Lab Units 05/17/22  1710   INR  1 04         Lab Results   Component Value Date/Time    HGBA1C 5 3 05/17/2022 11:57 PM     Results from last 7 days   Lab Units 05/17/22  1701   POC GLUCOSE mg/dl 92         Blood Culture:   Lab Results   Component Value Date    BLOODCX No Growth After 5 Days  08/05/2021     Urine Culture:   Lab Results   Component Value Date    URINECX No Growth <1000 cfu/mL 08/05/2021     Sputum Culture: No components found for: SPUTUMCX  Wound Culture: No results found for: WOUNDCULT     XR knee 4+ vw right injury   Final Result by Daniel Muñiz MD (05/22 8910)      No acute osseous abnormality              Workstation performed: [id number]         CTA head and neck w wo contrast   Final Result by Cassi Cid Roc Jules MD (05/18 1806)         1  Subtle evolving cortical hypodensities in the right occipital lobe, correlating to some of the cortical diffusion restriction noted on the MRI earlier today  Findings are worrisome for evolving infarction or perhaps post ictal edema  Other    infectious or inflammatory processes including cerebritis are in the differential diagnosis  Further clinical assessment advised  2   Scattered mild to moderate atherosclerotic disease in the major arteries of the neck is unchanged  3   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 retrospectively similar to the previous CTA from 2021  4   No acute intracranial hemorrhage  Workstation performed: [id number]         MRI brain wo contrast   Final Result by Param Garnica MD (05/18 1045)      Findings suspicious for recent infarct in the posterior right MCA distribution and right posterior cerebral artery distributions (fetal PCA noted on prior CTA)  Findings may also be related to seizures  No acute hemorrhage  No mass effect, shift or herniation  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  Absent flow void in the right transverse and sigmoid sinuses, chronically occluded based on CTA from 2021  The study was marked in [facility] for immediate notification  Workstation performed: [id number]         CT lumbar spine without contrast   Final Result by Darwyn Canavan, MD (05/17 2045)      No acute fracture or dislocation  Multilevel degenerative changes  Workstation performed: [id number]         CT thoracic spine without contrast   Final Result by Marko Herbert MD (05/17 1830)      Schmorl's nodes at the superior endplates of T2, T3 and possibly T4 of indeterminate age              Workstation performed: [id number]         CT cervical spine without contrast   Final Result by Marko Herbert MD (05/17 1822)      No cervical spine fracture or traumatic malalignment  Fluid in the sphenoid sinuses  Workstation performed: [id number]         CT head wo contrast   Final Result by Nic Bella DO (05/17 1808)      No acute intracranial abnormality  Microangiopathic changes  Workstation performed: [id number]               Test Results Pending at Discharge (will require follow up): · Thrombosis panel  · Keppra level      Outpatient Tests Requested:  · None    Complications:  None    Reason for Admission:   Chief Complaint   Patient presents with    Fall     Patient states he collapsed and fell - states was on the floor for several hours  States he thinks he was having seizures (states he was "convulsing, but awake the whole time")  Requesting  for help in the home  Hospital Course:     Per HPI: Nasreen Vilchis is a 59 y o  male patient with a PMH of anxiety, GERD, hypertension, hyperlipidemia who originally presented to the hospital on 5/17/2022 due to a fall  Patient was found on the ground at home incontinent and disoriented  Patient does have a history of seizures  CT head showed no acute abnormalities  CT of the cervical spine with no acute fractures or dislocations  CT of the head and neck revealed a subtle involving cortical hypodensity in the right occipital load worrisome for an involving infarction or post ictal edema  Patient was admitted  Neurology consult was appreciated  MRI of the brain revealed a suspicion for recent infarct in the posterior right MCA distribution and right posterior cerebral artery distributions, could also be related to seizures thus, stable small chronic infarcts  Keppra was increased to 1500 b i d  And Vimpat was added for seizure controls  Patient was started on dual anti-platelet therapy  This will be continued for 21 days with Plavix and there after patient will be on aspirin 81 mg daily  Continue statin    Thrombosis panel is pending, patient will need follow-up as an outpatient  Hospital Course:    Please see above list of diagnoses and related plan for additional information  Condition at Discharge: good       Discharge Day Visit / Exam:     Subjective:  Patient reports continued headaches but otherwise feels well  He is sitting up in chair at time of exam     Vitals: Blood Pressure: 135/78 (05/23/22 0801)  Pulse: 61 (05/23/22 0801)  Temperature: (!) 97 3 °F (36 3 °C) (05/23/22 0801)  Temp Source: Oral (05/21/22 2340)  Respirations: 17 (05/23/22 0801)  Height: 5' 11" (180 3 cm) (05/19/22 1401)  Weight - Scale: 90 7 kg (200 lb) (05/19/22 1401)  SpO2: 96 % (05/23/22 0801)  Exam:   Physical Exam  Vitals and nursing note reviewed  Constitutional:       Appearance: Normal appearance  HENT:      Head: Normocephalic  Nose: Nose normal    Eyes:      Extraocular Movements: Extraocular movements intact  Cardiovascular:      Rate and Rhythm: Normal rate and regular rhythm  Pulses: Normal pulses  Heart sounds: No murmur heard  Pulmonary:      Effort: Pulmonary effort is normal       Breath sounds: Normal breath sounds  Abdominal:      General: Abdomen is flat  Bowel sounds are normal  There is no distension  Palpations: Abdomen is soft  Tenderness: There is no abdominal tenderness  Musculoskeletal:         General: No swelling or deformity  Normal range of motion  Skin:     General: Skin is warm and dry  Neurological:      General: No focal deficit present  Mental Status: He is alert and oriented to person, place, and time  Psychiatric:         Mood and Affect: Mood normal          Behavior: Behavior normal            Discharge instructions/Information to patient and family:   See after visit summary for information provided to patient and family        Provisions for Follow-Up Care:  See after visit summary for information related to follow-up care and any pertinent home health orders  Disposition:     Cinda Mason at [facility] Readmission: none      Discharge Statement:  I spent 30 minutes discharging the patient  This time was spent on the day of discharge  I had direct contact with the patient on the day of discharge  Greater than 50% of the total time was spent examining patient, answering all patient questions, arranging and discussing plan of care with patient as well as directly providing post-discharge instructions  Additional time then spent on discharge activities  Discharge Medications:  See after visit summary for reconciled discharge medications provided to patient and family        ** Please Note: This note has been constructed using a voice recognition system **

## 2022-05-23 NOTE — CASE MANAGEMENT
Case Management Discharge Planning Note    Patient name Lissette Stein  Location [address] MRN [id number]  : 1965 Date 2022       Current Admission Date: 2022  Current Admission Diagnosis:Stroke-like symptoms   Patient Active Problem List    Diagnosis Date Noted    CVA [facility]cerebral vascular accident) ([facility] ) 2022    Weakness 2022    Hypomagnesemia 2022    Stroke-like symptoms 2022    Contusion of right foot     Right foot pain     Arthralgia of right foot     Chronic tophaceous gout of right foot     Syncopal episodes 2022    SIRS (systemic inflammatory response syndrome) ([place] [address] ) 2022    Acute gout of multiple sites 2021    Hypertension 2021    Hyperlipidemia 2021    Smoking 2021    GERD (gastroesophageal reflux disease) 2021    Depression 2021    Left bundle branch block 2021    Thrombocytopenia (HCC) 2021    Prolonged Q-T interval on ECG 2021    Rhabdomyolysis 2021    Breakthrough seizure ([facility] ) 2021    Seizure disorder ([place] [address] ) 2021    History of cerebral hemorrhage 2021    History of alcohol abuse 2021    Hypocalcemia 2017    Lactic acidosis 2017    Rhabdomyolysis 2017    Nicotine dependence 2017    Dyslipidemia 2017    Seizure ([facility] ) 2017    Diarrhea 2016    Blood in stool 2016    Hypertension     Hyperlipidemia     GERD (gastroesophageal reflux disease)     Compression neuropathy of lower extremity 02/10/2016      LOS (days): 6  Geometric Mean LOS (GMLOS) (days):   Days to GMLOS:     OBJECTIVE:  Risk of Unplanned Readmission Score: 15 28   Current admission status: Inpatient   Preferred Pharmacy:   [address], [address], [address]   [place] [address]  Phone: [phone] Fax: [address] [facility] #[id number], [address] [facility] [address] [address]  Phone: [phone] Fax: [phone]    Renetta Jackson [address] [address]  Phone: [phone] Fax: [phone] - [place], [address] - [address]  [address]  [address] [address]  Phone: [phone] Fax: [phone]    Primary Care Provider: No primary care provider on file  Primary Insurance: BLUE CROSS  Secondary Insurance:     DISCHARGE DETAILS:  Discharge planning discussed with[de-identified] Patients daughter Hai Verdin (daughter) [phone]  Freedom of Choice: Yes  Comments - Freedom of Choice: [facility] (first preference-bed is available today and auth obtained for this facility)  CM contacted family/caregiver?: Yes  Were Treatment Team discharge recommendations reviewed with patient/caregiver?: Yes  Did patient/caregiver verbalize understanding of patient care needs?: Yes  Were patient/caregiver advised of the risks associated with not following Treatment Team discharge recommendations?: Yes    Contacts  Patient Contacts: Hai Verdin (daughter)  Relationship to Patient[de-identified] Family  Contact Method: Phone  Phone Number: [phone]  Reason/Outcome: Emergency Contact, Discharge Planning, Continuity of [facility]         Is the patient interested in [facility] at discharge?: No    DME Referral Provided  Referral made for DME?: No    Other Referral/Resources/Interventions Provided:  Interventions: Transportation, Short Term Rehab    Would you like to participate in our [address] service program?  : No - Declined    Treatment Team Recommendation: Short Term Rehab  Discharge Destination Plan[de-identified] Short Term Rehab  Transport at Discharge : [facility]  Dispatcher Contacted: Yes     Number/Name of Dispatcher: Renae Crabtree and Unit #):  REY FRANK of Transport (Date): 05/23/22 at 1630  Transfer Mode: [address] Name, [address] : [facility]  Receiving Facility/Agency Phone Number: [phone]     [facility] spoke with Yanely Conway at [facility] and patient has a 1630 BLS transport with [facility]  Jakob zaman/BETSY and patients nurse Caity Quarles are aware of transport time and accepting facility  Patients daughter Juancarlos Lam was also made aware and Lukasz Soto at [facility]

## 2022-05-23 NOTE — ASSESSMENT & PLAN NOTE
Patient on Keppra 1500 mg b i d  From known seizure disorder  · Unsure if patient is compliant with medication  · Keppra level: pending  · Seizure, fall precautions  · Neurology consulted and appreciate their recommendations    · Vimpat 100 mg BID added by neurology

## 2022-05-23 NOTE — [place] UNIVERSAL TRANSFER FORM
[place] UNIVERSAL TRANSFER FORM  (ALL ITEMS MUST BE COMPLETED)    1  TRANSFER FROM: [address] [facility]      TRANSFER TO: [facility]    2  DATE OF TRANSFER: 5/23/2022                        TIME OF TRANSFER: 1630    3  PATIENT NAME: GEORGE Lei      YOB: 1965                             GENDER: male    4  LANGUAGE:   English    5  PHYSICIAN NAME:  Jimmye Boast, DO                   PHONE: [phone]    6  CODE STATUS: Level 1 - Full Code        Out of Hospital DNR Attached: No    7  :                                      :  Extended Emergency Contact Information  Primary Emergency Contact: Lu Velez  Address: [address] Phone: [phone]  Relation: Sister  Secondary Emergency Contact: HANNAH Yuan  Mobile Phone: [phone]  Relation: Daughter           Health Care Representative/Proxy:  No           Legal Guardian:  No             NAME OF:           HEALTH CARE REPRESENTATIVE/PROXY:                                         OR           LEGAL GUARDIAN, IF NOT :                                               PHONE:  (Day)           (Night)                        (Cell)    8  REASON FOR TRANSFER: (Must include brief medical history and recent changes in physical function or cognition ) Needs physical therapy and strengethening            V/S: /60   Pulse 65   Temp 98 °F (36 7 °C)   Resp 18   Ht 5' 11" (1 803 m)   Wt 90 7 kg (200 lb)   SpO2 96%   BMI 27 89 kg/m²           PAIN: None    9  PRIMARY DIAGNOSIS: Stroke-like symptoms      Secondary Diagnosis:         Pacemaker: No      Internal Defib: No          Mental Health Diagnosis (if Applicable):    10  RESTRAINTS: No     11  RESPIRATORY NEEDS: None    12  ISOLATION/PRECAUTION: None    13  ALLERGY: Codeine, Penicillins, and Hydrocodone    14   SENSORY:       Vision Good, Hearing Good and Speech Clear    15  SKIN CONDITION: No Wounds    16  DIET: Regular    17  IV ACCESS: None    18  PERSONAL ITEMS SENT WITH PATIENT: None    19  ATTACHED DOCUMENTS: MUST ATTACH CURRENT MEDICATION INFORMATION Face Sheet, MAR and Medication Reconciliation    20  AT RISK ALERTS:None        HARM TO: N/A    21  WEIGHT BEARING STATUS:         Left Leg: Limited        Right Leg: Limited    22  MENTAL STATUS:Alert, Forgetful and Disoriented    23  FUNCTION:        Walk: With Help        Transfer: With Help        Toilet: With Help        Feed: Self    24  IMMUNIZATIONS/SCREENING:     Immunization History   Administered Date(s) Administered    COVID-19 J&J ([employer]) vaccine 0 5 mL 09/10/2021    DTaP 5 08/07/2008    Tdap 09/04/2015       25  BOWEL: Continent    26  BLADDER: Continent    27   SENDING FACILITY CONTACT: Halina Peralta                  Title: RN        Unit: [address] [place] [address]        Phone: [phone] (if known):        Title:        Unit:         Phone:         FORM PREFILLED BY (if applicable)       Title:       Unit:        Phone:         FORM COMPLETED BY Christiana Whaley RN      Title: RN      Phone: [phone]

## 2022-05-23 NOTE — CASE MANAGEMENT
Case Management Discharge Planning Note    Patient name Chantell Birch  Location [address]-* MRN [id number]  : 1965 Date 2022       Current Admission Date: 2022  Current Admission Diagnosis:Stroke-like symptoms   Patient Active Problem List    Diagnosis Date Noted    CVA (cerebral vascular accident) ([facility] ) 2022    Weakness 2022    Hypomagnesemia 2022    Stroke-like symptoms 2022    Contusion of right foot     Right foot pain     Arthralgia of right foot     Chronic tophaceous gout of right foot     Syncopal episodes 2022    SIRS (systemic inflammatory response syndrome) ([place] [address] ) 2022    Acute gout of multiple sites 2021    Hypertension 2021    Hyperlipidemia 2021    Smoking 2021    GERD (gastroesophageal reflux disease) 2021    Depression 2021    Left bundle branch block 2021    Thrombocytopenia (HCC) 2021    Prolonged Q-T interval on ECG 2021    Rhabdomyolysis 2021    Breakthrough seizure ([facility] ) 2021    Seizure disorder ([place] [address] ) 2021    History of cerebral hemorrhage 2021    History of alcohol abuse 2021    Hypocalcemia 2017    Lactic acidosis 2017    Rhabdomyolysis 2017    Nicotine dependence 2017    Dyslipidemia 2017    Seizure ([facility] ) 2017    Diarrhea 2016    Blood in stool 2016    Hypertension     Hyperlipidemia     GERD (gastroesophageal reflux disease)     Compression neuropathy of lower extremity 02/10/2016      LOS (days): 6  Geometric Mean LOS (GMLOS) (days):   Days to GMLOS:     OBJECTIVE:  Risk of Unplanned Readmission Score: 15 28   Current admission status: Inpatient   Preferred Pharmacy:   [address], [address], [address]   [place] [address]  Phone: [phone] Fax: 55 [facility] #[id number], [address] [facility] [place] [address]  Phone: [phone] Fax: [phone]    Renetta Jackson [address]  [address] [address]  Phone: [phone] Fax: [phone] - [address] [place], [place] - [address] [facility]  [address] [facility]  [address] [facility] [address]  Phone: [phone] Fax: [phone]    Primary Care Provider: No primary care provider on file    Primary Insurance: BLUE CROSS  Secondary Insurance:     DISCHARGE DETAILS:  Discharge planning discussed with[de-identified] Patients daughter Adiel Barclay (daughter) [phone]  Freedom of Choice: Yes  Comments - Freedom of Choice: [facility] (first preference-bed is available today and auth obtained for this facility)  CM contacted family/caregiver?: Yes  Were Treatment Team discharge recommendations reviewed with patient/caregiver?: Yes  Did patient/caregiver verbalize understanding of patient care needs?: Yes  Were patient/caregiver advised of the risks associated with not following Treatment Team discharge recommendations?: Yes    Contacts  Patient Contacts: Adiel Braclay (daughter)  Relationship to Patient[de-identified] Family  Contact Method: Phone  Phone Number: [phone]  Reason/Outcome: Emergency Contact, Discharge Planning, Continuity of [address] [facility]         Is the patient interested in [facility] at discharge?: No    DME Referral Provided  Referral made for DME?: No    Other Referral/Resources/Interventions Provided:  Interventions: Transportation, Short Term Rehab    Would you like to participate in our [address] service program?  : No - Declined    Treatment Team Recommendation: Short Term Rehab  Discharge Destination Plan[de-identified] Short Term Rehab  Transport at Discharge : Wheelchair van  Dispatcher Contacted: Yes     Number/Name of Dispatcher: [facility]  Transported by [employer] and Unit #): TBD  ETA of Transport (Date): 05/23/22  ETA of Transport (Time):  (TBD)     CM spoke with Aubrey Barragan with [facility] and STR auth obtained for [facility] under [id number] SOC: 5/23-5/25  This and all contact info was sent to [facility]  CM called Jaime Burris with [facility] and confirmed they have a bed and can take patient today  CM spoke with Shayy Pagan and confirmed patient is being dc'd and transport can be set up  CM spoke with patient and his daughter Aissatou Haynes and confirmed dcp to go to [facility] at dc  They are very pleased that the facility has a bed today because this was their first preference  She is aware discharge is being requested  CM submitted BLS transport request to [facility]  CM to follow

## 2022-05-23 NOTE — PLAN OF CARE
Problem: Prexisting or High Potential for Compromised Skin Integrity  Goal: Skin integrity is maintained or improved  Description: INTERVENTIONS:  - Identify patients at risk for skin breakdown  - Assess and monitor skin integrity  - Assess and monitor nutrition and hydration status  - Monitor labs   - Assess for incontinence   - Turn and reposition patient  - Assist with mobility/ambulation  - Relieve pressure over bony prominences  - Avoid friction and shearing  - Provide appropriate hygiene as needed including keeping skin clean and dry  - Evaluate need for skin moisturizer/barrier cream  - Collaborate with interdisciplinary team   - Patient/family teaching  - Consider wound care consult   Outcome: Progressing     Problem: MOBILITY - ADULT  Goal: Maintain or return to baseline ADL function  Description: INTERVENTIONS:  -  Assess patient's ability to carry out ADLs; assess patient's baseline for ADL function and identify physical deficits which impact ability to perform ADLs (bathing, care of mouth/teeth, toileting, grooming, dressing, etc )  - Assess/evaluate cause of self-care deficits   - Assess range of motion  - Assess patient's mobility; develop plan if impaired  - Assess patient's need for assistive devices and provide as appropriate  - Encourage maximum independence but intervene and supervise when necessary  - Involve family in performance of ADLs  - Assess for home care needs following discharge   - Consider OT consult to assist with ADL evaluation and planning for discharge  - Provide patient education as appropriate  Outcome: Progressing     Problem: Potential for Falls  Goal: Patient will remain free of falls  Description: INTERVENTIONS:  - Educate patient/family on patient safety including physical limitations  - Instruct patient to call for assistance with activity   - Consult OT/PT to assist with strengthening/mobility   - Keep Call bell within reach  - Keep bed low and locked with side rails adjusted as appropriate  - Keep care items and personal belongings within reach  - Initiate and maintain comfort rounds  - Make Fall Risk Sign visible to staff  - Offer Toileting every 2 Hours, in advance of need  - Initiate/Maintain  bed alarm  - Obtain necessary fall risk management equipment: call bell  Problem: PAIN - ADULT  Goal: Verbalizes/displays adequate comfort level or baseline comfort level  Description: Interventions:  - Encourage patient to monitor pain and request assistance  - Assess pain using appropriate pain scale  - Administer analgesics based on type and severity of pain and evaluate response  - Implement non-pharmacological measures as appropriate and evaluate response  - Consider cultural and social influences on pain and pain management  - Notify physician/advanced practitioner if interventions unsuccessful or patient reports new pain  Outcome: Progressing     Problem: INFECTION - ADULT  Goal: Absence or prevention of progression during hospitalization  Description: INTERVENTIONS:  - Assess and monitor for signs and symptoms of infection  - Monitor lab/diagnostic results  - Monitor all insertion sites, i e  indwelling lines, tubes, and drains  - Monitor endotracheal if appropriate and nasal secretions for changes in amount and color  - [place] appropriate cooling/warming therapies per order  - Administer medications as ordered  - Instruct and encourage patient and family to use good hand hygiene technique  - Identify and instruct in appropriate isolation precautions for identified infection/condition  Outcome: Progressing     Problem: DISCHARGE PLANNING  Goal: Discharge to home or other facility with appropriate resources  Description: INTERVENTIONS:  - Identify barriers to discharge w/patient and caregiver  - Arrange for needed discharge resources and transportation as appropriate  - Identify discharge learning needs (meds, wound care, etc )  - Arrange for interpretive services to assist at discharge as needed  - Refer to Case Management Department for coordinating discharge planning if the patient needs post-hospital services based on physician/advanced practitioner order or complex needs related to functional status, cognitive ability, or social support system  Outcome: Progressing     - Apply yellow socks and bracelet for high fall risk patients  - Consider moving patient to room near nurses station  Outcome: Progressing

## 2022-05-23 NOTE — OCCUPATIONAL THERAPY NOTE
Occupational Therapy Treatment Note         05/23/22 1135   Note Type   Note Type Treatment   Restrictions/Precautions   Other Precautions Cognitive; Chair Alarm; Bed Alarm; Fall Risk;Visual impairment;Pain   Pain Assessment   Pain Assessment Tool 0-10   Pain Score 10 - Worst Possible Pain   Pain Location/Orientation Orientation: Left; Location: Neck; Location: Shoulder   Therapeutic Exercise - ROM   UE-ROM Yes   ROM - Left Upper Extremities    L Shoulder PROM;AAROM; Flexion;ABduction; Internal rotation; External rotation  (Scapular elevation/depression)   L Elbow AROM;Elbow flexion;Elbow extension   L Position Seated   L Weight/Reps/Sets 10 reps x 2   LUE ROM Comment Patient with c/o of severe pain to L shoulder and neck, pt reports most pain is in proximal shoulder and scapula; combination of PROM and AAROM performed with patient today to change positioning and attempt to reduce/relieve pain; increased extensor tone note to LUE   Cognition   Overall Cognitive Status Impaired   Arousal/Participation Alert; Cooperative   Attention Difficulty attending to directions   Orientation Level Oriented to person;Oriented to place   Following Commands Follows one step commands with increased time or repetition   Comments Patient highly distracted today by pain   Activity Tolerance   Activity Tolerance Patient limited by fatigue;Patient limited by pain   Assessment   Assessment Patient seen for OT treatment today  Limited today by complaints of increase LUE pain  Patient able to tolerate therex and repositioning of LUE in attempts to relieve/reduce pain, however after completed patient deferred further participation in OT treatment requesting to rest due to pain  RN aware of patient's pain levels  The patient's raw score on the AM-PAC Daily Activity inpatient short form is 14, standardized score is 33 39, less than 39 4  Patients at this level are likely to benefit from discharge to post-acute rehabilitation services   Please refer to the recommendation of the Occupational Therapist for safe discharge planning  At end of session patient seated in recliner chair with all needs met; LUE propped on pillow for support, pillow behind head for neck support, chair alarm set; Continue OT per POC     Plan   OT Frequency 5x/wk   Recommendation   OT Discharge Recommendation Post acute rehabilitation services   AM-PAC Daily Activity Inpatient   Lower Body Dressing 2   Bathing 2   Toileting 2   Upper Body Dressing 2   Grooming 3   Eating 3   Daily Activity Raw Score 14   Daily Activity Standardized Score (Calc for Raw Score >=11) 33 39   AM-PAC Applied Cognition Inpatient   Following a Speech/Presentation 2   Understanding Ordinary Conversation 3   Taking Medications 2   Remembering Where Things Are Placed or Put Away 3   Remembering List of 4-5 Errands 2   Taking Care of Complicated Tasks 1   Applied Cognition Raw Score 13   Applied Cognition Standardized Score 04 06   Licensure   [place] License Number  Zoya FinkBELLO haddadR/DAVIS [id number]

## 2022-05-23 NOTE — ASSESSMENT & PLAN NOTE
Patient presents after being found on the ground at home, incontinent and disoriented  · Possibly due to postictal state from seizure vs stroke  · CT head shows no acute intracranial abnormalities  · CT spine with no acute fractures or dislocation  · CTA head/neck:  Subtle evolving cortical hypodensities in the right occipital lobe, correlating to some of the cortical diffusion restriction noted on the MRI  Findings are worrisome for evolving infarction or perhaps postictal edema  · MRI brain:  Suspicious for recent infarct in the posterior right MCA distribution and right posterior cerebral artery distributions  Findings may also be related seizures  No acute hemorrhage  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  · S/p Ativan and Keppra 1000 mg in ED  · CK normal  · Restarted Keppra 1500 mg b i d  · Vimpat added for seizure control   · Seizure, fall precautions  · Neurology consulted and appreciate their recommendations  · Unclear whether event is seizure vs stroke based on findings on imaging  · Recommend repeat MRI in 2-3 months to check for evolution  · S/p BEBO with no evidence of PFO or thrombus  · S/p Loop recorder implantation  · Recommend DAPT for 21 days then daily ASA  · Continue Lipitor 80 mg while in hospital   Discharge on 40 mg daily    · Thrombosis panel:  · Beta-2 glycoprotein antibodies: Negative  · Cardiolipin antibodies: Negative  · Protein C activity:  Elevated  · Proteic S activity: Normal  · Anti thrombin 3 activity:  Normal  · Factor 5 Leiden:  Pending  · Prothrombin gene mutation:  Pending  · Lupus anticoagulant:  pending

## 2022-05-24 ENCOUNTER — TELEPHONE (OUTPATIENT)
Dept: NEUROLOGY | Facility: CLINIC | Age: 57
End: 2022-05-24

## 2022-05-24 LAB — LEVETIRACETAM SERPL-MCNC: 27.7 UG/ML (ref 10–40)

## 2022-05-24 NOTE — UTILIZATION REVIEW
Notification of Discharge   This is a Notification of Discharge from our facility [address]  Please be advised that this patient has been discharge from our facility  Below you will find the admission and discharge date and time including the patients disposition  UTILIZATION REVIEW CONTACT:  Jayne Loera  Utilization   Network Utilization Review Department  Phone: [phone] x carefully listen to the prompts  All voicemails are confidential   Email: [email or web address]     PHYSICIAN ADVISORY SERVICES:  FOR [id number] REVIEW - MEDICAL NECESSITY DENIAL  Phone: [phone]  Fax: [phone]  Email: [email or web address]     PRESENTATION DATE: 5/17/2022  4:32 PM  OBERVATION ADMISSION DATE:   INPATIENT ADMISSION DATE: 5/17/22  7:38 PM   DISCHARGE DATE: 5/23/2022  6:42 PM  DISPOSITION: Non [facility] SN[facility]/SNU Non [facility] SN[facility]/SNU      IMPORTANT INFORMATION:  Send all requests for admission clinical reviews, approved or denied determinations and any other requests to dedicated fax number below belonging to the campus where the patient is receiving treatment   List of dedicated fax numbers:  [address] DENIALS (Administrative/Medical Necessity) [phone]   [address] (Maternity/NICU/Pediatrics) [phone]   Steven Needs [phone]   [address] [place] [address] [phone]   [address] [place] [address] [phone]   [address] [phone]   [facility]  [phone]   [address] [facility]  [address] [facility] [address] [facility] [phone]

## 2022-05-25 LAB — F5 GENE MUT ANL BLD/T: NORMAL

## 2022-05-26 LAB — F2 GENE MUT ANL BLD/T: NORMAL

## 2022-06-07 ENCOUNTER — TELEPHONE (OUTPATIENT)
Dept: OTHER | Facility: OTHER | Age: 57
End: 2022-06-07

## 2022-06-07 NOTE — TELEPHONE ENCOUNTER
Patient's sister called in stating he had a procedure and there was no paperwork for a follow up appointment but he still has the tube in his chest and it has been 3 weeks  She states she wanted to take him to the hospital to just get the tube removed  No verbal consent signed in chart to speak with sister, so I called patient to speak with him but there was no answer and the mailbox was full     Patient will need to schedule follow up appointment from hospital

## 2022-06-14 ENCOUNTER — TELEPHONE (OUTPATIENT)
Dept: NEUROLOGY | Facility: CLINIC | Age: 57
End: 2022-06-14

## 2022-06-14 NOTE — LETTER
Rubina Sample  [address] [facility] [address]      Our office has been unsuccessful in trying to reach you by phone regarding:      ? Scheduling a test       Please contact our office at your earliest convenience  Please call [phone]  and follow the prompts        Sincerely,      [facility]

## 2022-06-14 NOTE — TELEPHONE ENCOUNTER
Per last inpatient neuro note, patient needs MRI approx 2 months after d/c  Called patient, informed him of such and offered to assist in scheduling  He declined at this time, but will call central scheduling when his sister is available

## 2022-06-29 ENCOUNTER — TELEPHONE (OUTPATIENT)
Dept: NEUROLOGY | Facility: CLINIC | Age: 57
End: 2022-06-29

## 2022-06-29 NOTE — TELEPHONE ENCOUNTER
SLW/STROKE LIKE SYMPTOMS          NOTES:Carlos Manuel GEORGE Binghamnina will need follow up in 8 weeks with general attending or advance practitioner  He will not require outpatient neurological testing  RESCHEDULED: 7/21/22 @ 1:45PM W/[facility]HEMY IN [facility]  WILL SEND PATIENT A LETTER W/APPOINTMENT DETAILS/DIRECTIONS

## 2022-07-06 NOTE — TELEPHONE ENCOUNTER
Called patient with no answer  Left message on voicemail box for call back  Second failed attempt to contact patient  Letter and MRI order mailed to home address on file to let patient know office is trying to contact them  Closing encounter  Will reopen when/if call is returned

## 2022-07-19 ENCOUNTER — TELEPHONE (OUTPATIENT)
Dept: NEUROLOGY | Facility: CLINIC | Age: 57
End: 2022-07-19

## 2022-07-19 NOTE — TELEPHONE ENCOUNTER
3rd attempt to call patient to remind him of appointment mailbox full    2nd attempt to confirm appt with gustavo mailbox full    lmovm for patient to call back to confirm appt with gustavo

## 2022-07-21 NOTE — TELEPHONE ENCOUNTER
Patient's sister called in because they wanted to reschedule since the MRI was not completed yet  I reached out to [facility] via [address]  Informed me that still going to the appt would be okay even though the MRI was complete  When I told patient's sister to keep the appt, she stated she was cancelling it still because they already discussed not going  I rescheduled for first available in November, and added pt to the waitlist  Notified [facility] of this change via [address]  RESCHEDULED: Fri, 11/25/22 at 1:00pm junie/ Maryellen Sarabia in [place]

## 2022-09-02 ENCOUNTER — HOSPITAL ENCOUNTER (INPATIENT)
Facility: HOSPITAL | Age: 57
LOS: 5 days | Discharge: LEFT BEFORE DISCHARGE | DRG: 312 | End: 2022-09-07
Attending: EMERGENCY MEDICINE | Admitting: INTERNAL MEDICINE
Payer: COMMERCIAL

## 2022-09-02 ENCOUNTER — APPOINTMENT (OUTPATIENT)
Dept: RADIOLOGY | Facility: HOSPITAL | Age: 57
DRG: 312 | End: 2022-09-02
Payer: COMMERCIAL

## 2022-09-02 ENCOUNTER — APPOINTMENT (EMERGENCY)
Dept: RADIOLOGY | Facility: HOSPITAL | Age: 57
DRG: 312 | End: 2022-09-02
Payer: COMMERCIAL

## 2022-09-02 DIAGNOSIS — I95.9 HYPOTENSION: Primary | ICD-10-CM

## 2022-09-02 DIAGNOSIS — S50.01XA CONTUSION OF RIGHT ELBOW, INITIAL ENCOUNTER: ICD-10-CM

## 2022-09-02 DIAGNOSIS — R42 DIZZINESS: ICD-10-CM

## 2022-09-02 DIAGNOSIS — F17.200 TOBACCO DEPENDENCE: ICD-10-CM

## 2022-09-02 DIAGNOSIS — E83.42 HYPOMAGNESEMIA: ICD-10-CM

## 2022-09-02 DIAGNOSIS — R29.6 FREQUENT FALLS: ICD-10-CM

## 2022-09-02 DIAGNOSIS — G40.909 SEIZURE DISORDER (HCC): Chronic | ICD-10-CM

## 2022-09-02 DIAGNOSIS — E87.6 HYPOKALEMIA: ICD-10-CM

## 2022-09-02 DIAGNOSIS — I10 PRIMARY HYPERTENSION: ICD-10-CM

## 2022-09-02 DIAGNOSIS — R53.1 WEAKNESS: ICD-10-CM

## 2022-09-02 PROBLEM — Z86.73 HISTORY OF STROKE: Status: ACTIVE | Noted: 2022-09-02

## 2022-09-02 LAB
2HR DELTA HS TROPONIN: 0 NG/L
ALBUMIN SERPL BCP-MCNC: 4.2 G/DL (ref 3.5–5)
ALP SERPL-CCNC: 96 U/L (ref 46–116)
ALT SERPL W P-5'-P-CCNC: 19 U/L (ref 12–78)
AMMONIA PLAS-SCNC: 18 UMOL/L (ref 11–35)
AMORPH PHOS CRY URNS QL MICRO: NORMAL /HPF
ANION GAP SERPL CALCULATED.3IONS-SCNC: 10 MMOL/L (ref 4–13)
AST SERPL W P-5'-P-CCNC: 19 U/L (ref 5–45)
ATRIAL RATE: 77 BPM
BACTERIA UR QL AUTO: NORMAL /HPF
BASOPHILS # BLD AUTO: 0.03 THOUSANDS/ΜL (ref 0–0.1)
BASOPHILS NFR BLD AUTO: 0 % (ref 0–1)
BILIRUB SERPL-MCNC: 0.91 MG/DL (ref 0.2–1)
BILIRUB UR QL STRIP: NEGATIVE
BUN SERPL-MCNC: 13 MG/DL (ref 5–25)
CALCIUM SERPL-MCNC: 9.6 MG/DL (ref 8.3–10.1)
CARDIAC TROPONIN I PNL SERPL HS: 6 NG/L
CARDIAC TROPONIN I PNL SERPL HS: 6 NG/L
CHLORIDE SERPL-SCNC: 100 MMOL/L (ref 96–108)
CLARITY UR: CLEAR
CO2 SERPL-SCNC: 28 MMOL/L (ref 21–32)
COLOR UR: ABNORMAL
CREAT SERPL-MCNC: 1.66 MG/DL (ref 0.6–1.3)
EOSINOPHIL # BLD AUTO: 0.09 THOUSAND/ΜL (ref 0–0.61)
EOSINOPHIL NFR BLD AUTO: 1 % (ref 0–6)
ERYTHROCYTE [DISTWIDTH] IN BLOOD BY AUTOMATED COUNT: 12.8 % (ref 11.6–15.1)
GFR SERPL CREATININE-BSD FRML MDRD: 44 ML/MIN/1.73SQ M
GLUCOSE SERPL-MCNC: 127 MG/DL (ref 65–140)
GLUCOSE UR STRIP-MCNC: NEGATIVE MG/DL
HCT VFR BLD AUTO: 47.5 % (ref 36.5–49.3)
HGB BLD-MCNC: 16.8 G/DL (ref 12–17)
HGB UR QL STRIP.AUTO: ABNORMAL
IMM GRANULOCYTES # BLD AUTO: 0.02 THOUSAND/UL (ref 0–0.2)
IMM GRANULOCYTES NFR BLD AUTO: 0 % (ref 0–2)
KETONES UR STRIP-MCNC: NEGATIVE MG/DL
LACTATE SERPL-SCNC: 1.4 MMOL/L (ref 0.5–2)
LEUKOCYTE ESTERASE UR QL STRIP: NEGATIVE
LIPASE SERPL-CCNC: 77 U/L (ref 73–393)
LYMPHOCYTES # BLD AUTO: 1.34 THOUSANDS/ΜL (ref 0.6–4.47)
LYMPHOCYTES NFR BLD AUTO: 19 % (ref 14–44)
MAGNESIUM SERPL-MCNC: 1 MG/DL (ref 1.6–2.6)
MCH RBC QN AUTO: 28.9 PG (ref 26.8–34.3)
MCHC RBC AUTO-ENTMCNC: 35.4 G/DL (ref 31.4–37.4)
MCV RBC AUTO: 82 FL (ref 82–98)
MONOCYTES # BLD AUTO: 0.6 THOUSAND/ΜL (ref 0.17–1.22)
MONOCYTES NFR BLD AUTO: 8 % (ref 4–12)
NEUTROPHILS # BLD AUTO: 5.15 THOUSANDS/ΜL (ref 1.85–7.62)
NEUTS SEG NFR BLD AUTO: 72 % (ref 43–75)
NITRITE UR QL STRIP: NEGATIVE
NON-SQ EPI CELLS URNS QL MICRO: NORMAL /HPF
NRBC BLD AUTO-RTO: 0 /100 WBCS
NT-PROBNP SERPL-MCNC: 1086 PG/ML
P AXIS: 42 DEGREES
PH UR STRIP.AUTO: 7.5 [PH]
PLATELET # BLD AUTO: 195 THOUSANDS/UL (ref 149–390)
PMV BLD AUTO: 9.8 FL (ref 8.9–12.7)
POTASSIUM SERPL-SCNC: 3.3 MMOL/L (ref 3.5–5.3)
PR INTERVAL: 166 MS
PROT SERPL-MCNC: 7 G/DL (ref 6.4–8.4)
PROT UR STRIP-MCNC: NEGATIVE MG/DL
QRS AXIS: 15 DEGREES
QRSD INTERVAL: 140 MS
QT INTERVAL: 440 MS
QTC INTERVAL: 497 MS
RBC # BLD AUTO: 5.82 MILLION/UL (ref 3.88–5.62)
RBC #/AREA URNS AUTO: NORMAL /HPF
SODIUM SERPL-SCNC: 138 MMOL/L (ref 135–147)
SP GR UR STRIP.AUTO: 1.01 (ref 1–1.03)
T WAVE AXIS: 99 DEGREES
TSH SERPL DL<=0.05 MIU/L-ACNC: 1.77 UIU/ML (ref 0.45–4.5)
UROBILINOGEN UR QL STRIP.AUTO: 0.2 E.U./DL
VENTRICULAR RATE: 77 BPM
WBC # BLD AUTO: 7.23 THOUSAND/UL (ref 4.31–10.16)
WBC #/AREA URNS AUTO: NORMAL /HPF

## 2022-09-02 PROCEDURE — 82140 ASSAY OF AMMONIA: CPT | Performed by: EMERGENCY MEDICINE

## 2022-09-02 PROCEDURE — 80053 COMPREHEN METABOLIC PANEL: CPT | Performed by: EMERGENCY MEDICINE

## 2022-09-02 PROCEDURE — 73080 X-RAY EXAM OF ELBOW: CPT

## 2022-09-02 PROCEDURE — 99285 EMERGENCY DEPT VISIT HI MDM: CPT | Performed by: EMERGENCY MEDICINE

## 2022-09-02 PROCEDURE — 83690 ASSAY OF LIPASE: CPT | Performed by: EMERGENCY MEDICINE

## 2022-09-02 PROCEDURE — 70498 CT ANGIOGRAPHY NECK: CPT

## 2022-09-02 PROCEDURE — 83880 ASSAY OF NATRIURETIC PEPTIDE: CPT | Performed by: EMERGENCY MEDICINE

## 2022-09-02 PROCEDURE — 70496 CT ANGIOGRAPHY HEAD: CPT

## 2022-09-02 PROCEDURE — 96374 THER/PROPH/DIAG INJ IV PUSH: CPT

## 2022-09-02 PROCEDURE — 93005 ELECTROCARDIOGRAM TRACING: CPT

## 2022-09-02 PROCEDURE — 83605 ASSAY OF LACTIC ACID: CPT | Performed by: EMERGENCY MEDICINE

## 2022-09-02 PROCEDURE — 99223 1ST HOSP IP/OBS HIGH 75: CPT | Performed by: FAMILY MEDICINE

## 2022-09-02 PROCEDURE — 84443 ASSAY THYROID STIM HORMONE: CPT | Performed by: EMERGENCY MEDICINE

## 2022-09-02 PROCEDURE — 71045 X-RAY EXAM CHEST 1 VIEW: CPT

## 2022-09-02 PROCEDURE — 84484 ASSAY OF TROPONIN QUANT: CPT | Performed by: EMERGENCY MEDICINE

## 2022-09-02 PROCEDURE — 36415 COLL VENOUS BLD VENIPUNCTURE: CPT | Performed by: EMERGENCY MEDICINE

## 2022-09-02 PROCEDURE — 85025 COMPLETE CBC W/AUTO DIFF WBC: CPT | Performed by: EMERGENCY MEDICINE

## 2022-09-02 PROCEDURE — 93010 ELECTROCARDIOGRAM REPORT: CPT | Performed by: INTERNAL MEDICINE

## 2022-09-02 PROCEDURE — 81001 URINALYSIS AUTO W/SCOPE: CPT | Performed by: EMERGENCY MEDICINE

## 2022-09-02 PROCEDURE — 87040 BLOOD CULTURE FOR BACTERIA: CPT | Performed by: EMERGENCY MEDICINE

## 2022-09-02 PROCEDURE — 99285 EMERGENCY DEPT VISIT HI MDM: CPT

## 2022-09-02 PROCEDURE — 96361 HYDRATE IV INFUSION ADD-ON: CPT

## 2022-09-02 PROCEDURE — 83735 ASSAY OF MAGNESIUM: CPT | Performed by: EMERGENCY MEDICINE

## 2022-09-02 RX ORDER — LACOSAMIDE 50 MG/1
100 TABLET ORAL EVERY 12 HOURS SCHEDULED
Status: DISCONTINUED | OUTPATIENT
Start: 2022-09-02 | End: 2022-09-07 | Stop reason: HOSPADM

## 2022-09-02 RX ORDER — DOCUSATE SODIUM 100 MG/1
100 CAPSULE, LIQUID FILLED ORAL 2 TIMES DAILY
Status: DISCONTINUED | OUTPATIENT
Start: 2022-09-02 | End: 2022-09-07 | Stop reason: HOSPADM

## 2022-09-02 RX ORDER — POTASSIUM CHLORIDE 20 MEQ/1
40 TABLET, EXTENDED RELEASE ORAL EVERY 4 HOURS
Status: COMPLETED | OUTPATIENT
Start: 2022-09-02 | End: 2022-09-02

## 2022-09-02 RX ORDER — PANTOPRAZOLE SODIUM 40 MG/1
40 TABLET, DELAYED RELEASE ORAL
Status: DISCONTINUED | OUTPATIENT
Start: 2022-09-03 | End: 2022-09-07 | Stop reason: HOSPADM

## 2022-09-02 RX ORDER — LANOLIN ALCOHOL/MO/W.PET/CERES
100 CREAM (GRAM) TOPICAL DAILY
Status: DISCONTINUED | OUTPATIENT
Start: 2022-09-03 | End: 2022-09-07 | Stop reason: HOSPADM

## 2022-09-02 RX ORDER — ACETAMINOPHEN 325 MG/1
650 TABLET ORAL EVERY 6 HOURS PRN
Status: DISCONTINUED | OUTPATIENT
Start: 2022-09-02 | End: 2022-09-07 | Stop reason: HOSPADM

## 2022-09-02 RX ORDER — FENOFIBRATE 145 MG/1
145 TABLET, COATED ORAL DAILY
Status: DISCONTINUED | OUTPATIENT
Start: 2022-09-03 | End: 2022-09-07 | Stop reason: HOSPADM

## 2022-09-02 RX ORDER — POLYETHYLENE GLYCOL 3350 17 G/17G
17 POWDER, FOR SOLUTION ORAL DAILY PRN
Status: DISCONTINUED | OUTPATIENT
Start: 2022-09-02 | End: 2022-09-07 | Stop reason: HOSPADM

## 2022-09-02 RX ORDER — LEVETIRACETAM 500 MG/1
1500 TABLET ORAL EVERY 12 HOURS
Status: DISCONTINUED | OUTPATIENT
Start: 2022-09-02 | End: 2022-09-07 | Stop reason: HOSPADM

## 2022-09-02 RX ORDER — ASPIRIN 81 MG/1
81 TABLET, CHEWABLE ORAL DAILY
Status: DISCONTINUED | OUTPATIENT
Start: 2022-09-03 | End: 2022-09-07 | Stop reason: HOSPADM

## 2022-09-02 RX ORDER — ENOXAPARIN SODIUM 100 MG/ML
40 INJECTION SUBCUTANEOUS
Status: DISCONTINUED | OUTPATIENT
Start: 2022-09-03 | End: 2022-09-03

## 2022-09-02 RX ORDER — ATORVASTATIN CALCIUM 40 MG/1
40 TABLET, FILM COATED ORAL
Status: DISCONTINUED | OUTPATIENT
Start: 2022-09-02 | End: 2022-09-07 | Stop reason: HOSPADM

## 2022-09-02 RX ORDER — SODIUM CHLORIDE 9 MG/ML
75 INJECTION, SOLUTION INTRAVENOUS CONTINUOUS
Status: DISCONTINUED | OUTPATIENT
Start: 2022-09-02 | End: 2022-09-03

## 2022-09-02 RX ORDER — LANOLIN ALCOHOL/MO/W.PET/CERES
3 CREAM (GRAM) TOPICAL
Status: DISCONTINUED | OUTPATIENT
Start: 2022-09-02 | End: 2022-09-07 | Stop reason: HOSPADM

## 2022-09-02 RX ORDER — MAGNESIUM SULFATE HEPTAHYDRATE 40 MG/ML
2 INJECTION, SOLUTION INTRAVENOUS ONCE
Status: COMPLETED | OUTPATIENT
Start: 2022-09-02 | End: 2022-09-02

## 2022-09-02 RX ADMIN — DOCUSATE SODIUM 100 MG: 100 CAPSULE, LIQUID FILLED ORAL at 19:25

## 2022-09-02 RX ADMIN — IOHEXOL 65 ML: 350 INJECTION, SOLUTION INTRAVENOUS at 15:22

## 2022-09-02 RX ADMIN — SODIUM CHLORIDE 1000 ML: 0.9 INJECTION, SOLUTION INTRAVENOUS at 16:07

## 2022-09-02 RX ADMIN — POTASSIUM CHLORIDE 40 MEQ: 1500 TABLET, EXTENDED RELEASE ORAL at 19:24

## 2022-09-02 RX ADMIN — SODIUM CHLORIDE 1000 ML: 0.9 INJECTION, SOLUTION INTRAVENOUS at 14:53

## 2022-09-02 RX ADMIN — ATORVASTATIN CALCIUM 40 MG: 40 TABLET, FILM COATED ORAL at 19:24

## 2022-09-02 RX ADMIN — Medication 3 MG: at 21:24

## 2022-09-02 RX ADMIN — LACOSAMIDE 100 MG: 50 TABLET, FILM COATED ORAL at 20:37

## 2022-09-02 RX ADMIN — MAGNESIUM OXIDE TAB 400 MG (241.3 MG ELEMENTAL MG) 400 MG: 400 (241.3 MG) TAB at 19:24

## 2022-09-02 RX ADMIN — POTASSIUM CHLORIDE 40 MEQ: 1500 TABLET, EXTENDED RELEASE ORAL at 21:24

## 2022-09-02 RX ADMIN — SODIUM CHLORIDE 75 ML/HR: 0.9 INJECTION, SOLUTION INTRAVENOUS at 19:24

## 2022-09-02 RX ADMIN — LEVETIRACETAM 1500 MG: 500 TABLET, FILM COATED ORAL at 19:24

## 2022-09-02 RX ADMIN — MAGNESIUM SULFATE HEPTAHYDRATE 2 G: 40 INJECTION, SOLUTION INTRAVENOUS at 16:58

## 2022-09-02 NOTE — ASSESSMENT & PLAN NOTE
Likely related to above  Continue with IV fluid hydration  Physical therapy and occupational therapy evaluations have been ordered

## 2022-09-02 NOTE — PLAN OF CARE
Problem: Potential for Falls  Goal: Patient will remain free of falls  Description: INTERVENTIONS:  - Educate patient/family on patient safety including physical limitations  - Instruct patient to call for assistance with activity   - Consult OT/PT to assist with strengthening/mobility   - Keep Call bell within reach  - Keep bed low and locked with side rails adjusted as appropriate  - Keep care items and personal belongings within reach  - Initiate and maintain comfort rounds  - Make Fall Risk Sign visible to staff  - Offer Toileting every  Hours, in advance of need  - Initiate/Maintain alarm  - Obtain necessary fall risk management equipment:   - Apply yellow socks and bracelet for high fall risk patients  - Consider moving patient to room near nurses station  Outcome: Progressing     Problem: PAIN - ADULT  Goal: Verbalizes/displays adequate comfort level or baseline comfort level  Description: Interventions:  - Encourage patient to monitor pain and request assistance  - Assess pain using appropriate pain scale  - Administer analgesics based on type and severity of pain and evaluate response  - Implement non-pharmacological measures as appropriate and evaluate response  - Consider cultural and social influences on pain and pain management  - Notify physician/advanced practitioner if interventions unsuccessful or patient reports new pain  Outcome: Progressing     Problem: SAFETY ADULT  Goal: Patient will remain free of falls  Description: INTERVENTIONS:  - Educate patient/family on patient safety including physical limitations  - Instruct patient to call for assistance with activity   - Consult OT/PT to assist with strengthening/mobility   - Keep Call bell within reach  - Keep bed low and locked with side rails adjusted as appropriate  - Keep care items and personal belongings within reach  - Initiate and maintain comfort rounds  - Make Fall Risk Sign visible to staff  - Offer Toileting every  Hours, in advance of need  - Initiate/Maintain alarm  - Obtain necessary fall risk management equipment:   - Apply yellow socks and bracelet for high fall risk patients  - Consider moving patient to room near nurses station  Outcome: Progressing  Goal: Maintain or return to baseline ADL function  Description: INTERVENTIONS:  -  Assess patient's ability to carry out ADLs; assess patient's baseline for ADL function and identify physical deficits which impact ability to perform ADLs (bathing, care of mouth/teeth, toileting, grooming, dressing, etc )  - Assess/evaluate cause of self-care deficits   - Assess range of motion  - Assess patient's mobility; develop plan if impaired  - Assess patient's need for assistive devices and provide as appropriate  - Encourage maximum independence but intervene and supervise when necessary  - Involve family in performance of ADLs  - Assess for home care needs following discharge   - Consider OT consult to assist with ADL evaluation and planning for discharge  - Provide patient education as appropriate  Outcome: Progressing  Goal: Maintains/Returns to pre admission functional level  Description: INTERVENTIONS:  - Perform BMAT or MOVE assessment daily    - Set and communicate daily mobility goal to care team and patient/family/caregiver  - Collaborate with rehabilitation services on mobility goals if consulted  - Perform Range of Motion  times a day  - Reposition patient every  hours    - Dangle patient  times a day  - Stand patient  times a day  - Ambulate patient  times a day  - Out of bed to chair times a day   - Out of bed for meals  times a day  - Out of bed for toileting  - Record patient progress and toleration of activity level   Outcome: Progressing     Problem: DISCHARGE PLANNING  Goal: Discharge to home or other facility with appropriate resources  Description: INTERVENTIONS:  - Identify barriers to discharge w/patient and caregiver  - Arrange for needed discharge resources and transportation as appropriate  - Identify discharge learning needs (meds, wound care, etc )  - Arrange for interpretive services to assist at discharge as needed  - Refer to Case Management Department for coordinating discharge planning if the patient needs post-hospital services based on physician/advanced practitioner order or complex needs related to functional status, cognitive ability, or social support system  Outcome: Progressing     Problem: Knowledge Deficit  Goal: Patient/family/caregiver demonstrates understanding of disease process, treatment plan, medications, and discharge instructions  Description: Complete learning assessment and assess knowledge base    Interventions:  - Provide teaching at level of understanding  - Provide teaching via preferred learning methods  Outcome: Progressing

## 2022-09-02 NOTE — H&P
[address]  Progress Note - Meri Nguyen 1965, 59 y o  male MRN: [id number]  Unit/Bed#: ED 08 Encounter: [id number]  Primary Care Provider: No primary care provider on file  Date and time admitted to hospital: 9/2/2022  2:12 PM    * Hypotension  Assessment & Plan  Patient was seen and examined in the emergency department and will be admitted to the hospital for further evaluation and management  Patient was initially noted to be hypotensive on arrival with a systolic blood pressure in the 70s  Patient's blood pressure did dramatically improve with administration of IV fluid hydration  Patient also with evidence of acute kidney injury  Suspect that patient's hypotension is likely related to volume depletion, dehydration as well as an affective blood pressure medications  No evidence of infection or sepsis at this time  Will check orthostatic vital signs  Will continue to monitor and make further adjustments as needed  Hold blood pressure medications at this time given hypotension on arrival     History of stroke  Assessment & Plan  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 right posterior cerebral artery distributions  As noted by Neurology recommendation for repeat MRI was to be performed in 2-3 months  This will be ordered at this time  Multiple falls  Assessment & Plan  Likely secondary to above  Dizziness  Assessment & Plan  Likely related to above  Continue with IV fluid hydration  Physical therapy and occupational therapy evaluations have been ordered  Acute kidney injury [facility])  Assessment & Plan  Creatinine level on arrival is noted to be 1 66  Continue with IV fluid hydration  Repeat blood work in the morning  Hypomagnesemia  Assessment & Plan  Magnesium level 1 0  This will be repleted at this time      Hypokalemia  Assessment & Plan  Potassium 3 3 on arrival   This will be repleted at this time  Hyperlipidemia  Assessment & Plan  Statin therapy  History of alcohol abuse  Assessment & Plan  Patient denies any recent use  Seizure disorder [facility])  Assessment & Plan  Continue home medications at this time  Will continue to monitor  Hypertension  Assessment & Plan  Hold antihypertensive medications at this time given hypotension on arrival     VTE Pharmacologic Prophylaxis: VTE Score: 1 Lovenox prophylaxis  Code Status: Full code  Anticipated Length of Stay: Patient will be admitted on an inpatient basis with an anticipated length of stay of greater than 2 midnights secondary to hypotension  Total Time for Visit, including Counseling / Coordination of Care: 70 minutes Greater than 50% of this total time spent on direct patient counseling and coordination of care  Chief Complaint: Falls  History of Present Illness:  Meri Nguyen is a 59 y o  male who presents to the emergency department after multiple falls at home  Patient reports that since his stroke he has been extremely unsteady on his feet  Patient reports that over the last several days he has been experiencing dizziness when standing which has resulted in him falling  Patient reports chronic left-sided weakness since his stroke in May  In the emergency department patient was noted to be hypotensive on arrival   Patient's blood pressure did respond to IV fluid hydration  CT of the head and neck was performed which did not reveal any acute findings but did show evolution of previously seen infarct with development of right temporal and occipital encephalomalacia as well as chronic microangiopathy  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 is unchanged  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   Patient currently denies any chest pain, shortness a breath, abdominal pain, lightheadedness, palpitations, fevers, chills, change in urinary habits, change in bowel habits, recent travel or any known sick contacts  Review of Systems:  14 point review of systems obtained and reviewed and is negative except as outlined above in the HPI  Past Medical and Surgical History:   Past Medical History:   Diagnosis Date    Biceps tendonitis, unspecified laterality 09/17/2007    Bone cyst 11/22/2011    Bronchiectasis ([address] ) 10/02/2006    Bursitis 11/17/2005    Chest pain 04/29/2009    Chronic renal failure 11/22/2011    Diverticulitis of colon 10/09/2007    Generalized anxiety disorder 06/08/2006    GERD (gastroesophageal reflux disease)     Gout     Hyperlipidemia     Hypertension     Lateral epicondylitis, unspecified elbow     Last Assessed: 11/29/2013    Low magnesium levels     Lyme disease 11/09/2009    Pneumonia     Last Assessed: 1/7/2013    Stroke [facility])      Past Surgical History:   Procedure Laterality Date    CARDIAC ELECTROPHYSIOLOGY PROCEDURE N/A 5/19/2022    Procedure: Cardiac loop recorder implant;  Surgeon: Noah Rhodes MD;  Location: [place] [address] CATH LAB; Service: Cardiology    KNEE ARTHROSCOPY      Therapeutic    OLECRANON BURSA EXCISION       Meds/Allergies:  Prior to Admission medications    Medication Sig Start Date End Date Taking? Authorizing Provider   acetaminophen (TYLENOL) 325 mg tablet Take 2 tablets (650 mg total) by mouth every 6 (six) hours as needed for mild pain, headaches or fever 5/23/22   NOAM Quiros   aspirin 81 mg chewable tablet Chew 1 tablet (81 mg total)  in the morning  5/24/22   NOAM Quiros   atorvastatin (LIPITOR) 80 mg tablet Take 0 5 tablets (40 mg total) by mouth daily with dinner  Patient not taking: Reported on 9/2/2022 5/23/22   NOAM Quiros   clopidogrel (PLAVIX) 75 mg tablet Take 1 tablet (75 mg total) by mouth in the morning for 16 days   5/24/22 6/9/22  NOAM Quiros   Diclofenac Sodium (VOLTAREN) 1 % Apply 2 g topically in the morning and 2 g at noon and 2 g in the evening and 2 g before bedtime  5/23/22   NOAM Mathew   fenofibrate (TRICOR) 145 mg tablet Take 1 tablet (145 mg total) by mouth daily 3/5/18   NOAM Mendoza   lacosamide (VIMPAT) 100 mg tablet Take 1 tablet (100 mg total) by mouth every 12 (twelve) hours for 10 days 5/23/22 6/2/22  NOAM Mathew   levETIRAcetam (KEPPRA) 750 mg tablet Take 2 tablets (1,500 mg total) by mouth every 12 (twelve) hours 5/11/22 6/10/22  Ziggy Dias MD   magnesium oxide (MAG-OX) 400 mg Take 1 tablet (400 mg total) by mouth in the morning and 1 tablet (400 mg total) in the evening  5/23/22   NOAM Mathew   melatonin 3 mg Take 1 tablet (3 mg total) by mouth daily at bedtime 5/23/22   NOAM Mathew   methocarbamol (ROBAXIN) 750 mg tablet Take 1 tablet (750 mg total) by mouth every 6 (six) hours 5/23/22   NOAM Mathew   metoprolol succinate (TOPROL-XL) 100 mg 24 hr tablet Take 1 tablet (100 mg total) by mouth daily 3/5/18   NOAM Mendoza   omeprazole (PriLOSEC) 20 mg delayed release capsule Take 20 mg by mouth daily  Historical Provider, MD   thiamine 100 MG tablet Take 1 tablet (100 mg total) by mouth in the morning  5/12/22 6/11/22  Ziggy Dias MD   verapamil (CALAN-SR) 120 mg CR tablet Take 1 tablet (120 mg total) by mouth daily at bedtime 5/23/22   NOAM Mathew     Allergies:    Allergies   Allergen Reactions    Codeine Hives     Tolerates morphine    Penicillins     Hydrocodone Rash     Reaction Date: 17Sep2007;      Social History:  Marital Status: /Civil Union     Social History     Substance and Sexual Activity   Alcohol Use Yes    Alcohol/week: 6 0 standard drinks    Types: 6 Glasses of wine per week    Comment: 5/17/22-states he has not had alcohol since December 2021     Social History     Tobacco Use   Smoking Status Former Smoker    Packs/day: 0 50    Quit date: 5/10/2022  Years since quittin 3   Smokeless Tobacco Never Used   Tobacco Comment    cigarette nicotine dependence     Social History     Substance and Sexual Activity   Drug Use No     Family History:  Family History   Problem Relation Age of Onset    Cancer Mother         Colon     Physical Exam:     Vitals:   Blood Pressure: 142/79 (22 1730)  Pulse: 59 (22 1730)  Temperature: 98 9 °F (37 2 °C) (22 1416)  Respirations: 14 (22 1730)  Weight - Scale: 76 2 kg (168 lb) (22 1416)  SpO2: 99 % (22 173)    Physical Exam  HENT:      Head: Normocephalic  Mouth/Throat:      Mouth: Mucous membranes are moist    Eyes:      Extraocular Movements: Extraocular movements intact  Cardiovascular:      Rate and Rhythm: Normal rate  Pulmonary:      Effort: Pulmonary effort is normal  No respiratory distress  Abdominal:      Palpations: Abdomen is soft  Tenderness: There is no abdominal tenderness  Skin:     General: Skin is warm  Neurological:      Mental Status: He is alert and oriented to person, place, and time  Comments: Left sided weakness  Psychiatric:         Mood and Affect: Mood normal          Behavior: Behavior normal        Additional Data:     Lab Results:  Results from last 7 days   Lab Units 22  1449   WBC Thousand/uL 7 23   HEMOGLOBIN g/dL 16 8   HEMATOCRIT % 47 5   PLATELETS Thousands/uL 195   NEUTROS PCT % 72   LYMPHS PCT % 19   MONOS PCT % 8   EOS PCT % 1     Results from last 7 days   Lab Units 22  1449   SODIUM mmol/L 138   POTASSIUM mmol/L 3 3*   CHLORIDE mmol/L 100   CO2 mmol/L 28   BUN mg/dL 13   CREATININE mg/dL 1 66*   ANION GAP mmol/L 10   CALCIUM mg/dL 9 6   ALBUMIN g/dL 4 2   TOTAL BILIRUBIN mg/dL 0 91   ALK PHOS U/L 96   ALT U/L 19   AST U/L 19   GLUCOSE RANDOM mg/dL 127     Results from last 7 days   Lab Units 22  1449   LACTIC ACID mmol/L 1 4     Imaging: Reviewed    CTA head and neck with and without contrast   Final Result by Guzman Cardona MD (09/02 1614)      No acute intracranial pathology  Evolution of previously seen infarct with development of right temporal and occipital encephalomalacia  Chronic microangiopathy  No significant stenosis of the cervical carotid arteries  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 is unchanged  No significant intracranial stenosis or large vessel occlusion  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  Workstation performed: [id number]         XR chest 1 view portable   ED Interpretation by Wilfredo Chery MD (09/02 1637)   No infiltrates  No CHF  Final Result by Bisi Eastman MD (09/02 1521)      No acute cardiopulmonary disease  Workstation performed: [id number]         XR elbow 3+ views RIGHT   ED Interpretation by Wilfredo Chery MD (09/02 1637)   No fracture or dislocation      Final Result by Michael Love MD (09/02 1521)      No acute osseous abnormality  Workstation performed: [id number]           ** Please Note: This note has been constructed using a voice recognition system   **

## 2022-09-02 NOTE — ASSESSMENT & PLAN NOTE
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 right posterior cerebral artery distributions  As noted by Neurology recommendation for repeat MRI was to be performed in 2-3 months  This will be ordered at this time

## 2022-09-02 NOTE — ED PROVIDER NOTES
History  Chief Complaint   Patient presents with    Multiple Falls     Had a stroke in June  Continues to fall and becomes dizzy when standing  Symptoms are getting worse and he is having headaches     Patient is a 57-year-old male  He has a history of alcoholism  He has not drank alcohol since December  Back in June he was admitted for stroke  He also has a history of seizure disorder and is on Vimpat and Keppra  Since his discharge he has done poorly at home  He is very unsteady on his feet, especially in the mornings  He feels as if the ground is moving under is feet  In the mornings the weakness and dysesthesia on the left side are worse  He falls a lot  He is having difficulty accomplishing ADLs  He is dizzy and does headaches  He has varying degrees of vision loss in his left eye  He has no new acute focal neurologic complaints  He does smoke  Denies fever  He is complaining of continued right elbow pain from one of his falls  Otherwise denies any acute injuries  His symptoms have been moderately severe  Worse in the mornings  No relieving factors  Denies chest pain or trouble breathing  No abdominal pain  No vomiting or diarrhea  No hematemesis, hematochezia or melena  No fever  No urinary complaints  Prior to Admission Medications   Prescriptions Last Dose Informant Patient Reported? Taking? Diclofenac Sodium (VOLTAREN) 1 %   No No   Sig: Apply 2 g topically in the morning and 2 g at noon and 2 g in the evening and 2 g before bedtime  acetaminophen (TYLENOL) 325 mg tablet   No No   Sig: Take 2 tablets (650 mg total) by mouth every 6 (six) hours as needed for mild pain, headaches or fever   aspirin 81 mg chewable tablet   No No   Sig: Chew 1 tablet (81 mg total)  in the morning     atorvastatin (LIPITOR) 80 mg tablet Not Taking at Unknown time  No No   Sig: Take 0 5 tablets (40 mg total) by mouth daily with dinner   Patient not taking: Reported on 9/2/2022   clopidogrel (PLAVIX) 75 mg tablet   No No   Sig: Take 1 tablet (75 mg total) by mouth in the morning for 16 days  fenofibrate (TRICOR) 145 mg tablet   No No   Sig: Take 1 tablet (145 mg total) by mouth daily   lacosamide (VIMPAT) 100 mg tablet   No No   Sig: Take 1 tablet (100 mg total) by mouth every 12 (twelve) hours for 10 days   levETIRAcetam (KEPPRA) 750 mg tablet   No No   Sig: Take 2 tablets (1,500 mg total) by mouth every 12 (twelve) hours   magnesium oxide (MAG-OX) 400 mg   No No   Sig: Take 1 tablet (400 mg total) by mouth in the morning and 1 tablet (400 mg total) in the evening  melatonin 3 mg   No No   Sig: Take 1 tablet (3 mg total) by mouth daily at bedtime   methocarbamol (ROBAXIN) 750 mg tablet   No No   Sig: Take 1 tablet (750 mg total) by mouth every 6 (six) hours   metoprolol succinate (TOPROL-XL) 100 mg 24 hr tablet   No No   Sig: Take 1 tablet (100 mg total) by mouth daily   omeprazole (PriLOSEC) 20 mg delayed release capsule   Yes No   Sig: Take 20 mg by mouth daily  thiamine 100 MG tablet   No No   Sig: Take 1 tablet (100 mg total) by mouth in the morning     verapamil (CALAN-SR) 120 mg CR tablet   No No   Sig: Take 1 tablet (120 mg total) by mouth daily at bedtime      Facility-Administered Medications: None       Past Medical History:   Diagnosis Date    Biceps tendonitis, unspecified laterality 09/17/2007    Bone cyst 11/22/2011    Bronchiectasis ([address] ) 10/02/2006    Bursitis 11/17/2005    Chest pain 04/29/2009    Chronic renal failure 11/22/2011    Diverticulitis of colon 10/09/2007    Generalized anxiety disorder 06/08/2006    GERD (gastroesophageal reflux disease)     Gout     Hyperlipidemia     Hypertension     Lateral epicondylitis, unspecified elbow     Last Assessed: 11/29/2013    Low magnesium levels     Lyme disease 11/09/2009    Pneumonia     Last Assessed: 1/7/2013    Stroke [facility])        Past Surgical History:   Procedure Laterality Date    CARDIAC ELECTROPHYSIOLOGY PROCEDURE N/A 2022    Procedure: Cardiac loop recorder implant;  Surgeon: Christiane Sylvester MD;  Location: [place] [address] CATH LAB; Service: Cardiology    KNEE ARTHROSCOPY      Therapeutic    OLECRANON BURSA EXCISION         Family History   Problem Relation Age of Onset    Cancer Mother         Colon     I have reviewed and agree with the history as documented  E-Cigarette/Vaping    E-Cigarette Use Never User      E-Cigarette/Vaping Substances    Nicotine No     THC No     CBD No     Flavoring No     Other No     Unknown No      Social History     Tobacco Use    Smoking status: Former Smoker     Packs/day: 0 50     Quit date: 5/10/2022     Years since quittin 3    Smokeless tobacco: Never Used    Tobacco comment: cigarette nicotine dependence   Vaping Use    Vaping Use: Never used   Substance Use Topics    Alcohol use: Yes     Alcohol/week: 6 0 standard drinks     Types: 6 Glasses of wine per week     Comment: 20-states he has not had alcohol since 2021    Drug use: No       Review of Systems   Constitutional: Negative for chills and fever  HENT: Negative for rhinorrhea and sore throat  Eyes: Negative for pain, redness and visual disturbance  Respiratory: Positive for cough  Negative for shortness of breath  Cardiovascular: Negative for chest pain and leg swelling  Gastrointestinal: Negative for abdominal pain, diarrhea and vomiting  Endocrine: Negative for polydipsia and polyuria  Genitourinary: Negative for dysuria, frequency and hematuria  Musculoskeletal: Negative for back pain and neck pain  Skin: Negative for rash and wound  Allergic/Immunologic: Negative for immunocompromised state  Neurological: Positive for dizziness, weakness, numbness and headaches  Psychiatric/Behavioral: Negative for hallucinations and suicidal ideas  All other systems reviewed and are negative  Physical Exam  Physical Exam  Vitals reviewed     Constitutional: General: He is not in acute distress  Appearance: He is obese  HENT:      Head: Normocephalic and atraumatic  Mouth/Throat:      Mouth: Mucous membranes are moist       Comments: Poor dentition  Eyes:      Extraocular Movements: Extraocular movements intact  Pupils: Pupils are equal, round, and reactive to light  Comments: Nystagmus is present  Cardiovascular:      Rate and Rhythm: Normal rate and regular rhythm  Pulses: Normal pulses  Heart sounds: Normal heart sounds  No murmur heard  No friction rub  No gallop  Pulmonary:      Effort: Pulmonary effort is normal  No respiratory distress  Breath sounds: Normal breath sounds  No stridor  No wheezing, rhonchi or rales  Abdominal:      General: Bowel sounds are normal  There is no distension  Tenderness: There is no abdominal tenderness  Musculoskeletal:         General: No swelling, tenderness, deformity or signs of injury  Cervical back: Neck supple  No rigidity  Skin:     General: Skin is warm and dry  Neurological:      Mental Status: He is alert and oriented to person, place, and time  Cranial Nerves: No facial asymmetry  Coordination: Coordination is intact  Gait: Gait normal       Comments: There is weakness on the left side when compared to the right  There is decreased sensation on the left side     Psychiatric:         Mood and Affect: Mood normal          Behavior: Behavior normal          Vital Signs  ED Triage Vitals [09/02/22 1416]   Temperature Pulse Respirations Blood Pressure SpO2   98 9 °F (37 2 °C) 73 20 96/59 92 %      Temp src Heart Rate Source Patient Position - Orthostatic VS BP Location FiO2 (%)   -- -- Lying Right arm --      Pain Score       No Pain           Vitals:    09/02/22 1545 09/02/22 1600 09/02/22 1630 09/02/22 1645   BP: 117/65 109/62 127/77    Pulse: 73 70 64 62   Patient Position - Orthostatic VS:             Visual Acuity      ED Medications  Medications magnesium sulfate 2 g/50 mL IVPB (premix) 2 g (has no administration in time range)   sodium chloride 0 9 % bolus 1,000 mL (0 mL Intravenous Stopped 9/2/22 1607)   sodium chloride 0 9 % bolus 1,000 mL (0 mL Intravenous Stopped 9/2/22 1645)   iohexol (OMNIPAQUE) 350 MG/ML injection (MULTI-DOSE) 65 mL (65 mL Intravenous Given 9/2/22 1522)       Diagnostic Studies  Results Reviewed     Procedure Component Value Units Date/Time    HS Troponin I 2hr [[id number]] Collected: 09/02/22 1657    Lab Status: No result Specimen: Blood from Arm, Right     UA w Reflex to Microscopic w Reflex to Culture [[id number]]  (Abnormal) Collected: 09/02/22 1616    Lab Status: Final result Specimen: Urine, Clean Catch Updated: 09/02/22 1634     Color, UA Light Yellow     Clarity, UA Clear     Specific Gravity, UA 1 010     pH, UA 7 5     Leukocytes, UA Negative     Nitrite, UA Negative     Protein, UA Negative mg/dl      Glucose, UA Negative mg/dl      Ketones, UA Negative mg/dl      Urobilinogen, UA 0 2 E U /dl      Bilirubin, UA Negative     Occult Blood, UA Trace-lysed    Urine Microscopic [[id number]] Collected: 09/02/22 1616    Lab Status:  In process Specimen: Urine, Clean Catch Updated: 09/02/22 1634    HS Troponin I 4hr [[id number]]     Lab Status: No result Specimen: Blood     Ammonia [[id number]]  (Normal) Collected: 09/02/22 1449    Lab Status: Final result Specimen: Blood from Arm, Left Updated: 09/02/22 1543     Ammonia 18 umol/L     Magnesium [[id number]]  (Abnormal) Collected: 09/02/22 1449    Lab Status: Final result Specimen: Blood from Arm, Left Updated: 09/02/22 1542     Magnesium 1 0 mg/dL     HS Troponin 0hr (reflex protocol) [[id number]]  (Normal) Collected: 09/02/22 1449    Lab Status: Final result Specimen: Blood from Arm, Left Updated: 09/02/22 1527     hs TnI 0hr 6 ng/L     TSH [[id number]]  (Normal) Collected: 09/02/22 1449    Lab Status: Final result Specimen: Blood from Arm, Left Updated: 09/02/22 1527     TSH 3RD BRUCE 1 766 uIU/mL     Narrative:      Patients undergoing fluorescein dye angiography may retain small amounts of fluorescein in the body for 48-72 hours post procedure  Samples containing fluorescein can produce falsely depressed TSH values  If the patient had this procedure,a specimen should be resubmitted post fluorescein clearance  Blood culture #1 [[id number]] Collected: 09/02/22 1516    Lab Status: In process Specimen: Blood from Arm, Right Updated: 09/02/22 1525    Lipase [[id number]]  (Normal) Collected: 09/02/22 1449    Lab Status: Final result Specimen: Blood from Arm, Left Updated: 09/02/22 1525     Lipase 77 u/L     NT-BNP PRO [[id number]]  (Abnormal) Collected: 09/02/22 1449    Lab Status: Final result Specimen: Blood from Arm, Left Updated: 09/02/22 1525     NT-proBNP 1,086 pg/mL     Lactic acid [[id number]]  (Normal) Collected: 09/02/22 1449    Lab Status: Final result Specimen: Blood from Arm, Left Updated: 09/02/22 1523     LACTIC ACID 1 4 mmol/L     Narrative:      Result may be elevated if tourniquet was used during collection      Comprehensive metabolic panel [[id number]]  (Abnormal) Collected: 09/02/22 1449    Lab Status: Final result Specimen: Blood from Arm, Left Updated: 09/02/22 1519     Sodium 138 mmol/L      Potassium 3 3 mmol/L      Chloride 100 mmol/L      CO2 28 mmol/L      ANION GAP 10 mmol/L      BUN 13 mg/dL      Creatinine 1 66 mg/dL      Glucose 127 mg/dL      Calcium 9 6 mg/dL      AST 19 U/L      ALT 19 U/L      Alkaline Phosphatase 96 U/L      Total Protein 7 0 g/dL      Albumin 4 2 g/dL      Total Bilirubin 0 91 mg/dL      eGFR 44 ml/min/1 73sq m     Narrative:      [facility] guidelines for Chronic Kidney Disease (CKD):     Stage 1 with normal or high GFR (GFR > 90 mL/min/1 73 square meters)    Stage 2 Mild CKD (GFR = 60-89 mL/min/1 73 square meters)    Stage 3A Moderate CKD (GFR = 45-59 mL/min/1 73 square meters)    Stage 3B Moderate CKD (GFR = 30-44 mL/min/1 73 square meters)    Stage 4 Severe CKD (GFR = 15-29 mL/min/1 73 square meters)    Stage 5 End Stage CKD (GFR <15 mL/min/1 73 square meters)  Note: GFR calculation is accurate only with a steady state creatinine    CBC and differential [[id number]]  (Abnormal) Collected: 09/02/22 1449    Lab Status: Final result Specimen: Blood from Arm, Left Updated: 09/02/22 1456     WBC 7 23 Thousand/uL      RBC 5 82 Million/uL      Hemoglobin 16 8 g/dL      Hematocrit 47 5 %      MCV 82 fL      MCH 28 9 pg      MCHC 35 4 g/dL      RDW 12 8 %      MPV 9 8 fL      Platelets 039 Thousands/uL      nRBC 0 /100 WBCs      Neutrophils Relative 72 %      Immat GRANS % 0 %      Lymphocytes Relative 19 %      Monocytes Relative 8 %      Eosinophils Relative 1 %      Basophils Relative 0 %      Neutrophils Absolute 5 15 Thousands/µL      Immature Grans Absolute 0 02 Thousand/uL      Lymphocytes Absolute 1 34 Thousands/µL      Monocytes Absolute 0 60 Thousand/µL      Eosinophils Absolute 0 09 Thousand/µL      Basophils Absolute 0 03 Thousands/µL     Blood culture #2 [[id number]] Collected: 09/02/22 1449    Lab Status: In process Specimen: Blood from Arm, Left Updated: 09/02/22 1454                 CTA head and neck with and without contrast   Final Result by Martha Larios MD (09/02 1614)      No acute intracranial pathology  Evolution of previously seen infarct with development of right temporal and occipital encephalomalacia  Chronic microangiopathy  No significant stenosis of the cervical carotid arteries  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 is unchanged  No significant intracranial stenosis or large vessel occlusion  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  Workstation performed: [id number]         XR chest 1 view portable   ED Interpretation by Lorenzo Ha MD (09/02 1637)   No infiltrates  No CHF        Final Result by Giles Hamilton MD (09/02 1521)      No acute cardiopulmonary disease  Workstation performed: [id number]         XR elbow 3+ views RIGHT   ED Interpretation by Sy Salter MD (09/02 1637)   No fracture or dislocation      Final Result by Darlyn Harris MD (09/02 1521)      No acute osseous abnormality  Workstation performed: [id number]                    Procedures  ECG 12 Lead Documentation Only    Date/Time: 9/2/2022 2:53 PM  Performed by: Sy Salter MD  Authorized by: Sy Salter MD     ECG reviewed by me, the ED Provider: yes    Patient location:  ED  Comments:      Normal sinus rhythm  Left bundle-branch block  No ectopy  Abnormal EKG  ED Course                                             MDM  Number of Diagnoses or Management Options  Contusion of right elbow, initial encounter  Dizziness  Frequent falls  Hypomagnesemia  Hypotension  Diagnosis management comments: Patient was found to be hypotensive on arrival   Hypotension is worse with standing  This did resolve with IV fluids  This could explain his dizziness and falling  He is not septic  He could have been dry  Another thought is adverse medication effect  Doubt cardiogenic shock  Patient is having difficulty accomplishing ADLs  He lives alone  At this point he appears to be unsafe for discharge  Consulted with hospitalist for admission  X-ray of right elbow was negative for fracture or dislocation  Patient was found to have low magnesium    Replacement was ordered       Amount and/or Complexity of Data Reviewed  Clinical lab tests: ordered and reviewed  Tests in the radiology section of CPT®: ordered and reviewed  Review and summarize past medical records: yes  Discuss the patient with other providers: yes  Independent visualization of images, tracings, or specimens: yes        Disposition  Final diagnoses:   Hypotension   Dizziness   Frequent falls   Contusion of right elbow, initial encounter   Hypomagnesemia     Time reflects when diagnosis was documented in both MDM as applicable and the Disposition within this note     Time User Action Codes Description Comment    9/2/2022  4:56 PM Josef Sams Add [I95 9] Hypotension     9/2/2022  4:56 PM Josef Sams Add [R42] Dizziness     9/2/2022  4:57 PM Josef Sams Add [R29 6] Frequent falls     9/2/2022  4:57 PM Josef Sams Add [S50 01XA] Contusion of right elbow, initial encounter     9/2/2022  4:57 PM Josef Sams Add [E83 42] Hypomagnesemia       ED Disposition     ED Disposition   Admit    Condition   Stable    Date/Time   Fri Sep 2, 2022  4:56 PM    Comment   --         Follow-up Information    None         Patient's Medications   Discharge Prescriptions    No medications on file       No discharge procedures on file      PDMP Review     None          ED Provider  Electronically Signed by           Isra Llanos MD  09/02/22 6029

## 2022-09-02 NOTE — ASSESSMENT & PLAN NOTE
Creatinine level on arrival is noted to be 1 66  Continue with IV fluid hydration  Repeat blood work in the morning

## 2022-09-02 NOTE — ASSESSMENT & PLAN NOTE
Patient was seen and examined in the emergency department and will be admitted to the hospital for further evaluation and management  Patient was initially noted to be hypotensive on arrival with a systolic blood pressure in the 70s  Patient's blood pressure did dramatically improve with administration of IV fluid hydration  Patient also with evidence of acute kidney injury  Suspect that patient's hypotension is likely related to volume depletion, dehydration as well as an affective blood pressure medications  No evidence of infection or sepsis at this time  Will check orthostatic vital signs  Will continue to monitor and make further adjustments as needed    Hold blood pressure medications at this time given hypotension on arrival

## 2022-09-03 ENCOUNTER — APPOINTMENT (OUTPATIENT)
Dept: RADIOLOGY | Facility: HOSPITAL | Age: 57
DRG: 312 | End: 2022-09-03
Payer: COMMERCIAL

## 2022-09-03 PROBLEM — N17.9 ACUTE KIDNEY INJURY (HCC): Status: RESOLVED | Noted: 2022-05-19 | Resolved: 2022-09-03

## 2022-09-03 PROBLEM — I95.9 HYPOTENSION: Status: RESOLVED | Noted: 2022-09-02 | Resolved: 2022-09-03

## 2022-09-03 PROBLEM — I95.1 ORTHOSTATIC HYPOTENSION: Status: ACTIVE | Noted: 2022-09-03

## 2022-09-03 LAB
ALBUMIN SERPL BCP-MCNC: 3.8 G/DL (ref 3.5–5)
ALP SERPL-CCNC: 92 U/L (ref 46–116)
ALT SERPL W P-5'-P-CCNC: 18 U/L (ref 12–78)
ANION GAP SERPL CALCULATED.3IONS-SCNC: 10 MMOL/L (ref 4–13)
AST SERPL W P-5'-P-CCNC: 17 U/L (ref 5–45)
BASOPHILS # BLD AUTO: 0.01 THOUSANDS/ΜL (ref 0–0.1)
BASOPHILS NFR BLD AUTO: 0 % (ref 0–1)
BILIRUB SERPL-MCNC: 0.68 MG/DL (ref 0.2–1)
BUN SERPL-MCNC: 12 MG/DL (ref 5–25)
CALCIUM SERPL-MCNC: 9 MG/DL (ref 8.3–10.1)
CHLORIDE SERPL-SCNC: 107 MMOL/L (ref 96–108)
CO2 SERPL-SCNC: 24 MMOL/L (ref 21–32)
CREAT SERPL-MCNC: 1.13 MG/DL (ref 0.6–1.3)
EOSINOPHIL # BLD AUTO: 0.14 THOUSAND/ΜL (ref 0–0.61)
EOSINOPHIL NFR BLD AUTO: 3 % (ref 0–6)
ERYTHROCYTE [DISTWIDTH] IN BLOOD BY AUTOMATED COUNT: 12.9 % (ref 11.6–15.1)
GFR SERPL CREATININE-BSD FRML MDRD: 70 ML/MIN/1.73SQ M
GLUCOSE SERPL-MCNC: 95 MG/DL (ref 65–140)
HCT VFR BLD AUTO: 42.5 % (ref 36.5–49.3)
HGB BLD-MCNC: 14.7 G/DL (ref 12–17)
IMM GRANULOCYTES # BLD AUTO: 0.01 THOUSAND/UL (ref 0–0.2)
IMM GRANULOCYTES NFR BLD AUTO: 0 % (ref 0–2)
LYMPHOCYTES # BLD AUTO: 1.33 THOUSANDS/ΜL (ref 0.6–4.47)
LYMPHOCYTES NFR BLD AUTO: 26 % (ref 14–44)
MAGNESIUM SERPL-MCNC: 1.8 MG/DL (ref 1.6–2.6)
MCH RBC QN AUTO: 29.1 PG (ref 26.8–34.3)
MCHC RBC AUTO-ENTMCNC: 34.6 G/DL (ref 31.4–37.4)
MCV RBC AUTO: 84 FL (ref 82–98)
MONOCYTES # BLD AUTO: 0.66 THOUSAND/ΜL (ref 0.17–1.22)
MONOCYTES NFR BLD AUTO: 13 % (ref 4–12)
NEUTROPHILS # BLD AUTO: 2.91 THOUSANDS/ΜL (ref 1.85–7.62)
NEUTS SEG NFR BLD AUTO: 58 % (ref 43–75)
NRBC BLD AUTO-RTO: 0 /100 WBCS
PLATELET # BLD AUTO: 100 THOUSANDS/UL (ref 149–390)
PMV BLD AUTO: 9.9 FL (ref 8.9–12.7)
POTASSIUM SERPL-SCNC: 4.4 MMOL/L (ref 3.5–5.3)
PROT SERPL-MCNC: 6.5 G/DL (ref 6.4–8.4)
RBC # BLD AUTO: 5.05 MILLION/UL (ref 3.88–5.62)
SODIUM SERPL-SCNC: 141 MMOL/L (ref 135–147)
WBC # BLD AUTO: 5.06 THOUSAND/UL (ref 4.31–10.16)

## 2022-09-03 PROCEDURE — 83735 ASSAY OF MAGNESIUM: CPT | Performed by: FAMILY MEDICINE

## 2022-09-03 PROCEDURE — 70551 MRI BRAIN STEM W/O DYE: CPT

## 2022-09-03 PROCEDURE — 85025 COMPLETE CBC W/AUTO DIFF WBC: CPT | Performed by: FAMILY MEDICINE

## 2022-09-03 PROCEDURE — 80053 COMPREHEN METABOLIC PANEL: CPT | Performed by: FAMILY MEDICINE

## 2022-09-03 PROCEDURE — 99232 SBSQ HOSP IP/OBS MODERATE 35: CPT | Performed by: NURSE PRACTITIONER

## 2022-09-03 PROCEDURE — G1004 CDSM NDSC: HCPCS

## 2022-09-03 RX ORDER — LORAZEPAM 2 MG/ML
0.5 INJECTION INTRAMUSCULAR ONCE
Status: COMPLETED | OUTPATIENT
Start: 2022-09-03 | End: 2022-09-03

## 2022-09-03 RX ORDER — SODIUM CHLORIDE 9 MG/ML
50 INJECTION, SOLUTION INTRAVENOUS CONTINUOUS
Status: DISCONTINUED | OUTPATIENT
Start: 2022-09-03 | End: 2022-09-04

## 2022-09-03 RX ORDER — METOPROLOL SUCCINATE 25 MG/1
25 TABLET, EXTENDED RELEASE ORAL DAILY
Status: DISCONTINUED | OUTPATIENT
Start: 2022-09-03 | End: 2022-09-07 | Stop reason: HOSPADM

## 2022-09-03 RX ADMIN — ATORVASTATIN CALCIUM 40 MG: 40 TABLET, FILM COATED ORAL at 16:20

## 2022-09-03 RX ADMIN — SODIUM CHLORIDE 75 ML/HR: 0.9 INJECTION, SOLUTION INTRAVENOUS at 06:17

## 2022-09-03 RX ADMIN — THIAMINE HCL TAB 100 MG 100 MG: 100 TAB at 08:21

## 2022-09-03 RX ADMIN — PANTOPRAZOLE SODIUM 40 MG: 40 TABLET, DELAYED RELEASE ORAL at 05:38

## 2022-09-03 RX ADMIN — ASPIRIN 81 MG CHEWABLE TABLET 81 MG: 81 TABLET CHEWABLE at 08:21

## 2022-09-03 RX ADMIN — SODIUM CHLORIDE 50 ML/HR: 0.9 INJECTION, SOLUTION INTRAVENOUS at 14:37

## 2022-09-03 RX ADMIN — FENOFIBRATE 145 MG: 145 TABLET, FILM COATED ORAL at 08:21

## 2022-09-03 RX ADMIN — DOCUSATE SODIUM 100 MG: 100 CAPSULE, LIQUID FILLED ORAL at 17:18

## 2022-09-03 RX ADMIN — Medication 3 MG: at 21:05

## 2022-09-03 RX ADMIN — MAGNESIUM OXIDE TAB 400 MG (241.3 MG ELEMENTAL MG) 400 MG: 400 (241.3 MG) TAB at 08:21

## 2022-09-03 RX ADMIN — LORAZEPAM 0.5 MG: 2 INJECTION INTRAMUSCULAR; INTRAVENOUS at 09:28

## 2022-09-03 RX ADMIN — ENOXAPARIN SODIUM 40 MG: 40 INJECTION SUBCUTANEOUS at 08:21

## 2022-09-03 RX ADMIN — LACOSAMIDE 100 MG: 50 TABLET, FILM COATED ORAL at 21:05

## 2022-09-03 RX ADMIN — LEVETIRACETAM 1500 MG: 500 TABLET, FILM COATED ORAL at 05:37

## 2022-09-03 RX ADMIN — METOPROLOL SUCCINATE 25 MG: 25 TABLET, EXTENDED RELEASE ORAL at 10:44

## 2022-09-03 RX ADMIN — MAGNESIUM OXIDE TAB 400 MG (241.3 MG ELEMENTAL MG) 400 MG: 400 (241.3 MG) TAB at 17:18

## 2022-09-03 RX ADMIN — LACOSAMIDE 100 MG: 50 TABLET, FILM COATED ORAL at 08:21

## 2022-09-03 RX ADMIN — LEVETIRACETAM 1500 MG: 500 TABLET, FILM COATED ORAL at 17:18

## 2022-09-03 RX ADMIN — DOCUSATE SODIUM 100 MG: 100 CAPSULE, LIQUID FILLED ORAL at 08:21

## 2022-09-03 NOTE — PLAN OF CARE
Problem: Potential for Falls  Goal: Patient will remain free of falls  Description: INTERVENTIONS:  - Educate patient/family on patient safety including physical limitations  - Instruct patient to call for assistance with activity   - Consult OT/PT to assist with strengthening/mobility   - Keep Call bell within reach  - Keep bed low and locked with side rails adjusted as appropriate  - Keep care items and personal belongings within reach  - Initiate and maintain comfort rounds  - Make Fall Risk Sign visible to staff    - Apply yellow socks and bracelet for high fall risk patients  - Consider moving patient to room near nurses station  Outcome: Progressing     Problem: PAIN - ADULT  Goal: Verbalizes/displays adequate comfort level or baseline comfort level  Description: Interventions:  - Encourage patient to monitor pain and request assistance  - Assess pain using appropriate pain scale  - Administer analgesics based on type and severity of pain and evaluate response  - Implement non-pharmacological measures as appropriate and evaluate response  - Consider cultural and social influences on pain and pain management  - Notify physician/advanced practitioner if interventions unsuccessful or patient reports new pain  Outcome: Progressing     Problem: SAFETY ADULT  Goal: Patient will remain free of falls  Description: INTERVENTIONS:  - Educate patient/family on patient safety including physical limitations  - Instruct patient to call for assistance with activity   - Consult OT/PT to assist with strengthening/mobility   - Keep Call bell within reach  - Keep bed low and locked with side rails adjusted as appropriate  - Keep care items and personal belongings within reach  - Initiate and maintain comfort rounds  - Make Fall Risk Sign visible to staff    - Apply yellow socks and bracelet for high fall risk patients  - Consider moving patient to room near nurses station  Outcome: Progressing  Goal: Maintain or return to baseline ADL function  Description: INTERVENTIONS:  -  Assess patient's ability to carry out ADLs; assess patient's baseline for ADL function and identify physical deficits which impact ability to perform ADLs (bathing, care of mouth/teeth, toileting, grooming, dressing, etc )  - Assess/evaluate cause of self-care deficits   - Assess range of motion  - Assess patient's mobility; develop plan if impaired  - Assess patient's need for assistive devices and provide as appropriate  - Encourage maximum independence but intervene and supervise when necessary  - Involve family in performance of ADLs  - Assess for home care needs following discharge   - Consider OT consult to assist with ADL evaluation and planning for discharge  - Provide patient education as appropriate  Outcome: Progressing  Goal: Maintains/Returns to pre admission functional level  Description: INTERVENTIONS:  - Perform BMAT or MOVE assessment daily    - Set and communicate daily mobility goal to care team and patient/family/caregiver     - Collaborate with rehabilitation services on mobility goals if consulted    - Out of bed for toileting  - Record patient progress and toleration of activity level   Outcome: Progressing     Problem: DISCHARGE PLANNING  Goal: Discharge to home or other facility with appropriate resources  Description: INTERVENTIONS:  - Identify barriers to discharge w/patient and caregiver  - Arrange for needed discharge resources and transportation as appropriate  - Identify discharge learning needs (meds, wound care, etc )  - Arrange for interpretive services to assist at discharge as needed  - Refer to Case Management Department for coordinating discharge planning if the patient needs post-hospital services based on physician/advanced practitioner order or complex needs related to functional status, cognitive ability, or social support system  Outcome: Progressing     Problem: Knowledge Deficit  Goal: Patient/family/caregiver demonstrates understanding of disease process, treatment plan, medications, and discharge instructions  Description: Complete learning assessment and assess knowledge base    Interventions:  - Provide teaching at level of understanding  - Provide teaching via preferred learning methods  Outcome: Progressing     Problem: CARDIOVASCULAR - ADULT  Goal: Maintains optimal cardiac output and hemodynamic stability  Description: INTERVENTIONS:  - Monitor I/O, vital signs and rhythm  - Monitor for S/S and trends of decreased cardiac output  - Administer and titrate ordered vasoactive medications to optimize hemodynamic stability  - Assess quality of pulses, skin color and temperature  - Assess for signs of decreased coronary artery perfusion  - Instruct patient to report change in severity of symptoms  Outcome: Progressing  Goal: Absence of cardiac dysrhythmias or at baseline rhythm  Description: INTERVENTIONS:  - Continuous cardiac monitoring, vital signs, obtain 12 lead EKG if ordered  - Administer antiarrhythmic and heart rate control medications as ordered  - Monitor electrolytes and administer replacement therapy as ordered  Outcome: Progressing     Problem: METABOLIC, FLUID AND ELECTROLYTES - ADULT  Goal: Electrolytes maintained within normal limits  Description: INTERVENTIONS:  - Monitor labs and assess patient for signs and symptoms of electrolyte imbalances  - Administer electrolyte replacement as ordered  - Monitor response to electrolyte replacements, including repeat lab results as appropriate  - Instruct patient on fluid and nutrition as appropriate  Outcome: Progressing  Goal: Fluid balance maintained  Description: INTERVENTIONS:  - Monitor labs   - Monitor I/O and WT  - Instruct patient on fluid and nutrition as appropriate  - Assess for signs & symptoms of volume excess or deficit  Outcome: Progressing

## 2022-09-03 NOTE — ASSESSMENT & PLAN NOTE
· Potassium 3 3 on arrival for which improved to 4 4 after repletion  · Encourage oral intake  · Monitor

## 2022-09-03 NOTE — PROGRESS NOTES
Irina U  64   Progress Note - Enzo Crandall 1965, 59 y o  male MRN: [id number]  Unit/Bed#: [address] [place] [address] Encounter: [id number]  Primary Care Provider: No primary care provider on file  Date and time admitted to hospital: 9/2/2022  2:12 PM    * Hypotension-resolved as of 9/3/2022  Assessment & Plan  Patient was initially noted to be hypotensive on arrival with a systolic blood pressure in the 70s  Patient's blood pressure did dramatically improve with administration of IV fluid hydration  Patient also with evidence of acute kidney injury  Suspect that patient's hypotension is likely related to volume depletion, dehydration as well as blood pressure medications  No evidence of infection or sepsis  · BP improved with IVF; now 139/89  · Orthostatic vital signs mildly positive  · Decrease IVF to NS at 50 mL/hr  · Resume Toprol XL   · Continue holding Verapamil   · Monitor     Acute kidney injury (HCC)-resolved as of 9/3/2022  Assessment & Plan  POA as evidenced by Cr 1 66 likely prerenal from dehydration/ hypotension   · Cr improved to 1 13 with IVF  · Encourage oral intake, avoid nephrotoxic agents   · Monitor Cr    Orthostatic hypotension  Assessment & Plan  Noted to be orthostatic hypotensive  Reports dizziness  · Holding Verapamil  · Trial BRAVO stockings  · Gentle IVF  · Encourage oral intake; add nutritional supplements     History of stroke  Assessment & Plan  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 right posterior cerebral artery distributions  As noted by Neurology, recommendation for repeat MRI was to be performed in 2-3 months      Repeated MRI brain based on recommendations  · MRI brain: "Resolution of the previously noted diffusion hyperintensity in the right temporal, occipital and parietal region with the development of in the right occipital and temporal region with ex vacuo dilation of the right occipital horn encephalomalacia  No acute infarct seen  No acute hemorrhage  Severe chronic small vessel ischemic changes  Small chronic microbleed noted in the right temporal region, not seen on the previous study in May"  · Reached out to on-call neurologist to discuss the small chronic micro bleed noted in the right temporal region which was not seen on previous study in May   · Awaiting recommendations   · Continue ASA and Lipitor  · Discontinue Lovenox  · Encourage SCDs     Dizziness  Assessment & Plan  Likely related to dehydration and orthostatic hypotension   · BRAVO stockings, gentle IVF  · Follow-up PT/OT eval   · Fall precautions     Hypomagnesemia  Assessment & Plan  · Magnesium level 1 0 on arrival for which improved to 1 8 following repletion   · Magnesium oxide 400 mg BID   · Encourage oral intake     Hypokalemia  Assessment & Plan  · Potassium 3 3 on arrival for which improved to 4 4 after repletion  · Encourage oral intake  · Monitor     Hypertension  Assessment & Plan  · Held antihypertensive medications given hypotension on arrival  · BP improved with IVF  · Will restart Toprol XL 25 mg daily and monitor  · Continue holding Verapamil     Multiple falls  Assessment & Plan  · Likely secondary to above  · Fall precautions  · PT/OT eval     Hyperlipidemia  Assessment & Plan  · Continue Lipitor     History of alcohol abuse  Assessment & Plan  · Patient denies any recent use  Seizure disorder ([facility] [address] )  Assessment & Plan  · Continue home medications, Vimpat and Keppra       VTE Pharmacologic Prophylaxis: VTE Score: 1 Moderate Risk (Score 3-4) - Pharmacological DVT Prophylaxis Ordered: enoxaparin (Lovenox)  Will discontinue Lovenox given MRI brain findings - encourage SCDs     Patient Centered Rounds: I performed bedside rounds with nursing staff today    Discussions with Specialists or Other Care Team Provider: nursing, CM    Education and Discussions with Family / Patient: Attempted to update contact person (sister) via phone  Left voicemail  Time Spent for Care: 30 minutes  More than 50% of total time spent on counseling and coordination of care as described above  Current Length of Stay: 1 day(s)  Current Patient Status: Inpatient   Certification Statement: The patient will continue to require additional inpatient hospital stay due to dizziness, orthostatic hypotension   Discharge Plan: Anticipate discharge in 24-48 hrs to home  Code Status: Level 1 - Full Code    Subjective:   Patient seen and examined at bedside  Feels cold  Reports dizziness "like the floor is moving underneath me when I walk " Denies HA, blurry vision, CP, SOB, abdominal pain, N/V/D  Discussed the importance of strengthening him by ambulation and getting oob to chair for meals  Objective:     Vitals:   Temp (24hrs), Av 9 °F (36 6 °C), Min:97 3 °F (36 3 °C), Max:98 9 °F (37 2 °C)    Temp:  [97 3 °F (36 3 °C)-98 9 °F (37 2 °C)] 97 3 °F (36 3 °C)  HR:  [51-95] 70  Resp:  [12-21] 17  BP: ()/(50-89) 139/89  SpO2:  [92 %-100 %] 97 %  Body mass index is 23 43 kg/m²  Input and Output Summary (last 24 hours): Intake/Output Summary (Last 24 hours) at 9/3/2022 1415  Last data filed at 9/3/2022 0617  Gross per 24 hour   Intake 1249 ml   Output 3 ml   Net 1246 ml       Physical Exam:   Physical Exam  Vitals and nursing note reviewed  Constitutional:       General: He is not in acute distress  Appearance: He is ill-appearing (appears deconditioned )  He is not toxic-appearing or diaphoretic  Comments: Pleasant gentleman resting in bed on room air    HENT:      Head: Normocephalic  Mouth/Throat:      Mouth: Mucous membranes are moist    Eyes:      Conjunctiva/sclera: Conjunctivae normal    Cardiovascular:      Rate and Rhythm: Normal rate  Pulmonary:      Effort: Pulmonary effort is normal       Breath sounds: Normal breath sounds  No wheezing, rhonchi or rales     Abdominal:      General: Bowel sounds are normal  There is no distension  Palpations: Abdomen is soft  Tenderness: There is no abdominal tenderness  Musculoskeletal:         General: Normal range of motion  Cervical back: Normal range of motion  Right lower leg: No edema  Left lower leg: No edema  Skin:     General: Skin is warm and dry  Capillary Refill: Capillary refill takes less than 2 seconds  Neurological:      Mental Status: He is alert and oriented to person, place, and time  Mental status is at baseline  Motor: Weakness (generalized ) present  Psychiatric:         Mood and Affect: Mood normal          Behavior: Behavior normal          Thought Content: Thought content normal           Additional Data:     Labs:  Results from last 7 days   Lab Units 09/03/22  0659   WBC Thousand/uL 5 06   HEMOGLOBIN g/dL 14 7   HEMATOCRIT % 42 5   PLATELETS Thousands/uL 100*   NEUTROS PCT % 58   LYMPHS PCT % 26   MONOS PCT % 13*   EOS PCT % 3     Results from last 7 days   Lab Units 09/03/22  0520   SODIUM mmol/L 141   POTASSIUM mmol/L 4 4   CHLORIDE mmol/L 107   CO2 mmol/L 24   BUN mg/dL 12   CREATININE mg/dL 1 13   ANION GAP mmol/L 10   CALCIUM mg/dL 9 0   ALBUMIN g/dL 3 8   TOTAL BILIRUBIN mg/dL 0 68   ALK PHOS U/L 92   ALT U/L 18   AST U/L 17   GLUCOSE RANDOM mg/dL 95                 Results from last 7 days   Lab Units 09/02/22  1449   LACTIC ACID mmol/L 1 4       Lines/Drains:  Invasive Devices  Report    Peripheral Intravenous Line  Duration           Peripheral IV 09/02/22 Left Antecubital <1 day                      Imaging: Reviewed radiology reports from this admission including: chest xray, MRI brain and elbow xray, CTA head/neck     Recent Cultures (last 7 days):   Results from last 7 days   Lab Units 09/02/22  1516 09/02/22  1449   BLOOD CULTURE  Received in Microbiology Lab  Culture in Progress  Received in Microbiology Lab  Culture in Progress         Last 24 Hours Medication List:   Current Facility-Administered Medications   Medication Dose Route Frequency Provider Last Rate    acetaminophen  650 mg Oral Q6H PRN Maddison Agrawal MD      aspirin  81 mg Oral Daily Maddison Agrawal MD      atorvastatin  40 mg Oral Daily With Domenic Hough MD      docusate sodium  100 mg Oral BID Maddison Agrawal MD      fenofibrate  145 mg Oral Daily Maddison Agrawal MD      lacosamide  100 mg Oral Q12H [facility] Maddison Agrawal MD      levETIRAcetam  1,500 mg Oral Q12H Maddison Agrawal MD      magnesium oxide  400 mg Oral BID Maddison Agrawal MD      melatonin  3 mg Oral HS Maddison Agrawal MD      metoprolol succinate  25 mg Oral Daily NOAM Rogers      pantoprazole  40 mg Oral Early Morning Maddison Agrawal MD      polyethylene glycol  17 g Oral Daily PRN Maddison Agrawal MD      sodium chloride  50 mL/hr Intravenous Continuous NOAM Rogers      thiamine  100 mg Oral Daily Maddison Agrawal MD          Today, Patient Was Seen By: NOAM Rogers    **Please Note: This note may have been constructed using a voice recognition system  **

## 2022-09-03 NOTE — ASSESSMENT & PLAN NOTE
Likely related to dehydration and orthostatic hypotension   · BRAVO stockings, gentle IVF  · Follow-up PT/OT eval   · Fall precautions

## 2022-09-03 NOTE — ASSESSMENT & PLAN NOTE
Patient was initially noted to be hypotensive on arrival with a systolic blood pressure in the 70s  Patient's blood pressure did dramatically improve with administration of IV fluid hydration  Patient also with evidence of acute kidney injury  Suspect that patient's hypotension is likely related to volume depletion, dehydration as well as blood pressure medications  No evidence of infection or sepsis     · BP improved with IVF; now 139/89  · Orthostatic vital signs mildly positive  · Decrease IVF to NS at 50 mL/hr  · Resume Toprol XL   · Continue holding Verapamil   · Monitor

## 2022-09-03 NOTE — ASSESSMENT & PLAN NOTE
· Magnesium level 1 0 on arrival for which improved to 1 8 following repletion   · Magnesium oxide 400 mg BID   · Encourage oral intake

## 2022-09-03 NOTE — ASSESSMENT & PLAN NOTE
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 right posterior cerebral artery distributions  As noted by Neurology, recommendation for repeat MRI was to be performed in 2-3 months  Repeated MRI brain based on recommendations  · MRI brain: "Resolution of the previously noted diffusion hyperintensity in the right temporal, occipital and parietal region with the development of in the right occipital and temporal region with ex vacuo dilation of the right occipital horn encephalomalacia  No acute infarct seen  No acute hemorrhage  Severe chronic small vessel ischemic changes   Small chronic microbleed noted in the right temporal region, not seen on the previous study in May"  · Reached out to on-call neurologist to discuss the small chronic micro bleed noted in the right temporal region which was not seen on previous study in May   · Awaiting recommendations   · Continue ASA and Lipitor  · Discontinue Lovenox  · Encourage SCDs

## 2022-09-03 NOTE — ASSESSMENT & PLAN NOTE
· Held antihypertensive medications given hypotension on arrival  · BP improved with IVF  · Will restart Toprol XL 25 mg daily and monitor  · Continue holding Verapamil

## 2022-09-03 NOTE — ASSESSMENT & PLAN NOTE
Noted to be orthostatic hypotensive  Reports dizziness     · Holding Verapamil  · Trial BRAVO stockings  · Gentle IVF  · Encourage oral intake; add nutritional supplements

## 2022-09-03 NOTE — ASSESSMENT & PLAN NOTE
POA as evidenced by Cr 1 66 likely prerenal from dehydration/ hypotension   · Cr improved to 1 13 with IVF  · Encourage oral intake, avoid nephrotoxic agents   · Monitor Cr

## 2022-09-04 PROBLEM — E87.6 HYPOKALEMIA: Status: RESOLVED | Noted: 2022-05-10 | Resolved: 2022-09-04

## 2022-09-04 LAB
ANION GAP SERPL CALCULATED.3IONS-SCNC: 8 MMOL/L (ref 4–13)
BUN SERPL-MCNC: 11 MG/DL (ref 5–25)
CALCIUM SERPL-MCNC: 8.9 MG/DL (ref 8.3–10.1)
CHLORIDE SERPL-SCNC: 106 MMOL/L (ref 96–108)
CO2 SERPL-SCNC: 27 MMOL/L (ref 21–32)
CREAT SERPL-MCNC: 1.1 MG/DL (ref 0.6–1.3)
ERYTHROCYTE [DISTWIDTH] IN BLOOD BY AUTOMATED COUNT: 13 % (ref 11.6–15.1)
GFR SERPL CREATININE-BSD FRML MDRD: 73 ML/MIN/1.73SQ M
GLUCOSE SERPL-MCNC: 90 MG/DL (ref 65–140)
HCT VFR BLD AUTO: 43.1 % (ref 36.5–49.3)
HGB BLD-MCNC: 15.2 G/DL (ref 12–17)
MAGNESIUM SERPL-MCNC: 1.2 MG/DL (ref 1.6–2.6)
MCH RBC QN AUTO: 29.5 PG (ref 26.8–34.3)
MCHC RBC AUTO-ENTMCNC: 35.3 G/DL (ref 31.4–37.4)
MCV RBC AUTO: 84 FL (ref 82–98)
PHOSPHATE SERPL-MCNC: 3.9 MG/DL (ref 2.7–4.5)
PLATELET # BLD AUTO: 126 THOUSANDS/UL (ref 149–390)
PMV BLD AUTO: 9.2 FL (ref 8.9–12.7)
POTASSIUM SERPL-SCNC: 4.1 MMOL/L (ref 3.5–5.3)
RBC # BLD AUTO: 5.16 MILLION/UL (ref 3.88–5.62)
SODIUM SERPL-SCNC: 141 MMOL/L (ref 135–147)
WBC # BLD AUTO: 5.15 THOUSAND/UL (ref 4.31–10.16)

## 2022-09-04 PROCEDURE — 80048 BASIC METABOLIC PNL TOTAL CA: CPT | Performed by: NURSE PRACTITIONER

## 2022-09-04 PROCEDURE — 97110 THERAPEUTIC EXERCISES: CPT

## 2022-09-04 PROCEDURE — 85027 COMPLETE CBC AUTOMATED: CPT | Performed by: NURSE PRACTITIONER

## 2022-09-04 PROCEDURE — 97129 THER IVNTJ 1ST 15 MIN: CPT

## 2022-09-04 PROCEDURE — 97530 THERAPEUTIC ACTIVITIES: CPT

## 2022-09-04 PROCEDURE — 99232 SBSQ HOSP IP/OBS MODERATE 35: CPT | Performed by: NURSE PRACTITIONER

## 2022-09-04 PROCEDURE — 97163 PT EVAL HIGH COMPLEX 45 MIN: CPT

## 2022-09-04 PROCEDURE — 84100 ASSAY OF PHOSPHORUS: CPT | Performed by: NURSE PRACTITIONER

## 2022-09-04 PROCEDURE — 97167 OT EVAL HIGH COMPLEX 60 MIN: CPT

## 2022-09-04 PROCEDURE — 83735 ASSAY OF MAGNESIUM: CPT | Performed by: NURSE PRACTITIONER

## 2022-09-04 RX ORDER — HYDROXYZINE HYDROCHLORIDE 25 MG/1
25 TABLET, FILM COATED ORAL EVERY 6 HOURS PRN
Status: DISCONTINUED | OUTPATIENT
Start: 2022-09-04 | End: 2022-09-04

## 2022-09-04 RX ORDER — MAGNESIUM SULFATE HEPTAHYDRATE 40 MG/ML
2 INJECTION, SOLUTION INTRAVENOUS ONCE
Status: COMPLETED | OUTPATIENT
Start: 2022-09-04 | End: 2022-09-04

## 2022-09-04 RX ORDER — HYDROXYZINE HYDROCHLORIDE 25 MG/1
50 TABLET, FILM COATED ORAL EVERY 6 HOURS PRN
Status: DISCONTINUED | OUTPATIENT
Start: 2022-09-04 | End: 2022-09-05

## 2022-09-04 RX ORDER — BUTALBITAL, ACETAMINOPHEN AND CAFFEINE 50; 325; 40 MG/1; MG/1; MG/1
1 TABLET ORAL ONCE
Status: COMPLETED | OUTPATIENT
Start: 2022-09-04 | End: 2022-09-04

## 2022-09-04 RX ORDER — KETOROLAC TROMETHAMINE 30 MG/ML
15 INJECTION, SOLUTION INTRAMUSCULAR; INTRAVENOUS EVERY 6 HOURS PRN
Status: DISPENSED | OUTPATIENT
Start: 2022-09-04 | End: 2022-09-06

## 2022-09-04 RX ADMIN — DOCUSATE SODIUM 100 MG: 100 CAPSULE, LIQUID FILLED ORAL at 17:11

## 2022-09-04 RX ADMIN — ATORVASTATIN CALCIUM 40 MG: 40 TABLET, FILM COATED ORAL at 16:54

## 2022-09-04 RX ADMIN — LEVETIRACETAM 1500 MG: 500 TABLET, FILM COATED ORAL at 17:10

## 2022-09-04 RX ADMIN — HYDROXYZINE HYDROCHLORIDE 50 MG: 25 TABLET ORAL at 19:50

## 2022-09-04 RX ADMIN — SODIUM CHLORIDE 50 ML/HR: 0.9 INJECTION, SOLUTION INTRAVENOUS at 07:13

## 2022-09-04 RX ADMIN — HYDROXYZINE HYDROCHLORIDE 25 MG: 25 TABLET ORAL at 17:21

## 2022-09-04 RX ADMIN — ASPIRIN 81 MG CHEWABLE TABLET 81 MG: 81 TABLET CHEWABLE at 09:48

## 2022-09-04 RX ADMIN — MAGNESIUM SULFATE HEPTAHYDRATE 2 G: 40 INJECTION, SOLUTION INTRAVENOUS at 09:54

## 2022-09-04 RX ADMIN — METOPROLOL SUCCINATE 25 MG: 25 TABLET, EXTENDED RELEASE ORAL at 09:48

## 2022-09-04 RX ADMIN — LACOSAMIDE 100 MG: 50 TABLET, FILM COATED ORAL at 21:24

## 2022-09-04 RX ADMIN — DOCUSATE SODIUM 100 MG: 100 CAPSULE, LIQUID FILLED ORAL at 09:49

## 2022-09-04 RX ADMIN — PANTOPRAZOLE SODIUM 40 MG: 40 TABLET, DELAYED RELEASE ORAL at 05:21

## 2022-09-04 RX ADMIN — FENOFIBRATE 145 MG: 145 TABLET, FILM COATED ORAL at 09:48

## 2022-09-04 RX ADMIN — LACOSAMIDE 100 MG: 50 TABLET, FILM COATED ORAL at 09:48

## 2022-09-04 RX ADMIN — NICOTINE POLACRILEX 2 MG: 2 GUM, CHEWING BUCCAL at 17:41

## 2022-09-04 RX ADMIN — BUTALBITAL, ACETAMINOPHEN AND CAFFEINE 1 TABLET: 50; 325; 40 TABLET ORAL at 12:42

## 2022-09-04 RX ADMIN — LEVETIRACETAM 1500 MG: 500 TABLET, FILM COATED ORAL at 05:21

## 2022-09-04 RX ADMIN — MAGNESIUM OXIDE TAB 400 MG (241.3 MG ELEMENTAL MG) 400 MG: 400 (241.3 MG) TAB at 17:11

## 2022-09-04 RX ADMIN — KETOROLAC TROMETHAMINE 15 MG: 30 INJECTION, SOLUTION INTRAMUSCULAR at 10:45

## 2022-09-04 RX ADMIN — MAGNESIUM OXIDE TAB 400 MG (241.3 MG ELEMENTAL MG) 400 MG: 400 (241.3 MG) TAB at 09:48

## 2022-09-04 RX ADMIN — THIAMINE HCL TAB 100 MG 100 MG: 100 TAB at 09:48

## 2022-09-04 RX ADMIN — Medication 3 MG: at 21:24

## 2022-09-04 NOTE — PLAN OF CARE
Problem: PAIN - ADULT  Goal: Verbalizes/displays adequate comfort level or baseline comfort level  Description: Interventions:  - Encourage patient to monitor pain and request assistance  - Assess pain using appropriate pain scale  - Administer analgesics based on type and severity of pain and evaluate response  - Implement non-pharmacological measures as appropriate and evaluate response  - Consider cultural and social influences on pain and pain management  - Notify physician/advanced practitioner if interventions unsuccessful or patient reports new pain  Outcome: Progressing     Problem: SAFETY ADULT  Goal: Patient will remain free of falls  Description: INTERVENTIONS:  - Educate patient/family on patient safety including physical limitations  - Instruct patient to call for assistance with activity   - Consult OT/PT to assist with strengthening/mobility   - Keep Call bell within reach  - Keep bed low and locked with side rails adjusted as appropriate  - Keep care items and personal belongings within reach  - Initiate and maintain comfort rounds  - Make Fall Risk Sign visible to staff  - Offer Toileting every 2 Hours, in advance of need  - Initiate/Maintain bed alarm  - Obtain necessary fall risk management equipment  - Apply yellow socks and bracelet for high fall risk patients  - Consider moving patient to room near nurses station  Outcome: Progressing  Goal: Maintain or return to baseline ADL function  Description: INTERVENTIONS:  -  Assess patient's ability to carry out ADLs; assess patient's baseline for ADL function and identify physical deficits which impact ability to perform ADLs (bathing, care of mouth/teeth, toileting, grooming, dressing, etc )  - Assess/evaluate cause of self-care deficits   - Assess range of motion  - Assess patient's mobility; develop plan if impaired  - Assess patient's need for assistive devices and provide as appropriate  - Encourage maximum independence but intervene and supervise when necessary  - Involve family in performance of ADLs  - Assess for home care needs following discharge   - Consider OT consult to assist with ADL evaluation and planning for discharge  - Provide patient education as appropriate  Outcome: Progressing    Problem: DISCHARGE PLANNING  Goal: Discharge to home or other facility with appropriate resources  Description: INTERVENTIONS:  - Identify barriers to discharge w/patient and caregiver  - Arrange for needed discharge resources and transportation as appropriate  - Identify discharge learning needs (meds, wound care, etc )  - Arrange for interpretive services to assist at discharge as needed  - Refer to Case Management Department for coordinating discharge planning if the patient needs post-hospital services based on physician/advanced practitioner order or complex needs related to functional status, cognitive ability, or social support system  Outcome: Progressing     Problem: Knowledge Deficit  Goal: Patient/family/caregiver demonstrates understanding of disease process, treatment plan, medications, and discharge instructions  Description: Complete learning assessment and assess knowledge base    Interventions:  - Provide teaching at level of understanding  - Provide teaching via preferred learning methods  Outcome: Progressing     Problem: CARDIOVASCULAR - ADULT  Goal: Maintains optimal cardiac output and hemodynamic stability  Description: INTERVENTIONS:  - Monitor I/O, vital signs and rhythm  - Monitor for S/S and trends of decreased cardiac output  - Administer and titrate ordered vasoactive medications to optimize hemodynamic stability  - Assess quality of pulses, skin color and temperature  - Assess for signs of decreased coronary artery perfusion  - Instruct patient to report change in severity of symptoms  Outcome: Progressing  Goal: Absence of cardiac dysrhythmias or at baseline rhythm  Description: INTERVENTIONS:  - Continuous cardiac monitoring, vital signs, obtain 12 lead EKG if ordered  - Administer antiarrhythmic and heart rate control medications as ordered  - Monitor electrolytes and administer replacement therapy as ordered  Outcome: Progressing     Problem: METABOLIC, FLUID AND ELECTROLYTES - ADULT  Goal: Electrolytes maintained within normal limits  Description: INTERVENTIONS:  - Monitor labs and assess patient for signs and symptoms of electrolyte imbalances  - Administer electrolyte replacement as ordered  - Monitor response to electrolyte replacements, including repeat lab results as appropriate  - Instruct patient on fluid and nutrition as appropriate  Outcome: Progressing  Goal: Fluid balance maintained  Description: INTERVENTIONS:  - Monitor labs   - Monitor I/O and WT  - Instruct patient on fluid and nutrition as appropriate  - Assess for signs & symptoms of volume excess or deficit  Outcome: Progressing     Problem: MOBILITY - ADULT  Goal: Maintain or return to baseline ADL function  Description: INTERVENTIONS:  -  Assess patient's ability to carry out ADLs; assess patient's baseline for ADL function and identify physical deficits which impact ability to perform ADLs (bathing, care of mouth/teeth, toileting, grooming, dressing, etc )  - Assess/evaluate cause of self-care deficits   - Assess range of motion  - Assess patient's mobility; develop plan if impaired  - Assess patient's need for assistive devices and provide as appropriate  - Encourage maximum independence but intervene and supervise when necessary  - Involve family in performance of ADLs  - Assess for home care needs following discharge   - Consider OT consult to assist with ADL evaluation and planning for discharge  - Provide patient education as appropriate  Outcome: Progressing  Goal: Maintains/Returns to pre admission functional level  Description: INTERVENTIONS:  - Perform BMAT or MOVE assessment daily    - Set and communicate daily mobility goal to care team and patient/family/caregiver  - Collaborate with rehabilitation services on mobility goals if consulted  - Perform Range of Motion 2 times a day  - Reposition patient every 2 hours    - Dangle patient 3 times a day  - Stand patient 3 times a day  - Ambulate patient 3 times a day  - Out of bed to chair 3 times a day   - Out of bed for meals 3 times a day  - Out of bed for toileting  - Record patient progress and toleration of activity level   Outcome: Progressing

## 2022-09-04 NOTE — ASSESSMENT & PLAN NOTE
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 right posterior cerebral artery distributions  As noted by Neurology, recommendation for repeat MRI was to be performed in 2-3 months  Repeated MRI brain based on recommendations  · MRI brain: "Resolution of the previously noted diffusion hyperintensity in the right temporal, occipital and parietal region with the development of in the right occipital and temporal region with ex vacuo dilation of the right occipital horn encephalomalacia  No acute infarct seen  No acute hemorrhage  Severe chronic small vessel ischemic changes  Small chronic microbleed noted in the right temporal region, not seen on the previous study in May"  · Discussed findings of small chronic micro bleed with on-call neurologist  · Per neurologist: okay to continue current medications, follow-up PT/OT/cognitive evaluations and recommend home PT at minimum  · Continue ASA and Lipitor  · Discontinued Lovenox  · Encourage SCDs   · PT/OT recommending discharge to short-term rehab  Spoke with case management  Due to the holiday weekend, will need to follow-up placement on Tuesday   Consider [facility]

## 2022-09-04 NOTE — SPEECH THERAPY NOTE
Speech Language/Pathology  Speech/Language Pathology  Assessment    Patient Name: Suyapa Carmona  [id number] Date: 9/4/2022     Problem List  Active Problems:    Hypertension    Seizure disorder ([address] )    History of alcohol abuse    Hyperlipidemia    Hypokalemia    Hypomagnesemia    Dizziness    Multiple falls    History of stroke    Orthostatic hypotension    Suyapa Carmona is a 59 y o  male who presents to the emergency department after multiple falls at home  Patient reports that since his stroke he has been extremely unsteady on his feet  Patient reports that over the last several days he has been experiencing dizziness when standing which has resulted in him falling  Patient reports chronic left-sided weakness since his stroke in May  In the emergency department patient was noted to be hypotensive on arrival   Patient's blood pressure did respond to IV fluid hydration  CT of the head and neck was performed which did not reveal any acute findings but did show evolution of previously seen infarct with development of right temporal and occipital encephalomalacia as well as chronic microangiopathy  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 is unchanged  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  Patient currently denies any chest pain, shortness a breath, abdominal pain, lightheadedness, palpitations, fevers, chills, change in urinary habits, change in bowel habits, recent travel or any known sick contacts  Consult received for speech/swallow eval on stroke pathway  Pt passed nsg swallow screen; tolerating regular diet w/o s/s dysphagia or aspiration  No speech/language deficits reported      MRI: 9/3/22  Resolution of the previously noted diffusion hyperintensity in the right temporal, occipital and parietal region with the development of in the right occipital and temporal region with ex vacuo dilation of the right occipital horn encephalomalacia   No acute infarct seen   No acute hemorrhage    No need for formal speech/swallow eval at this time  Reconsult if needed        April Hortencia Hunt MA CCC-SLP  Speech Pathologist  [place] license # [id number]  [place] license # [id number]  Available via [employer] 19-Jan-2018 17:50

## 2022-09-04 NOTE — ASSESSMENT & PLAN NOTE
· Magnesium level 1 0 -> 1 8 -> 1 2   · Started Magnesium oxide 400 mg BID  · Encourage oral intake   · Monitor electrolytes

## 2022-09-04 NOTE — OCCUPATIONAL THERAPY NOTE
Occupational Therapy Evaluation/Treatment     09/04/22 1110   Note Type   Note type Evaluation   Restrictions/Precautions   Other Precautions Fall Risk;Pain;Bed Alarm   Pain Assessment   Pain Assessment Tool 0-10   Pain Score 8   Pain Location/Orientation Orientation: Right  (elbow and reported pounding headache)   Home Living   Type of Home Mobile home  (trailer home)   Home Layout One level  (4-5 ORALIA)   Bathroom Shower/Tub Tub/shower unit   Bathroom Toilet Standard   Bathroom Equipment   (none, per pt grab bar in bathroom came off  and not fixed)   [facility] Walker;Cane   Additional Comments Pt admitted after multiple falls at home  Pt reported dizziness when standing over last several days  Pt reported continued  R elbow pain from one of his falls  (-) for fracture  Pt has h/o Stroke in May 2022  Prior Function   Level of [place] Independent with ADLs and functional mobility; Needs assistance with IADLs   Lives With Alone   Receives Help From Family  (Sister who lives far from pt and has medical condition (MS), same sister sometimes takes pt to go shopping)   ADL Assistance Independent   IADLs   (but as per pt he has not been able to care for self well and do household chores because of recurring falls and unable to tolerate standing too long d/t feeling of dizziness)   Falls in the last 6 months 5 to 10   Comments pt reports living alone and was able to do some basic ADLs but have not been able to do housekeeping  Pt is home bound and does not drive  Sister comes to assist as able  Pt reported using a cane when ambulating inside trailer home     Subjective   Subjective I have a pounding headache   ADL   Eating Assistance 6  Modified independent   Grooming Assistance 5  Supervision/Setup   UB Bathing Assistance 5  Supervision/Setup   LB Bathing Assistance 4  Minimal Assistance   UB Dressing Assistance 5  Supervision/Setup   LB Dressing Assistance 3  Moderate Assistance   Toileting Assistance  4  Minimal Assistance   Functional Assistance 4  Minimal Assistance   Bed Mobility   Rolling R 7  Independent   Rolling L 7  Independent   Supine to Sit 5  Supervision   Sit to Supine 5  Supervision   Transfers   Sit to Stand 4  Minimal assistance   Additional items Assist x 1   Stand to Sit 4  Minimal assistance   Additional items Assist x 1   Toilet transfer 4  Minimal assistance   Additional items Assist x 1;Verbal cues   Additional Comments Pt reports it "feels as if  Floor or ground moving under his feet' when in stance and when walking to and from bathroom   Functional Mobility   Functional Mobility 4  Minimal assistance   Additional items Rolling walker   Balance   Static Sitting Fair   Dynamic Sitting Fair -   Static Standing Fair -   Dynamic Standing Poor +   Activity Tolerance   Activity Tolerance Patient limited by fatigue;Patient limited by pain   RUE Assessment   RUE Assessment WFL  (3-/5 grossly graded and reported 8/10 pain to R elbow)   LUE Assessment   LUE Assessment WFL  (3-/5 grossly graded , left sided weakness from previous  stroke)   Cognition   Arousal/Participation Alert; Responsive; Cooperative   Attention Within functional limits   Orientation Level Oriented X4   Following Commands Follows one step commands without difficulty   Cognition Assessment Tools SLUMS   Score 21  (Mild Neurocognitive disorder)   Assessment   Limitation Decreased ADL status; Decreased UE ROM; Decreased UE strength;Decreased endurance;Decreased self-care trans;Decreased high-level ADLs   Prognosis Good   Assessment Patient evaluated by Occupational Therapy  Patient admitted with Hypotension  The patients occupational profile, medical and therapy history includes a extensive additional review of physical, cognitive, or psychosocial history related to current functional performance    Comorbidities affecting functional mobility and ADLS include: anxiety, GERD, hypertension and hyperlipidemia  Prior to admission, patient was requiring assist for functional mobility with cane, independent with ADLS, requiring assist for IADLS, living alone in one story home with 4-5 steps to enter and ambulating household distance  The evaluation identifies the following performance deficits: weakness, decreased ROM, impaired balance, decreased endurance, decreased coordination, increased fall risk, decreased ADLS, decreased IADLS, pain, decreased activity tolerance and decreased strength, that result in activity limitations and/or participation restrictions  This evaluation requires clinical decision making of high complexity, because the patient presents with comorbidites that affect occupational performance and required significant modification of tasks or assistance with consideration of multiple treatment options  The Barthel Index was used as a functional outcome tool presenting with a score of Barthel Index Score: 50, indicating marked limitations of functional mobility and ADLS  The patient's raw score on the -PAC Daily Activity inpatient short form is 18, standardized score is 38 66, less than 39 4  Patients at this level are likely to benefit from DC to post-acute rehabilitation services  Please refer to the recommendation of the Occupational Therapist for safe DC planning  Patient will benefit from skilled Occupational Therapy services to address above deficits and facilitate a safe return to prior level of function  Goals   Patient Goals Feel better   STG Time Frame   (1-7 days)   Short Term Goal  Goals established to promote: feel better  Patient will increase standing tolerance to 3 minutes during ADL task to decrease assistance level and decrease fall risk; Patient will increase bed mobility to independent in preparation for ADLS and transfers;  Patient will increase functional mobility to and from bathroom with rolling walker with supervision to increase performance with ADLS and to use a toilet; Patient will tolerate 5  minutes of UE ROM/strengthening to increase general activity tolerance and performance in ADLS/IADLS; Patient will improve functional activity tolerance to 5 minutes of sustained functional tasks to increase participation in basic self-care and decrease assistance level;  Patient will be able to to verbalize understanding and perform energy conservation/proper body mechanics during ADLS and functional mobility at least 75% of the time with minimal cueing to decrease signs of fatigue and increase stamina to return to prior level of function; Patient will increase dynamic sitting balance to fair to improve the ability to sit at edge of bed or on a chair for ADLS;  Patient will increase static standing balance to fair to improve postural stability and decrease fall risk during standing ADLS and transfers     LTG Time Frame   (8-14 days)   Long Term Goal Patient will increase standing tolerance to 6 minutes during ADL task to decrease assistance level and decrease fall risk; Patient will increase functional mobility to and from bathroom with rolling walker independently to increase performance with ADS and to use a toilet; Patient will tolerate 10 minutes of UE ROM/strengthening to increase general activity tolerance and performance in ADLS/IADLS; Patient will improve functional activity tolerance to 10 minutes of sustained functional tasks to increase participation in basic self-care and decrease assistance level;  Patient will be able to to verbalize understanding and perform energy conservation/proper body mechanics during ADLS and functional mobility at least 90% of the time with no cueing to decrease signs of fatigue and increase stamina to return to prior level of function; Patient will increase dynamic sitting balance to fair+ to improve the ability to sit at edge of bed or on a chair for ADLS;  Patient will increase dynamic standing balance to fair- to improve postural stability and decrease fall risk during standing ADLS and transfers  Pt will score >/= 20/24 on AM-PAC Daily Activity Inpatient scale to promote safe independence with ADLs and functional mobility; Pt will score >/= 80/100 on Barthel Index in order to decrease caregiver assistance needed and increase ability to perform ADLs and functional mobility  Functional Transfer Goals   Pt Will Perform All Functional Transfers   (STG Supervision, LTG independent)   ADL Goals   Pt Will Perform Eating   (STG independent)   Pt Will Perform Grooming   (STG independent)   Pt Will Perform Bathing   (STG min A, LTG supervision/set-up)   Pt Will Perform UE Dressing   (STG independent)   Pt Will Perform LE Dressing   (STG min A, LTG supervision/set-up)   Pt Will Perform Toileting   (STG Supervision, LTG  independent)   Plan   Treatment Interventions ADL retraining;Functional transfer training;UE strengthening/ROM; Patient/family training;Neuromuscular reeducation;Continued evaluation; Activityengagement   Goal Expiration Date 09/18/22   OT Frequency 3-5x/wk   Additional Treatment Session   Start Time 1055   End Time 1110   Treatment Assessment S: "I get dizzy a lot" O:  Prior to mobility tasks pt was assisted by RN/Linda for fitting and donning of compression stockings and socks  Patient ambulated short household distance to/from bathroom with RW and min A for navigating while using RW  ; Pt declined to do grooming/oral care while in bathrom  S for sit to stand from toilet seat  Recommended use of grab bars when in bathroom and use of  RW for transfers and ambulation for support and safety d/t pt reports of dizziness and floor moving under him  Pt agreed and showed understanding  Pt able to tolerate sitting on bedside chair for 10 minutes  A: Pt continues to complain of R elbow pain and pounding headache  pt then requesting to return to supine ,at end of session patient supine in bed with all needs met P: Continue with POC   Once d/c recommending STR     Recommendation   OT Discharge Recommendation Post acute rehabilitation services   AM-PAC Daily Activity Inpatient   Lower Body Dressing 3   Bathing 2   Toileting 3   Upper Body Dressing 3   Grooming 3   Eating 4   Daily Activity Raw Score 18   Daily Activity Standardized Score (Calc for Raw Score >=11) 38 66   AM-PAC Applied Cognition Inpatient   Following a Speech/Presentation 4   Understanding Ordinary Conversation 4   Taking Medications 4   Remembering Where Things Are Placed or Put Away 4   Remembering List of 4-5 Errands 4   Taking Care of Complicated Tasks 4   Applied Cognition Raw Score 24   Applied Cognition Standardized Score 62 21   Barthel Index   Feeding 10   Bathing 0   Grooming Score 0   Dressing Score 5   Bladder Score 10   Bowels Score 10   Toilet Use Score 5   Transfers (Bed/Chair) Score 10   Mobility (Level Surface) Score 0   Stairs Score 0   Barthel Index Score 50   Licensure   [place] License Number  Mitzi Ruma Garibay Blas 85 OTR/L [id number]

## 2022-09-04 NOTE — PROGRESS NOTES
[facility] [address]  Progress Note - Jean Claude Balderas 1965, 59 y o  male MRN: [id number]  Unit/Bed#: [address] [place] [address] Encounter: [id number]  Primary Care Provider: No primary care provider on file  Date and time admitted to hospital: 9/2/2022  2:12 PM    * Hypotension-resolved as of 9/3/2022  Assessment & Plan  Patient was initially noted to be hypotensive on arrival with a systolic blood pressure in the 70s  Patient's blood pressure did dramatically improve with administration of IV fluid hydration  Patient also with evidence of acute kidney injury  Suspect that patient's hypotension is likely related to volume depletion, dehydration as well as blood pressure medications  No evidence of infection or sepsis  · BP improved with IVF; now 139/89  · Orthostatic vital signs mildly positive  · Resume Toprol XL   · Continue holding Verapamil     Acute kidney injury (HCC)-resolved as of 9/3/2022  Assessment & Plan  POA as evidenced by Cr 1 66 likely prerenal from dehydration/ hypotension   · Cr improved to 1 13 with IVF  · Encourage oral intake, avoid nephrotoxic agents     Orthostatic hypotension  Assessment & Plan  Noted to be orthostatic hypotensive  Reports dizziness  · Holding Verapamil  · Trial BRAVO stockings  · S/p IVF  · Encourage oral intake; add nutritional supplements     History of stroke  Assessment & Plan  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 right posterior cerebral artery distributions  As noted by Neurology, recommendation for repeat MRI was to be performed in 2-3 months  Repeated MRI brain based on recommendations  · MRI brain: "Resolution of the previously noted diffusion hyperintensity in the right temporal, occipital and parietal region with the development of in the right occipital and temporal region with ex vacuo dilation of the right occipital horn encephalomalacia  No acute infarct seen   No acute hemorrhage  Severe chronic small vessel ischemic changes  Small chronic microbleed noted in the right temporal region, not seen on the previous study in May"  · Discussed findings of small chronic micro bleed with on-call neurologist  · Per neurologist: okay to continue current medications, follow-up PT/OT/cognitive evaluations and recommend home PT at minimum  · Continue ASA and Lipitor  · Discontinued Lovenox  · Encourage SCDs   · PT/OT recommending discharge to short-term rehab  Spoke with case management  Due to holiday weekend, will need to follow-up placement on Tuesday    Dizziness  Assessment & Plan  Likely related to dehydration and orthostatic hypotension   · BRAVO stockings  · PT/OT recommending discharge to rehab  · Fall precautions     Hypomagnesemia  Assessment & Plan  · Magnesium level 1 0 on arrival for which improved to 1 8 following repletion   · Started Magnesium oxide 400 mg BID, however, magnesium down to 1 2 today  · Will replete with Mag sulfate 2 g IV x1  · Encourage oral intake   · Monitor in a m  Hypertension  Assessment & Plan  · Held antihypertensive medications given hypotension on arrival  · BP improved with IVF  · Restarted Toprol XL 25 mg daily   · Continue holding Verapamil   · Monitor    Multiple falls  Assessment & Plan  · Likely secondary to recent stroke and seizures  · Spoke with patient's sister who states the patient falls frequently and often hits his head  He states he recently fell out of the bathtub and pulled the towel rack off the wall  Patient's sister feels he would benefit from rehab    · PT/OT recommends discharge to rehab  · Case management made aware of therapy recommendations; placement pending  · Fall precautions    Hyperlipidemia  Assessment & Plan  · Continue Lipitor     History of alcohol abuse  Assessment & Plan  · Patient denies any recent use  · Started on thiamine and folic acid    Seizure disorder ([address] )  Assessment & Plan  · Continue home medications, Vimpat and Keppra     Hypokalemia-resolved as of 2022  Assessment & Plan  · Potassium 3 3 on arrival for which improved to 4 4 after repletion  · Encourage oral intake  · Monitor       VTE Pharmacologic Prophylaxis: VTE Score: 1 Low Risk (Score 0-2) - Encourage Ambulation  Patient Centered Rounds: I performed bedside rounds with nursing staff today  Discussions with Specialists or Other Care Team Provider: nursing, CM, physical therapy    Education and Discussions with Family / Patient: Updated  (sister) via phone  Time Spent for Care: 30 minutes  More than 50% of total time spent on counseling and coordination of care as described above  Current Length of Stay: 2 day(s)  Current Patient Status: Inpatient   Certification Statement: The patient will continue to require additional inpatient hospital stay due to dizziness, hypomagnesemia, weakness  Discharge Plan: Anticipate discharge in 24-48 hrs to rehab facility  Code Status: Level 1 - Full Code    Subjective:   Patient seen and examined at bedside  Reports a headache  Denies any vision changes  Reports right elbow soreness with range of motion  Feels weak and deconditioned  Denies any chest pain, shortness a breath, abdominal pain, nausea, vomiting, or diarrhea  Looking forward to working with physical therapy  Objective:     Vitals:   Temp (24hrs), Av 9 °F (36 6 °C), Min:97 5 °F (36 4 °C), Max:98 2 °F (36 8 °C)    Temp:  [97 5 °F (36 4 °C)-98 2 °F (36 8 °C)] 98 2 °F (36 8 °C)  HR:  [51-76] 59  Resp:  [16-19] 17  BP: (144-161)/(75-93) 144/75  SpO2:  [95 %-100 %] 98 %  Body mass index is 23 43 kg/m²  Input and Output Summary (last 24 hours): Intake/Output Summary (Last 24 hours) at 2022 1218  Last data filed at 9/3/2022 2000  Gross per 24 hour   Intake 10 ml   Output --   Net 10 ml       Physical Exam:   Physical Exam  Vitals and nursing note reviewed     Constitutional:       General: He is not in acute distress  Appearance: He is ill-appearing (appears deconditioned)  He is not toxic-appearing or diaphoretic  Comments: Resting in bed on room air   HENT:      Head: Normocephalic  Mouth/Throat:      Mouth: Mucous membranes are moist    Eyes:      Conjunctiva/sclera: Conjunctivae normal    Cardiovascular:      Rate and Rhythm: Normal rate  Pulmonary:      Effort: Pulmonary effort is normal       Breath sounds: Normal breath sounds  No wheezing, rhonchi or rales  Abdominal:      General: Bowel sounds are normal  There is no distension  Palpations: Abdomen is soft  Tenderness: There is no abdominal tenderness  Musculoskeletal:         General: Tenderness (right elbow with ROM ) present  Normal range of motion  Cervical back: Normal range of motion  Right lower leg: No edema  Left lower leg: No edema  Skin:     General: Skin is warm and dry  Capillary Refill: Capillary refill takes less than 2 seconds  Neurological:      Mental Status: He is alert and oriented to person, place, and time  Mental status is at baseline  Psychiatric:         Mood and Affect: Mood normal          Behavior: Behavior normal          Thought Content:  Thought content normal           Additional Data:     Labs:  Results from last 7 days   Lab Units 09/04/22  0530 09/03/22  0659   WBC Thousand/uL 5 15 5 06   HEMOGLOBIN g/dL 15 2 14 7   HEMATOCRIT % 43 1 42 5   PLATELETS Thousands/uL 126* 100*   NEUTROS PCT %  --  58   LYMPHS PCT %  --  26   MONOS PCT %  --  13*   EOS PCT %  --  3     Results from last 7 days   Lab Units 09/04/22  0530 09/03/22  0520   SODIUM mmol/L 141 141   POTASSIUM mmol/L 4 1 4 4   CHLORIDE mmol/L 106 107   CO2 mmol/L 27 24   BUN mg/dL 11 12   CREATININE mg/dL 1 10 1 13   ANION GAP mmol/L 8 10   CALCIUM mg/dL 8 9 9 0   ALBUMIN g/dL  --  3 8   TOTAL BILIRUBIN mg/dL  --  0 68   ALK PHOS U/L  --  92   ALT U/L  --  18   AST U/L  --  17   GLUCOSE RANDOM mg/dL 90 95 Results from last 7 days   Lab Units 09/02/22  1449   LACTIC ACID mmol/L 1 4       Lines/Drains:  Invasive Devices  Report    Peripheral Intravenous Line  Duration           Peripheral IV 09/02/22 Left Antecubital 1 day                      Imaging: Reviewed radiology reports from this admission including: chest xray, MRI brain and elbow xray, CTA head/neck     Recent Cultures (last 7 days):   Results from last 7 days   Lab Units 09/02/22  1516 09/02/22  1449   BLOOD CULTURE  No Growth at 24 hrs  No Growth at 24 hrs  Last 24 Hours Medication List:   Current Facility-Administered Medications   Medication Dose Route Frequency Provider Last Rate    acetaminophen  650 mg Oral Q6H PRN Gil Ca MD      aspirin  81 mg Oral Daily Gil Ca MD      atorvastatin  40 mg Oral Daily With Daysi Mujica MD      docusate sodium  100 mg Oral BID Gil Ca MD      fenofibrate  145 mg Oral Daily Gil Ca MD      ketorolac  15 mg Intravenous Q6H PRN NOAM Alvarez      lacosamide  100 mg Oral Q12H [address] Gil Ca MD      levETIRAcetam  1,500 mg Oral Q12H Gil Ca MD      magnesium oxide  400 mg Oral BID Gil Ca MD      magnesium sulfate  2 g Intravenous Once NOAM Alvarez      melatonin  3 mg Oral HS Gil Ca MD      metoprolol succinate  25 mg Oral Daily NOAM Alvarez      pantoprazole  40 mg Oral Early Morning Gil Ca MD      polyethylene glycol  17 g Oral Daily PRN Gil Ca MD      thiamine  100 mg Oral Daily Gil Ca MD          Today, Patient Was Seen By: NOAM Alvarez    **Please Note: This note may have been constructed using a voice recognition system  **

## 2022-09-04 NOTE — ASSESSMENT & PLAN NOTE
Likely related to recent CVA, dehydration and orthostatic hypotension   · S/p IVF  · Trial BRAVO stockings when oob   · PT/OT recommending discharge to rehab for which patient's sister is amenable  · CM for placement   · Fall precautions

## 2022-09-04 NOTE — ASSESSMENT & PLAN NOTE
· On Metoprolol and Verapamil at home  · Held antihypertensive medications given hypotension on arrival  · BP improved with IVF  · Given hst of frequent falls, bradycardia, and hypotension - do not recommend using Metoprolol and Verapamil together   · Restarted Toprol XL 25 mg daily   · Will add Norvasc 5 mg daily  · Discontinue Verapamil   · Monitor BP

## 2022-09-04 NOTE — PHYSICAL THERAPY NOTE
PHYSICAL THERAPY EVALUATION/TREATMENT     09/04/22 5405   Note Type   Note type Evaluation   Pain Assessment   Pain Assessment Tool 0-10   Pain Score 8  (Right elbow pain)   Restrictions/Precautions   Other Precautions Chair Alarm; Bed Alarm; Fall Risk;Pain  (Dizziness, decreased coordination)   Home Living   Type of [address] [place] [address] One level;Stairs to enter with rails  (4-5 stairs to enter)   Home Equipment Walker;Cane   Additional Comments Patient states not using assistive device prior to admission because for walker will not fit in his mobile home as per patient and he trips over his cane   Prior Function   Level of [place] Needs assistance with ADLs and functional mobility   Lives With Alone   Receives Help From Family  (Sister does not live close but does assist with shopping at times)   ADL Assistance Independent   IADLs Needs assistance   Falls in the last 6 months 5 to 10   Comments Patient reports numerous falls since return home from rehab several months ago  Patient reports limited ability to care for self and home due to dizziness and falls   General   Additional Pertinent History Chart reviewed, patient admitted with dizziness and falls, contusion of the right elbow  Patient status post CVA with residual left-sided weakness decreased coordination and vertigo at times    Patient now presents with significantly decreased coordination and left upper and lower extremity and high risk to fall due to dizziness, decreased coordination and weakness/deconditioning   Family/Caregiver Present No   Cognition   Overall Cognitive Status WFL   Arousal/Participation Cooperative   Orientation Level Oriented to person;Oriented to place;Oriented to situation   Following Commands Follows multistep commands with increased time or repetition   Subjective   Subjective Patient reports limited ability to care for self and home since return home from rehab due to falls with dizziness   RLE Assessment RLE Assessment   (ROM WFL, strength 4/ 5)   LLE Assessment   LLE Assessment   (ROM WFL, strength 3+/4- with decreased coordination)   Coordination   Movements are Fluid and Coordinated 0   Coordination and Movement Description Decreased coordination in left lower extremity with all higher level balance activity as well as simple transfers and gait   Bed Mobility   Supine to Sit 7  Independent   Sit to Supine 7  Independent   Transfers   Sit to Stand 4  Minimal assistance   Additional items Assist x 1   Stand to Sit 4  Minimal assistance   Additional items Assist x 1   Ambulation/Elevation   Gait Assistance   (Min to mod assist)   Additional items Assist x 1;Verbal cues; Tactile cues   Assistive Device   (None)   Distance 20 ft with change in direction with ataxic gait type patterning due to decreased coordination in LLE   Balance   Static Sitting Fair   Dynamic Sitting Fair -   Static Standing Fair -   Dynamic Standing Poor +   Ambulatory Poor   Activity Tolerance   Activity Tolerance Patient limited by fatigue;Patient limited by pain  (Limited by decreased coordination and balance)   Nurse Made Aware yes   Assessment   Prognosis Good   Problem List Decreased strength;Decreased range of motion;Decreased endurance; Impaired balance;Decreased mobility; Decreased coordination; Impaired judgement;Decreased safety awareness;Pain   Assessment Patient seen for Physical Therapy evaluation  Patient admitted with Hypotension  Comorbidities affecting patient's physical performance include:  Hypertension, seizure disorder, falls, CVA, syncopal episodes, SIRS, alcohol abuse, dizziness    Personal factors affecting patient at time of initial evaluation include: inability to ambulate household distances, inability to navigate community distances, inability to navigate level surfaces without external assistance, inability to perform dynamic tasks in community, limited home support, positive fall history, inability to perform physical activity, inability to perform ADLS and inability to perform IADLS   Prior to admission, patient was independent with functional mobility without assistive device, independent with ADLS, requiring assist for IADLS, ambulating household distance and home with family assist   Please find objective findings from Physical Therapy assessment regarding body systems outlined above with impairments and limitations including weakness, decreased ROM, impaired balance, decreased endurance, impaired coordination, gait deviations, pain, decreased activity tolerance, decreased functional mobility tolerance and fall risk  The Barthel Index was used as a functional outcome tool presenting with a score of Barthel Index Score: 50 today indicating marked limitations of functional mobility and ADLS  Patient's clinical presentation is currently unstable/unpredictable as seen in patient's presentation of vital sign response, changing level of pain, increased fall risk, new onset of impairment of functional mobility, decreased endurance and new onset of weakness  Pt would benefit from continued Physical Therapy treatment to address deficits as defined above and maximize level of functional mobility  As demonstrated by objective findings, the assigned level of complexity for this evaluation is high  The patient's AM-[facility] Basic Mobility Inpatient Short Form Raw Score is 15  A Raw score of less than or equal to 16 suggests the patient may benefit from discharge to post-acute rehabilitation services  Please also refer to the recommendation of the Physical Therapist for safe discharge planning     Goals   Patient Goals To stop falling   STG Expiration Date 09/11/22   Short Term Goal #1 Transfers and gait with roller walker independently   Short Term Goal #2 Gait endurance to functional household distances   LTG Expiration Date 09/18/22   Long Term Goal #1 Strength bilateral lower extremities 4+/5 with improved coordination of left lower extremity to good   Long Term Goal #2 Re-education left extremities with improved coordination and function, follow-up with outpatient balance center   Plan   Treatment/Interventions ADL retraining;Functional transfer training;LE strengthening/ROM; Elevations; Therapeutic exercise; Endurance training;Patient/family training;Equipment eval/education;Gait training; Compensatory technique education   PT Frequency Other (Comment)  (5 times per week)   Recommendation   PT Discharge Recommendation Post acute rehabilitation services   Additional Comments Patient may benefit from higher level of rehab and previously received following stroke with emphasis on balance coordination and vestibular rehab  Outpatient follow-up with the balance center is also a must upon return home   Nicanor 8 in Bed Without Bedrails 4   Lying on Back to Sitting on Edge of Flat Bed 3   Moving Bed to Chair 2   Standing Up From Chair 3   Walk in Room 2   Climb 3-5 Stairs 1   Basic Mobility Inpatient Raw Score 15   Basic Mobility Standardized Score 36 97   Highest Level Of Mobility   -[facility] Goal 4: Move to chair/commode   -HL Achieved 6: Walk 10 steps or more   Barthel Index   Feeding 10   Bathing 0   Grooming Score 0   Dressing Score 5   Bladder Score 10   Bowels Score 10   Toilet Use Score 5   Transfers (Bed/Chair) Score 10   Mobility (Level Surface) Score 0   Stairs Score 0   Barthel Index Score 50   Additional Treatment Session   Start Time 1340   End Time 1355   Treatment Assessment S:  Patient states fearful of falling at all times due to dizziness O: Bilateral lower extremity exercise completed as listed below  Gaze fixation education completed for improved gait safety with use of roller walker at all times    Education also completed in use of roller walker for fall prevention a:  Patient will benefit from continued physical therapy with progression as tolerated to regain independent function   Exercises Quad Sets Supine;10 reps;Bilateral   Heelslides Supine;10 reps;Bilateral   Hip Flexion Sitting;10 reps;Bilateral  (Alternating)   Hip Abduction Sitting;10 reps;Bilateral  (Alternating)   Knee AROM Short Arc Quad Supine;10 reps;Bilateral   Knee AROM Long Arc Quad Sitting;10 reps;Bilateral  (Alternating)   Ankle Pumps Sitting;20 reps;Bilateral   Balance training  Sidestepping and backward walking completed for balance coordination   Licensure   [place] License Number  Zuhair Goins [id number]

## 2022-09-04 NOTE — ASSESSMENT & PLAN NOTE
Patient was initially noted to be hypotensive on arrival with a systolic blood pressure in the 70s  Patient's blood pressure did dramatically improve with administration of IV fluid hydration  Patient also with evidence of acute kidney injury  Suspect that patient's hypotension is likely related to volume depletion, dehydration as well as blood pressure medications  No evidence of infection or sepsis     · BP improved with IVF; now 139/89  · Orthostatic vital signs mildly positive  · Resume Toprol XL   · Continue holding Verapamil

## 2022-09-04 NOTE — UTILIZATION REVIEW
Initial Clinical Review    Admission: Date/Time/Statement:   Admission Orders (From admission, onward)     Ordered        09/02/22 1658  INPATIENT ADMISSION  Once                 Z                                                                                                                ,           Orders Placed This Encounter   Procedures    INPATIENT ADMISSION     Standing Status:   Standing     Number of Occurrences:   1     Order Specific Question:   Level of Care     Answer:   Med Surg [16]     Order Specific Question:   Estimated length of stay     Answer:   More than 2 Midnights     Order Specific Question:   Certification     Answer:   I certify that inpatient services are medically necessary for this patient for a duration of greater than two midnights  See H&P and MD Progress Notes for additional information about the patient's course of treatment  ED Arrival Information     Expected   -    Arrival   9/2/2022 14:10    Acuity   Urgent            Means of arrival   Walk-In    Escorted by   Family Member    Service   Hospitalist    Admission type   Urgent            Arrival complaint   previous stroke, left side weakness         Chief Complaint   Patient presents with    Multiple Falls     Had a stroke in June  Continues to fall and becomes dizzy when standing  Symptoms are getting worse and he is having headaches     Initial Presentation:  60 y/o male with PMHx HTN, HLD, 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, Bronchiectasis, GERD, Diverticulitis, CKD, Gout -  presents urgently to [facility] ED 2nd dizziness when standing with multiple falls at home  Reports that he has been extremely unsteady on his feet since his stroke  In the ED - Temp 98 9  BP 70's - 96/59 - much improved with IVF  Exam:  AA+O x 3  Left-sided weakness    CT Head and Neck negative for acute change with evolution of prior infarct and development of right temporal and occipital encephalomalacia as well as chronic microangiopathy, severe stenosis at the origin of the right vertebral artery unchanged and 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  Labs:  CBC wnl, K+ 3 3  Mg+ 1 0  BUN/ CR 13/ 1 66   BNP 1,086  ED TX: IVF NSS x 2 L, IV Mag SO4 2 Gms  Admitted inpatient 9/2/22 at 1658 2nd Dizziness with Hypotension, DORA  -monitor  orthostatic VS,  continue IVF, hold + adjust BP meds, PT + OT Evals    9/3/22 - DAY 2:  - initially hypotensive on arrival with a systolic blood pressure in the 70s  BP dramatically improved with administration of IV fluid hydration  Also with evidence of acute kidney injury  Suspect that patient's hypotension is likely related to volume depletion, dehydration as well as blood pressure medications  - feels cold  Reports dizziness "like the floor is moving underneath me when I walk  MD stressed the importance of strengthening by ambulation and getting oob to chair for meals     · BP improved with IVF; now 139/89  · Orthostatic vital signs mildly positive  · Decrease IVF to NS at 50 mL/hr  · Resume Toprol XL   · Continue holding Verapamil   · Monitor   MD Certification Statement: Continues to require additional inpatient hospital stay due to dizziness, orthostatic hypotension     ED Triage Vitals   Temperature Pulse Respirations Blood Pressure SpO2   09/02/22 1416 09/02/22 1416 09/02/22 1416 09/02/22 1416 09/02/22 1416   98 9 °F (37 2 °C) 73 20 96/59 92 %      Temp Source Heart Rate Source Patient Position - Orthostatic VS BP Location FiO2 (%)   09/02/22 2253 09/02/22 2253 09/02/22 1416 09/02/22 1416 --   Oral Monitor Lying Right arm       Pain Score       09/02/22 1416       No Pain          Wt Readings from Last 1 Encounters:   09/02/22 76 2 kg (168 lb)     Additional Vital Signs:   Date/Time Temp Pulse Resp BP MAP (mmHg) SpO2 O2 Device Patient Position - Orthostatic VS   09/04/22 0945 -- 59 -- 144/75 98 98 % -- Standing - Orthostatic VS   09/04/22 09:43:28 -- 76 -- 144/75 98 95 % -- --   09/04/22 0942 -- 65 -- 152/89 110 97 % -- Sitting - Orthostatic VS   09/04/22 09:41:52 -- 63 -- 152/89 110 97 % -- --   09/04/22 0937 98 2 °F (36 8 °C) 61 17 160/92 115 98 % None (Room air) Lying - Orthostatic VS   09/04/22 09:36:04 98 2 °F (36 8 °C) 55 -- 160/92 115 97 % -- --   09/04/22 05:35:53 -- 67 -- 147/86 106 96 % -- Standing - Orthostatic VS   09/04/22 05:34:40 -- 59 -- 159/85 110 98 % -- Sitting - Orthostatic VS   09/04/22 05:33:24 -- 58 -- 160/86 111 97 % -- Lying - Orthostatic VS   09/03/22 23:52:16 97 7 °F (36 5 °C) 61 16 147/85 106 96 % -- --   09/03/22 20:15:58 97 8 °F (36 6 °C) 62 18 148/86 107 97 % -- --   09/03/22 15:27:18 97 5 °F (36 4 °C) 51 Abnormal  19 161/93 116 100 % -- --   09/03/22 1200 -- -- -- -- -- 97 % -- --   09/03/22 10:35:46 -- 70 -- 139/89 106 97 % -- Standing - Orthostatic VS   09/03/22 10:34:16 -- 56 -- 151/79 103 97 % -- Sitting - Orthostatic VS   09/03/22 10:32:08 -- 52 Abnormal  17 152/78 103 97 % -- Lying - Orthostatic VS   09/03/22 0900 -- -- -- -- -- -- None (Room air) --   09/03/22 07:30:27 -- 55 -- 160/86 111 97 % -- --   09/03/22 0700 -- 54 Abnormal  -- -- -- 98 % -- --   09/03/22 0300 -- 51 Abnormal  -- -- -- 96 % -- --   09/03/22 02:31:43 97 3 °F (36 3 °C) Abnormal  55 18 162/87 112 96 % None (Room air) Lying   09/03/22 0100 -- 51 Abnormal  -- -- -- 97 % -- --   09/02/22 2300 -- 55 -- 145/72 96 96 % -- Lying   09/02/22 22:53:46 97 5 °F (36 4 °C) 56 20 145/72 96 99 % -- Lying   09/02/22 2100 -- 61 -- -- -- 97 % -- --   09/02/22 20:02:26 98 °F (36 7 °C) 65 -- 133/80 98 97 % -- Standing - Orthostatic VS   09/02/22 20:00:57 98 °F (36 7 °C) 60 18 154/88 110 97 % None (Room air) Sitting - Orthostatic VS   09/02/22 19:59:36 98 °F (36 7 °C) 62 -- 154/89 111 97 % -- Lying - Orthostatic VS   09/02/22 18:48:59 97 5 °F (36 4 °C) 64 -- 155/89 111 97 % -- --   09/02/22 1800 -- 61 13 139/74 102 97 % -- --   09/02/22 1745 -- 63 16 144/78 105 97 % -- --   09/02/22 1730 -- 59 14 142/79 106 99 % -- --   09/02/22 1715 -- 63 14 139/77 103 96 % -- --   09/02/22 1700 -- 62 13 145/78 106 97 % -- --   09/02/22 1645 -- 62 12 -- -- 97 % -- --   09/02/22 1630 -- 64 13 127/77 97 99 % -- --   09/02/22 1600 -- 70 21 109/62 80 100 % -- --   09/02/22 1545 -- 73 20 117/65 84 94 % -- --   09/02/22 1515 -- 74 14 99/59 72 93 % -- --   09/02/22 1445 -- 76 -- 91/56 67 95 % -- --   09/02/22 1422 -- 95 -- 78/52 Abnormal  -- -- -- Standing - Orthostatic VS   09/02/22 1421 -- 87 -- 78/50 Abnormal  -- -- -- Sitting - Orthostatic VS   09/02/22 1416 98 9 °F (37 2 °C) 73 20 96/59 -- 92 % None (Room air) Lying      09/02 0701   09/03 0700 09/03 0701   09/04 0700   P  O  240    I V  (mL/kg) 1009 (13 2) 10 (0 1)   Total Intake(mL/kg) 1249 (16 4) 10 (0 1)   Urine (mL/kg/hr) 3    Total Output 3    Net +1246 +10     Pertinent Labs/Diagnostic Test Results:   MRI brain wo contrast   Resolution of the previously noted diffusion hyperintensity in the right temporal, occipital and parietal region with the development of in the right occipital and temporal region with ex vacuo dilation of the right occipital horn encephalomalacia      No acute infarct seen      No acute hemorrhage      Severe chronic small vessel ischemic changes      Small chronic microbleed noted in the right temporal region, not seen on the previous study in May      CTA head and neck with and without contrast   No acute intracranial pathology  Evolution of previously seen infarct with development of right temporal and occipital encephalomalacia  Chronic microangiopathy  No significant stenosis of the cervical carotid arteries  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 is unchanged  No significant intracranial stenosis or large vessel occlusion  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  XR chest 1 view portable   No acute cardiopulmonary disease        XR elbow 3+ views RIGHT   No acute osseous abnormality         Results from last 7 days   Lab Units 09/04/22  0530 09/03/22  0659 09/02/22  1449   WBC Thousand/uL 5 15 5 06 7 23   HEMOGLOBIN g/dL 15 2 14 7 16 8   HEMATOCRIT % 43 1 42 5 47 5   PLATELETS Thousands/uL 126* 100* 195   NEUTROS ABS Thousands/µL  --  2 91 5 15     Results from last 7 days   Lab Units 09/04/22  0530 09/03/22  0520 09/02/22  1449   SODIUM mmol/L 141 141 138   POTASSIUM mmol/L 4 1 4 4 3 3*   CHLORIDE mmol/L 106 107 100   CO2 mmol/L 27 24 28   ANION GAP mmol/L 8 10 10   BUN mg/dL 11 12 13   CREATININE mg/dL 1 10 1 13 1 66*   EGFR ml/min/1 73sq m 73 70 44   CALCIUM mg/dL 8 9 9 0 9 6   MAGNESIUM mg/dL 1 2* 1 8 1 0*   PHOSPHORUS mg/dL 3 9  --   --      Results from last 7 days   Lab Units 09/03/22  0520 09/02/22  1449   AST U/L 17 19   ALT U/L 18 19   ALK PHOS U/L 92 96   TOTAL PROTEIN g/dL 6 5 7 0   ALBUMIN g/dL 3 8 4 2   TOTAL BILIRUBIN mg/dL 0 68 0 91   AMMONIA umol/L  --  18     Results from last 7 days   Lab Units 09/04/22  0530 09/03/22  0520 09/02/22  1449   GLUCOSE RANDOM mg/dL 90 95 127     Results from last 7 days   Lab Units 09/02/22  1657 09/02/22  1449   HS TNI 0HR ng/L  --  6   HS TNI 2HR ng/L 6  --    HSTNI D2 ng/L 0  --        Results from last 7 days   Lab Units 09/02/22  1449   TSH 3RD GENERATON uIU/mL 1 766     Results from last 7 days   Lab Units 09/02/22  1449   LACTIC ACID mmol/L 1 4       Results from last 7 days   Lab Units 09/02/22  1449   NT-PRO BNP pg/mL 1,086*     Results from last 7 days   Lab Units 09/02/22  1449   LIPASE u/L 77       Results from last 7 days   Lab Units 09/02/22  1616   CLARITY UA  Clear   COLOR UA  Light Yellow   SPEC GRAV UA  1 010   PH UA  7 5   GLUCOSE UA mg/dl Negative   KETONES UA mg/dl Negative   BLOOD UA  Trace-lysed*   PROTEIN UA mg/dl Negative   NITRITE UA  Negative   BILIRUBIN UA  Negative   UROBILINOGEN UA E U /dl 0 2   LEUKOCYTES UA  Negative   WBC UA /hpf 0-1   RBC UA /hpf 1-2   BACTERIA UA /hpf Occasional   EPITHELIAL CELLS WET PREP /hpf None Seen     Results from last 7 days   Lab Units 09/02/22  1516 09/02/22  1449   BLOOD CULTURE  No Growth at 24 hrs  No Growth at 24 hrs       ED Treatment:   Medication Administration from 09/02/2022 1410 to 09/02/2022 1824       Date/Time Order Dose Route Action     09/02/2022 1453 sodium chloride 0 9 % bolus 1,000 mL 1,000 mL Intravenous New Bag     09/02/2022 1607 sodium chloride 0 9 % bolus 1,000 mL 1,000 mL Intravenous New Bag     09/02/2022 1522 iohexol (OMNIPAQUE) 350 MG/ML injection (MULTI-DOSE) 65 mL 65 mL Intravenous Given     09/02/2022 1658 magnesium sulfate 2 g/50 mL IVPB (premix) 2 g 2 g Intravenous New Bag     Past Medical History:   Diagnosis Date    Biceps tendonitis, unspecified laterality 09/17/2007    Bone cyst 11/22/2011    Bronchiectasis ([facility] [address] ) 10/02/2006    Bursitis 11/17/2005    Chest pain 04/29/2009    Chronic renal failure 11/22/2011    Diverticulitis of colon 10/09/2007    Generalized anxiety disorder 06/08/2006    GERD (gastroesophageal reflux disease)     Gout     Hyperlipidemia     Hypertension     Lateral epicondylitis, unspecified elbow     Last Assessed: 11/29/2013    Low magnesium levels     Lyme disease 11/09/2009    Pneumonia     Last Assessed: 1/7/2013    Stroke [facility])      Present on Admission:   Seizure disorder ([facility] [address] )   Hypomagnesemia   History of alcohol abuse   Hyperlipidemia   Hypertension    Admitting Diagnosis: Hypomagnesemia [E83 42]  Dizziness [R42]  Hypotension [I95 9]  Frequent falls [R29 6]  Contusion of right elbow, initial encounter [S50 01XA]    Age/Sex: 59 y o  male    Admission Orders:  Telemetry  VS q4hrs  Continuous Pulse Oximetry  SCD  Up with assistance  Diet Regular: House  Dietary Nutritional Supplements - Ensure compact TID with meals  I+O q shift  Daily weight  Serial HS Troponin q2hrs x 3  PT + OT Evals    Scheduled Medications:  aspirin, 81 mg, Oral, Daily  atorvastatin, 40 mg, Oral, Daily With Dinner  docusate sodium, 100 mg, Oral, BID  fenofibrate, 145 mg, Oral, Daily  lacosamide, 100 mg, Oral, Q12H LUCIA  levETIRAcetam, 1,500 mg, Oral, Q12H  magnesium oxide, 400 mg, Oral, BID  IV magnesium sulfate, 2 g, Intravenous, Once  melatonin, 3 mg, Oral, HS  metoprolol succinate, 25 mg, Oral, Daily  pantoprazole, 40 mg, Oral, Early Morning  thiamine, 100 mg, Oral, Daily    Continuous IV Infusions:     IVF sodium chloride 0 9 % bolus 1,000 mL    Ordered Dose: 1,000 mL Route: Intravenous Frequency: Once @ 1,000 mL/hr over 1 Hours   Scheduled Start Date/Time: 05/07/22 1530 End Date/Time: 05/07/22 1803 after 1 doses      PRN Meds:  acetaminophen, 650 mg, Oral, Q6H PRN  IV ketorolac, 15 mg, Intravenous, Q6H PRN - 9/4 X 1  polyethylene glycol, 17 g, Oral, Daily PRN    Network Utilization Review Department  ATTENTION: Please call with any questions or concerns to [phone] and carefully listen to the prompts so that you are directed to the right person  All voicemails are confidential   [facility] all requests for admission clinical reviews, approved or denied determinations and any other requests to dedicated fax number below belonging to the campus where the patient is receiving treatment   List of dedicated fax numbers for the Facilities:  [address] DENIALS (Administrative/Medical Necessity) [phone]   [address] (Maternity/NICU/Pediatrics) [phone]   [address] [phone]   [address] [place] [address] [facility] [place] [address] [id number] [address] [place] [address] [address] [place] [address] [phone]

## 2022-09-04 NOTE — ASSESSMENT & PLAN NOTE
· Likely secondary to recent stroke and seizures  · Spoke with patient's sister who states the patient falls frequently and often hits his head  He states he recently fell out of the bathtub and pulled the towel rack off the wall  Patient's sister feels he would benefit from rehab    · PT/OT recommends discharge to rehab  · Case management made aware of therapy recommendations; placement pending  · Fall precautions

## 2022-09-04 NOTE — ASSESSMENT & PLAN NOTE
POA as evidenced by Cr 1 66 likely prerenal from dehydration/ hypotension   · Cr improved to 1 13 with IVF  · Encourage oral intake, avoid nephrotoxic agents

## 2022-09-04 NOTE — PLAN OF CARE
Problem: Potential for Falls  Goal: Patient will remain free of falls  Description: INTERVENTIONS:  - Educate patient/family on patient safety including physical limitations  - Instruct patient to call for assistance with activity   - Consult OT/PT to assist with strengthening/mobility   - Keep Call bell within reach  - Keep bed low and locked with side rails adjusted as appropriate  - Keep care items and personal belongings within reach  - Initiate and maintain comfort rounds  - Make Fall Risk Sign visible to staff  - Offer Toileting every Hour, in advance of need  - Initiate/Maintain bed/chair alarm  - Apply yellow socks and bracelet for high fall risk patients  - Consider moving patient to room near nurses station  Outcome: Progressing     Problem: PAIN - ADULT  Goal: Verbalizes/displays adequate comfort level or baseline comfort level  Description: Interventions:  - Encourage patient to monitor pain and request assistance  - Assess pain using appropriate pain scale  - Administer analgesics based on type and severity of pain and evaluate response  - Implement non-pharmacological measures as appropriate and evaluate response  - Consider cultural and social influences on pain and pain management  - Notify physician/advanced practitioner if interventions unsuccessful or patient reports new pain  Outcome: Progressing     Problem: SAFETY ADULT  Goal: Patient will remain free of falls  Description: INTERVENTIONS:  - Educate patient/family on patient safety including physical limitations  - Instruct patient to call for assistance with activity   - Consult OT/PT to assist with strengthening/mobility   - Keep Call bell within reach  - Keep bed low and locked with side rails adjusted as appropriate  - Keep care items and personal belongings within reach  - Initiate and maintain comfort rounds  - Make Fall Risk Sign visible to staff  - Offer Toileting every Hour, in advance of need  - Initiate/Maintain bed/chair alarm  - Apply yellow socks and bracelet for high fall risk patients  - Consider moving patient to room near nurses station  Outcome: Progressing  Goal: Maintain or return to baseline ADL function  Description: INTERVENTIONS:  -  Assess patient's ability to carry out ADLs; assess patient's baseline for ADL function and identify physical deficits which impact ability to perform ADLs (bathing, care of mouth/teeth, toileting, grooming, dressing, etc )  - Assess/evaluate cause of self-care deficits   - Assess range of motion  - Assess patient's mobility; develop plan if impaired  - Assess patient's need for assistive devices and provide as appropriate  - Encourage maximum independence but intervene and supervise when necessary  - Involve family in performance of ADLs  - Assess for home care needs following discharge   - Consider OT consult to assist with ADL evaluation and planning for discharge  - Provide patient education as appropriate  Outcome: Progressing  Goal: Maintains/Returns to pre admission functional level  Description: INTERVENTIONS:  - Perform BMAT or MOVE assessment daily    - Set and communicate daily mobility goal to care team and patient/family/caregiver     - Collaborate with rehabilitation services on mobility goals if consulted  - Ambulate patient as patient tolerates  - Out of bed for meals 3 times a day  - Out of bed for toileting  - Record patient progress and toleration of activity level   Outcome: Progressing     Problem: DISCHARGE PLANNING  Goal: Discharge to home or other facility with appropriate resources  Description: INTERVENTIONS:  - Identify barriers to discharge w/patient and caregiver  - Arrange for needed discharge resources and transportation as appropriate  - Identify discharge learning needs (meds, wound care, etc )  - Arrange for interpretive services to assist at discharge as needed  - Refer to Case Management Department for coordinating discharge planning if the patient needs post-hospital services based on physician/advanced practitioner order or complex needs related to functional status, cognitive ability, or social support system  Outcome: Progressing     Problem: Knowledge Deficit  Goal: Patient/family/caregiver demonstrates understanding of disease process, treatment plan, medications, and discharge instructions  Description: Complete learning assessment and assess knowledge base    Interventions:  - Provide teaching at level of understanding  - Provide teaching via preferred learning methods  Outcome: Progressing     Problem: CARDIOVASCULAR - ADULT  Goal: Maintains optimal cardiac output and hemodynamic stability  Description: INTERVENTIONS:  - Monitor I/O, vital signs and rhythm  - Monitor for S/S and trends of decreased cardiac output  - Administer and titrate ordered vasoactive medications to optimize hemodynamic stability  - Assess quality of pulses, skin color and temperature  - Assess for signs of decreased coronary artery perfusion  - Instruct patient to report change in severity of symptoms  Outcome: Progressing  Goal: Absence of cardiac dysrhythmias or at baseline rhythm  Description: INTERVENTIONS:  - Continuous cardiac monitoring, vital signs, obtain 12 lead EKG if ordered  - Administer antiarrhythmic and heart rate control medications as ordered  - Monitor electrolytes and administer replacement therapy as ordered  Outcome: Progressing     Problem: METABOLIC, FLUID AND ELECTROLYTES - ADULT  Goal: Electrolytes maintained within normal limits  Description: INTERVENTIONS:  - Monitor labs and assess patient for signs and symptoms of electrolyte imbalances  - Administer electrolyte replacement as ordered  - Monitor response to electrolyte replacements, including repeat lab results as appropriate  - Instruct patient on fluid and nutrition as appropriate  Outcome: Progressing  Goal: Fluid balance maintained  Description: INTERVENTIONS:  - Monitor labs   - Monitor I/O and WT  - Instruct patient on fluid and nutrition as appropriate  - Assess for signs & symptoms of volume excess or deficit  Outcome: Progressing     Problem: MOBILITY - ADULT  Goal: Maintain or return to baseline ADL function  Description: INTERVENTIONS:  -  Assess patient's ability to carry out ADLs; assess patient's baseline for ADL function and identify physical deficits which impact ability to perform ADLs (bathing, care of mouth/teeth, toileting, grooming, dressing, etc )  - Assess/evaluate cause of self-care deficits   - Assess range of motion  - Assess patient's mobility; develop plan if impaired  - Assess patient's need for assistive devices and provide as appropriate  - Encourage maximum independence but intervene and supervise when necessary  - Involve family in performance of ADLs  - Assess for home care needs following discharge   - Consider OT consult to assist with ADL evaluation and planning for discharge  - Provide patient education as appropriate  Outcome: Progressing  Goal: Maintains/Returns to pre admission functional level  Description: INTERVENTIONS:  - Perform BMAT or MOVE assessment daily    - Set and communicate daily mobility goal to care team and patient/family/caregiver     - Collaborate with rehabilitation services on mobility goals if consulted  - Out of bed for meals 3 times a day  - Out of bed for toileting  - Record patient progress and toleration of activity level   Outcome: Progressing

## 2022-09-04 NOTE — ASSESSMENT & PLAN NOTE
General Noted to be orthostatic hypotensive  Reports dizziness     · S/p IVF   · Stop Verapamil  · Trial BRAVO stockings  · Encourage oral intake; add nutritional supplements

## 2022-09-04 NOTE — ASSESSMENT & PLAN NOTE
· Patient denies any recent use   Last drink Dec 2021  · Started on thiamine and folic acid  · Ativan PRN

## 2022-09-05 PROBLEM — I95.1 ORTHOSTATIC HYPOTENSION: Status: RESOLVED | Noted: 2022-09-03 | Resolved: 2022-09-05

## 2022-09-05 PROBLEM — F17.200 TOBACCO DEPENDENCE: Status: ACTIVE | Noted: 2022-09-05

## 2022-09-05 PROBLEM — R42 DIZZINESS: Status: RESOLVED | Noted: 2022-09-02 | Resolved: 2022-09-05

## 2022-09-05 PROCEDURE — 99232 SBSQ HOSP IP/OBS MODERATE 35: CPT | Performed by: NURSE PRACTITIONER

## 2022-09-05 RX ORDER — DIPHENHYDRAMINE HYDROCHLORIDE 50 MG/ML
25 INJECTION INTRAMUSCULAR; INTRAVENOUS EVERY 6 HOURS PRN
Status: DISCONTINUED | OUTPATIENT
Start: 2022-09-05 | End: 2022-09-07 | Stop reason: HOSPADM

## 2022-09-05 RX ORDER — LORAZEPAM 0.5 MG/1
0.5 TABLET ORAL EVERY 8 HOURS PRN
Status: DISCONTINUED | OUTPATIENT
Start: 2022-09-05 | End: 2022-09-05

## 2022-09-05 RX ORDER — LORAZEPAM 1 MG/1
1 TABLET ORAL EVERY 6 HOURS PRN
Status: DISCONTINUED | OUTPATIENT
Start: 2022-09-05 | End: 2022-09-07 | Stop reason: HOSPADM

## 2022-09-05 RX ORDER — NICOTINE 21 MG/24HR
14 PATCH, TRANSDERMAL 24 HOURS TRANSDERMAL ONCE
Status: COMPLETED | OUTPATIENT
Start: 2022-09-05 | End: 2022-09-06

## 2022-09-05 RX ORDER — AMLODIPINE BESYLATE 5 MG/1
5 TABLET ORAL DAILY
Status: DISCONTINUED | OUTPATIENT
Start: 2022-09-05 | End: 2022-09-07 | Stop reason: HOSPADM

## 2022-09-05 RX ORDER — NICOTINE 21 MG/24HR
21 PATCH, TRANSDERMAL 24 HOURS TRANSDERMAL DAILY
Status: DISCONTINUED | OUTPATIENT
Start: 2022-09-05 | End: 2022-09-07 | Stop reason: HOSPADM

## 2022-09-05 RX ADMIN — DIPHENHYDRAMINE HYDROCHLORIDE 25 MG: 50 INJECTION, SOLUTION INTRAMUSCULAR; INTRAVENOUS at 21:11

## 2022-09-05 RX ADMIN — LORAZEPAM 0.5 MG: 0.5 TABLET ORAL at 11:58

## 2022-09-05 RX ADMIN — LACOSAMIDE 100 MG: 50 TABLET, FILM COATED ORAL at 09:19

## 2022-09-05 RX ADMIN — FENOFIBRATE 145 MG: 145 TABLET, FILM COATED ORAL at 09:20

## 2022-09-05 RX ADMIN — MORPHINE SULFATE 2 MG: 2 INJECTION, SOLUTION INTRAMUSCULAR; INTRAVENOUS at 22:28

## 2022-09-05 RX ADMIN — DOCUSATE SODIUM 100 MG: 100 CAPSULE, LIQUID FILLED ORAL at 09:20

## 2022-09-05 RX ADMIN — LEVETIRACETAM 1500 MG: 500 TABLET, FILM COATED ORAL at 06:02

## 2022-09-05 RX ADMIN — METOPROLOL SUCCINATE 25 MG: 25 TABLET, EXTENDED RELEASE ORAL at 09:20

## 2022-09-05 RX ADMIN — PANTOPRAZOLE SODIUM 40 MG: 40 TABLET, DELAYED RELEASE ORAL at 06:02

## 2022-09-05 RX ADMIN — KETOROLAC TROMETHAMINE 15 MG: 30 INJECTION, SOLUTION INTRAMUSCULAR at 10:37

## 2022-09-05 RX ADMIN — LORAZEPAM 1 MG: 1 TABLET ORAL at 19:55

## 2022-09-05 RX ADMIN — ASPIRIN 81 MG CHEWABLE TABLET 81 MG: 81 TABLET CHEWABLE at 09:19

## 2022-09-05 RX ADMIN — THIAMINE HCL TAB 100 MG 100 MG: 100 TAB at 09:20

## 2022-09-05 RX ADMIN — NICOTINE 14 MG: 14 PATCH, EXTENDED RELEASE TRANSDERMAL at 12:19

## 2022-09-05 RX ADMIN — ATORVASTATIN CALCIUM 40 MG: 40 TABLET, FILM COATED ORAL at 18:59

## 2022-09-05 RX ADMIN — AMLODIPINE BESYLATE 5 MG: 5 TABLET ORAL at 09:20

## 2022-09-05 RX ADMIN — MAGNESIUM OXIDE TAB 400 MG (241.3 MG ELEMENTAL MG) 400 MG: 400 (241.3 MG) TAB at 18:59

## 2022-09-05 RX ADMIN — LORAZEPAM 1 MG: 1 TABLET ORAL at 12:19

## 2022-09-05 RX ADMIN — NICOTINE POLACRILEX 2 MG: 2 GUM, CHEWING BUCCAL at 12:19

## 2022-09-05 RX ADMIN — DIPHENHYDRAMINE HYDROCHLORIDE 25 MG: 50 INJECTION, SOLUTION INTRAMUSCULAR; INTRAVENOUS at 12:19

## 2022-09-05 RX ADMIN — LEVETIRACETAM 1500 MG: 500 TABLET, FILM COATED ORAL at 18:58

## 2022-09-05 RX ADMIN — NICOTINE 7 MG: 7 PATCH, EXTENDED RELEASE TRANSDERMAL at 10:37

## 2022-09-05 RX ADMIN — MAGNESIUM OXIDE TAB 400 MG (241.3 MG ELEMENTAL MG) 400 MG: 400 (241.3 MG) TAB at 09:20

## 2022-09-05 RX ADMIN — DOCUSATE SODIUM 100 MG: 100 CAPSULE, LIQUID FILLED ORAL at 18:59

## 2022-09-05 RX ADMIN — KETOROLAC TROMETHAMINE 15 MG: 30 INJECTION, SOLUTION INTRAMUSCULAR at 20:04

## 2022-09-05 RX ADMIN — LACOSAMIDE 100 MG: 50 TABLET, FILM COATED ORAL at 20:20

## 2022-09-05 RX ADMIN — HYDROXYZINE HYDROCHLORIDE 50 MG: 25 TABLET ORAL at 09:13

## 2022-09-05 NOTE — NURSING NOTE
The patient is seen at the bedside, the patient complained that he "did not like any of the food that was brought to him from the kitchen and hasn't eaten for days"  Discussed patient's preferences with him and offered foods available from the unit pantry  Patient education provided regarding medications ordered  as per patient's questions  The patient is verbally anxious, Atarax given as ordered  The patient continues to verbalize that he" wants to go home and will walk home"  MD aware of patient's requests and en route to speak with him  Will continue to make hourly rounds and increase frequency as needed

## 2022-09-05 NOTE — ASSESSMENT & PLAN NOTE
Wants to go outside and smoke   Educated on the no-smoking policy   · Increase Nicotine patch to 21 mg daily  · Trial Nicoette gum  · Patient has no interest in quitting

## 2022-09-05 NOTE — PLAN OF CARE
Problem: Potential for Falls  Goal: Patient will remain free of falls  Description: INTERVENTIONS:  - Educate patient/family on patient safety including physical limitations  - Instruct patient to call for assistance with activity   - Consult OT/PT to assist with strengthening/mobility   - Keep Call bell within reach  - Keep bed low and locked with side rails adjusted as appropriate  - Keep care items and personal belongings within reach  - Initiate and maintain comfort rounds  - Make Fall Risk Sign visible to staff  - Offer Toileting every 2 Hours, in advance of need  - Initiate/Maintain bed alarm  - Obtain necessary fall risk management equipment  - Apply yellow socks and bracelet for high fall risk patients  - Consider moving patient to room near nurses station  Outcome: Progressing     Problem: PAIN - ADULT  Goal: Verbalizes/displays adequate comfort level or baseline comfort level  Description: Interventions:  - Encourage patient to monitor pain and request assistance  - Assess pain using appropriate pain scale  - Administer analgesics based on type and severity of pain and evaluate response  - Implement non-pharmacological measures as appropriate and evaluate response  - Consider cultural and social influences on pain and pain management  - Notify physician/advanced practitioner if interventions unsuccessful or patient reports new pain  Outcome: Progressing     Goal: Maintain or return to baseline ADL function  Description: INTERVENTIONS:  -  Assess patient's ability to carry out ADLs; assess patient's baseline for ADL function and identify physical deficits which impact ability to perform ADLs (bathing, care of mouth/teeth, toileting, grooming, dressing, etc )  - Assess/evaluate cause of self-care deficits   - Assess range of motion  - Assess patient's mobility; develop plan if impaired  - Assess patient's need for assistive devices and provide as appropriate  - Encourage maximum independence but intervene and supervise when necessary  - Involve family in performance of ADLs  - Assess for home care needs following discharge   - Consider OT consult to assist with ADL evaluation and planning for discharge  - Provide patient education as appropriate  Outcome: Progressing  Goal: Maintains/Returns to pre admission functional level  Description: INTERVENTIONS:  - Perform BMAT or MOVE assessment daily    - Set and communicate daily mobility goal to care team and patient/family/caregiver  - Collaborate with rehabilitation services on mobility goals if consulted  - Perform Range of Motion 2 times a day  - Reposition patient every 2 hours  - Dangle patient 3 times a day  - Stand patient 3 times a day  - Ambulate patient 3 times a day  - Out of bed to chair 3 times a day   - Out of bed for meals 3 times a day  - Out of bed for toileting  - Record patient progress and toleration of activity level   Outcome: Progressing     Problem: DISCHARGE PLANNING  Goal: Discharge to home or other facility with appropriate resources  Description: INTERVENTIONS:  - Identify barriers to discharge w/patient and caregiver  - Arrange for needed discharge resources and transportation as appropriate  - Identify discharge learning needs (meds, wound care, etc )  - Arrange for interpretive services to assist at discharge as needed  - Refer to Case Management Department for coordinating discharge planning if the patient needs post-hospital services based on physician/advanced practitioner order or complex needs related to functional status, cognitive ability, or social support system  Outcome: Progressing     Problem: Knowledge Deficit  Goal: Patient/family/caregiver demonstrates understanding of disease process, treatment plan, medications, and discharge instructions  Description: Complete learning assessment and assess knowledge base    Interventions:  - Provide teaching at level of understanding  - Provide teaching via preferred learning methods  Outcome: Progressing     Problem: CARDIOVASCULAR - ADULT  Goal: Maintains optimal cardiac output and hemodynamic stability  Description: INTERVENTIONS:  - Monitor I/O, vital signs and rhythm  - Monitor for S/S and trends of decreased cardiac output  - Administer and titrate ordered vasoactive medications to optimize hemodynamic stability  - Assess quality of pulses, skin color and temperature  - Assess for signs of decreased coronary artery perfusion  - Instruct patient to report change in severity of symptoms  Outcome: Progressing  Goal: Absence of cardiac dysrhythmias or at baseline rhythm  Description: INTERVENTIONS:  - Continuous cardiac monitoring, vital signs, obtain 12 lead EKG if ordered  - Administer antiarrhythmic and heart rate control medications as ordered  - Monitor electrolytes and administer replacement therapy as ordered  Outcome: Progressing     Problem: METABOLIC, FLUID AND ELECTROLYTES - ADULT  Goal: Electrolytes maintained within normal limits  Description: INTERVENTIONS:  - Monitor labs and assess patient for signs and symptoms of electrolyte imbalances  - Administer electrolyte replacement as ordered  - Monitor response to electrolyte replacements, including repeat lab results as appropriate  - Instruct patient on fluid and nutrition as appropriate  Outcome: Progressing  Goal: Fluid balance maintained  Description: INTERVENTIONS:  - Monitor labs   - Monitor I/O and WT  - Instruct patient on fluid and nutrition as appropriate  - Assess for signs & symptoms of volume excess or deficit  Outcome: Progressing     Problem: MOBILITY - ADULT  Goal: Maintain or return to baseline ADL function  Description: INTERVENTIONS:  -  Assess patient's ability to carry out ADLs; assess patient's baseline for ADL function and identify physical deficits which impact ability to perform ADLs (bathing, care of mouth/teeth, toileting, grooming, dressing, etc )  - Assess/evaluate cause of self-care deficits   - Assess range of motion  - Assess patient's mobility; develop plan if impaired  - Assess patient's need for assistive devices and provide as appropriate  - Encourage maximum independence but intervene and supervise when necessary  - Involve family in performance of ADLs  - Assess for home care needs following discharge   - Consider OT consult to assist with ADL evaluation and planning for discharge  - Provide patient education as appropriate  Outcome: Progressing     Problem: Nutrition/Hydration-ADULT  Goal: Nutrient/Hydration intake appropriate for improving, restoring or maintaining nutritional needs  Description: Monitor and assess patient's nutrition/hydration status for malnutrition  Collaborate with interdisciplinary team and initiate plan and interventions as ordered  Monitor patient's weight and dietary intake as ordered or per policy  Utilize nutrition screening tool and intervene as necessary  Determine patient's food preferences and provide high-protein, high-caloric foods as appropriate       INTERVENTIONS:  - Monitor oral intake, urinary output, labs, and treatment plans  - Assess nutrition and hydration status and recommend course of action  - Evaluate amount of meals eaten  - Assist patient with eating if necessary   - Allow adequate time for meals  - Recommend/ encourage appropriate diets, oral nutritional supplements, and vitamin/mineral supplements  - Order, calculate, and assess calorie counts as needed  - Recommend, monitor, and adjust tube feedings and TPN/PPN based on assessed needs  - Assess need for intravenous fluids  - Provide specific nutrition/hydration education as appropriate  - Include patient/family/caregiver in decisions related to nutrition  Outcome: Progressing

## 2022-09-05 NOTE — PLAN OF CARE
Outpatient Medications Marked as Taking for the 1/3/19 encounter (Refill) with Carlos Fulton Jr., MD   Medication Sig Dispense Refill   • pramipexole (MIRAPEX) 0.25 MG tablet TAKE 1 TABLET BY MOUTH THREE TIMES DAILY 90 tablet 3   • tiZANidine (ZANAFLEX) 4 MG tablet Take 1 tablet by mouth 4 times daily. 120 tablet 1        Ok to leave detailed Message: No  Informed caller of refill policy- 24-48 hours: Yes  No call back needed unless nurse has questions.     Pharmacy: [facility] - [place], [place] - [address] [facility]  [phone]   Problem: Potential for Falls  Goal: Patient will remain free of falls  Description: INTERVENTIONS:  - Educate patient/family on patient safety including physical limitations  - Instruct patient to call for assistance with activity   - Consult OT/PT to assist with strengthening/mobility   - Keep Call bell within reach  - Keep bed low and locked with side rails adjusted as appropriate  - Keep care items and personal belongings within reach  - Initiate and maintain comfort rounds  - Make Fall Risk Sign visible to staff  - Offer Toileting every Hour, in advance of need  - Initiate/Maintain bealarm  - Apply yellow socks and bracelet for high fall risk patients  - Consider moving patient to room near nurses station  Outcome: Progressing     Problem: PAIN - ADULT  Goal: Verbalizes/displays adequate comfort level or baseline comfort level  Description: Interventions:  - Encourage patient to monitor pain and request assistance  - Assess pain using appropriate pain scale  - Administer analgesics based on type and severity of pain and evaluate response  - Implement non-pharmacological measures as appropriate and evaluate response  - Consider cultural and social influences on pain and pain management  - Notify physician/advanced practitioner if interventions unsuccessful or patient reports new pain  Outcome: Progressing     Problem: SAFETY ADULT  Goal: Patient will remain free of falls  Description: INTERVENTIONS:  - Educate patient/family on patient safety including physical limitations  - Instruct patient to call for assistance with activity   - Consult OT/PT to assist with strengthening/mobility   - Keep Call bell within reach  - Keep bed low and locked with side rails adjusted as appropriate  - Keep care items and personal belongings within reach  - Initiate and maintain comfort rounds  - Make Fall Risk Sign visible to staff  - Offer Toileting every Hour, in advance of need  - Initiate/Maintain bed alarm  - Apply yellow socks and bracelet for high fall risk patients  - Consider moving patient to room near nurses station  Outcome: Progressing  Goal: Maintain or return to baseline ADL function  Description: INTERVENTIONS:  -  Assess patient's ability to carry out ADLs; assess patient's baseline for ADL function and identify physical deficits which impact ability to perform ADLs (bathing, care of mouth/teeth, toileting, grooming, dressing, etc )  - Assess/evaluate cause of self-care deficits   - Assess range of motion  - Assess patient's mobility; develop plan if impaired  - Assess patient's need for assistive devices and provide as appropriate  - Encourage maximum independence but intervene and supervise when necessary  - Assess for home care needs following discharge   - Consider OT consult to assist with ADL evaluation and planning for discharge  - Provide patient education as appropriate  Outcome: Progressing  Goal: Maintains/Returns to pre admission functional level  Description: INTERVENTIONS:  - Perform BMAT or MOVE assessment daily    - Set and communicate daily mobility goal to care team and patient/family/caregiver     - Collaborate with rehabilitation services on mobility goals if consulted  - Ambulate patient as patient able to tolerate  - Record patient progress and toleration of activity level   Outcome: Progressing     Problem: DISCHARGE PLANNING  Goal: Discharge to home or other facility with appropriate resources  Description: INTERVENTIONS:  - Identify barriers to discharge w/patient and caregiver  - Arrange for needed discharge resources and transportation as appropriate  - Identify discharge learning needs (meds, wound care, etc )  - Arrange for interpretive services to assist at discharge as needed  - Refer to Case Management Department for coordinating discharge planning if the patient needs post-hospital services based on physician/advanced practitioner order or complex needs related to functional status, cognitive ability, or social support system  Outcome: Progressing     Problem: CARDIOVASCULAR - ADULT  Goal: Maintains optimal cardiac output and hemodynamic stability  Description: INTERVENTIONS:  - Monitor I/O, vital signs and rhythm  - Monitor for S/S and trends of decreased cardiac output  - Administer and titrate ordered vasoactive medications to optimize hemodynamic stability  - Assess quality of pulses, skin color and temperature  - Assess for signs of decreased coronary artery perfusion  - Instruct patient to report change in severity of symptoms  Outcome: Progressing  Goal: Absence of cardiac dysrhythmias or at baseline rhythm  Description: INTERVENTIONS:  - Continuous cardiac monitoring, vital signs, obtain 12 lead EKG if ordered  - Administer antiarrhythmic and heart rate control medications as ordered  - Monitor electrolytes and administer replacement therapy as ordered  Outcome: Progressing     Problem: METABOLIC, FLUID AND ELECTROLYTES - ADULT  Goal: Electrolytes maintained within normal limits  Description: INTERVENTIONS:  - Monitor labs and assess patient for signs and symptoms of electrolyte imbalances  - Administer electrolyte replacement as ordered  - Monitor response to electrolyte replacements, including repeat lab results as appropriate  - Instruct patient on fluid and nutrition as appropriate  Outcome: Progressing  Goal: Fluid balance maintained  Description: INTERVENTIONS:  - Monitor labs   - Monitor I/O and WT  - Instruct patient on fluid and nutrition as appropriate  - Assess for signs & symptoms of volume excess or deficit  Outcome: Progressing     Problem: MOBILITY - ADULT  Goal: Maintain or return to baseline ADL function  Description: INTERVENTIONS:  -  Assess patient's ability to carry out ADLs; assess patient's baseline for ADL function and identify physical deficits which impact ability to perform ADLs (bathing, care of mouth/teeth, toileting, grooming, dressing, etc )  - Assess/evaluate cause of self-care deficits   - Assess range of motion  - Assess patient's mobility; develop plan if impaired  - Assess patient's need for assistive devices and provide as appropriate  - Encourage maximum independence but intervene and supervise when necessary  - Assess for home care needs following discharge   - Consider OT consult to assist with ADL evaluation and planning for discharge  - Provide patient education as appropriate  Outcome: Progressing  Goal: Maintains/Returns to pre admission functional level  Description: INTERVENTIONS:  - Perform BMAT or MOVE assessment daily    - Set and communicate daily mobility goal to care team and patient/family/caregiver  - Collaborate with rehabilitation services on mobility goals if consulted  - Ambulate patient as patient able to tolerate  - Record patient progress and toleration of activity level   Outcome: Progressing     Problem: Knowledge Deficit  Goal: Patient/family/caregiver demonstrates understanding of disease process, treatment plan, medications, and discharge instructions  Description: Complete learning assessment and assess knowledge base  Interventions:  - Provide teaching at level of understanding  - Provide teaching via preferred learning methods  Outcome: Not Progressing     Problem: Nutrition/Hydration-ADULT  Goal: Nutrient/Hydration intake appropriate for improving, restoring or maintaining nutritional needs  Description: Monitor and assess patient's nutrition/hydration status for malnutrition  Collaborate with interdisciplinary team and initiate plan and interventions as ordered  Monitor patient's weight and dietary intake as ordered or per policy  Utilize nutrition screening tool and intervene as necessary  Determine patient's food preferences and provide high-protein, high-caloric foods as appropriate       INTERVENTIONS:  - Monitor oral intake, urinary output, labs, and treatment plans  - Assess nutrition and hydration status and recommend course of action  - Evaluate amount of meals eaten  - Offer foods according to patient's preference  - Recommend/ encourage appropriate diets, oral nutritional supplements, and vitamin/mineral supplements  - Order, calculate, and assess calorie counts as needed  - Assess need for intravenous fluids  - Provide specific nutrition/hydration education as appropriate  - Include patient/family/caregiver in decisions related to nutrition  Outcome: Not Progressing

## 2022-09-05 NOTE — PROGRESS NOTES
[facility]  Progress Note - Ana Acuna 1965, 59 y o  male MRN: [id number]  Unit/Bed#: [address] [place] [address] Encounter: [id number]  Primary Care Provider: No primary care provider on file  Date and time admitted to hospital: 9/2/2022  2:12 PM    Acute kidney injury (HCC)-resolved as of 9/3/2022  Assessment & Plan  POA as evidenced by Cr 1 66 likely prerenal from dehydration/ hypotension   · Cr improved to 1 13 with IVF  · Encourage oral intake, avoid nephrotoxic agents     * Hypotension-resolved as of 9/3/2022  Assessment & Plan  Patient was initially noted to be hypotensive on arrival with a systolic blood pressure in the 70s  Patient's blood pressure did dramatically improve with administration of IV fluid hydration  Patient also with evidence of acute kidney injury  Suspect that patient's hypotension is likely related to volume depletion, dehydration as well as blood pressure medications  No evidence of infection or sepsis  · BP improved with IVF; now 139/89  · Orthostatic vital signs mildly positive  · Resume Toprol XL   · Continue holding Verapamil     History of stroke  Assessment & Plan  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 right posterior cerebral artery distributions  As noted by Neurology, recommendation for repeat MRI was to be performed in 2-3 months  Repeated MRI brain based on recommendations  · MRI brain: "Resolution of the previously noted diffusion hyperintensity in the right temporal, occipital and parietal region with the development of in the right occipital and temporal region with ex vacuo dilation of the right occipital horn encephalomalacia  No acute infarct seen  No acute hemorrhage  Severe chronic small vessel ischemic changes   Small chronic microbleed noted in the right temporal region, not seen on the previous study in May"  · Discussed findings of small chronic micro bleed with on-call neurologist  · Per neurologist: okay to continue current medications, follow-up PT/OT/cognitive evaluations and recommend home PT at minimum  · Continue ASA and Lipitor  · Discontinued Lovenox  · Encourage SCDs   · PT/OT recommending discharge to short-term rehab  Spoke with case management  Due to the holiday weekend, will need to follow-up placement on Tuesday  Consider [facility]    Hypertension  Assessment & Plan  · On Metoprolol and Verapamil at home  · Held antihypertensive medications given hypotension on arrival  · BP improved with IVF  · Given hst of frequent falls, bradycardia, and hypotension - do not recommend using Metoprolol and Verapamil together   · Restarted Toprol XL 25 mg daily   · Will add Norvasc 5 mg daily  · Discontinue Verapamil   · Monitor BP    Multiple falls  Assessment & Plan  · Likely secondary to recent stroke and seizures  · Spoke with patient's sister who states the patient falls frequently and often hits his head  He states he recently fell out of the bathtub and pulled the towel rack off the wall  Patient's sister feels he would benefit from rehab  · PT/OT recommends discharge to rehab  · Case management made aware of therapy recommendations; placement pending  · Fall precautions    Hypomagnesemia  Assessment & Plan  · Magnesium level 1 0 -> 1 8 -> 1 2   · Started Magnesium oxide 400 mg BID  · Encourage oral intake   · Monitor electrolytes    Tobacco dependence  Assessment & Plan  Wants to go outside and smoke  Educated on the no-smoking policy   · Increase Nicotine patch to 21 mg daily  · Trial Nicoette gum  · Patient has no interest in quitting     Hyperlipidemia  Assessment & Plan  · Continue Lipitor     History of alcohol abuse  Assessment & Plan  · Patient denies any recent use   Last drink Dec 2021  · Started on thiamine and folic acid  · Ativan PRN    Seizure disorder ([facility] [address] )  Assessment & Plan  · Continue home medications, Vimpat and Keppra     Orthostatic hypotension-resolved as of 9/5/2022  Assessment & Plan  Noted to be orthostatic hypotensive  Reports dizziness  · S/p IVF   · Stop Verapamil  · Trial BRAVO stockings  · Encourage oral intake; add nutritional supplements     Dizziness-resolved as of 9/5/2022  Assessment & Plan  Likely related to recent CVA, dehydration and orthostatic hypotension   · S/p IVF  · Trial BRAVO stockings when oob   · PT/OT recommending discharge to rehab for which patient's sister is amenable  · CM for placement   · Fall precautions     Hypokalemia-resolved as of 9/4/2022  Assessment & Plan  · Potassium 3 3 on arrival for which improved to 4 4 after repletion  · Encourage oral intake  · Monitor         VTE Pharmacologic Prophylaxis: VTE Score: 1 High Risk (Score >/= 5) - Pharmacological DVT Prophylaxis Contraindicated  Sequential Compression Devices Ordered  Patient Centered Rounds: I performed bedside rounds with nursing staff today  Discussions with Specialists or Other Care Team Provider: nursing, CM    Education and Discussions with Family / Patient: Updated  (sister) at bedside  Time Spent for Care: 30 minutes  More than 50% of total time spent on counseling and coordination of care as described above  Current Length of Stay: 3 day(s)  Current Patient Status: Inpatient   Certification Statement: The patient will continue to require additional inpatient hospital stay due to weakness, unsteady gait, falls at home pending placement  Discharge Plan: Anticipate discharge in 24-48 hrs to rehab facility  Code Status: Level 1 - Full Code    Subjective:   Patient seen and examined at bedside  Had some questions regarding dizziness and rehab placement  Wants to smoke  Asked his sister to bring in cigarettes and matches  Extensive discussion had with myself and the patient regarding the no smoking policy  Patient amenable to increasing his nicotine patch, trialing nicotine gum, and trialing Ativan as needed  Patient denies any chest pain or shortness of breath  Patient denies any abdominal pain, nausea vomiting, diarrhea  Patient's appetite is excellent  He is ambulating around his room but does have an unsteady gait  Objective:     Vitals:   Temp (24hrs), Av 8 °F (36 6 °C), Min:97 °F (36 1 °C), Max:98 8 °F (37 1 °C)    Temp:  [97 °F (36 1 °C)-98 8 °F (37 1 °C)] 97 4 °F (36 3 °C)  HR:  [55-60] 57  Resp:  [18] 18  BP: (147-168)/(84-95) 163/90  SpO2:  [95 %-99 %] 97 %  Body mass index is 23 43 kg/m²  Input and Output Summary (last 24 hours): Intake/Output Summary (Last 24 hours) at 2022 1435  Last data filed at 2022  Gross per 24 hour   Intake 470 ml   Output --   Net 470 ml       Physical Exam:   Physical Exam  Vitals and nursing note reviewed  Constitutional:       General: He is not in acute distress  Appearance: He is not toxic-appearing or diaphoretic  Comments: Pleasant gentleman ambulating around the room on room air   HENT:      Head: Normocephalic  Mouth/Throat:      Mouth: Mucous membranes are moist    Eyes:      Conjunctiva/sclera: Conjunctivae normal    Cardiovascular:      Rate and Rhythm: Normal rate  Pulmonary:      Effort: Pulmonary effort is normal       Breath sounds: Normal breath sounds  No wheezing, rhonchi or rales  Abdominal:      General: Bowel sounds are normal  There is no distension  Palpations: Abdomen is soft  Tenderness: There is no abdominal tenderness  Musculoskeletal:         General: Normal range of motion  Cervical back: Normal range of motion  Right lower leg: No edema  Left lower leg: No edema  Comments: Unsteady gait   Skin:     General: Skin is warm and dry  Capillary Refill: Capillary refill takes less than 2 seconds  Neurological:      Mental Status: He is alert and oriented to person, place, and time  Mental status is at baseline  Motor: Weakness (Generalized) present     Psychiatric: Mood and Affect: Mood is anxious  Affect is labile  Speech: Speech normal          Behavior: Behavior normal  Behavior is not aggressive  Behavior is cooperative  Judgment: Judgment is impulsive  Additional Data:     Labs:  Results from last 7 days   Lab Units 09/04/22  0530 09/03/22  0659   WBC Thousand/uL 5 15 5 06   HEMOGLOBIN g/dL 15 2 14 7   HEMATOCRIT % 43 1 42 5   PLATELETS Thousands/uL 126* 100*   NEUTROS PCT %  --  58   LYMPHS PCT %  --  26   MONOS PCT %  --  13*   EOS PCT %  --  3     Results from last 7 days   Lab Units 09/04/22  0530 09/03/22  0520   SODIUM mmol/L 141 141   POTASSIUM mmol/L 4 1 4 4   CHLORIDE mmol/L 106 107   CO2 mmol/L 27 24   BUN mg/dL 11 12   CREATININE mg/dL 1 10 1 13   ANION GAP mmol/L 8 10   CALCIUM mg/dL 8 9 9 0   ALBUMIN g/dL  --  3 8   TOTAL BILIRUBIN mg/dL  --  0 68   ALK PHOS U/L  --  92   ALT U/L  --  18   AST U/L  --  17   GLUCOSE RANDOM mg/dL 90 95                 Results from last 7 days   Lab Units 09/02/22  1449   LACTIC ACID mmol/L 1 4       Lines/Drains:  Invasive Devices  Report    Peripheral Intravenous Line  Duration           Peripheral IV 09/02/22 Left Antecubital 2 days                      Imaging: Reviewed radiology reports from this admission including: chest xray and MRI brain    Recent Cultures (last 7 days):   Results from last 7 days   Lab Units 09/02/22  1516 09/02/22  1449   BLOOD CULTURE  No Growth at 48 hrs  No Growth at 48 hrs         Last 24 Hours Medication List:   Current Facility-Administered Medications   Medication Dose Route Frequency Provider Last Rate    acetaminophen  650 mg Oral Q6H PRN Scarlett Swain MD      amLODIPine  5 mg Oral Daily NOAM Mast      aspirin  81 mg Oral Daily Scarlett Swain MD      atorvastatin  40 mg Oral Daily With Anni Mcmullen MD      diphenhydrAMINE  25 mg Intravenous Q6H PRN NOAM Mast      docusate sodium  100 mg Oral BID Yuliya Found, MD      fenofibrate  145 mg Oral Daily Yuliya Found, MD      ketorolac  15 mg Intravenous Q6H PRN NOAM Baker      lacosamide  100 mg Oral Q12H [address] Yuliya Found, MD      levETIRAcetam  1,500 mg Oral Q12H Fulton Found, MD      LORazepam  1 mg Oral Q6H PRN NOAM Baker      magnesium oxide  400 mg Oral BID Yuliya Found, MD      melatonin  3 mg Oral HS Yuliya Found, MD      metoprolol succinate  25 mg Oral Daily Rebeka Baker      nicotine  14 mg Transdermal Once NOAM Baker      nicotine  21 mg Transdermal Daily Abhijit Edwin [place]      nicotine polacrilex  2 mg Oral Q2H PRN NOAM Baker      pantoprazole  40 mg Oral Early Morning Fulton Found, MD      polyethylene glycol  17 g Oral Daily PRN Fulton Found, MD      thiamine  100 mg Oral Daily Fulton Found, MD          Today, Patient Was Seen By: NOAM Baker    **Please Note: This note may have been constructed using a voice recognition system  **

## 2022-09-06 LAB
ANION GAP SERPL CALCULATED.3IONS-SCNC: 5 MMOL/L (ref 4–13)
BUN SERPL-MCNC: 10 MG/DL (ref 5–25)
CALCIUM SERPL-MCNC: 9 MG/DL (ref 8.3–10.1)
CHLORIDE SERPL-SCNC: 105 MMOL/L (ref 96–108)
CO2 SERPL-SCNC: 31 MMOL/L (ref 21–32)
CREAT SERPL-MCNC: 1.22 MG/DL (ref 0.6–1.3)
ERYTHROCYTE [DISTWIDTH] IN BLOOD BY AUTOMATED COUNT: 13.2 % (ref 11.6–15.1)
GFR SERPL CREATININE-BSD FRML MDRD: 64 ML/MIN/1.73SQ M
GLUCOSE SERPL-MCNC: 79 MG/DL (ref 65–140)
HCT VFR BLD AUTO: 46 % (ref 36.5–49.3)
HGB BLD-MCNC: 15.5 G/DL (ref 12–17)
MAGNESIUM SERPL-MCNC: 1.3 MG/DL (ref 1.6–2.6)
MCH RBC QN AUTO: 29.1 PG (ref 26.8–34.3)
MCHC RBC AUTO-ENTMCNC: 33.7 G/DL (ref 31.4–37.4)
MCV RBC AUTO: 87 FL (ref 82–98)
PHOSPHATE SERPL-MCNC: 3.6 MG/DL (ref 2.7–4.5)
PLATELET # BLD AUTO: 124 THOUSANDS/UL (ref 149–390)
PMV BLD AUTO: 9.7 FL (ref 8.9–12.7)
POTASSIUM SERPL-SCNC: 4.1 MMOL/L (ref 3.5–5.3)
RBC # BLD AUTO: 5.32 MILLION/UL (ref 3.88–5.62)
SODIUM SERPL-SCNC: 141 MMOL/L (ref 135–147)
WBC # BLD AUTO: 4.83 THOUSAND/UL (ref 4.31–10.16)

## 2022-09-06 PROCEDURE — 99232 SBSQ HOSP IP/OBS MODERATE 35: CPT | Performed by: NURSE PRACTITIONER

## 2022-09-06 PROCEDURE — 84100 ASSAY OF PHOSPHORUS: CPT | Performed by: NURSE PRACTITIONER

## 2022-09-06 PROCEDURE — 80048 BASIC METABOLIC PNL TOTAL CA: CPT | Performed by: NURSE PRACTITIONER

## 2022-09-06 PROCEDURE — 85027 COMPLETE CBC AUTOMATED: CPT | Performed by: NURSE PRACTITIONER

## 2022-09-06 PROCEDURE — 83735 ASSAY OF MAGNESIUM: CPT | Performed by: NURSE PRACTITIONER

## 2022-09-06 PROCEDURE — 97110 THERAPEUTIC EXERCISES: CPT

## 2022-09-06 RX ORDER — MAGNESIUM SULFATE 1 G/100ML
1 INJECTION INTRAVENOUS ONCE
Status: COMPLETED | OUTPATIENT
Start: 2022-09-06 | End: 2022-09-07

## 2022-09-06 RX ORDER — OLANZAPINE 10 MG/1
5 INJECTION, POWDER, LYOPHILIZED, FOR SOLUTION INTRAMUSCULAR ONCE
Status: COMPLETED | OUTPATIENT
Start: 2022-09-06 | End: 2022-09-06

## 2022-09-06 RX ORDER — MORPHINE SULFATE 4 MG/ML
4 INJECTION, SOLUTION INTRAMUSCULAR; INTRAVENOUS ONCE
Status: COMPLETED | OUTPATIENT
Start: 2022-09-06 | End: 2022-09-06

## 2022-09-06 RX ORDER — KETOROLAC TROMETHAMINE 30 MG/ML
15 INJECTION, SOLUTION INTRAMUSCULAR; INTRAVENOUS EVERY 6 HOURS PRN
Status: DISCONTINUED | OUTPATIENT
Start: 2022-09-06 | End: 2022-09-07 | Stop reason: HOSPADM

## 2022-09-06 RX ORDER — LORAZEPAM 2 MG/ML
1 INJECTION INTRAMUSCULAR ONCE
Status: DISCONTINUED | OUTPATIENT
Start: 2022-09-06 | End: 2022-09-06

## 2022-09-06 RX ADMIN — AMLODIPINE BESYLATE 5 MG: 5 TABLET ORAL at 09:22

## 2022-09-06 RX ADMIN — KETOROLAC TROMETHAMINE 15 MG: 30 INJECTION, SOLUTION INTRAMUSCULAR at 18:35

## 2022-09-06 RX ADMIN — MORPHINE SULFATE 2 MG: 2 INJECTION, SOLUTION INTRAMUSCULAR; INTRAVENOUS at 01:15

## 2022-09-06 RX ADMIN — KETOROLAC TROMETHAMINE 15 MG: 30 INJECTION, SOLUTION INTRAMUSCULAR at 09:28

## 2022-09-06 RX ADMIN — ASPIRIN 81 MG CHEWABLE TABLET 81 MG: 81 TABLET CHEWABLE at 09:22

## 2022-09-06 RX ADMIN — THIAMINE HCL TAB 100 MG 100 MG: 100 TAB at 09:22

## 2022-09-06 RX ADMIN — LORAZEPAM 1 MG: 1 TABLET ORAL at 18:36

## 2022-09-06 RX ADMIN — MAGNESIUM OXIDE TAB 400 MG (241.3 MG ELEMENTAL MG) 400 MG: 400 (241.3 MG) TAB at 09:22

## 2022-09-06 RX ADMIN — METOPROLOL SUCCINATE 25 MG: 25 TABLET, EXTENDED RELEASE ORAL at 09:22

## 2022-09-06 RX ADMIN — PANTOPRAZOLE SODIUM 40 MG: 40 TABLET, DELAYED RELEASE ORAL at 06:43

## 2022-09-06 RX ADMIN — FENOFIBRATE 145 MG: 145 TABLET, FILM COATED ORAL at 09:22

## 2022-09-06 RX ADMIN — OLANZAPINE 5 MG: 10 INJECTION, POWDER, FOR SOLUTION INTRAMUSCULAR at 20:28

## 2022-09-06 RX ADMIN — LEVETIRACETAM 1500 MG: 500 TABLET, FILM COATED ORAL at 06:43

## 2022-09-06 RX ADMIN — LACOSAMIDE 100 MG: 50 TABLET, FILM COATED ORAL at 20:30

## 2022-09-06 RX ADMIN — MAGNESIUM SULFATE HEPTAHYDRATE 1 G: 1 INJECTION, SOLUTION INTRAVENOUS at 09:33

## 2022-09-06 RX ADMIN — OLANZAPINE 5 MG: 10 INJECTION, POWDER, FOR SOLUTION INTRAMUSCULAR at 01:04

## 2022-09-06 RX ADMIN — ACETAMINOPHEN 650 MG: 325 TABLET ORAL at 00:12

## 2022-09-06 RX ADMIN — Medication 3 MG: at 21:28

## 2022-09-06 RX ADMIN — LEVETIRACETAM 1500 MG: 500 TABLET, FILM COATED ORAL at 17:06

## 2022-09-06 RX ADMIN — MAGNESIUM OXIDE TAB 400 MG (241.3 MG ELEMENTAL MG) 400 MG: 400 (241.3 MG) TAB at 17:06

## 2022-09-06 RX ADMIN — ATORVASTATIN CALCIUM 40 MG: 40 TABLET, FILM COATED ORAL at 17:06

## 2022-09-06 RX ADMIN — DIPHENHYDRAMINE HYDROCHLORIDE 25 MG: 50 INJECTION, SOLUTION INTRAMUSCULAR; INTRAVENOUS at 21:23

## 2022-09-06 RX ADMIN — LACOSAMIDE 100 MG: 50 TABLET, FILM COATED ORAL at 09:22

## 2022-09-06 RX ADMIN — MORPHINE SULFATE 4 MG: 4 INJECTION INTRAVENOUS at 21:28

## 2022-09-06 NOTE — ASSESSMENT & PLAN NOTE
Wanted to go outside and smoke   Educated on the no-smoking policy   · Increase Nicotine patch to 21 mg daily  · Trial Nicoette gum  · Patient has no interest in quitting

## 2022-09-06 NOTE — ASSESSMENT & PLAN NOTE
· Likely secondary to recent stroke and seizures  · Previous provider spoke with patient's sister who states the patient falls frequently and often hits his head  He states he recently fell out of the bathtub and pulled the towel rack off the wall  Patient's sister feels he would benefit from rehab    · PT/OT recommends discharge to rehab  · Case management made aware of therapy recommendations; placement pending  · Fall precautions

## 2022-09-06 NOTE — ASSESSMENT & PLAN NOTE
· On Metoprolol and Verapamil at home  · Held antihypertensive medications given hypotension on arrival  · BP improved with IVF  · Given hst of frequent falls, bradycardia, and hypotension - do not recommend using Metoprolol and Verapamil together   · Restarted Toprol XL 25 mg daily   ·  Norvasc 5 mg daily added  · Discontinue Verapamil   · Monitor BP

## 2022-09-06 NOTE — UTILIZATION REVIEW
Initial Clinical Review    Admission: Date/Time/Statement:   Admission Orders (From admission, onward)     Ordered        09/02/22 1658  INPATIENT ADMISSION  Once                 Z                                                                                                                ,           Orders Placed This Encounter   Procedures    INPATIENT ADMISSION     Standing Status:   Standing     Number of Occurrences:   1     Order Specific Question:   Level of Care     Answer:   Med Surg [16]     Order Specific Question:   Estimated length of stay     Answer:   More than 2 Midnights     Order Specific Question:   Certification     Answer:   I certify that inpatient services are medically necessary for this patient for a duration of greater than two midnights  See H&P and MD Progress Notes for additional information about the patient's course of treatment  ED Arrival Information     Expected   -    Arrival   9/2/2022 14:10    Acuity   Urgent            Means of arrival   Walk-In    Escorted by   Family Member    Service   Hospitalist    Admission type   Urgent            Arrival complaint   previous stroke, left side weakness         Chief Complaint   Patient presents with    Multiple Falls     Had a stroke in June  Continues to fall and becomes dizzy when standing  Symptoms are getting worse and he is having headaches     Initial Presentation:  60 y/o male with PMHx HTN, HLD, 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, Bronchiectasis, GERD, Diverticulitis, CKD, Gout -  presents urgently to [facility] ED 2nd dizziness when standing with multiple falls at home  Reports that he has been extremely unsteady on his feet since his stroke  In the ED - Temp 98 9  BP 70's - 96/59 - much improved with IVF  Exam:  AA+O x 3  Left-sided weakness    CT Head and Neck negative for acute change with evolution of prior infarct and development of right temporal and occipital encephalomalacia as well as chronic microangiopathy, severe stenosis at the origin of the right vertebral artery unchanged and 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  Labs:  CBC wnl, K+ 3 3  Mg+ 1 0  BUN/ CR 13/ 1 66   BNP 1,086  ED TX: IVF NSS x 2 L, IV Mag SO4 2 Gms  Admitted inpatient 9/2/22 at 1658 2nd Dizziness with Hypotension, DORA  -monitor  orthostatic VS,  continue IVF, hold + adjust BP meds, PT + OT Evals    9/3/22 - DAY 2:  - initially hypotensive on arrival with a systolic blood pressure in the 70s  BP dramatically improved with administration of IV fluid hydration  Also with evidence of acute kidney injury  Suspect that patient's hypotension is likely related to volume depletion, dehydration as well as blood pressure medications  - feels cold  Reports dizziness "like the floor is moving underneath me when I walk  MD stressed the importance of strengthening by ambulation and getting oob to chair for meals     · BP improved with IVF; now 139/89  · Orthostatic vital signs mildly positive  · Decrease IVF to NS at 50 mL/hr  · Resume Toprol XL   · Continue holding Verapamil   · Monitor   MD Certification Statement: Continues to require additional inpatient hospital stay due to dizziness, orthostatic hypotension     ED Triage Vitals   Temperature Pulse Respirations Blood Pressure SpO2   09/02/22 1416 09/02/22 1416 09/02/22 1416 09/02/22 1416 09/02/22 1416   98 9 °F (37 2 °C) 73 20 96/59 92 %      Temp Source Heart Rate Source Patient Position - Orthostatic VS BP Location FiO2 (%)   09/02/22 2253 09/02/22 2253 09/02/22 1416 09/02/22 1416 --   Oral Monitor Lying Right arm       Pain Score       09/02/22 1416       No Pain          Wt Readings from Last 1 Encounters:   09/02/22 76 2 kg (168 lb)     Additional Vital Signs:   Date/Time Temp Pulse Resp BP MAP (mmHg) SpO2 O2 Device Patient Position - Orthostatic VS   09/04/22 0945 -- 59 -- 144/75 98 98 % -- Standing - Orthostatic VS   09/04/22 09:43:28 -- 76 -- 144/75 98 95 % -- --   09/04/22 0942 -- 65 -- 152/89 110 97 % -- Sitting - Orthostatic VS   09/04/22 09:41:52 -- 63 -- 152/89 110 97 % -- --   09/04/22 0937 98 2 °F (36 8 °C) 61 17 160/92 115 98 % None (Room air) Lying - Orthostatic VS   09/04/22 09:36:04 98 2 °F (36 8 °C) 55 -- 160/92 115 97 % -- --   09/04/22 05:35:53 -- 67 -- 147/86 106 96 % -- Standing - Orthostatic VS   09/04/22 05:34:40 -- 59 -- 159/85 110 98 % -- Sitting - Orthostatic VS   09/04/22 05:33:24 -- 58 -- 160/86 111 97 % -- Lying - Orthostatic VS   09/03/22 23:52:16 97 7 °F (36 5 °C) 61 16 147/85 106 96 % -- --   09/03/22 20:15:58 97 8 °F (36 6 °C) 62 18 148/86 107 97 % -- --   09/03/22 15:27:18 97 5 °F (36 4 °C) 51 Abnormal  19 161/93 116 100 % -- --   09/03/22 1200 -- -- -- -- -- 97 % -- --   09/03/22 10:35:46 -- 70 -- 139/89 106 97 % -- Standing - Orthostatic VS   09/03/22 10:34:16 -- 56 -- 151/79 103 97 % -- Sitting - Orthostatic VS   09/03/22 10:32:08 -- 52 Abnormal  17 152/78 103 97 % -- Lying - Orthostatic VS   09/03/22 0900 -- -- -- -- -- -- None (Room air) --   09/03/22 07:30:27 -- 55 -- 160/86 111 97 % -- --   09/03/22 0700 -- 54 Abnormal  -- -- -- 98 % -- --   09/03/22 0300 -- 51 Abnormal  -- -- -- 96 % -- --   09/03/22 02:31:43 97 3 °F (36 3 °C) Abnormal  55 18 162/87 112 96 % None (Room air) Lying   09/03/22 0100 -- 51 Abnormal  -- -- -- 97 % -- --   09/02/22 2300 -- 55 -- 145/72 96 96 % -- Lying   09/02/22 22:53:46 97 5 °F (36 4 °C) 56 20 145/72 96 99 % -- Lying   09/02/22 2100 -- 61 -- -- -- 97 % -- --   09/02/22 20:02:26 98 °F (36 7 °C) 65 -- 133/80 98 97 % -- Standing - Orthostatic VS   09/02/22 20:00:57 98 °F (36 7 °C) 60 18 154/88 110 97 % None (Room air) Sitting - Orthostatic VS   09/02/22 19:59:36 98 °F (36 7 °C) 62 -- 154/89 111 97 % -- Lying - Orthostatic VS   09/02/22 18:48:59 97 5 °F (36 4 °C) 64 -- 155/89 111 97 % -- --   09/02/22 1800 -- 61 13 139/74 102 97 % -- --   09/02/22 1745 -- 63 16 144/78 105 97 % -- --   09/02/22 1730 -- 59 14 142/79 106 99 % -- --   09/02/22 1715 -- 63 14 139/77 103 96 % -- --   09/02/22 1700 -- 62 13 145/78 106 97 % -- --   09/02/22 1645 -- 62 12 -- -- 97 % -- --   09/02/22 1630 -- 64 13 127/77 97 99 % -- --   09/02/22 1600 -- 70 21 109/62 80 100 % -- --   09/02/22 1545 -- 73 20 117/65 84 94 % -- --   09/02/22 1515 -- 74 14 99/59 72 93 % -- --   09/02/22 1445 -- 76 -- 91/56 67 95 % -- --   09/02/22 1422 -- 95 -- 78/52 Abnormal  -- -- -- Standing - Orthostatic VS   09/02/22 1421 -- 87 -- 78/50 Abnormal  -- -- -- Sitting - Orthostatic VS   09/02/22 1416 98 9 °F (37 2 °C) 73 20 96/59 -- 92 % None (Room air) Lying      09/02 0701   09/03 0700 09/03 0701   09/04 0700   P  O  240    I V  (mL/kg) 1009 (13 2) 10 (0 1)   Total Intake(mL/kg) 1249 (16 4) 10 (0 1)   Urine (mL/kg/hr) 3    Total Output 3    Net +1246 +10     Pertinent Labs/Diagnostic Test Results:   MRI brain wo contrast   Resolution of the previously noted diffusion hyperintensity in the right temporal, occipital and parietal region with the development of in the right occipital and temporal region with ex vacuo dilation of the right occipital horn encephalomalacia      No acute infarct seen      No acute hemorrhage      Severe chronic small vessel ischemic changes      Small chronic microbleed noted in the right temporal region, not seen on the previous study in May      CTA head and neck with and without contrast   No acute intracranial pathology  Evolution of previously seen infarct with development of right temporal and occipital encephalomalacia  Chronic microangiopathy  No significant stenosis of the cervical carotid arteries  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 is unchanged  No significant intracranial stenosis or large vessel occlusion  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  XR chest 1 view portable   No acute cardiopulmonary disease        XR elbow 3+ views RIGHT   No acute osseous abnormality         Results from last 7 days   Lab Units 09/06/22  0506 09/04/22  0530 09/03/22  0659 09/02/22  1449   WBC Thousand/uL 4 83 5 15 5 06 7 23   HEMOGLOBIN g/dL 15 5 15 2 14 7 16 8   HEMATOCRIT % 46 0 43 1 42 5 47 5   PLATELETS Thousands/uL 124* 126* 100* 195   NEUTROS ABS Thousands/µL  --   --  2 91 5 15     Results from last 7 days   Lab Units 09/06/22  0738 09/04/22  0530 09/03/22  0520 09/02/22  1449   SODIUM mmol/L 141 141 141 138   POTASSIUM mmol/L 4 1 4 1 4 4 3 3*   CHLORIDE mmol/L 105 106 107 100   CO2 mmol/L 31 27 24 28   ANION GAP mmol/L 5 8 10 10   BUN mg/dL 10 11 12 13   CREATININE mg/dL 1 22 1 10 1 13 1 66*   EGFR ml/min/1 73sq m 64 73 70 44   CALCIUM mg/dL 9 0 8 9 9 0 9 6   MAGNESIUM mg/dL 1 3* 1 2* 1 8 1 0*   PHOSPHORUS mg/dL 3 6 3 9  --   --      Results from last 7 days   Lab Units 09/03/22  0520 09/02/22  1449   AST U/L 17 19   ALT U/L 18 19   ALK PHOS U/L 92 96   TOTAL PROTEIN g/dL 6 5 7 0   ALBUMIN g/dL 3 8 4 2   TOTAL BILIRUBIN mg/dL 0 68 0 91   AMMONIA umol/L  --  18     Results from last 7 days   Lab Units 09/06/22  0738 09/04/22  0530 09/03/22  0520 09/02/22  1449   GLUCOSE RANDOM mg/dL 79 90 95 127     Results from last 7 days   Lab Units 09/02/22  1657 09/02/22  1449   HS TNI 0HR ng/L  --  6   HS TNI 2HR ng/L 6  --    HSTNI D2 ng/L 0  --        Results from last 7 days   Lab Units 09/02/22  1449   TSH 3RD GENERATON uIU/mL 1 766     Results from last 7 days   Lab Units 09/02/22  1449   LACTIC ACID mmol/L 1 4       Results from last 7 days   Lab Units 09/02/22  1449   NT-PRO BNP pg/mL 1,086*     Results from last 7 days   Lab Units 09/02/22  1449   LIPASE u/L 77       Results from last 7 days   Lab Units 09/02/22  1616   CLARITY UA  Clear   COLOR UA  Light Yellow   SPEC GRAV UA  1 010   PH UA  7 5   GLUCOSE UA mg/dl Negative   KETONES UA mg/dl Negative   BLOOD UA  Trace-lysed*   PROTEIN UA mg/dl Negative   NITRITE UA  Negative   BILIRUBIN UA  Negative UROBILINOGEN UA E U /dl 0 2   LEUKOCYTES UA  Negative   WBC UA /hpf 0-1   RBC UA /hpf 1-2   BACTERIA UA /hpf Occasional   EPITHELIAL CELLS WET PREP /hpf None Seen     Results from last 7 days   Lab Units 09/02/22  1516 09/02/22  1449   BLOOD CULTURE  No Growth at 72 hrs  No Growth at 72 hrs       ED Treatment:   Medication Administration from 09/02/2022 1410 to 09/02/2022 1824       Date/Time Order Dose Route Action     09/02/2022 1453 sodium chloride 0 9 % bolus 1,000 mL 1,000 mL Intravenous New Bag     09/02/2022 1607 sodium chloride 0 9 % bolus 1,000 mL 1,000 mL Intravenous New Bag     09/02/2022 1522 iohexol (OMNIPAQUE) 350 MG/ML injection (MULTI-DOSE) 65 mL 65 mL Intravenous Given     09/02/2022 1658 magnesium sulfate 2 g/50 mL IVPB (premix) 2 g 2 g Intravenous New Bag     Past Medical History:   Diagnosis Date    Biceps tendonitis, unspecified laterality 09/17/2007    Bone cyst 11/22/2011    Bronchiectasis ([facility] [address] ) 10/02/2006    Bursitis 11/17/2005    Chest pain 04/29/2009    Chronic renal failure 11/22/2011    Diverticulitis of colon 10/09/2007    Generalized anxiety disorder 06/08/2006    GERD (gastroesophageal reflux disease)     Gout     Hyperlipidemia     Hypertension     Lateral epicondylitis, unspecified elbow     Last Assessed: 11/29/2013    Low magnesium levels     Lyme disease 11/09/2009    Pneumonia     Last Assessed: 1/7/2013    Stroke [facility])      Present on Admission:   Seizure disorder ([facility] [address] )   Hypomagnesemia   History of alcohol abuse   Hyperlipidemia   Hypertension    Admitting Diagnosis: Hypomagnesemia [E83 42]  Dizziness [R42]  Hypotension [I95 9]  Frequent falls [R29 6]  Contusion of right elbow, initial encounter [S50 01XA]    Age/Sex: 59 y o  male    Admission Orders:  Telemetry  VS q4hrs  Continuous Pulse Oximetry  SCD  Up with assistance  Diet Regular: House  Dietary Nutritional Supplements - Ensure compact TID with meals  I+O q shift  Daily weight  Serial HS Troponin q2hrs x 3  PT + OT Evals    Scheduled Medications:  aspirin, 81 mg, Oral, Daily  atorvastatin, 40 mg, Oral, Daily With Dinner  docusate sodium, 100 mg, Oral, BID  fenofibrate, 145 mg, Oral, Daily  lacosamide, 100 mg, Oral, Q12H LUCIA  levETIRAcetam, 1,500 mg, Oral, Q12H  magnesium oxide, 400 mg, Oral, BID  IV magnesium sulfate, 2 g, Intravenous, Once  melatonin, 3 mg, Oral, HS  metoprolol succinate, 25 mg, Oral, Daily  pantoprazole, 40 mg, Oral, Early Morning  thiamine, 100 mg, Oral, Daily    Continuous IV Infusions:     IVF sodium chloride 0 9 % bolus 1,000 mL    Ordered Dose: 1,000 mL Route: Intravenous Frequency: Once @ 1,000 mL/hr over 1 Hours   Scheduled Start Date/Time: 05/07/22 1530 End Date/Time: 05/07/22 1803 after 1 doses      PRN Meds:  acetaminophen, 650 mg, Oral, Q6H PRN  IV ketorolac, 15 mg, Intravenous, Q6H PRN - 9/4 X 1  polyethylene glycol, 17 g, Oral, Daily PRN    Network Utilization Review Department  ATTENTION: Please call with any questions or concerns to [phone] and carefully listen to the prompts so that you are directed to the right person  All voicemails are confidential   Ashwini Skipper all requests for admission clinical reviews, approved or denied determinations and any other requests to dedicated fax number below belonging to the campus where the patient is receiving treatment   List of dedicated fax numbers for the Facilities:  [address] DENIALS (Administrative/Medical Necessity) [phone]   [address] (Maternity/NICU/Pediatrics) [phone]   [address] [phone]   [address]  [address] Medical [place] Melodie Yatesoniel Tony [address] [id number] [facility] [phone] 187 [facility] [address] [facility] [place] [address] [phone]

## 2022-09-06 NOTE — PLAN OF CARE
Problem: PAIN - ADULT  Goal: Verbalizes/displays adequate comfort level or baseline comfort level  Description: Interventions:  - Encourage patient to monitor pain and request assistance  - Assess pain using appropriate pain scale  - Administer analgesics based on type and severity of pain and evaluate response  - Implement non-pharmacological measures as appropriate and evaluate response  - Consider cultural and social influences on pain and pain management  - Notify physician/advanced practitioner if interventions unsuccessful or patient reports new pain  Outcome: Progressing     Problem: Potential for Falls  Goal: Patient will remain free of falls  Description: INTERVENTIONS:  - Educate patient/family on patient safety including physical limitations  - Instruct patient to call for assistance with activity   - Consult OT/PT to assist with strengthening/mobility   - Keep Call bell within reach  - Keep bed low and locked with side rails adjusted as appropriate  - Keep care items and personal belongings within reach  - Initiate and maintain comfort rounds  - Make Fall Risk Sign visible to staff  - Offer Toileting every 2 Hours, in advance of need  - Initiate/Maintain bedalarm  - Obtain necessary fall risk management equipment: n/a  - Apply yellow socks and bracelet for high fall risk patients  - Consider moving patient to room near nurses station  Outcome: Progressing

## 2022-09-06 NOTE — NURSING NOTE
Pt initiated on 1:1 as pt walking around the hallways looking for exit signs to elope;pt said he wants to sign out and walk home  Explained to the pt that he needs to have a ride to pick him up as he is unsteady and multiple falls at home  Pt said his sister refused to come and he has no one else to arrange for a ride  Nsg supervisor Sloan Becerra aware  Pt seen by Jaleel Soriano PA-C at the bedside  Pt continues to be agitated,and angry  Will continue to monitor

## 2022-09-06 NOTE — PROGRESS NOTES
Jordin [address]  Progress Note - Campos Pod 1965, 59 y o  male MRN: [id number]  Unit/Bed#: [address] [place] [address] Encounter: [id number]  Primary Care Provider: No primary care provider on file  Date and time admitted to hospital: 9/2/2022  2:12 PM    * Hypomagnesemia  Assessment & Plan  · Magnesium level 1 0 -> 1 8 -> 1 2 -> 1 3  · Continue Magnesium oxide 400 mg BID  · Receiving IV replete ment 9/6   · Encourage oral intake   · Monitor electrolytes    History of stroke  Assessment & Plan  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 right posterior cerebral artery distributions  As noted by Neurology, recommendation for repeat MRI was to be performed in 2-3 months  Repeated MRI brain based on recommendations  · MRI brain: "Resolution of the previously noted diffusion hyperintensity in the right temporal, occipital and parietal region with the development of in the right occipital and temporal region with ex vacuo dilation of the right occipital horn encephalomalacia  No acute infarct seen  No acute hemorrhage  Severe chronic small vessel ischemic changes  Small chronic microbleed noted in the right temporal region, not seen on the previous study in May"  · Discussed findings of small chronic micro bleed with on-call neurologist  · Per neurologist: okay to continue current medications, follow-up PT/OT/cognitive evaluations and recommend home PT at minimum  · Continue ASA and Lipitor  · Discontinued Lovenox  · Encourage SCDs   · PT/OT recommending discharge to short-term rehab, and continue with [address] [place] [address] upon discharge from [address] [place] [address]  Spoke with case management  Due to the holiday weekend, will need to follow-up placement on Tuesday   Consider [facility]    Seizure disorder [facility])  Assessment & Plan  · Continue home medications, Vimpat and Keppra     Multiple falls  Assessment & Plan  · Likely secondary to recent stroke and seizures  · Previous provider spoke with patient's sister who states the patient falls frequently and often hits his head  He states he recently fell out of the bathtub and pulled the towel rack off the wall  Patient's sister feels he would benefit from rehab  · PT/OT recommends discharge to rehab  · Case management made aware of therapy recommendations; placement pending  · Fall precautions    History of alcohol abuse  Assessment & Plan  · Patient denies any recent use  Last drink Dec 2021  · Started on thiamine and folic acid  · Ativan PRN    Hypertension  Assessment & Plan  · On Metoprolol and Verapamil at home  · Held antihypertensive medications given hypotension on arrival  · BP improved with IVF  · Given hst of frequent falls, bradycardia, and hypotension - do not recommend using Metoprolol and Verapamil together   · Restarted Toprol XL 25 mg daily   ·  Norvasc 5 mg daily added  · Discontinue Verapamil   · Monitor BP    Hyperlipidemia  Assessment & Plan  · Continue Lipitor     Tobacco dependence  Assessment & Plan  Wanted to go outside and smoke  Educated on the no-smoking policy   · Increase Nicotine patch to 21 mg daily  · Trial Nicoette gum  · Patient has no interest in quitting           VTE Pharmacologic Prophylaxis:   Pharmacologic: Enoxaparin (Lovenox)  Mechanical VTE Prophylaxis in Place: Yes    Patient Centered Rounds: I have performed bedside rounds with nursing staff today  Discussions with Specialists or Other Care Team Provider: Yes  Education and Discussions with Family / Patient:Yes  Time Spent for Care: 30 minutes  More than 50% of total time spent on counseling and coordination of care as described above  Current Length of Stay: 4 day(s)  Current Patient Status: Inpatient     Discharge Plan: Not stable for discharge today; needs magnesium replacement, repeat labs in am, off one to one observation as more compliant with not trying to go outside to smoke       Code Status: Level 1 - Full Code      Subjective:   Patient seen Courtney Glenny in bed, stated he just woke up and slept very well, feels refreshed  Denies any headache, dizziness  States his right elbow is still sore from where he fell in the driveway  No bruise noted  Full ROM noted  Is agreeable to short term rehab  States he is hungry, no nausea or vomiting       Objective:     Vitals:   Temp (24hrs), Av 6 °F (36 4 °C), Min:96 7 °F (35 9 °C), Max:98 2 °F (36 8 °C)    Temp:  [96 7 °F (35 9 °C)-98 2 °F (36 8 °C)] 96 7 °F (35 9 °C)  HR:  [50-72] 72  Resp:  [17-19] 18  BP: (140-167)/(80-99) 167/89  SpO2:  [95 %-98 %] 97 %  Body mass index is 23 43 kg/m²  Input and Output Summary (last 24 hours):   No intake or output data in the 24 hours ending 22 1025     Physical Exam:     Physical Exam  Vitals and nursing note reviewed  Constitutional:       Appearance: Normal appearance  Comments: Pleasant and cooperative    HENT:      Head: Normocephalic  Nose: Nose normal       Mouth/Throat:      Mouth: Mucous membranes are moist    Eyes:      Extraocular Movements: Extraocular movements intact  Conjunctiva/sclera: Conjunctivae normal       Pupils: Pupils are equal, round, and reactive to light  Cardiovascular:      Rate and Rhythm: Normal rate and regular rhythm  Pulses: Normal pulses  Heart sounds: Normal heart sounds  Pulmonary:      Effort: Pulmonary effort is normal       Breath sounds: Normal breath sounds  Abdominal:      General: Bowel sounds are normal  There is no distension  Palpations: Abdomen is soft  Tenderness: There is no abdominal tenderness  Genitourinary:     Comments: Voiding spontaneously  Musculoskeletal:         General: Normal range of motion  Cervical back: Normal range of motion  Right lower leg: No edema  Left lower leg: No edema  Skin:     General: Skin is warm and dry  Capillary Refill: Capillary refill takes less than 2 seconds     Neurological: General: No focal deficit present  Mental Status: He is alert and oriented to person, place, and time  Psychiatric:         Mood and Affect: Mood normal          Behavior: Behavior normal          Thought Content: Thought content normal          Judgment: Judgment normal          Additional Data:     Labs:    Results from last 7 days   Lab Units 09/06/22  0506 09/04/22  0530 09/03/22  0659 09/02/22  1449   WBC Thousand/uL 4 83 5 15 5 06 7 23   HEMOGLOBIN g/dL 15 5 15 2 14 7 16 8   HEMATOCRIT % 46 0 43 1 42 5 47 5   PLATELETS Thousands/uL 124* 126* 100* 195   NEUTROS PCT %  --   --  58 72     Results from last 7 days   Lab Units 09/06/22  0738 09/04/22  0530 09/03/22  0520 09/02/22  1449   SODIUM mmol/L 141 141 141 138   POTASSIUM mmol/L 4 1 4 1 4 4 3 3*   CHLORIDE mmol/L 105 106 107 100   CO2 mmol/L 31 27 24 28   BUN mg/dL 10 11 12 13   CREATININE mg/dL 1 22 1 10 1 13 1 66*   CALCIUM mg/dL 9 0 8 9 9 0 9 6   TOTAL BILIRUBIN mg/dL  --   --  0 68 0 91   ALK PHOS U/L  --   --  92 96   ALT U/L  --   --  18 19   AST U/L  --   --  17 19             Lab Results   Component Value Date/Time    HGBA1C 5 3 05/17/2022 11:57 PM         Results from last 7 days   Lab Units 09/02/22  1449   LACTIC ACID mmol/L 1 4       * I Have Reviewed All Lab Data Listed Above  * Additional Pertinent Lab Tests Reviewed:  All Labs Within Last 24 Hours Reviewed    Imaging:     MRI brain wo contrast   Final Result by Elyse Vitale MD (09/03 1312)      Resolution of the previously noted diffusion hyperintensity in the right temporal, occipital and parietal region with the development of in the right occipital and temporal region with ex vacuo dilation of the right occipital horn encephalomalacia      No acute infarct seen      No acute hemorrhage      Severe chronic small vessel ischemic changes      Small chronic microbleed noted in the right temporal region, not seen on the previous study in May      Workstation performed: [id number] head and neck with and without contrast   Final Result by Juanita Montez MD (09/02 1614)      No acute intracranial pathology  Evolution of previously seen infarct with development of right temporal and occipital encephalomalacia  Chronic microangiopathy  No significant stenosis of the cervical carotid arteries  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 is unchanged  No significant intracranial stenosis or large vessel occlusion  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  Workstation performed: [id number]         XR chest 1 view portable   ED Interpretation by Irineo Caruso MD (09/02 1637)   No infiltrates  No CHF  Final Result by Jacek Pederson MD (09/02 1521)      No acute cardiopulmonary disease  Workstation performed: [id number]         XR elbow 3+ views RIGHT   ED Interpretation by Irineo Caruso MD (09/02 1637)   No fracture or dislocation      Final Result by Akila Blas MD (09/02 1521)      No acute osseous abnormality  Workstation performed: [id number]           Imaging Reports Reviewed by myself    Cultures:   Blood Culture:   Lab Results   Component Value Date    BLOODCX No Growth at 72 hrs  09/02/2022    BLOODCX No Growth at 72 hrs  09/02/2022    BLOODCX No Growth After 5 Days   08/05/2021     Urine Culture:   Lab Results   Component Value Date    URINECX No Growth <1000 cfu/mL 08/05/2021     Sputum Culture: No components found for: SPUTUMCX  Wound Culture: No results found for: WOUNDCULT    Last 24 Hours Medication List:   Current Facility-Administered Medications   Medication Dose Route Frequency Provider Last Rate    acetaminophen  650 mg Oral Q6H PRN Abdirahman Orantes MD      amLODIPine  5 mg Oral Daily NOAM Andrade      aspirin  81 mg Oral Daily Abdirahman Orantes MD      atorvastatin  40 mg Oral Daily With Shree Ballesteros Mary Beth Sin MD      diphenhydrAMINE  25 mg Intravenous Q6H PRN NOAM Self      docusate sodium  100 mg Oral BID Arlester Boast, MD      fenofibrate  145 mg Oral Daily Arlester Boast, MD      ketorolac  15 mg Intravenous Q6H PRN NOAM Self      lacosamide  100 mg Oral Q12H [address] Arlester Boast, MD      levETIRAcetam  1,500 mg Oral Q12H Arlester Boast, MD      LORazepam  1 mg Oral Q6H PRN NOAM Self      magnesium oxide  400 mg Oral BID Arlester Boast, MD      magnesium sulfate  1 g Intravenous Once Kemi Mejia MD      melatonin  3 mg Oral HS Arlester Boast, MD      metoprolol succinate  25 mg Oral Daily NOAM Self      nicotine  14 mg Transdermal Once NOAM Self      nicotine  21 mg Transdermal Daily NOAM Self      nicotine polacrilex  2 mg Oral Q2H PRN NOAM Self      pantoprazole  40 mg Oral Early Morning Arlester Boast, MD      polyethylene glycol  17 g Oral Daily PRN Arlester Boast, MD      thiamine  100 mg Oral Daily Arlester Boast, MD          Today, Patient Was Seen By: NOAM Tejeda    ** Please Note: Dragon 360 Dictation voice to text software may have been used in the creation of this document   **

## 2022-09-06 NOTE — QUICK NOTE
Paged by nurse that patient very agitated and trying to elope  Control team was called this afternoon as patient tried to elope as well  Patient remains in hospital due to weakness, unsteady gait, and pending placement  He was given ativan and nightly medication  He states is going to walk out of the hospital and walk home because we aren't doing anything for him and so he can smoke  I spoke with patient and explained it is unsafe for him to walk home due to unsteady gait, frequent falls, received ativan 1 hour ago and somebody will have to pick him up if he wants to leave AMA  He states nobody can pick him up  He is very agitated and states he "is going to create havoc and do whatever he needs to get out of here"  Patient will be placed on 1:1  Nicotine patch in place and nicotine gum given by nurse

## 2022-09-06 NOTE — PHYSICAL THERAPY NOTE
PT TREATMENT     09/06/22 1320   Note Type   Note Type Treatment   Pain Assessment   Pain Assessment Tool Moore-Baker FACES   Moore-Baker FACES Pain Rating 4  (Right elbow area with active ROM)   Restrictions/Precautions   Other Precautions Chair Alarm; Bed Alarm; Fall Risk;Pain   General   Chart Reviewed Yes   Family/Caregiver Present No   Cognition   Arousal/Participation Cooperative   Subjective   Subjective Patient states continued right elbow pain by improving slowly   Bed Mobility   Supine to Sit 5  Supervision   Sit to Supine 4  Minimal assistance   Additional items Assist x 1;Verbal cues;LE management  (Assist required to left lower extremity and a bed)   Additional Comments Decreased coordination in left extremities requiring assist for transfers at times   Transfers   Sit to Stand 5  Supervision   Stand to Sit 4  Minimal assistance   Additional items Assist x 1;Verbal cues   Additional Comments Verbal cuing required for safety with transfers to prevent falls due to decreased coordination and left upper and lower extremities   Ambulation/Elevation   Gait Assistance   (Min to mod assist)   Additional items Assist x 1;Verbal cues; Tactile cues   Assistive Device   (None)   Distance 30 ft with change in direction, shortened stride length on left lower extremity and dragging foot at times  20 ft x 1 with cuing for widened base of support   Ambulation/Elevation Additional Comments Education completed for into the safety and awareness with high risk to fall due to left lower extremity decreased coordination  Patient impulsive at times although following commands well   Balance   Static Sitting Fair   Dynamic Sitting Fair -   Static Standing Fair   Dynamic Standing Fair -   Ambulatory Poor +   Activity Tolerance   Activity Tolerance Patient limited by fatigue; Other (Comment)  (Limited by decreased coordination left lower extremity therefore decreased balance)   Exercises   Hamstring Sets Supine;5 reps;Bilateral Quad Sets Supine;10 reps;Bilateral   Heelslides Supine;10 reps;Bilateral   Glute Sets Supine;5 reps   Hip Flexion Sitting;15 reps;Bilateral  (Alternating)   Hip Abduction Sitting;15 reps;Bilateral  (Alternating)   Knee AROM Short Arc Quad Supine;10 reps;Bilateral   Knee AROM Long Arc Quad Sitting;15 reps;Bilateral  (Alternating)   Ankle Pumps Sitting;20 reps;Bilateral  (Alternating)   Marching Standing;20 reps;Bilateral   Balance training  Sidestepping and backward walking completed for balance coordination with emphasis on standing bilateral hip abduction alternating in fashion and requiring bilateral upper extremity support of mod to max assist of 1 at times for balance   Assessment   Assessment Patient cooperative demonstrating improving ability to walk short distances with improving balance although continues with left upper and lower extremity decreased coordination in is at high risk to fall  Patient is also impulsive at times although following direction well and will benefit from continued physical therapy with progression as tolerated  The patient's [facility] Mobility Inpatient Short Form Raw Score is 17  A Raw score of greater than 16 suggests the patient may benefit from discharge to home, although patient with poor balance and coordination with left lower extremity lives alone with no assist near by and will benefit from short-term rehab and follow-up and outpatient balance center as needed following return home  Please also refer to the recommendation of the Physical Therapist for safe discharge planning  Plan   Treatment/Interventions ADL retraining;Functional transfer training;LE strengthening/ROM; Elevations; Therapeutic exercise; Endurance training;Patient/family training;Equipment eval/education;Gait training; Compensatory technique education   PT Frequency Other (Comment)  (5 times per week)   Recommendation   PT Discharge Recommendation Post acute rehabilitation services   [facility] Mobility Inpatient   Turning in Bed Without Bedrails 4   Lying on Back to Sitting on Edge of Flat Bed 3   Moving Bed to Chair 3   Standing Up From Chair 3   Walk in Room 2   Climb 3-5 Stairs 2   Basic Mobility Inpatient Raw Score 17   Basic Mobility Standardized Score 39 67   Highest Level Of Mobility   JH-HLM Goal 5: Stand one or more mins   JH-HLM Achieved 7: Walk 25 feet or more   Education   Patient Explanation/teachback used; Reinforcement needed   [employer] License Number  Marisela Ramirez [id number]

## 2022-09-06 NOTE — ASSESSMENT & PLAN NOTE
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 right posterior cerebral artery distributions  As noted by Neurology, recommendation for repeat MRI was to be performed in 2-3 months  Repeated MRI brain based on recommendations  · MRI brain: "Resolution of the previously noted diffusion hyperintensity in the right temporal, occipital and parietal region with the development of in the right occipital and temporal region with ex vacuo dilation of the right occipital horn encephalomalacia  No acute infarct seen  No acute hemorrhage  Severe chronic small vessel ischemic changes  Small chronic microbleed noted in the right temporal region, not seen on the previous study in May"  · Discussed findings of small chronic micro bleed with on-call neurologist  · Per neurologist: okay to continue current medications, follow-up PT/OT/cognitive evaluations and recommend home PT at minimum  · Continue ASA and Lipitor  · Discontinued Lovenox  · Encourage SCDs   · PT/OT recommending discharge to short-term rehab, and continue with [address] [place] [address] [facility]  Spoke with case management  Due to the holiday weekend, will need to follow-up placement on Tuesday   Consider [facility]

## 2022-09-06 NOTE — ASSESSMENT & PLAN NOTE
· Magnesium level 1 0 -> 1 8 -> 1 2 -> 1 3  · Continue Magnesium oxide 400 mg BID  · Receiving IV replete ment 9/6   · Encourage oral intake   · Monitor electrolytes

## 2022-09-06 NOTE — PROGRESS NOTES
Patient was calm and cooperative this AM  Patient hasn't tried to elope or leave his room  He has been pleasant and cooperative with all of his treatment  Miranda NP discontinued 1:1 observation this morning and Q15 minute checks are being completed  Will continue to monitor the patients behavior

## 2022-09-07 VITALS
OXYGEN SATURATION: 96 % | SYSTOLIC BLOOD PRESSURE: 158 MMHG | DIASTOLIC BLOOD PRESSURE: 80 MMHG | TEMPERATURE: 96.7 F | WEIGHT: 168 LBS | HEIGHT: 71 IN | RESPIRATION RATE: 20 BRPM | BODY MASS INDEX: 23.52 KG/M2 | HEART RATE: 57 BPM

## 2022-09-07 LAB
ANION GAP SERPL CALCULATED.3IONS-SCNC: 8 MMOL/L (ref 4–13)
BACTERIA BLD CULT: NORMAL
BACTERIA BLD CULT: NORMAL
BUN SERPL-MCNC: 16 MG/DL (ref 5–25)
CALCIUM SERPL-MCNC: 9 MG/DL (ref 8.3–10.1)
CHLORIDE SERPL-SCNC: 106 MMOL/L (ref 96–108)
CO2 SERPL-SCNC: 29 MMOL/L (ref 21–32)
CREAT SERPL-MCNC: 1.22 MG/DL (ref 0.6–1.3)
ERYTHROCYTE [DISTWIDTH] IN BLOOD BY AUTOMATED COUNT: 13.2 % (ref 11.6–15.1)
GFR SERPL CREATININE-BSD FRML MDRD: 64 ML/MIN/1.73SQ M
GLUCOSE SERPL-MCNC: 95 MG/DL (ref 65–140)
HCT VFR BLD AUTO: 46 % (ref 36.5–49.3)
HGB BLD-MCNC: 15.6 G/DL (ref 12–17)
MAGNESIUM SERPL-MCNC: 1.4 MG/DL (ref 1.6–2.6)
MCH RBC QN AUTO: 28.8 PG (ref 26.8–34.3)
MCHC RBC AUTO-ENTMCNC: 33.9 G/DL (ref 31.4–37.4)
MCV RBC AUTO: 85 FL (ref 82–98)
PLATELET # BLD AUTO: 123 THOUSANDS/UL (ref 149–390)
PMV BLD AUTO: 9.1 FL (ref 8.9–12.7)
POTASSIUM SERPL-SCNC: 4 MMOL/L (ref 3.5–5.3)
RBC # BLD AUTO: 5.42 MILLION/UL (ref 3.88–5.62)
SODIUM SERPL-SCNC: 143 MMOL/L (ref 135–147)
WBC # BLD AUTO: 4.77 THOUSAND/UL (ref 4.31–10.16)

## 2022-09-07 PROCEDURE — 99223 1ST HOSP IP/OBS HIGH 75: CPT | Performed by: NURSE PRACTITIONER

## 2022-09-07 PROCEDURE — 83735 ASSAY OF MAGNESIUM: CPT | Performed by: NURSE PRACTITIONER

## 2022-09-07 PROCEDURE — 80048 BASIC METABOLIC PNL TOTAL CA: CPT | Performed by: NURSE PRACTITIONER

## 2022-09-07 PROCEDURE — 85027 COMPLETE CBC AUTOMATED: CPT | Performed by: NURSE PRACTITIONER

## 2022-09-07 RX ORDER — LACOSAMIDE 100 MG/1
100 TABLET ORAL EVERY 12 HOURS SCHEDULED
Qty: 20 TABLET | Refills: 0 | Status: SHIPPED | OUTPATIENT
Start: 2022-09-07 | End: 2022-09-17

## 2022-09-07 RX ORDER — LEVETIRACETAM 750 MG/1
1500 TABLET ORAL EVERY 12 HOURS
Qty: 40 TABLET | Refills: 0 | Status: SHIPPED | OUTPATIENT
Start: 2022-09-07 | End: 2022-09-17

## 2022-09-07 RX ORDER — NICOTINE 21 MG/24HR
1 PATCH, TRANSDERMAL 24 HOURS TRANSDERMAL DAILY
Qty: 28 PATCH | Refills: 0 | Status: SHIPPED | OUTPATIENT
Start: 2022-09-08

## 2022-09-07 RX ORDER — MAGNESIUM SULFATE HEPTAHYDRATE 40 MG/ML
2 INJECTION, SOLUTION INTRAVENOUS ONCE
Status: COMPLETED | OUTPATIENT
Start: 2022-09-07 | End: 2022-09-07

## 2022-09-07 RX ADMIN — LEVETIRACETAM 1500 MG: 500 TABLET, FILM COATED ORAL at 06:24

## 2022-09-07 RX ADMIN — THIAMINE HCL TAB 100 MG 100 MG: 100 TAB at 08:08

## 2022-09-07 RX ADMIN — LORAZEPAM 1 MG: 1 TABLET ORAL at 07:38

## 2022-09-07 RX ADMIN — LORAZEPAM 1 MG: 1 TABLET ORAL at 00:42

## 2022-09-07 RX ADMIN — LACOSAMIDE 100 MG: 50 TABLET, FILM COATED ORAL at 08:03

## 2022-09-07 RX ADMIN — MAGNESIUM OXIDE TAB 400 MG (241.3 MG ELEMENTAL MG) 400 MG: 400 (241.3 MG) TAB at 08:03

## 2022-09-07 RX ADMIN — KETOROLAC TROMETHAMINE 15 MG: 30 INJECTION, SOLUTION INTRAMUSCULAR at 07:39

## 2022-09-07 RX ADMIN — ASPIRIN 81 MG CHEWABLE TABLET 81 MG: 81 TABLET CHEWABLE at 08:03

## 2022-09-07 RX ADMIN — KETOROLAC TROMETHAMINE 15 MG: 30 INJECTION, SOLUTION INTRAMUSCULAR at 00:42

## 2022-09-07 RX ADMIN — MAGNESIUM SULFATE HEPTAHYDRATE 2 G: 40 INJECTION, SOLUTION INTRAVENOUS at 07:50

## 2022-09-07 RX ADMIN — AMLODIPINE BESYLATE 5 MG: 5 TABLET ORAL at 08:11

## 2022-09-07 RX ADMIN — FENOFIBRATE 145 MG: 145 TABLET, FILM COATED ORAL at 08:04

## 2022-09-07 RX ADMIN — DOCUSATE SODIUM 100 MG: 100 CAPSULE, LIQUID FILLED ORAL at 08:03

## 2022-09-07 RX ADMIN — METOPROLOL SUCCINATE 25 MG: 25 TABLET, EXTENDED RELEASE ORAL at 08:04

## 2022-09-07 RX ADMIN — PANTOPRAZOLE SODIUM 40 MG: 40 TABLET, DELAYED RELEASE ORAL at 06:24

## 2022-09-07 NOTE — DISCHARGE SUMMARY
[address] Yuan 1965, 59 y o  male MRN: [id number]  Unit/Bed#: [address] [place] [address] Encounter: [id number]  Primary Care Provider: No primary care provider on file  Date and time admitted to hospital: 9/2/2022  2:12 PM    * Hypomagnesemia  Assessment & Plan  · Magnesium level 1 0 -> 1 8 -> 1 2 -> 1 3->1 4  · Continue Magnesium oxide 400 mg BID  · Receiving IV replete ment 9/6 and 9/7  · Encourage oral intake   · Patient is adamant about leaving the hospital today, did discuss at length with patient dangers of low magnesium, prior history of seizure activity and higher risk for having seizures secondary to low magnesium  · He verbalized understanding and continued to insist upon leaving the hospital against medical advice  · We did also speak of the danger of having a seizure leading to death  He verbalized understanding  · Patient will be discharged home today against medical advice  · He is advised to follow up with his primary care physician, when questioned he indicated it is Dr Shi Alves from [facility] in [place], [place]  · He is also provided with a prescription for blood work for [facility] and magnesium in 1 week    History of stroke  Assessment & Plan  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 right posterior cerebral artery distributions  As noted by Neurology, recommendation for repeat MRI was to be performed in 2-3 months  Repeated MRI brain based on recommendations  · MRI brain: "Resolution of the previously noted diffusion hyperintensity in the right temporal, occipital and parietal region with the development of in the right occipital and temporal region with ex vacuo dilation of the right occipital horn encephalomalacia  No acute infarct seen  No acute hemorrhage  Severe chronic small vessel ischemic changes   Small chronic microbleed noted in the right temporal region, not seen on the previous study in May"  · Discussed findings of small chronic micro bleed with on-call neurologist  · Per neurologist: okay to continue current medications, follow-up PT/OT/cognitive evaluations and recommend home PT at minimum  · Continue ASA and Lipitor  · Discontinued Lovenox  · Encourage SCDs   · PT/OT recommending discharge to short-term rehab, and continue with [address] [place] [address] [place] upon discharge from [facility]  Spoke with case management; they were working on finding short-term rehab for the patient  · Patient indicated on 09/07 that he does not wish to go to short-term rehab, feels [facility] will not do anything for me there  We did discuss vestibular exercises, improvement in balance, strengthening exercises however patient continues to decline and demands to be released home  · We did discuss dangers of leaving AMA as noted above, patient insisted and signed out against medical advice  Seizure disorder [facility])  Assessment & Plan  · Continue home medications, Vimpat and Keppra   · Patient indicated that he had medications at home, however in review of record it appears that patient may not have these medications so a short prescription was E scribed to his pharmacy for him to   · Instructed patient to follow-up with his primary care physician Dr Kelly Bishop in 1 week    Multiple falls  Assessment & Plan  · Likely secondary to recent stroke and seizures  · Previous provider spoke with patient's sister who states the patient falls frequently and often hits his head  He states he recently fell out of the bathtub and pulled the towel rack off the wall  Patient's sister feels he would benefit from rehab  · PT/OT recommends discharge to rehab  · Patient decided that he does not wish to pursue short-term rehab as noted above and is signing out against medical advice  · Did discuss the benefits of rehab in preventing future falls however patient indicated he is not interested      History of alcohol abuse  Assessment & Plan  · Patient denies any recent use  Last drink Dec 2021  · Started on thiamine and folic acid; encourage patient to continue taking these on discharge  · Counseled alcohol cessation in light of seizures, low magnesium and medications that he is taking  · Follow-up outpatient PCP 1 week    Hypertension  Assessment & Plan  · On Metoprolol and Verapamil at home  · Held antihypertensive medications given hypotension on arrival  · BP improved with IVF  · Given hst of frequent falls, bradycardia, and hypotension - do not recommend using Metoprolol and Verapamil together   · Restarted Toprol XL 25 mg daily   · As noted above patient is refusing short-term rehab and is signing out against medical advice  · Advised to follow-up with his primary care physician in 1 week    Hyperlipidemia  Assessment & Plan  · Continue Lipitor     Tobacco dependence  Assessment & Plan  Wanted to go outside and smoke  Educated on the no-smoking policy   · Patient was on Nicotine patch to 21 mg daily  · Trial Nicoette gum; patient did not like  · Patient has no interest in quitting   · Counseled cessation however patient reported he is not ready  · Patient left [facility]        Discharging Physician / Practitioner: NOAM Caldwell  PCP: No primary care provider on file  Admission Date: 9/2/2022  Discharge Date: 09/07/22    Reason for Admission: Multiple Falls (Had a stroke in June  Continues to fall and becomes dizzy when standing   Symptoms are getting worse and he is having headaches)        Medical Problems             Resolved Problems  Date Reviewed: 9/7/2022          Resolved    Hypokalemia 9/4/2022     Resolved by  NOAM Savage    Acute kidney injury [facility]) 9/3/2022     Resolved by  NOAM Savage    Hypotension 9/3/2022     Resolved by  NOAM Caldwell    Dizziness 9/5/2022     Resolved by  NOAM Savage    Orthostatic hypotension 9/5/2022 Resolved by  Bee Hale, [address]                Consultations During Hospital Stay:  None    Procedures Performed:     · None     Significant Findings / Test Results:     · See below   Results from last 7 days   Lab Units 09/07/22  0644 09/06/22  0506 09/04/22  0530   WBC Thousand/uL 4 77 4 83 5 15   HEMOGLOBIN g/dL 15 6 15 5 15 2   PLATELETS Thousands/uL 123* 124* 126*     Results from last 7 days   Lab Units 09/07/22  0644 09/06/22  0738 09/04/22  0530 09/03/22  0520 09/02/22  1449   SODIUM mmol/L 143 141 141 141 138   POTASSIUM mmol/L 4 0 4 1 4 1 4 4 3 3*   CHLORIDE mmol/L 106 105 106 107 100   CO2 mmol/L 29 31 27 24 28   BUN mg/dL 16 10 11 12 13   CREATININE mg/dL 1 22 1 22 1 10 1 13 1 66*   CALCIUM mg/dL 9 0 9 0 8 9 9 0 9 6   TOTAL BILIRUBIN mg/dL  --   --   --  0 68 0 91   ALK PHOS U/L  --   --   --  92 96   ALT U/L  --   --   --  18 19   AST U/L  --   --   --  17 19             Lab Results   Component Value Date/Time    HGBA1C 5 3 05/17/2022 11:57 PM         Results from last 7 days   Lab Units 09/02/22  1449   LACTIC ACID mmol/L 1 4     Blood Culture:   Lab Results   Component Value Date    BLOODCX No Growth After 4 Days  09/02/2022    BLOODCX No Growth After 4 Days  09/02/2022    BLOODCX No Growth After 5 Days   08/05/2021     Urine Culture:   Lab Results   Component Value Date    URINECX No Growth <1000 cfu/mL 08/05/2021     Sputum Culture: No components found for: SPUTUMCX  Wound Culture: No results found for: WOUNDCULT     MRI brain wo contrast   Final Result by Richard Diehl MD (09/03 1312)      Resolution of the previously noted diffusion hyperintensity in the right temporal, occipital and parietal region with the development of in the right occipital and temporal region with ex vacuo dilation of the right occipital horn encephalomalacia      No acute infarct seen      No acute hemorrhage      Severe chronic small vessel ischemic changes      Small chronic microbleed noted in the right temporal region, not seen on the previous study in May      Workstation performed: [id number]         CTA head and neck with and without contrast   Final Result by Yung Elizondo MD (09/02 1614)      No acute intracranial pathology  Evolution of previously seen infarct with development of right temporal and occipital encephalomalacia  Chronic microangiopathy  No significant stenosis of the cervical carotid arteries  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 is unchanged  No significant intracranial stenosis or large vessel occlusion  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  Workstation performed: [id number]         XR chest 1 view portable   ED Interpretation by Shagufta Stein MD (09/02 1637)   No infiltrates  No CHF  Final Result by Jamil Plascencia MD (09/02 1521)      No acute cardiopulmonary disease  Workstation performed: [id number]         XR elbow 3+ views RIGHT   ED Interpretation by Shagufta Stein MD (09/02 1637)   No fracture or dislocation      Final Result by Cj Tabares MD (09/02 1521)      No acute osseous abnormality  Workstation performed: [id number]                Incidental Findings:   · See above     Test Results Pending at Discharge (will require follow up): · None      Outpatient Tests Requested:  · Outpatient BMP and Mag level in one week; script provided to patient     Complications:  None     Reason for Admission:   Chief Complaint   Patient presents with    Multiple Falls     Had a stroke in June  Continues to fall and becomes dizzy when standing   Symptoms are getting worse and he is having headaches       Hospital Course:     Per HPI: Rita Mcguire is a 59 y o  male patient with a PMH of chronic renal failure, generalized anxiety disorder, GERD, hyperlipidemia, hypertension, low magnesium levels and CVA who originally presented to the hospital on 9/2/2022 due to falls  Patient reported that since his stroke he has been extremely unsteady on his feet, reported over the last several years he had been experiencing dizziness with standing which resulted in him falling  He reports chronic left-sided weakness since stroke in May  In the ED patient was noted to be hypotensive, he did receive IV fluid hydration and improved  CTA of head and neck was performed which did not reveal any acute findings but did show evolution of previously seen infarct with development of right temporal and occipital encephalomalacia as well as chronic microangiopathy  Severe stenosis at the origin the right vertebral artery is unchanged, there is a 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 that dates back to at least 2021  He was found to be hypo magnesium with magnesium 1 0  During course of hospitalization patient received oral and IV replacement  PT/OT evaluation and treatment were performed, recommending short-term rehab  Initially patient was agreeable to this however on day of discharge he change his mind and decided that he did not feel that physical therapy would be of benefit to him and he wanted to go home  He continued to have a low magnesium 1 4, was actually receiving replacement as we had a conversation regarding the dangers of previous seizure history and low magnesium putting him at a higher risk for having a seizure possibly leading to death  He verbalized understanding of this and insisted on being discharged  We did discuss that he would have to sign out against medical advice which patient was willing to accept the risk  This provider also reached out to his sister Leonela Tang via phone, and left a detailed message for her regarding patient's wishes of signing out against medical advice and the risks of him doing so with low magnesium level    The patient is advised to follow up with his primary care physician Dr Roya Crowder from [facility] in [facility] within 1 week; he was also given a prescription for a BMP and magnesium in 1 week to be done outpatient  In reviewing records it appeared that patient had received only a short course of his anti seizure medication, when questioned he indicated that he had plenty of them at home, about 50 of them  A short course was E scribed to his pharmacy in the event that when he got home he realized he did not have his anti seizure medication  Once again it was stressed to him the dangers of signing out against medical advice, the importance of close follow-up with his family doctor and blood work  Patient verbalized understanding, signed the [facility] paperwork and left via [facility] Hospital Course:    Please see above list of diagnoses and related plan for additional information  Condition at Discharge: fair       Discharge Day Visit / Exam:     Subjective:  "I want to get out of here, you people or doing nothing for me, I can do better at home    Patient verbalized frustration at his perception of lack of care, time was spent explaining to patient that he was receiving IV magnesium and oral magnesium, that his magnesium level had been low and he was high risk for seizure as he has a history of seizure  He verbalized understanding  Once again he indicated that he did not want to go to short-term rehab, wanted to leave the hospital       Vitals: Blood Pressure: 158/80 (09/07/22 0755)  Pulse: 57 (09/07/22 0755)  Temperature: (!) 96 7 °F (35 9 °C) (09/07/22 0755)  Temp Source: Oral (09/07/22 0755)  Respirations: 20 (09/07/22 0755)  Height: 5' 11" (180 3 cm) (09/02/22 1900)  Weight - Scale: 76 2 kg (168 lb) (09/02/22 1416)  SpO2: 96 % (09/07/22 0755)     Exam:   Physical Exam  Vitals and nursing note reviewed  Constitutional:       General: He is not in acute distress  Comments: Unkempt appearing    HENT:      Head: Normocephalic        Nose: Nose normal  Mouth/Throat:      Mouth: Mucous membranes are moist    Eyes:      Extraocular Movements: Extraocular movements intact  Conjunctiva/sclera: Conjunctivae normal       Pupils: Pupils are equal, round, and reactive to light  Cardiovascular:      Rate and Rhythm: Normal rate and regular rhythm  Pulses: Normal pulses  Heart sounds: Normal heart sounds  Pulmonary:      Effort: Pulmonary effort is normal       Breath sounds: Normal breath sounds  Abdominal:      General: Bowel sounds are normal  There is no distension  Palpations: Abdomen is soft  Tenderness: There is no abdominal tenderness  Genitourinary:     Comments: Voiding spontaneously  Musculoskeletal:         General: Normal range of motion  Cervical back: Normal range of motion  Right lower leg: No edema  Left lower leg: No edema  Skin:     General: Skin is warm and dry  Capillary Refill: Capillary refill takes less than 2 seconds  Neurological:      General: No focal deficit present  Mental Status: He is alert and oriented to person, place, and time  Psychiatric:         Attention and Perception: Attention normal          Mood and Affect: Mood is anxious  Affect is labile  Speech: Speech normal          Behavior: Behavior is cooperative  Thought Content: Thought content does not include homicidal or suicidal ideation  Discharge instructions/Information to patient and family:   See after visit summary for information provided to patient and family  Provisions for Follow-Up Care:  See after visit summary for information related to follow-up care and any pertinent home health orders  Disposition:     Other: Patient signed out AMA, discharged home    Planned Readmission:  No     Discharge Statement:  I spent greater than 30 minutes discharging the patient  This time was spent on the day of discharge  I had direct contact with the patient on the day of discharge  Greater than 50% of the total time was spent examining patient, answering all patient questions, arranging and discussing plan of care with patient as well as directly providing post-discharge instructions  Additional time then spent on discharge activities  Discharge Medications:  See after visit summary for reconciled discharge medications provided to patient and family        ** Please Note: This note has been constructed using a voice recognition system **

## 2022-09-07 NOTE — DISCHARGE INSTR - AVS FIRST PAGE
You have been advised it is in your best interest to remain in the hospital and continue to receive care for your low magnesium    You have been told you are at a high risk for seizure due to your history of seizures and low magnesium  This seizure could possibly lead to death  Your also advised to refrain from consuming alcohol because this could lead to further low magnesium  We have also discussed going to short-term rehab which you are also refusing at this time, the benefits of rehab would include better balance resulting in less falls  Your advised to follow-up with your primary care physician in 1 week, and you should have a repeat magnesium level drawn in 1 week

## 2022-09-07 NOTE — PLAN OF CARE
Plan of care cont  Problem: PAIN - ADULT  Goal: Verbalizes/displays adequate comfort level or baseline comfort level  Description: Interventions:  - Encourage patient to monitor pain and request assistance  - Assess pain using appropriate pain scale  - Administer analgesics based on type and severity of pain and evaluate response  - Implement non-pharmacological measures as appropriate and evaluate response  - Consider cultural and social influences on pain and pain management  - Notify physician/advanced practitioner if interventions unsuccessful or patient reports new pain  Outcome: Progressing     Problem: DISCHARGE PLANNING  Goal: Discharge to home or other facility with appropriate resources  Description: INTERVENTIONS:  - Identify barriers to discharge w/patient and caregiver  - Arrange for needed discharge resources and transportation as appropriate  - Identify discharge learning needs (meds, wound care, etc )  - Arrange for interpretive services to assist at discharge as needed  - Refer to Case Management Department for coordinating discharge planning if the patient needs post-hospital services based on physician/advanced practitioner order or complex needs related to functional status, cognitive ability, or social support system  Outcome: Progressing     Problem: Knowledge Deficit  Goal: Patient/family/caregiver demonstrates understanding of disease process, treatment plan, medications, and discharge instructions  Description: Complete learning assessment and assess knowledge base    Interventions:  - Provide teaching at level of understanding  - Provide teaching via preferred learning methods  Outcome: Progressing     Problem: CARDIOVASCULAR - ADULT  Goal: Maintains optimal cardiac output and hemodynamic stability  Description: INTERVENTIONS:  - Monitor I/O, vital signs and rhythm  - Monitor for S/S and trends of decreased cardiac output  - Administer and titrate ordered vasoactive medications to optimize hemodynamic stability  - Assess quality of pulses, skin color and temperature  - Assess for signs of decreased coronary artery perfusion  - Instruct patient to report change in severity of symptoms  Outcome: Progressing  Goal: Absence of cardiac dysrhythmias or at baseline rhythm  Description: INTERVENTIONS:  - Continuous cardiac monitoring, vital signs, obtain 12 lead EKG if ordered  - Administer antiarrhythmic and heart rate control medications as ordered  - Monitor electrolytes and administer replacement therapy as ordered  Outcome: Progressing     Problem: METABOLIC, FLUID AND ELECTROLYTES - ADULT  Goal: Electrolytes maintained within normal limits  Description: INTERVENTIONS:  - Monitor labs and assess patient for signs and symptoms of electrolyte imbalances  - Administer electrolyte replacement as ordered  - Monitor response to electrolyte replacements, including repeat lab results as appropriate  - Instruct patient on fluid and nutrition as appropriate  Outcome: Progressing  Goal: Fluid balance maintained  Description: INTERVENTIONS:  - Monitor labs   - Monitor I/O and WT  - Instruct patient on fluid and nutrition as appropriate  - Assess for signs & symptoms of volume excess or deficit  Outcome: Progressing     Problem: Nutrition/Hydration-ADULT  Goal: Nutrient/Hydration intake appropriate for improving, restoring or maintaining nutritional needs  Description: Monitor and assess patient's nutrition/hydration status for malnutrition  Collaborate with interdisciplinary team and initiate plan and interventions as ordered  Monitor patient's weight and dietary intake as ordered or per policy  Utilize nutrition screening tool and intervene as necessary  Determine patient's food preferences and provide high-protein, high-caloric foods as appropriate       INTERVENTIONS:  - Monitor oral intake, urinary output, labs, and treatment plans  - Assess nutrition and hydration status and recommend course of action  - Evaluate amount of meals eaten  - Assist patient with eating if necessary   - Allow adequate time for meals  - Recommend/ encourage appropriate diets, oral nutritional supplements, and vitamin/mineral supplements  - Order, calculate, and assess calorie counts as needed  - Recommend, monitor, and adjust tube feedings and TPN/PPN based on assessed needs  - Assess need for intravenous fluids  - Provide specific nutrition/hydration education as appropriate  - Include patient/family/caregiver in decisions related to nutrition  Outcome: Progressing

## 2022-09-07 NOTE — ASSESSMENT & PLAN NOTE
· Likely secondary to recent stroke and seizures  · Previous provider spoke with patient's sister who states the patient falls frequently and often hits his head  He states he recently fell out of the bathtub and pulled the towel rack off the wall  Patient's sister feels he would benefit from rehab  · PT/OT recommends discharge to rehab  · Patient decided that he does not wish to pursue short-term rehab as noted above and is signing out against medical advice  · Did discuss the benefits of rehab in preventing future falls however patient indicated he is not interested

## 2022-09-07 NOTE — ASSESSMENT & PLAN NOTE
Started on COVID-19 protocol upon admission including Remdesivir, dexamethasone, and Azithromycin. Started on Fondaparinux instead of heparin due to [employer] preference.  Given IVIG 500mg/kg per immunology due to low IgG levels. Continue home eltrombopag per hematology.     Initially required increased respiratory support via HME/BiPAP then weaned to home settings within 2-3 days. Respiratory culture obtained and started on Tobramycin per pulmonology. Tobramycin discontinued after respiratory cultures no growth final.      Calcium gtt to maintain SBP > 80, titrated off and stable for 24 hours before stepping down to floor. Home medications continued initially. Decreased Torsemide and Metoprolol to daily after consulting cardiology to maintain adequate blood pressures. Increased daily PO calcium supplement.     Patient stepped down to the floor in good condition. His calcium continued to trend down, but slowly, so his oral calcium was increased. He was stable overngiht on home O2 and feeds. Mother comfortable with discharge. Patient was sent home with anticipatory COVID guidance, return precaution and referrals (messages sent to staff) for Cardiology, Endocrine and Pulmonology.      Please see PE from progress note on day of discharge.    · Continue Lipitor

## 2022-09-07 NOTE — ASSESSMENT & PLAN NOTE
Wanted to go outside and smoke  Educated on the no-smoking policy   · Patient was on Nicotine patch to 21 mg daily  · Trial Nicoette gum; patient did not like  · Patient has no interest in quitting   · Counseled cessation however patient reported he is not ready    · Patient left AMA

## 2022-09-07 NOTE — PLAN OF CARE
Problem: Potential for Falls  Goal: Patient will remain free of falls  Description: INTERVENTIONS:  - Educate patient/family on patient safety including physical limitations  - Instruct patient to call for assistance with activity   - Consult OT/PT to assist with strengthening/mobility   - Keep Call bell within reach  - Keep bed low and locked with side rails adjusted as appropriate  - Keep care items and personal belongings within reach  - Initiate and maintain comfort rounds  - Make Fall Risk Sign visible to staff  - Offer Toileting every 2 Hours, in advance of need  - Initiate/Maintain bed alarm  - Obtain necessary fall risk management equipment:   - Apply yellow socks and bracelet for high fall risk patients  - Consider moving patient to room near nurses station  Outcome: Progressing     Problem: PAIN - ADULT  Goal: Verbalizes/displays adequate comfort level or baseline comfort level  Description: Interventions:  - Encourage patient to monitor pain and request assistance  - Assess pain using appropriate pain scale  - Administer analgesics based on type and severity of pain and evaluate response  - Implement non-pharmacological measures as appropriate and evaluate response  - Consider cultural and social influences on pain and pain management  - Notify physician/advanced practitioner if interventions unsuccessful or patient reports new pain  Outcome: Progressing     Problem: SAFETY ADULT  Goal: Maintains/Returns to pre admission functional level  Description: INTERVENTIONS:  - Perform BMAT or MOVE assessment daily    - Set and communicate daily mobility goal to care team and patient/family/caregiver  - Collaborate with rehabilitation services on mobility goals if consulted  - Perform Range of Motion 3 times a day  - Actively turns self     - Dangle patient 3 times a day  - Stand patient 3 times a day  - Ambulate patient 3 times a day  - Out of bed to chair 3  times a day   - Out of bed for meals 3 times a day  - Out of bed for toileting  - Record patient progress and toleration of activity level   Outcome: Progressing     Problem: DISCHARGE PLANNING  Goal: Discharge to home or other facility with appropriate resources  Description: INTERVENTIONS:  - Identify barriers to discharge w/patient and caregiver  - Arrange for needed discharge resources and transportation as appropriate  - Identify discharge learning needs (meds, wound care, etc )  - Arrange for interpretive services to assist at discharge as needed  - Refer to Case Management Department for coordinating discharge planning if the patient needs post-hospital services based on physician/advanced practitioner order or complex needs related to functional status, cognitive ability, or social support system  Outcome: Progressing     Problem: Knowledge Deficit  Goal: Patient/family/caregiver demonstrates understanding of disease process, treatment plan, medications, and discharge instructions  Description: Complete learning assessment and assess knowledge base    Interventions:  - Provide teaching at level of understanding  - Provide teaching via preferred learning methods  Outcome: Progressing     Problem: CARDIOVASCULAR - ADULT  Goal: Maintains optimal cardiac output and hemodynamic stability  Description: INTERVENTIONS:  - Monitor I/O, vital signs and rhythm  - Monitor for S/S and trends of decreased cardiac output  - Administer and titrate ordered vasoactive medications to optimize hemodynamic stability  - Assess quality of pulses, skin color and temperature  - Assess for signs of decreased coronary artery perfusion  - Instruct patient to report change in severity of symptoms  Outcome: Progressing     Problem: CARDIOVASCULAR - ADULT  Goal: Absence of cardiac dysrhythmias or at baseline rhythm  Description: INTERVENTIONS:  - Continuous cardiac monitoring, vital signs, obtain 12 lead EKG if ordered  - Administer antiarrhythmic and heart rate control medications as ordered  - Monitor electrolytes and administer replacement therapy as ordered  Outcome: Progressing     Problem: METABOLIC, FLUID AND ELECTROLYTES - ADULT  Goal: Electrolytes maintained within normal limits  Description: INTERVENTIONS:  - Monitor labs and assess patient for signs and symptoms of electrolyte imbalances  - Administer electrolyte replacement as ordered  - Monitor response to electrolyte replacements, including repeat lab results as appropriate  - Instruct patient on fluid and nutrition as appropriate  Outcome: Progressing     Problem: Nutrition/Hydration-ADULT  Goal: Nutrient/Hydration intake appropriate for improving, restoring or maintaining nutritional needs  Description: Monitor and assess patient's nutrition/hydration status for malnutrition  Collaborate with interdisciplinary team and initiate plan and interventions as ordered  Monitor patient's weight and dietary intake as ordered or per policy  Utilize nutrition screening tool and intervene as necessary  Determine patient's food preferences and provide high-protein, high-caloric foods as appropriate       INTERVENTIONS:  - Monitor oral intake, urinary output, labs, and treatment plans  - Assess nutrition and hydration status and recommend course of action  - Evaluate amount of meals eaten  - Assist patient with eating if necessary   - Allow adequate time for meals  - Recommend/ encourage appropriate diets, oral nutritional supplements, and vitamin/mineral supplements  - Order, calculate, and assess calorie counts as needed  - Recommend, monitor, and adjust tube feedings and TPN/PPN based on assessed needs  - Assess need for intravenous fluids  - Provide specific nutrition/hydration education as appropriate  - Include patient/family/caregiver in decisions related to nutrition  Outcome: Progressing

## 2022-09-07 NOTE — ASSESSMENT & PLAN NOTE
· On Metoprolol and Verapamil at home  · Held antihypertensive medications given hypotension on arrival  · BP improved with IVF  · Given hst of frequent falls, bradycardia, and hypotension - do not recommend using Metoprolol and Verapamil together   · Restarted Toprol XL 25 mg daily   · As noted above patient is refusing short-term rehab and is signing out against medical advice  · Advised to follow-up with his primary care physician in 1 week

## 2022-09-07 NOTE — ASSESSMENT & PLAN NOTE
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 right posterior cerebral artery distributions  As noted by Neurology, recommendation for repeat MRI was to be performed in 2-3 months  Repeated MRI brain based on recommendations  · MRI brain: "Resolution of the previously noted diffusion hyperintensity in the right temporal, occipital and parietal region with the development of in the right occipital and temporal region with ex vacuo dilation of the right occipital horn encephalomalacia  No acute infarct seen  No acute hemorrhage  Severe chronic small vessel ischemic changes  Small chronic microbleed noted in the right temporal region, not seen on the previous study in May"  · Discussed findings of small chronic micro bleed with on-call neurologist  · Per neurologist: okay to continue current medications, follow-up PT/OT/cognitive evaluations and recommend home PT at minimum  · Continue ASA and Lipitor  · Discontinued Lovenox  · Encourage SCDs   · PT/OT recommending discharge to short-term rehab, and continue with [address] [place] [address] upon discharge from [address] [place] [address]  Spoke with case management; they were working on finding short-term rehab for the patient  · Patient indicated on 09/07 that he does not wish to go to short-term rehab, feels [facility] will not do anything for me there  We did discuss vestibular exercises, improvement in balance, strengthening exercises however patient continues to decline and demands to be released home  · We did discuss dangers of leaving AMA as noted above, patient insisted and signed out against medical advice

## 2022-09-07 NOTE — ASSESSMENT & PLAN NOTE
· Continue home medications, Vimpat and Keppra   · Patient indicated that he had medications at home, however in review of record it appears that patient may not have these medications so a short prescription was E scribed to his pharmacy for him to     · Instructed patient to follow-up with his primary care physician Dr Corby Jacobson in 1 week

## 2022-09-07 NOTE — ASSESSMENT & PLAN NOTE
· Patient denies any recent use   Last drink Dec 2021  · Started on thiamine and folic acid; encourage patient to continue taking these on discharge  · Counseled alcohol cessation in light of seizures, low magnesium and medications that he is taking  · Follow-up outpatient PCP 1 week

## 2022-09-07 NOTE — ASSESSMENT & PLAN NOTE
· Magnesium level 1 0 -> 1 8 -> 1 2 -> 1 3->1 4  · Continue Magnesium oxide 400 mg BID  · Receiving IV replete ment 9/6 and 9/7  · Encourage oral intake   · Patient is adamant about leaving the hospital today, did discuss at length with patient dangers of low magnesium, prior history of seizure activity and higher risk for having seizures secondary to low magnesium  · He verbalized understanding and continued to insist upon leaving the hospital against medical advice  · We did also speak of the danger of having a seizure leading to death    He verbalized understanding  · Patient will be discharged home today against medical advice  · He is advised to follow up with his primary care physician, when questioned he indicated it is Dr Wilmer Mariano from [facility] in [place], [place]  · He is also provided with a prescription for blood work for [facility] and magnesium in 1 week

## 2022-09-08 NOTE — UTILIZATION REVIEW
Notification of Discharge   This is a Notification of Discharge from our facility [address]  Please be advised that this patient has been discharge from our facility  Below you will find the admission and discharge date and time including the patients disposition  UTILIZATION REVIEW CONTACT:  Mateusz Pizano  Utilization   Network Utilization Review Department  Phone: [phone] x carefully listen to the prompts  All voicemails are confidential   Email: [email or web address]     PHYSICIAN ADVISORY SERVICES:  FOR TVEI-HI-MEBN REVIEW - MEDICAL NECESSITY DENIAL  Phone: [phone]  Fax: [phone]  Email: [email or web address]     PRESENTATION DATE: 9/2/2022  2:12 PM  OBERVATION ADMISSION DATE:   INPATIENT ADMISSION DATE: 9/2/22  4:58 PM   DISCHARGE DATE: 9/7/2022 11:03 AM  DISPOSITION: Left against medical advice or discontinued care Left against medical advice or discontinued care      IMPORTANT INFORMATION:  Send all requests for admission clinical reviews, approved or denied determinations and any other requests to dedicated fax number below belonging to the campus where the patient is receiving treatment   List of dedicated fax numbers:  [address] [place] [address] DENIALS (Administrative/Medical Necessity) [phone]   [address] (Maternity/NICU/Pediatrics) [phone]   Moira Flatten [phone]   [address] [place] [address] [phone]   [address] [place] [address] [phone]   [address] [place] [address],[address] [phone]   [facility]  [phone]   [address] [place], [place] [address] [facility] [address] [facility] [phone]

## 2022-11-18 ENCOUNTER — TELEPHONE (OUTPATIENT)
Dept: NEUROLOGY | Facility: CLINIC | Age: 57
End: 2022-11-18

## 2022-11-25 ENCOUNTER — TELEPHONE (OUTPATIENT)
Dept: NEUROLOGY | Facility: CLINIC | Age: 57
End: 2022-11-25

## 2022-11-25 NOTE — TELEPHONE ENCOUNTER
Called and spoke with  patient daughter because patient phone is out of order at this time  I  informed her that  Provider is out of office today and to have patient call back to rescheduled appointment with provider

## 2023-02-15 ENCOUNTER — HOSPITAL ENCOUNTER (OUTPATIENT)
Facility: HOSPITAL | Age: 58
Setting detail: OBSERVATION
Discharge: NURSING, REHAB OR LONG-TERM CARE | End: 2023-02-18
Attending: EMERGENCY MEDICINE | Admitting: INTERNAL MEDICINE

## 2023-02-15 ENCOUNTER — APPOINTMENT (EMERGENCY)
Dept: RADIOLOGY | Facility: HOSPITAL | Age: 58
End: 2023-02-15
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
## 2023-02-15 DIAGNOSIS — R53.1 WEAKNESS: ICD-10-CM

## 2023-02-15 DIAGNOSIS — S16.1XXA STRAIN OF NECK MUSCLE, INITIAL ENCOUNTER: ICD-10-CM

## 2023-02-15 DIAGNOSIS — S39.012A STRAIN OF LUMBAR REGION, INITIAL ENCOUNTER: ICD-10-CM

## 2023-02-15 DIAGNOSIS — M54.50 LOWER BACK PAIN: ICD-10-CM

## 2023-02-15 DIAGNOSIS — R26.2 AMBULATORY DYSFUNCTION: Primary | ICD-10-CM

## 2023-02-15 DIAGNOSIS — M10.9: ICD-10-CM
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
## 2023-02-15 LAB
2HR DELTA HS TROPONIN: 0 NG/L
ALBUMIN SERPL BCP-MCNC: 4.2 G/DL (ref 3.5–5)
ALP SERPL-CCNC: 80 U/L (ref 34–104)
ALT SERPL W P-5'-P-CCNC: 11 U/L (ref 7–52)
ANION GAP SERPL CALCULATED.3IONS-SCNC: 11 MMOL/L (ref 4–13)
AST SERPL W P-5'-P-CCNC: 15 U/L (ref 13–39)
BASOPHILS # BLD AUTO: 0.02 THOUSANDS/ÂΜL (ref 0–0.1)
BASOPHILS NFR BLD AUTO: 0 % (ref 0–1)
BILIRUB SERPL-MCNC: 0.84 MG/DL (ref 0.2–1)
BILIRUB UR QL STRIP: ABNORMAL
BUN SERPL-MCNC: 17 MG/DL (ref 5–25)
CALCIUM SERPL-MCNC: 9.6 MG/DL (ref 8.4–10.2)
CARDIAC TROPONIN I PNL SERPL HS: 6 NG/L
CARDIAC TROPONIN I PNL SERPL HS: 6 NG/L
CHLORIDE SERPL-SCNC: 101 MMOL/L (ref 96–108)
CLARITY UR: CLEAR
CO2 SERPL-SCNC: 23 MMOL/L (ref 21–32)
COLOR UR: YELLOW
CREAT SERPL-MCNC: 0.91 MG/DL (ref 0.6–1.3)
EOSINOPHIL # BLD AUTO: 0.06 THOUSAND/ÂΜL (ref 0–0.61)
EOSINOPHIL NFR BLD AUTO: 1 % (ref 0–6)
ERYTHROCYTE [DISTWIDTH] IN BLOOD BY AUTOMATED COUNT: 12.5 % (ref 11.6–15.1)
GFR SERPL CREATININE-BSD FRML MDRD: 91 ML/MIN/1.73SQ M
GLUCOSE SERPL-MCNC: 87 MG/DL (ref 65–140)
GLUCOSE UR STRIP-MCNC: NEGATIVE MG/DL
HCT VFR BLD AUTO: 39.6 % (ref 36.5–49.3)
HGB BLD-MCNC: 14 G/DL (ref 12–17)
HGB UR QL STRIP.AUTO: NEGATIVE
IMM GRANULOCYTES # BLD AUTO: 0.02 THOUSAND/UL (ref 0–0.2)
IMM GRANULOCYTES NFR BLD AUTO: 0 % (ref 0–2)
KETONES UR STRIP-MCNC: ABNORMAL MG/DL
LEUKOCYTE ESTERASE UR QL STRIP: NEGATIVE
LYMPHOCYTES # BLD AUTO: 1.27 THOUSANDS/ÂΜL (ref 0.6–4.47)
LYMPHOCYTES NFR BLD AUTO: 15 % (ref 14–44)
MCH RBC QN AUTO: 29.4 PG (ref 26.8–34.3)
MCHC RBC AUTO-ENTMCNC: 35.4 G/DL (ref 31.4–37.4)
MCV RBC AUTO: 83 FL (ref 82–98)
MONOCYTES # BLD AUTO: 0.73 THOUSAND/ÂΜL (ref 0.17–1.22)
MONOCYTES NFR BLD AUTO: 9 % (ref 4–12)
NEUTROPHILS # BLD AUTO: 6.25 THOUSANDS/ÂΜL (ref 1.85–7.62)
NEUTS SEG NFR BLD AUTO: 75 % (ref 43–75)
NITRITE UR QL STRIP: NEGATIVE
NRBC BLD AUTO-RTO: 0 /100 WBCS
PH UR STRIP.AUTO: 6 [PH]
PLATELET # BLD AUTO: 156 THOUSANDS/UL (ref 149–390)
PMV BLD AUTO: 9 FL (ref 8.9–12.7)
POTASSIUM SERPL-SCNC: 3.5 MMOL/L (ref 3.5–5.3)
PROT SERPL-MCNC: 6.7 G/DL (ref 6.4–8.4)
PROT UR STRIP-MCNC: NEGATIVE MG/DL
RBC # BLD AUTO: 4.77 MILLION/UL (ref 3.88–5.62)
SODIUM SERPL-SCNC: 135 MMOL/L (ref 135–147)
SP GR UR STRIP.AUTO: 1.01 (ref 1–1.03)
UROBILINOGEN UR QL STRIP.AUTO: 0.2 E.U./DL
WBC # BLD AUTO: 8.35 THOUSAND/UL (ref 4.31–10.16)

## 2023-02-15 RX ORDER — FENOFIBRATE 145 MG/1
145 TABLET, COATED ORAL DAILY
Status: DISCONTINUED | OUTPATIENT
Start: 2023-02-16 | End: 2023-02-18 | Stop reason: HOSPADM

## 2023-02-15 RX ORDER — ACETAMINOPHEN 325 MG/1
650 TABLET ORAL ONCE
Status: COMPLETED | OUTPATIENT
Start: 2023-02-15 | End: 2023-02-15

## 2023-02-15 RX ORDER — ACETAMINOPHEN 325 MG/1
975 TABLET ORAL EVERY 8 HOURS SCHEDULED
Status: DISCONTINUED | OUTPATIENT
Start: 2023-02-15 | End: 2023-02-18 | Stop reason: HOSPADM

## 2023-02-15 RX ORDER — LIDOCAINE 50 MG/G
1 PATCH TOPICAL DAILY
Status: DISCONTINUED | OUTPATIENT
Start: 2023-02-15 | End: 2023-02-18 | Stop reason: HOSPADM

## 2023-02-15 RX ORDER — LACOSAMIDE 50 MG/1
100 TABLET ORAL EVERY 12 HOURS SCHEDULED
Status: DISCONTINUED | OUTPATIENT
Start: 2023-02-15 | End: 2023-02-18 | Stop reason: HOSPADM

## 2023-02-15 RX ORDER — PANTOPRAZOLE SODIUM 20 MG/1
20 TABLET, DELAYED RELEASE ORAL
Status: DISCONTINUED | OUTPATIENT
Start: 2023-02-16 | End: 2023-02-18 | Stop reason: HOSPADM

## 2023-02-15 RX ORDER — LANOLIN ALCOHOL/MO/W.PET/CERES
3 CREAM (GRAM) TOPICAL
Status: DISCONTINUED | OUTPATIENT
Start: 2023-02-15 | End: 2023-02-18 | Stop reason: HOSPADM

## 2023-02-15 RX ORDER — ENOXAPARIN SODIUM 100 MG/ML
40 INJECTION SUBCUTANEOUS DAILY
Status: DISCONTINUED | OUTPATIENT
Start: 2023-02-16 | End: 2023-02-18 | Stop reason: HOSPADM

## 2023-02-15 RX ORDER — METOPROLOL SUCCINATE 100 MG/1
100 TABLET, EXTENDED RELEASE ORAL DAILY
Status: DISCONTINUED | OUTPATIENT
Start: 2023-02-16 | End: 2023-02-18 | Stop reason: HOSPADM

## 2023-02-15 RX ORDER — ATORVASTATIN CALCIUM 40 MG/1
40 TABLET, FILM COATED ORAL
Status: DISCONTINUED | OUTPATIENT
Start: 2023-02-16 | End: 2023-02-18 | Stop reason: HOSPADM

## 2023-02-15 RX ORDER — LEVETIRACETAM 500 MG/1
1500 TABLET ORAL EVERY 12 HOURS
Status: DISCONTINUED | OUTPATIENT
Start: 2023-02-15 | End: 2023-02-18 | Stop reason: HOSPADM

## 2023-02-15 RX ORDER — ASPIRIN 81 MG/1
81 TABLET, CHEWABLE ORAL DAILY
Status: DISCONTINUED | OUTPATIENT
Start: 2023-02-16 | End: 2023-02-18 | Stop reason: HOSPADM

## 2023-02-15 RX ORDER — METHOCARBAMOL 500 MG/1
750 TABLET, FILM COATED ORAL EVERY 6 HOURS PRN
Status: DISCONTINUED | OUTPATIENT
Start: 2023-02-15 | End: 2023-02-18 | Stop reason: HOSPADM

## 2023-02-15 RX ORDER — OXYCODONE HYDROCHLORIDE 5 MG/1
5 TABLET ORAL EVERY 4 HOURS PRN
Status: DISCONTINUED | OUTPATIENT
Start: 2023-02-15 | End: 2023-02-18 | Stop reason: HOSPADM

## 2023-02-15 RX ORDER — LANOLIN ALCOHOL/MO/W.PET/CERES
400 CREAM (GRAM) TOPICAL 2 TIMES DAILY
Status: DISCONTINUED | OUTPATIENT
Start: 2023-02-15 | End: 2023-02-18 | Stop reason: HOSPADM

## 2023-02-15 RX ADMIN — MELATONIN TAB 3 MG 3 MG: 3 TAB at 21:35

## 2023-02-15 RX ADMIN — ACETAMINOPHEN 975 MG: 325 TABLET, FILM COATED ORAL at 21:34

## 2023-02-15 RX ADMIN — MAGNESIUM OXIDE TAB 400 MG (241.3 MG ELEMENTAL MG) 400 MG: 400 (241.3 MG) TAB at 21:35

## 2023-02-15 RX ADMIN — OXYCODONE HYDROCHLORIDE 5 MG: 5 TABLET ORAL at 21:35

## 2023-02-15 RX ADMIN — VERAPAMIL HYDROCHLORIDE 120 MG: 120 TABLET, FILM COATED, EXTENDED RELEASE ORAL at 21:35

## 2023-02-15 RX ADMIN — LACOSAMIDE 100 MG: 50 TABLET, FILM COATED ORAL at 21:35

## 2023-02-15 RX ADMIN — LEVETIRACETAM 1500 MG: 500 TABLET, FILM COATED ORAL at 21:35

## 2023-02-15 RX ADMIN — ACETAMINOPHEN 650 MG: 325 TABLET, FILM COATED ORAL at 15:26

## 2023-02-15 RX ADMIN — LIDOCAINE 5% 1 PATCH: 700 PATCH TOPICAL at 20:15

## 2023-02-15 RX ADMIN — IOHEXOL 100 ML: 350 INJECTION, SOLUTION INTRAVENOUS at 16:24

## 2023-02-15 NOTE — OCCUPATIONAL THERAPY NOTE
OT EVALUATION       02/15/23 1559   Note Type   Note type Evaluation   Pain Assessment   Pain Assessment Tool 0-10   Pain Score 10 - Worst Possible Pain   Pain Location/Orientation Location: Back; Location: Buttocks   Restrictions/Precautions   Other Precautions Chair Alarm; Bed Alarm; Fall Risk;Pain   Home Living   Type of [address] level  (5 ORALIA)   Bathroom Shower/Tub Tub/shower unit   [employer] bars in [facility]   Prior Function   Level of [address] with ADLs; Independent with functional mobility; Needs assistance with IADLS   Lives With Alone   Receives Help From Family  (6 sisters)   Falls in the last 6 months 5 to 10   Comments Patient with history of CVA/seizures with L sided weakness and spasticity   ADL   Eating Assistance 5  Supervision/Setup   Grooming Assistance 4  Minimal Assistance   UB Bathing Assistance 3  Moderate Assistance   LB Bathing Assistance 2  Maximal [address] 3  Moderate Assistance   LB Dressing Assistance 2  Maximal [address]  3  Moderate Assistance   Bed Mobility   Supine to Sit 3  Moderate assistance   Additional items Assist x 2   Sit to Supine 3  Moderate assistance   Additional items Assist x 2   Transfers   Sit to Stand 3  Moderate assistance   Stand to Sit 3  Moderate assistance   Functional Mobility   Functional Mobility 3  Moderate assistance   Additional Comments 15 feet with hand hold assist, poor safety awareness and balance with L knee buckling   Balance   Static Sitting Poor   Dynamic Sitting Poor   Static Standing Poor   Dynamic Standing Poor   Activity Tolerance   Activity Tolerance Patient limited by pain; Patient limited by fatigue  (weakness)   Nurse Made Aware yes, Janae Murry Assessment   RUE Assessment WFL   LUE Assessment   LUE Assessment   (hx of CVA, shoulder WFL, elbow MMT 3-/5,  2+/5)   Hand Function   Gross Motor Coordination Impaired  (LUE)   Fine Motor Coordination Impaired  (LUE)   Hand Function Comments L hand CVA hx, arm with spasticity, minimal functional use per patient   Cognition   Overall Cognitive Status WFL   Arousal/Participation Cooperative   Attention Within functional limits   Orientation Level Oriented X4   Following Commands Follows all commands and directions without difficulty   Assessment   Limitation Decreased ADL status; Decreased Safe judgement during ADL;Decreased UE strength;Decreased endurance;Decreased high-level ADLs; Decreased self-care trans  (decreased balance and mobility)   Prognosis Good   Assessment Patient evaluated by Occupational Therapy  Patient admitted with s/p fall with back and buttock pain  The patients occupational profile, medical and therapy history includes a extensive additional review of physical, cognitive, or psychosocial history related to current functional performance  Comorbidities affecting functional mobility and ADLS include: anxiety, CVA, gout, hypertension and seizures  Prior to admission, patient was independent with functional mobility with walker, independent with ADLS and requiring assist for IADLS  The evaluation identifies the following performance deficits: weakness, decreased ROM, impaired balance, decreased endurance, decreased coordination, increased fall risk, new onset of impairment of functional mobility, decreased ADLS, decreased IADLS, pain, decreased activity tolerance, decreased safety awareness, impaired judgement and decreased strength, that result in activity limitations and/or participation restrictions  This evaluation requires clinical decision making of high complexity, because the patient presents with comorbidites that affect occupational performance and required significant modification of tasks or assistance with consideration of multiple treatment options    The Barthel Index was used as a functional outcome tool presenting with a score of Barthel Index Score: 40, indicating marked limitations of functional mobility and ADLS  The patient's raw score on the AM-PAC Daily Activity Inpatient Short Form is 12  A raw score of less than 19 suggests the patient may benefit from discharge to post-acute rehabilitation services  Please refer to the recommendation of the Occupational Therapist for safe discharge planning  Patient will benefit from skilled Occupational Therapy services to address above deficits and facilitate a safe return to prior level of function  Goals   Patient Goals to get stronger, stop falling, less pain   STG Time Frame   (1-7 days)   Short Term Goal  Goals established to promote Patient Goals: to get stronger, stop falling, less pain:  Eating: independent; Grooming: supervision seated; Bathing: mod assist; Upper Body Dressing min assist; Lower Body Dressing: mod assist; Toileting: min assist; Patient will increase ambulatory standard toilet transfer to min assist with assistive device to increase performance and safety with ADLS and functional mobility; Patient will increase standing tolerance to 3 minutes during ADL task to decrease assistance level and decrease fall risk; Patient will increase bed mobility to mod assist in preparation for ADLS and transfers;  Patient will increase functional mobility to and from bathroom with assistive device with min assist to increase performance with ADLS and to use a toilet; Patient will tolerate 5 minutes of UE ROM/strengthening to increase general activity tolerance and performance in ADLS/IADLS; Patient will improve functional activity tolerance to 10 minutes of sustained functional tasks to increase participation in basic self-care and decrease assistance level; Patient will increase dynamic sitting balance to poor+ to improve the ability to sit at edge of bed or on a chair for ADLS;  Patient will increase dynamic standing balance to poor+ to improve postural stability and decrease fall risk during standing ADLS and transfers  LTG Time Frame   (8-14 days)   Long Term Goal Grooming: independent seated; Bathing: min assist; Upper Body Dressing supervision; Lower Body Dressing: min assist; Toileting: supervision; Patient will increase ambulatory standard toilet transfer to supervision with assistive device to increase performance and safety with ADLS and functional mobility; Patient will increase standing tolerance to 6 minutes during ADL task to decrease assistance level and decrease fall risk; Patient will increase bed mobility to min assist in preparation for ADLS and transfers; Patient will increase functional mobility to and from bathroom with assistive device with supervision to increase performance with ADLS and to use a toilet; Patient will tolerate 10 minutes of UE ROM/strengthening to increase general activity tolerance and performance in ADLS/IADLS; Patient will improve functional activity tolerance to 20 minutes of sustained functional tasks to increase participation in basic self-care and decrease assistance level; Patient will increase dynamic sitting balance to fair- to improve the ability to sit at edge of bed or on a chair for ADLS;  Patient will increase dynamic standing balance to fair- to improve postural stability and decrease fall risk during standing ADLS and transfers  Pt will score >/= 16/24 on AM-PAC Daily Activity Inpatient scale to promote safe independence with ADLs and functional mobility; Pt will score >/= 70/100 on Barthel Index in order to decrease caregiver assistance needed and increase ability to perform ADLs and functional mobility  Plan   Treatment Interventions ADL retraining;Functional transfer training;UE strengthening/ROM; Endurance training;Patient/family training;Equipment evaluation/education; Activityengagement; Compensatory technique education   Goal Expiration Date 03/01/23   OT Frequency 3-5x/wk   Recommendation   OT Discharge Recommendation Post acute rehabilitation services AM-PAC Daily Activity Inpatient   Lower Body Dressing 1   Bathing 1   Toileting 2   Upper Body Dressing 2   Grooming 3   Eating 3   Daily Activity Raw Score 12   Daily Activity Standardized Score (Calc for Raw Score >=11) 30 6   AM-PAC Applied Cognition Inpatient   Following a Speech/Presentation 4   Understanding Ordinary Conversation 4   Taking Medications 4   Remembering Where Things Are Placed or Put Away 4   Remembering List of 4-5 Errands 4   Taking Care of Complicated Tasks 4   Applied Cognition Raw Score 24   Applied Cognition Standardized Score 62 21   Barthel Index   Feeding 5   Bathing 0   Grooming Score 0   Dressing Score 5   Bladder Score 10   Bowels Score 10   Toilet Use Score 5   Transfers (Bed/Chair) Score 5   Mobility (Level Surface) Score 0   Stairs Score 0   Barthel Index Score 40   Licensure   [place] License Number  Sue Edwards Ruma Blas 87 OTR/L [id number]

## 2023-02-15 NOTE — ED PROVIDER NOTES
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  Last drink was 2021  His stroke left him with left-sided weakness  He lives at home alone  He is able to get around with a walker  He is fall prone  Last fall was 3 weeks ago  He fell today  Patient was brought in for evaluation  It is unclear if he struck his head  No loss of consciousness  He does report neck and low back pain  No chest pain or trouble breathing  No abdominal pain  Patient has chronic pain to his left arm and left leg  He denies new injury to the extremities  No new neurologic complaints  The injury was sudden  Symptoms are constant  Prior to Admission Medications   Prescriptions Last Dose Informant Patient Reported? Taking? Diclofenac Sodium (VOLTAREN) 1 %   No No   Sig: Apply 2 g topically in the morning and 2 g at noon and 2 g in the evening and 2 g before bedtime  acetaminophen (TYLENOL) 325 mg tablet   No No   Sig: Take 2 tablets (650 mg total) by mouth every 6 (six) hours as needed for mild pain, headaches or fever   aspirin 81 mg chewable tablet   No No   Sig: Chew 1 tablet (81 mg total)  in the morning  atorvastatin (LIPITOR) 80 mg tablet   No No   Sig: Take 0 5 tablets (40 mg total) by mouth daily with dinner   Patient not taking: Reported on 9/2/2022   clopidogrel (PLAVIX) 75 mg tablet   No No   Sig: Take 1 tablet (75 mg total) by mouth in the morning for 16 days     fenofibrate (TRICOR) 145 mg tablet   No No   Sig: Take 1 tablet (145 mg total) by mouth daily   lacosamide (VIMPAT) 100 mg tablet   No No Sig: Take 1 tablet (100 mg total) by mouth every 12 (twelve) hours for 10 days   levETIRAcetam (KEPPRA) 750 mg tablet   No No   Sig: Take 2 tablets (1,500 mg total) by mouth every 12 (twelve) hours for 10 days   magnesium oxide (MAG-OX) 400 mg   No No   Sig: Take 1 tablet (400 mg total) by mouth in the morning and 1 tablet (400 mg total) in the evening  melatonin 3 mg   No No   Sig: Take 1 tablet (3 mg total) by mouth daily at bedtime   methocarbamol (ROBAXIN) 750 mg tablet   No No   Sig: Take 1 tablet (750 mg total) by mouth every 6 (six) hours   metoprolol succinate (TOPROL-XL) 100 mg 24 hr tablet   No No   Sig: Take 1 tablet (100 mg total) by mouth daily   nicotine (NICODERM CQ) 21 mg/24 hr TD 24 hr patch   No No   Sig: Place 1 patch on the skin daily   omeprazole (PriLOSEC) 20 mg delayed release capsule   Yes No   Sig: Take 20 mg by mouth daily  thiamine 100 MG tablet   No No   Sig: Take 1 tablet (100 mg total) by mouth in the morning     verapamil (CALAN-SR) 120 mg CR tablet   No No   Sig: Take 1 tablet (120 mg total) by mouth daily at bedtime      Facility-Administered Medications: None       Past Medical History:   Diagnosis Date   • Biceps tendonitis, unspecified laterality 09/17/2007   • Bone cyst 11/22/2011   • Bronchiectasis ([facility] [address] ) 10/02/2006   • Bursitis 11/17/2005   • Chest pain 04/29/2009   • Chronic renal failure 11/22/2011   • Diverticulitis of colon 10/09/2007   • Generalized anxiety disorder 06/08/2006   • GERD (gastroesophageal reflux disease)    • Gout    • Hyperlipidemia    • Hypertension    • Lateral epicondylitis, unspecified elbow     Last Assessed: 11/29/2013   • Low magnesium levels    • Lyme disease 11/09/2009   • Pneumonia     Last Assessed: 1/7/2013   • Stroke [facility])        Past Surgical History:   Procedure Laterality Date   • CARDIAC ELECTROPHYSIOLOGY PROCEDURE N/A 5/19/2022    Procedure: Cardiac loop recorder implant;  Surgeon: Elliot Vaca MD;  Location: [place] [address] CATH LAB; Service: Cardiology   • KNEE ARTHROSCOPY      Therapeutic   • OLECRANON BURSA EXCISION         Family History   Problem Relation Age of Onset   • Cancer Mother         Colon     I have reviewed and agree with the history as documented  E-Cigarette/Vaping   • E-Cigarette Use Never User      E-Cigarette/Vaping Substances   • Nicotine No    • THC No    • CBD No    • Flavoring No    • Other No    • Unknown No      Social History     Tobacco Use   • Smoking status: Former     Packs/day: 0 50     Types: Cigarettes     Quit date: 5/10/2022     Years since quittin 7   • Smokeless tobacco: Never   • Tobacco comments:     cigarette nicotine dependence   Vaping Use   • Vaping Use: Never used   Substance Use Topics   • Alcohol use: Yes     Alcohol/week: 6 0 standard drinks     Types: 6 Glasses of wine per week     Comment: 20-states he has not had alcohol since 2021   • Drug use: No       Review of Systems   Constitutional: Negative for chills and fever  HENT: Negative for rhinorrhea and sore throat  Eyes: Negative for pain, redness and visual disturbance  Respiratory: Negative for cough and shortness of breath  Cardiovascular: Negative for chest pain and leg swelling  Gastrointestinal: Negative for abdominal pain, diarrhea and vomiting  Endocrine: Negative for polydipsia and polyuria  Genitourinary: Negative for dysuria, frequency and hematuria  Musculoskeletal: Positive for back pain and neck pain  Skin: Negative for rash and wound  Allergic/Immunologic: Negative for immunocompromised state  Neurological: Positive for seizures and weakness  Negative for headaches  Psychiatric/Behavioral: Negative for hallucinations and suicidal ideas  All other systems reviewed and are negative  Physical Exam  Physical Exam  Vitals reviewed  Constitutional:       General: He is not in acute distress  Appearance: He is not toxic-appearing     HENT:      Head: Normocephalic and atraumatic  Nose: Nose normal       Mouth/Throat:      Mouth: Mucous membranes are moist    Eyes:      Extraocular Movements: Extraocular movements intact  Pupils: Pupils are equal, round, and reactive to light  Cardiovascular:      Rate and Rhythm: Normal rate and regular rhythm  Pulses: Normal pulses  Heart sounds: Normal heart sounds  No murmur heard  No friction rub  No gallop  Pulmonary:      Effort: Pulmonary effort is normal  No respiratory distress  Breath sounds: Normal breath sounds  No stridor  No wheezing, rhonchi or rales  Chest:      Chest wall: No tenderness  Abdominal:      General: Bowel sounds are normal  There is no distension  Palpations: Abdomen is soft  Tenderness: There is no abdominal tenderness  There is no right CVA tenderness, left CVA tenderness, guarding or rebound  Musculoskeletal:         General: Tenderness present  No swelling, deformity or signs of injury  Normal range of motion  Cervical back: Normal range of motion and neck supple  Tenderness present  No rigidity  No muscular tenderness  Comments: There is tenderness to the thoracic and lumbar spine  There is some tenderness over the posterior ribs bilaterally  No crepitus  Extremities appear atraumatic  Skin:     General: Skin is warm and dry  Capillary Refill: Capillary refill takes less than 2 seconds  Coloration: Skin is not pale  Neurological:      Mental Status: He is alert and oriented to person, place, and time  GCS: GCS eye subscore is 4  GCS verbal subscore is 5  GCS motor subscore is 6  Comments: There is right-sidedThere is left-sided weakness  There is some spasticity of the left arm     Psychiatric:         Mood and Affect: Mood normal          Behavior: Behavior normal          Vital Signs  ED Triage Vitals [02/15/23 1432]   Temperature Pulse Respirations Blood Pressure SpO2   99 1 °F (37 3 °C) 68 18 126/78 98 %      Temp Source Heart Rate Source Patient Position - Orthostatic VS BP Location FiO2 (%)   Tympanic Monitor Sitting Right arm --      Pain Score       6           Vitals:    02/15/23 1432   BP: 126/78   Pulse: 68   Patient Position - Orthostatic VS: Sitting         Visual Acuity      ED Medications  Medications   acetaminophen (TYLENOL) tablet 650 mg (650 mg Oral Given 2/15/23 1526)   iohexol (OMNIPAQUE) 350 MG/ML injection (SINGLE-DOSE) 100 mL (100 mL Intravenous Given 2/15/23 1624)       Diagnostic Studies  Results Reviewed     Procedure Component Value Units Date/Time    HS Troponin 0hr (reflex protocol) [[id number]]  (Normal) Collected: 02/15/23 1600    Lab Status: Final result Specimen: Blood from Arm, Left Updated: 02/15/23 1640     hs TnI 0hr 6 ng/L     HS Troponin I 2hr [[id number]]     Lab Status: No result Specimen: Blood     HS Troponin I 4hr [[id number]]     Lab Status: No result Specimen: Blood     Comprehensive metabolic panel [[id number]] Collected: 02/15/23 1600    Lab Status: Final result Specimen: Blood from Arm, Left Updated: 02/15/23 1633     Sodium 135 mmol/L      Potassium 3 5 mmol/L      Chloride 101 mmol/L      CO2 23 mmol/L      ANION GAP 11 mmol/L      BUN 17 mg/dL      Creatinine 0 91 mg/dL      Glucose 87 mg/dL      Calcium 9 6 mg/dL      AST 15 U/L      ALT 11 U/L      Alkaline Phosphatase 80 U/L      Total Protein 6 7 g/dL      Albumin 4 2 g/dL      Total Bilirubin 0 84 mg/dL      eGFR 91 ml/min/1 73sq m     Narrative:      [place] guidelines for Chronic Kidney Disease (CKD):   •  Stage 1 with normal or high GFR (GFR > 90 mL/min/1 73 square meters)  •  Stage 2 Mild CKD (GFR = 60-89 mL/min/1 73 square meters)  •  Stage 3A Moderate CKD (GFR = 45-59 mL/min/1 73 square meters)  •  Stage 3B Moderate CKD (GFR = 30-44 mL/min/1 73 square meters)  •  Stage 4 Severe CKD (GFR = 15-29 mL/min/1 73 square meters)  •  Stage 5 End Stage CKD (GFR <15 mL/min/1 73 square meters)  Note: GFR calculation is accurate only with a steady state creatinine    CBC and differential [[id number]] Collected: 02/15/23 1548    Lab Status: Final result Specimen: Blood from Arm, Left Updated: 02/15/23 1553     WBC 8 35 Thousand/uL      RBC 4 77 Million/uL      Hemoglobin 14 0 g/dL      Hematocrit 39 6 %      MCV 83 fL      MCH 29 4 pg      MCHC 35 4 g/dL      RDW 12 5 %      MPV 9 0 fL      Platelets 023 Thousands/uL      nRBC 0 /100 WBCs      Neutrophils Relative 75 %      Immat GRANS % 0 %      Lymphocytes Relative 15 %      Monocytes Relative 9 %      Eosinophils Relative 1 %      Basophils Relative 0 %      Neutrophils Absolute 6 25 Thousands/µL      Immature Grans Absolute 0 02 Thousand/uL      Lymphocytes Absolute 1 27 Thousands/µL      Monocytes Absolute 0 73 Thousand/µL      Eosinophils Absolute 0 06 Thousand/µL      Basophils Absolute 0 02 Thousands/µL     UA w Reflex to Microscopic w Reflex to Culture [[id number]]     Lab Status: No result Specimen: Urine                  CT head without contrast   Final Result by Rachna Gunn MD (02/15 1704)      No acute intracranial abnormality  Workstation performed: [id number]         CT cervical spine without contrast   Final Result by Rachna Gunn MD (02/15 1707)      No cervical spine fracture or traumatic malalignment  Workstation performed: [id number]         CT recon only thoracolumbar (no charge)   Final Result by Rachna Gunn MD (02/15 1717)      No evidence of thoracic or lumbar spine fracture  Workstation performed: [id number]         CT chest abdomen pelvis w contrast   Final Result by Rachna Gunn MD (02/15 1715)      No evidence of acute trauma in the chest, abdomen or pelvis              Workstation performed: [id number]                    Procedures  ECG 12 Lead Documentation Only    Date/Time: 2/15/2023 3:43 PM  Performed by: Jonathan Antunez MD  Authorized by: Jonathan Antunez MD ECG reviewed by me, the ED Provider: yes    Patient location:  ED  Comments:      Normal sinus rhythm  Left bundle branch block  Left bundle branch block is old when compared to old EKG  Abnormal EKG  ED Course                                             Medical Decision Making  Imaging for trauma was unremarkable  CT of head was negative for skull fracture or intracranial hemorrhage  CT of the cervical spine was negative for fracture or subluxation  CT of the thoracic and lumbar spine was negative for fracture or subluxation  CT of the chest, abdomen and pelvis was negative for acute traumatic injury  Laboratory evaluation EKG were nonspecific  Patient lives alone  Concerned about frequent falls  Patient underwent PT and OT evaluation in the emergency room  Patient is felt to be unsafe for discharge and would be a good candidate for inpatient rehab  Patient is amendable to this  Consulted with hospitalist for admission  Amount and/or Complexity of Data Reviewed  External Data Reviewed: notes  Labs: ordered  Decision-making details documented in ED Course  Radiology: ordered and independent interpretation performed  Decision-making details documented in ED Course  ECG/medicine tests: ordered and independent interpretation performed  Decision-making details documented in ED Course  Discussion of management or test interpretation with external provider(s): Hospitalist, Occupational Therapy, physical therapy    Risk  Decision regarding hospitalization            Disposition  Final diagnoses:   Ambulatory dysfunction   Fall from standing, initial encounter   Strain of neck muscle, initial encounter   Strain of lumbar region, initial encounter     Time reflects when diagnosis was documented in both MDM as applicable and the Disposition within this note     Time User Action Codes Description Comment    2/15/2023  7:29 PM Brayden Hampton Add [R26 2] Ambulatory dysfunction     2/15/2023 7:29 PM [facility] Add [A84  XXXA] Fall from standing, initial encounter     2/15/2023  7:30 PM [facility] Add [A91  1XXA] Strain of neck muscle, initial encounter     2/15/2023  7:30 PM [facility] Add [S39 012A] Strain of lumbar region, initial encounter       ED Disposition     ED Disposition   Admit    Condition   Stable    Date/Time   Wed Feb 15, 2023  7:29 PM    Comment              Follow-up Information    None         Patient's Medications   Discharge Prescriptions    No medications on file       No discharge procedures on file      PDMP Review     None          ED Provider  Electronically Signed by           Claudette Jurado MD  02/15/23 9533

## 2023-02-15 NOTE — PHYSICAL THERAPY NOTE
PT EVALUATION       02/15/23 1545   PT Last Visit   PT Visit Date 02/15/23   Note Type   Note type Evaluation   Pain Assessment   Pain Assessment Tool 0-10   Pain Score 10 - Worst Possible Pain   Pain Location/Orientation Location: Back;Orientation: Lower; Location: Buttocks   Pain Onset/Description Onset: Ongoing; Descriptor: Sore   Effect of Pain on Daily Activities limits mobility   Patient's Stated Pain Goal No pain   Hospital Pain Intervention(s) Repositioned   Multiple Pain Sites No   Restrictions/Precautions   Weight Bearing Precautions Per Order No   Other Precautions Bed Alarm; Chair Alarm; Fall Risk;Pain   Home Living   Type of [address] One level;Stairs to enter with rails  (5 ORALIA)   Bathroom Shower/Tub Tub/shower unit   [employer] Grab bars in [facility] Walker;Cane   Additional Comments Pt reports he mostly uses walker at home 1 handed due to convenience   Prior Function   Level of [place] Independent with ADLs; Independent with functional mobility; Needs assistance with IADLS   Lives With Alone   Receives Help From Family  (nieces assist with cleaning/laundry/shopping)   IADLs Family/Friend/Other provides transportation   Falls in the last 6 months 5 to 10   General   Additional Pertinent History Pt is a 59year-old male who presents to the ED s/p fall at home - unsure how he fell   Pt with history of stroke with residual L weakness   Family/Caregiver Present No   Cognition   Overall Cognitive Status WFL   Attention Within functional limits   Orientation Level Oriented X4   Following Commands Follows all commands and directions without difficulty   RLE Assessment   RLE Assessment WFL  (grossly 3/5 elias by pain)   LLE Assessment   LLE Assessment X  (Ankle 3/5 ; hip/knee 3- to 3/5 elias by pain)   Bed Mobility   Supine to Sit 3  Moderate assistance   Additional items Assist x 2   Sit to Supine 3  Moderate assistance   Additional items Assist x 2   Transfers   Sit to Stand 3  Moderate assistance   Additional items Assist x 1;Verbal cues   Stand to Sit 3  Moderate assistance   Additional items Assist x 1;Verbal cues   Stand pivot 3  Moderate assistance   Additional items Assist x 1;Verbal cues   Ambulation/Elevation   Gait pattern Narrow GEOVANNY;L Knee Mariano; Antalgic; Short stride; Step through pattern   Gait Assistance 3  Moderate assist   Additional items Assist x 1;Verbal cues   Assistive Device   (bilat HHA - attempted walker but pt reports he uses 1 handed, not safe)   Distance 20 feet with change of direction   Stair Management Assistance Not tested   Balance   Static Sitting Poor +   Dynamic Sitting Poor   Static Standing Poor   Dynamic Standing Poor   Ambulatory Poor   Activity Tolerance   Activity Tolerance Patient limited by pain   Nurse Made Aware yes RN Jarret   Assessment   Prognosis Good   Problem List Decreased strength;Decreased range of motion;Decreased endurance; Impaired balance;Decreased mobility; Impaired judgement;Decreased safety awareness;Pain   Assessment Patient seen for Physical Therapy evaluation  Patient admitted with s/p( fall  Comorbidities affecting patient's physical performance include: CVA, GERD, hypertension and seizures  Personal factors affecting patient at time of initial evaluation include: lives in 1 story house, ambulating with assistive device, stairs to enter home, inability to ambulate household distances, inability to navigate community distances, positive fall history, inability to perform IADLS  and inability to live alone  Prior to admission, patient was independent with functional mobility with cane vs  walker, independent with ADLS, requiring assist for IADLS, living alone in 1 story home with 5 steps to enter and ambulating household distance    Please find objective findings from Physical Therapy assessment regarding body systems outlined above with impairments and limitations including weakness, impaired balance, decreased endurance, gait deviations, pain, decreased activity tolerance, decreased functional mobility tolerance, decreased safety awareness, impaired judgement, fall risk and impaired tone  The Barthel Index was used as a functional outcome tool presenting with a score of Barthel Index Score: 40 today indicating marked limitations of functional mobility and ADLS  Patient's clinical presentation is currently unstable/unpredictable as seen in patient's presentation of changing level of pain, increased fall risk and decreased endurance  Pt would benefit from continued Physical Therapy treatment to address deficits as defined above and maximize level of functional mobility  As demonstrated by objective findings, the assigned level of complexity for this evaluation is high  The patient's AM-PAC Basic Mobility Inpatient Short Form Raw Score is 12  A Raw score of less than or equal to 16 suggests the patient may benefit from discharge to post-acute rehabilitation services  Please also refer to the recommendation of the Physical Therapist for safe discharge planning  Goals   Patient Goals to get better, stronger   STG Expiration Date 02/22/23   Short Term Goal #1 Pt will perform bed mobility with Min A ; Pt will perform bed <> chair transfer with Min A + RW ; Pt will improve standing balance to fair- to decrease risk of falls   Short Term Goal #2 Pt will ambulate x 150 feet Min A + RW/cane ; Pt will negotiate x 5 steps with cane/rail + Min A   LTG Expiration Date 03/01/23   Long Term Goal #1 Pt will perform bed mobility/transfers with Supervision + RW/cane ; Pt will improve standing balance to Fair+ to decrease risk of falls   Long Term Goal #2 Pt will ambulate x 250 feet with Supervision + RW/cane ; Pt will negotiate x 5 steps with Supervision + rail/cane   Plan   Treatment/Interventions Functional transfer training;ADL retraining;LE strengthening/ROM; Therapeutic exercise; Endurance training;Bed mobility;Gait training;Spoke to nursing   PT Frequency Other (Comment)  (5x/week)   Recommendation   PT Discharge Recommendation Post acute rehabilitation services   AM-PAC Basic Mobility Inpatient   Turning in Flat Bed Without Bedrails 2   Lying on Back to Sitting on Edge of Flat Bed Without Bedrails 2   Moving Bed to Chair 2   Standing Up From Chair Using Arms 2   Walk in Room 2   Climb 3-5 Stairs With Railing 2   Basic Mobility Inpatient Raw Score 12   Basic Mobility Standardized Score 32 23   Highest Level Of Mobility   -[facility] Goal 4: Move to chair/commode   -HL Achieved 6: Walk 10 steps or more   Barthel Index   Feeding 5   Bathing 0   Grooming Score 0   Dressing Score 5   Bladder Score 10   Bowels Score 10   Toilet Use Score 5   Transfers (Bed/Chair) Score 5   Mobility (Level Surface) Score 0   Stairs Score 0   Barthel Index Score 40   End of Consult   Patient Position at End of Consult Supine; All needs within reach   [employer] License Number  [employer] [id number]

## 2023-02-15 NOTE — LETTER
To: [facility] [place] [facility]  From: [address] [facility], [facility] [address]     Skilled auth request for [employer] - [id number]    Ordering Physician: Kanwal Simons MD NPI: [id number]  Facility Name: [facility]   NPI: [id number]  Facility MD:   Dr Jamari Amin MD    NPI: [id number]    ICD-10 Codes  M54 50,  Z86 73,  Y92 009    Clinical attached  Pt is ready for discharge when authorization approval received      Thank you

## 2023-02-15 NOTE — PLAN OF CARE
Problem: PHYSICAL THERAPY ADULT  Goal: Performs mobility at highest level of function for planned discharge setting  See evaluation for individualized goals  Description: Treatment/Interventions: Functional transfer training, ADL retraining, LE strengthening/ROM, Therapeutic exercise, Endurance training, Bed mobility, Gait training, Spoke to nursing          See flowsheet documentation for full assessment, interventions and recommendations  Outcome: Progressing  Note: Prognosis: Good  Problem List: Decreased strength, Decreased range of motion, Decreased endurance, Impaired balance, Decreased mobility, Impaired judgement, Decreased safety awareness, Pain  Assessment: Patient seen for Physical Therapy evaluation  Patient admitted with s/p( fall  Comorbidities affecting patient's physical performance include: CVA, GERD, hypertension and seizures  Personal factors affecting patient at time of initial evaluation include: lives in 1 story house, ambulating with assistive device, stairs to enter home, inability to ambulate household distances, inability to navigate community distances, positive fall history, inability to perform IADLS  and inability to live alone  Prior to admission, patient was independent with functional mobility with cane vs  walker, independent with ADLS, requiring assist for IADLS, living alone in 1 story home with 5 steps to enter and ambulating household distance  Please find objective findings from Physical Therapy assessment regarding body systems outlined above with impairments and limitations including weakness, impaired balance, decreased endurance, gait deviations, pain, decreased activity tolerance, decreased functional mobility tolerance, decreased safety awareness, impaired judgement, fall risk and impaired tone    The Barthel Index was used as a functional outcome tool presenting with a score of Barthel Index Score: 40 today indicating marked limitations of functional mobility and ADLS   Patient's clinical presentation is currently unstable/unpredictable as seen in patient's presentation of changing level of pain, increased fall risk and decreased endurance  Pt would benefit from continued Physical Therapy treatment to address deficits as defined above and maximize level of functional mobility  As demonstrated by objective findings, the assigned level of complexity for this evaluation is high  The patient's AM-PAC Basic Mobility Inpatient Short Form Raw Score is 12  A Raw score of less than or equal to 16 suggests the patient may benefit from discharge to post-acute rehabilitation services  Please also refer to the recommendation of the Physical Therapist for safe discharge planning  PT Discharge Recommendation: Post acute rehabilitation services    See flowsheet documentation for full assessment

## 2023-02-16 ENCOUNTER — APPOINTMENT (OUTPATIENT)
Dept: RADIOLOGY | Facility: HOSPITAL | Age: 58
End: 2023-02-16

## 2023-02-16 LAB
ANION GAP SERPL CALCULATED.3IONS-SCNC: 10 MMOL/L (ref 4–13)
ATRIAL RATE: 81 BPM
BASOPHILS # BLD AUTO: 0.04 THOUSANDS/ÂΜL (ref 0–0.1)
BASOPHILS NFR BLD AUTO: 1 % (ref 0–1)
BUN SERPL-MCNC: 17 MG/DL (ref 5–25)
CALCIUM SERPL-MCNC: 8.9 MG/DL (ref 8.4–10.2)
CHLORIDE SERPL-SCNC: 103 MMOL/L (ref 96–108)
CO2 SERPL-SCNC: 23 MMOL/L (ref 21–32)
CREAT SERPL-MCNC: 0.9 MG/DL (ref 0.6–1.3)
EOSINOPHIL # BLD AUTO: 0.14 THOUSAND/ÂΜL (ref 0–0.61)
EOSINOPHIL NFR BLD AUTO: 2 % (ref 0–6)
ERYTHROCYTE [DISTWIDTH] IN BLOOD BY AUTOMATED COUNT: 12.6 % (ref 11.6–15.1)
GFR SERPL CREATININE-BSD FRML MDRD: 92 ML/MIN/1.73SQ M
GLUCOSE P FAST SERPL-MCNC: 88 MG/DL (ref 65–99)
GLUCOSE SERPL-MCNC: 88 MG/DL (ref 65–140)
HCT VFR BLD AUTO: 40.6 % (ref 36.5–49.3)
HGB BLD-MCNC: 13.8 G/DL (ref 12–17)
IMM GRANULOCYTES # BLD AUTO: 0.02 THOUSAND/UL (ref 0–0.2)
IMM GRANULOCYTES NFR BLD AUTO: 0 % (ref 0–2)
LYMPHOCYTES # BLD AUTO: 1.25 THOUSANDS/ÂΜL (ref 0.6–4.47)
LYMPHOCYTES NFR BLD AUTO: 21 % (ref 14–44)
MAGNESIUM SERPL-MCNC: 1.9 MG/DL (ref 1.9–2.7)
MCH RBC QN AUTO: 28.5 PG (ref 26.8–34.3)
MCHC RBC AUTO-ENTMCNC: 34 G/DL (ref 31.4–37.4)
MCV RBC AUTO: 84 FL (ref 82–98)
MONOCYTES # BLD AUTO: 0.63 THOUSAND/ÂΜL (ref 0.17–1.22)
MONOCYTES NFR BLD AUTO: 11 % (ref 4–12)
NEUTROPHILS # BLD AUTO: 3.77 THOUSANDS/ÂΜL (ref 1.85–7.62)
NEUTS SEG NFR BLD AUTO: 65 % (ref 43–75)
NRBC BLD AUTO-RTO: 0 /100 WBCS
P AXIS: 41 DEGREES
PLATELET # BLD AUTO: 160 THOUSANDS/UL (ref 149–390)
PMV BLD AUTO: 9.5 FL (ref 8.9–12.7)
POTASSIUM SERPL-SCNC: 3.3 MMOL/L (ref 3.5–5.3)
PR INTERVAL: 166 MS
QRS AXIS: 14 DEGREES
QRSD INTERVAL: 138 MS
QT INTERVAL: 418 MS
QTC INTERVAL: 485 MS
RBC # BLD AUTO: 4.85 MILLION/UL (ref 3.88–5.62)
SODIUM SERPL-SCNC: 136 MMOL/L (ref 135–147)
T WAVE AXIS: 60 DEGREES
VENTRICULAR RATE: 81 BPM
WBC # BLD AUTO: 5.85 THOUSAND/UL (ref 4.31–10.16)

## 2023-02-16 RX ORDER — TRAMADOL HYDROCHLORIDE 50 MG/1
50 TABLET ORAL EVERY 6 HOURS PRN
Status: DISCONTINUED | OUTPATIENT
Start: 2023-02-16 | End: 2023-02-18 | Stop reason: HOSPADM

## 2023-02-16 RX ORDER — POTASSIUM CHLORIDE 20 MEQ/1
40 TABLET, EXTENDED RELEASE ORAL ONCE
Status: COMPLETED | OUTPATIENT
Start: 2023-02-16 | End: 2023-02-16

## 2023-02-16 RX ADMIN — LIDOCAINE 5% 1 PATCH: 700 PATCH TOPICAL at 08:43

## 2023-02-16 RX ADMIN — LEVETIRACETAM 1500 MG: 500 TABLET, FILM COATED ORAL at 08:43

## 2023-02-16 RX ADMIN — LACOSAMIDE 100 MG: 50 TABLET, FILM COATED ORAL at 22:09

## 2023-02-16 RX ADMIN — MAGNESIUM OXIDE TAB 400 MG (241.3 MG ELEMENTAL MG) 400 MG: 400 (241.3 MG) TAB at 08:43

## 2023-02-16 RX ADMIN — OXYCODONE HYDROCHLORIDE 5 MG: 5 TABLET ORAL at 12:11

## 2023-02-16 RX ADMIN — ENOXAPARIN SODIUM 40 MG: 40 INJECTION SUBCUTANEOUS at 08:43

## 2023-02-16 RX ADMIN — POTASSIUM CHLORIDE 40 MEQ: 1500 TABLET, EXTENDED RELEASE ORAL at 19:58

## 2023-02-16 RX ADMIN — ASPIRIN 81 MG CHEWABLE TABLET 81 MG: 81 TABLET CHEWABLE at 08:43

## 2023-02-16 RX ADMIN — OXYCODONE HYDROCHLORIDE 5 MG: 5 TABLET ORAL at 18:12

## 2023-02-16 RX ADMIN — ACETAMINOPHEN 975 MG: 325 TABLET, FILM COATED ORAL at 05:35

## 2023-02-16 RX ADMIN — VERAPAMIL HYDROCHLORIDE 120 MG: 120 TABLET, FILM COATED, EXTENDED RELEASE ORAL at 22:09

## 2023-02-16 RX ADMIN — MELATONIN TAB 3 MG 3 MG: 3 TAB at 22:09

## 2023-02-16 RX ADMIN — ACETAMINOPHEN 975 MG: 325 TABLET, FILM COATED ORAL at 14:24

## 2023-02-16 RX ADMIN — ATORVASTATIN CALCIUM 40 MG: 40 TABLET, FILM COATED ORAL at 18:11

## 2023-02-16 RX ADMIN — PANTOPRAZOLE SODIUM 20 MG: 20 TABLET, DELAYED RELEASE ORAL at 05:35

## 2023-02-16 RX ADMIN — METHOCARBAMOL 750 MG: 500 TABLET ORAL at 22:08

## 2023-02-16 RX ADMIN — MAGNESIUM OXIDE TAB 400 MG (241.3 MG ELEMENTAL MG) 400 MG: 400 (241.3 MG) TAB at 18:11

## 2023-02-16 RX ADMIN — METHOCARBAMOL 750 MG: 500 TABLET ORAL at 07:49

## 2023-02-16 RX ADMIN — TRAMADOL HYDROCHLORIDE 50 MG: 50 TABLET, COATED ORAL at 14:24

## 2023-02-16 RX ADMIN — LEVETIRACETAM 1500 MG: 500 TABLET, FILM COATED ORAL at 22:08

## 2023-02-16 RX ADMIN — ACETAMINOPHEN 975 MG: 325 TABLET, FILM COATED ORAL at 22:08

## 2023-02-16 RX ADMIN — OXYCODONE HYDROCHLORIDE 5 MG: 5 TABLET ORAL at 05:35

## 2023-02-16 RX ADMIN — FENOFIBRATE 145 MG: 145 TABLET, FILM COATED ORAL at 08:43

## 2023-02-16 RX ADMIN — LACOSAMIDE 100 MG: 50 TABLET, FILM COATED ORAL at 08:43

## 2023-02-16 RX ADMIN — METOPROLOL SUCCINATE 100 MG: 100 TABLET, EXTENDED RELEASE ORAL at 08:43

## 2023-02-16 NOTE — CASE MANAGEMENT
Ruma Baptiste 48 received request for authorization from Care Manager    Authorization request for: [address] Name: [facility]  NPI: [id number]  Facility MD:  Yulia James   NPI: [id number]  Authorization initiated by contacting insurance: Ravi Via: Phone  Pending Reference #: [id number]  Clinicals submitted via: Fax ([phone])  Nurse Reviewer: Hill Broussard: [phone]

## 2023-02-16 NOTE — CASE MANAGEMENT
Support Center has received denial   Denial received for: Acute Rehab  Facility: [facility]    Denial #: [id number]  Denial Reason: does not meet criteria   Sub acute more appropriate  Peer to Peer phone#:  [phone]   Appeal P#: [phone]  Care Manager notified: Tray Mitchell

## 2023-02-16 NOTE — PHYSICAL THERAPY NOTE
PT TREATMENT       02/16/23 1015   PT Last Visit   PT Visit Date 02/16/23   Note Type   Note Type Treatment   Pain Assessment   Pain Assessment Tool 0-10   Pain Score 9   Pain Location/Orientation Orientation: Lower; Location: Back;Orientation: Left; Location: Leg   Pain Onset/Description Onset: Ongoing   Effect of Pain on Daily Activities limits rest/mobility   Patient's Stated Pain Goal No pain   Hospital Pain Intervention(s) Repositioned   Multiple Pain Sites No   Restrictions/Precautions   Weight Bearing Precautions Per Order No   Other Precautions Bed Alarm; Chair Alarm; Fall Risk;Pain   General   Chart Reviewed Yes   Family/Caregiver Present No   Cognition   Overall Cognitive Status WFL   Arousal/Participation Cooperative   Orientation Level Oriented X4   Following Commands Follows all commands and directions without difficulty   Subjective   Subjective "i'm still really hurting"   Bed Mobility   Supine to Sit 3  Moderate assistance   Additional items Assist x 1;Verbal cues   Additional Comments Transferred to bedside chair   Transfers   Sit to Stand 3  Moderate assistance   Additional items Assist x 1;Verbal cues   Stand to Sit 3  Moderate assistance   Additional items Assist x 1;Verbal cues   Stand pivot 3  Moderate assistance   Additional items Assist x 1;Verbal cues   Ambulation/Elevation   Gait pattern Narrow GEOVANNY; Short stride; Step to   Gait Assistance 3  Moderate assist   Additional items Assist x 1;Verbal cues   Assistive Device Large base quad cane   Distance 2 feet to chair   Stair Management Assistance Not tested   Ambulation/Elevation Additional Comments Pt more unsteady today, limited by pain this AM, refusing further ambulation   Balance   Static Sitting Fair   Dynamic Sitting Fair -   Static Standing Poor +   Dynamic Standing Poor   Ambulatory Poor   Activity Tolerance   Activity Tolerance Patient tolerated treatment well;Patient limited by pain   Nurse Made Aware yes KARMEN Nicole Prognosis Good   Problem List Decreased strength;Decreased range of motion;Decreased endurance; Impaired balance;Decreased mobility; Impaired judgement;Decreased safety awareness;Pain   Assessment Pt agreeable to PT session this AM  Pt continues to report severe pain limiting mobility  Able to perform bed mobility/transfers with Mod A x 1 + imroved sitting balance this AM compared to yesterday's session  Pt with increased pain in standing at buttocks/low back - able to transfer bed > chair with [facility] with Mod A, mild antalgic gait pattern, reufsing further ambulation at this time  P: pt will benefit from skilled PT to address deficits to maximize IND for safe d/c  The patient's AM-PAC Basic Mobility Inpatient Short Form Raw Score is 11  A Raw score of less than or equal to 16 suggests the patient may benefit from discharge to post-acute rehabilitation services  Please also refer to the recommendation of the Physical Therapist for safe discharge planning  Goals   Patient Goals to get stronger   Plan   Treatment/Interventions Functional transfer training;LE strengthening/ROM;ADL retraining; Therapeutic exercise; Endurance training;Bed mobility;Gait training;Spoke to nursing   Progress Slow progress, decreased activity tolerance   PT Frequency Other (Comment)  (5x/wek)   Recommendation   PT Discharge Recommendation Post acute rehabilitation services   AM-PAC Basic Mobility Inpatient   Turning in Flat Bed Without Bedrails 2   Lying on Back to Sitting on Edge of Flat Bed Without Bedrails 2   Moving Bed to Chair 2   Standing Up From Chair Using Arms 2   Walk in Room 2   Climb 3-5 Stairs With Railing 1   Basic Mobility Inpatient Raw Score 11   Basic Mobility Standardized Score 30 25   Highest Level Of Mobility   JH-HLM Goal 4: Move to chair/commode   JH-HLM Achieved 5: Stand (1 or more minutes)   Education   Education Provided Mobility training;Assistive device   Patient Explanation/teachback used   End of Consult Patient Position at End of Consult Bedside chair;Bed/Chair alarm activated; All needs within reach   [employer] Insurance Number  [facility] [id number]

## 2023-02-16 NOTE — CASE MANAGEMENT
Case Management Progress Note    Patient name Volodymyr Lobo  Location [address] 401/4 [address] [address]-* MRN [id number]  : 1965 Date 2023       LOS (days): 0  Geometric Mean LOS (GMLOS) (days):   Days to [facility]:        OBJECTIVE:      Current admission status: Observation  Preferred Pharmacy:   [address] [facility] [place], [address], [address]   [place] [address] [address]  Phone: [phone] Fax: [phone]    [facility] #[id number], [facility], [address]  Phone: [phone] Fax: [phone]    Renetta Jackson 83 [address] [facility] [address]  Phone: [phone] Fax: [phone] [facility] [phone] [facility]   [place] [address]  Phone: [phone] Fax: [phone]    Primary Care Provider: No primary care provider on file  Primary Insurance: BLUE CROSS  Secondary Insurance:     PROGRESS NOTE:    SIDDHARTHA reviewed bed availability for both STR and acute rehabilitation with patient at bedside  Patient has been accepted by [facility] in [place] per brianna Patel and patient's preference is for acute rehab  He is in agreement with plan for acute rehab at [facility] and stated that his family will also be happy with this plan  SIDDHARTHA notified brianna Patel that patient has accepted the bed and reserved bed in [address] [place] [address]  SIDDHARTHA tasked insurance authorization request to the [facility] and will continue to follow

## 2023-02-16 NOTE — ASSESSMENT & PLAN NOTE
Patient presents after fall at home today, history of stroke with L sided weakness, lives alone - was seen by PT/OT and recommended rehab placement  · CT head, c/a/p, spine negative  · Fall precautions  · UA pending  · PT/OT  · CM

## 2023-02-16 NOTE — UTILIZATION REVIEW
Initial Clinical Review    Admission: Date/Time/Statement:   Admission Orders (From admission, onward)     Ordered        02/15/23 1930  Place in Observation  Once                      Orders Placed This Encounter   Procedures   • Place in Observation     Standing Status:   Standing     Number of Occurrences:   1     Order Specific Question:   Level of Care     Answer:   Med Surg [16]     ED Arrival Information     Expected   -    Arrival   2/15/2023 14:21    Acuity   Urgent            Means of arrival   Ambulance    Escorted by   [facility]    Admission type   Emergency            Arrival complaint   Frequent falls           Chief Complaint   Patient presents with   • Fall     Arrives BLS, freq fall L sided deficit s/p stroke 4 months ago  Pt lives alone, co of L arm pain and L leg pain hx gout (redness and swollen noted to area) AXOX3  Initial Presentation:   59 yom to ER from home s/p witnessed fall, c/o neck & low back pain  Hx stroke (L sided weakness), seizures, alcohol abuse, HTN, multiple falls  Presents febrile with tenderness to the thoracic and lumbar spine  There is some tenderness over the posterior ribs bilaterally  No crepitus  Extremities appear atraumatic  R & L sided weakness, spasticity L arm  Placed in observation status:  Assessment and plan:  1  Frequent falls-CAT scan of the brain, cervical spine, abdomen and pelvis was unremarkable  Left hand and left foot x-rays were ordered which showed no evidence of fractures  Continue PT/OT  Patient will need placement in short-term rehabilitation  2  Low back pain-CAT scan showed no fractures  Continue Lidoderm patch, scheduled Tylenol and oxycodone for pain control  3  History of stroke-continue aspirin and Lipitor  4   Hypertension-continue metoprolol      ED Triage Vitals [02/15/23 1432]   Temperature Pulse Respirations Blood Pressure SpO2   99 1 °F (37 3 °C) 68 18 126/78 98 %      Temp Source Heart Rate Source Patient Position - Orthostatic VS BP Location FiO2 (%)   Tympanic Monitor Sitting Right arm --      Pain Score       6          Wt Readings from Last 1 Encounters:   02/15/23 73 2 kg (161 lb 6 4 oz)     Additional Vital Signs:   02/16/23 05:14:34 -- 64 13 116/72 87 95 % -- --   02/16/23 03:03:28 -- 67 18 110/68 82 97 % -- --   02/16/23 01:01:58 -- 68 19 106/64 78 92 % -- --   02/16/23 00:11:42 -- 70 17 105/64 78 91 % -- --   02/15/23 23:01:03 97 1 °F (36 2 °C) Abnormal  71 18 99/62 74 88 % Abnormal  -- --   02/15/23 22:13:56 -- 74 17 102/62 75 88 % Abnormal  -- --   02/15/23 20:55:02 97 3 °F (36 3 °C) Abnormal  69 15 130/75 93 94 % -- --   02/15/23 1940 -- 69 18 130/81 -- 99 % None (Room air) Lying   02/15/23 1802 -- 69 16 126/76 -- 98 % None (Room air) Lying   02/15/23 1650 -- -- 18 125/76 -- 97 % None (Room air) Lying   02/15/23 1527 -- 70 18 124/78 -- 98 % None (Room air) Lying   02/15/23 1432 99 1 °F (37 3 °C) 68 18 126/78 -- 98 % None (Room air) Sitting     Pertinent Labs/Diagnostic Test Results:   CT head without contrast   Final Result  (02/15 1704)      No acute intracranial abnormality  CT cervical spine without contrast   Final Result  (02/15 1707)      No cervical spine fracture or traumatic malalignment  CT recon only thoracolumbar (no charge)   Final Result (02/15 1717)      No evidence of thoracic or lumbar spine fracture  CT chest abdomen pelvis w contrast   Final Result  (02/15 1715)      No evidence of acute trauma in the chest, abdomen or pelvis              Results from last 7 days   Lab Units 02/16/23  0446 02/15/23  1548   WBC Thousand/uL 5 85 8 35   HEMOGLOBIN g/dL 13 8 14 0   HEMATOCRIT % 40 6 39 6   PLATELETS Thousands/uL 160 156   NEUTROS ABS Thousands/µL 3 77 6 25         Results from last 7 days   Lab Units 02/16/23  0446 02/15/23  1600   SODIUM mmol/L 136 135   POTASSIUM mmol/L 3 3* 3 5   CHLORIDE mmol/L 103 101   CO2 mmol/L 23 23   ANION GAP mmol/L 10 11   BUN mg/dL 17 17   CREATININE mg/dL 0 90 0 91   EGFR ml/min/1 73sq m 92 91   CALCIUM mg/dL 8 9 9 6   MAGNESIUM mg/dL 1 9  --      Results from last 7 days   Lab Units 02/15/23  1600   AST U/L 15   ALT U/L 11   ALK PHOS U/L 80   TOTAL PROTEIN g/dL 6 7   ALBUMIN g/dL 4 2   TOTAL BILIRUBIN mg/dL 0 84         Results from last 7 days   Lab Units 02/16/23  0446 02/15/23  1600   GLUCOSE RANDOM mg/dL 88 87             No results found for: BETA-HYDROXYBUTYRATE                   Results from last 7 days   Lab Units 02/15/23  1949 02/15/23  1600   HS TNI 0HR ng/L  --  6   HS TNI 2HR ng/L 6  --    HSTNI D2 ng/L 0  --                                                              Results from last 7 days   Lab Units 02/15/23  2139   CLARITY UA  Clear   COLOR UA  Yellow   SPEC GRAV UA  1 010   PH UA  6 0   GLUCOSE UA mg/dl Negative   KETONES UA mg/dl Trace*   BLOOD UA  Negative   PROTEIN UA mg/dl Negative   NITRITE UA  Negative   BILIRUBIN UA  Small*   UROBILINOGEN UA E U /dl 0 2   LEUKOCYTES UA  Negative                                               ED Treatment:   Medication Administration from 02/15/2023 1421 to 02/15/2023 2035       Date/Time Order Dose Route Action     02/15/2023 1526 EST acetaminophen (TYLENOL) tablet 650 mg 650 mg Oral Given     02/15/2023 1624 EST iohexol (OMNIPAQUE) 350 MG/ML injection (SINGLE-DOSE) 100 mL 100 mL Intravenous Given     02/15/2023 2015 EST lidocaine (LIDODERM) 5 % patch 1 patch 1 patch Topical Medication Applied        Past Medical History:   Diagnosis Date   • Biceps tendonitis, unspecified laterality 09/17/2007   • Bone cyst 11/22/2011   • Bronchiectasis ([address] ) 10/02/2006   • Bursitis 11/17/2005   • Chest pain 04/29/2009   • Chronic renal failure 11/22/2011   • Diverticulitis of colon 10/09/2007   • Generalized anxiety disorder 06/08/2006   • GERD (gastroesophageal reflux disease)    • Gout    • Hyperlipidemia    • Hypertension    • Lateral epicondylitis, unspecified elbow     Last Assessed: 11/29/2013   • Low magnesium levels    • Lyme disease 11/09/2009   • Pneumonia     Last Assessed: 1/7/2013   • Stroke [facility])      Present on Admission:  • Seizure disorder ([facility] [address] )  • Hypertension  • Hypertension  • Tobacco dependence      Admitting Diagnosis: Arm pain [M79 603]  Leg pain [M79 606]  Strain of neck muscle, initial encounter [S16  1XXA]  Strain of lumbar region, initial encounter [S39 012A]  Ambulatory dysfunction [R26 2]  Age/Sex: 59 y o  male  Admission Orders:  Pt/ot eval & tx  Seizure precautions  Scd/foot pumps    Scheduled Medications:  acetaminophen, 975 mg, Oral, Q8H [facility]  aspirin, 81 mg, Oral, Daily  atorvastatin, 40 mg, Oral, Daily With Dinner  enoxaparin, 40 mg, Subcutaneous, Daily  fenofibrate, 145 mg, Oral, Daily  influenza vaccine, 0 5 mL, Intramuscular, Once  lacosamide, 100 mg, Oral, Q12H LUCIA  levETIRAcetam, 1,500 mg, Oral, Q12H  lidocaine, 1 patch, Topical, Daily  magnesium Oxide, 400 mg, Oral, BID  melatonin, 3 mg, Oral, HS  metoprolol succinate, 100 mg, Oral, Daily  pantoprazole, 20 mg, Oral, Early Morning  verapamil, 120 mg, Oral, HS    PRN Meds:  methocarbamol, 750 mg, Oral, Q6H PRN  oxyCODONE, 5 mg, Oral, Q4H PRN    Network Utilization Review Department  ATTENTION: Please call with any questions or concerns to [phone] and carefully listen to the prompts so that you are directed to the right person  All voicemails are confidential   Joshua Giles all requests for admission clinical reviews, approved or denied determinations and any other requests to dedicated fax number below belonging to the campus where the patient is receiving treatment   List of dedicated fax numbers for the Facilities:  [address] [place] [address] DENIALS (Administrative/Medical Necessity) [phone]   [address] [place] [address] (Maternity/NICU/Pediatrics) Kenna Tavarez 172 [address] [facility] 406 [facility] [address] [place] [address] Vini Bach [facility] 28 U [facility] 310 [facility] Martin [address] [phone]

## 2023-02-16 NOTE — H&P
[address] 1965, 59 y o  male MRN: [id number]  Unit/Bed#: [address] [place] [address] Encounter: [id number]  Primary Care Provider: No primary care provider on file  Date and time admitted to hospital: 2/15/2023  2:27 PM    * Fall at home  [address] [place] [address]  Patient presents after fall at home today, history of stroke with L sided weakness, lives alone - was seen by PT/OT and recommended rehab placement  · CT head, c/a/p, spine negative  · Fall precautions  · UA pending  · PT/OT  · CM      Seizure disorder ([address] )  Assessment & Plan  Continue home medications, vimpat and keppra    Lower back pain  Assessment & Plan  Complaining of lumbar tenderness  · CT scans negative  · Tylenol atc, lidocaine patch    Tobacco dependence  Assessment & Plan  Recommend cessation, nicotine patch ordered    History of stroke  Assessment & Plan  Patient with previous stroke involving posterior right MCA distribution and right posterior cerebral artery distributions, with some left sided residual weakness  · Continue aspirin, lipitor    Hypertension  Assessment & Plan  Continue metoprolol    VTE Pharmacologic Prophylaxis: VTE Score: 3 Moderate Risk (Score 3-4) - Pharmacological DVT Prophylaxis Ordered: heparin  Code Status: Level 1 - Full Code   Discussion with family: Updated  (sister) via phone  Anticipated Length of Stay: Patient will be admitted on an observation basis with an anticipated length of stay of less than 2 midnights secondary to rehab placement  Total Time Spent on Date of Encounter in care of patient: 55 minutes This time was spent on one or more of the following: performing physical exam; counseling and coordination of care; obtaining or reviewing history; documenting in the medical record; reviewing/ordering tests, medications or procedures; communicating with other healthcare professionals and discussing with patient's family/caregivers      Chief Complaint: fall    History of Present Illness:  Jamilah Orosco is a 59 y o  male with a PMH of stroke, seizures, alcohol abuse, HTN who presents with fall  Patient states he fell at home this afternoon onto the floor  His sister was with him and witnessed the fall  He is unable to recall what happened, is complaining of lower back pain  Patient lives at home alone and has fallen multiple times  Denies any lightheadedness, dizziness, chest pain, SOB, abdominal pain  Was seen by PT/OT and rec rehab placement  Will be admitted to be seen by CM and for dispo planning  Review of Systems:  Review of Systems   Constitutional: Negative for chills and fever  HENT: Negative for ear pain and sore throat  Respiratory: Negative for cough and shortness of breath  Cardiovascular: Negative for chest pain and palpitations  Gastrointestinal: Negative for abdominal pain and vomiting  Genitourinary: Negative for dysuria and hematuria  Musculoskeletal: Positive for gait problem  Negative for arthralgias and back pain  Skin: Negative for color change and rash  Neurological: Positive for weakness  Negative for seizures and syncope         Past Medical and Surgical History:   Past Medical History:   Diagnosis Date   • Biceps tendonitis, unspecified laterality 09/17/2007   • Bone cyst 11/22/2011   • Bronchiectasis ([address] ) 10/02/2006   • Bursitis 11/17/2005   • Chest pain 04/29/2009   • Chronic renal failure 11/22/2011   • Diverticulitis of colon 10/09/2007   • Generalized anxiety disorder 06/08/2006   • GERD (gastroesophageal reflux disease)    • Gout    • Hyperlipidemia    • Hypertension    • Lateral epicondylitis, unspecified elbow     Last Assessed: 11/29/2013   • Low magnesium levels    • Lyme disease 11/09/2009   • Pneumonia     Last Assessed: 1/7/2013   • Stroke [facility])        Past Surgical History:   Procedure Laterality Date   • CARDIAC ELECTROPHYSIOLOGY PROCEDURE N/A 5/19/2022    Procedure: Cardiac loop recorder implant; Surgeon: Katie Ng MD;  Location: [place] [address] CATH [facility]; Service: Cardiology   • KNEE ARTHROSCOPY      Therapeutic   • OLECRANON BURSA EXCISION         Meds/Allergies:  Prior to Admission medications    Medication Sig Start Date End Date Taking? Authorizing Provider   acetaminophen (TYLENOL) 325 mg tablet Take 2 tablets (650 mg total) by mouth every 6 (six) hours as needed for mild pain, headaches or fever 5/23/22  Yes NOAM Mathew   fenofibrate (TRICOR) 145 mg tablet Take 1 tablet (145 mg total) by mouth daily 3/5/18  Yes NOAM Escobedo   magnesium oxide (MAG-OX) 400 mg Take 1 tablet (400 mg total) by mouth in the morning and 1 tablet (400 mg total) in the evening  5/23/22  Yes NOAM Mathew   melatonin 3 mg Take 1 tablet (3 mg total) by mouth daily at bedtime 5/23/22  Yes NOAM Mathew   methocarbamol (ROBAXIN) 750 mg tablet Take 1 tablet (750 mg total) by mouth every 6 (six) hours 5/23/22  Yes NOAM Mathew   metoprolol succinate (TOPROL-XL) 100 mg 24 hr tablet Take 1 tablet (100 mg total) by mouth daily 3/5/18  Yes NOAM Escobedo   omeprazole (PriLOSEC) 20 mg delayed release capsule Take 20 mg by mouth daily  Yes Historical Provider, MD   aspirin 81 mg chewable tablet Chew 1 tablet (81 mg total)  in the morning  Patient not taking: Reported on 2/15/2023 5/24/22   NOAM Mathew   atorvastatin (LIPITOR) 80 mg tablet Take 0 5 tablets (40 mg total) by mouth daily with dinner  Patient not taking: Reported on 9/2/2022 5/23/22   NOAM Mathew   clopidogrel (PLAVIX) 75 mg tablet Take 1 tablet (75 mg total) by mouth in the morning for 16 days  5/24/22 6/9/22  NOAM Mathew   Diclofenac Sodium (VOLTAREN) 1 % Apply 2 g topically in the morning and 2 g at noon and 2 g in the evening and 2 g before bedtime    Patient not taking: Reported on 2/15/2023 5/23/22   NOAM Mathew   lacosamide (VIMPAT) 100 mg tablet Take 1 tablet (100 mg total) by mouth every 12 (twelve) hours for 10 days 22  NOAM Dillon   levETIRAcetam (KEPPRA) 750 mg tablet Take 2 tablets (1,500 mg total) by mouth every 12 (twelve) hours for 10 days 22  NOAM Dillon   nicotine (NICODERM CQ) 21 mg/24 hr TD 24 hr patch Place 1 patch on the skin daily  Patient not taking: Reported on 2/15/2023 9/8/22   NOAM Dillon   thiamine 100 MG tablet Take 1 tablet (100 mg total) by mouth in the morning  22  Lena Ricks MD   verapamil (CALAN-SR) 120 mg CR tablet Take 1 tablet (120 mg total) by mouth daily at bedtime  Patient not taking: Reported on 2/15/2023 5/23/22   NOAM Hu     I have reviewed home medications with patient family member  Allergies:    Allergies   Allergen Reactions   • Codeine Hives     Tolerates morphine   • Penicillins    • Hydrocodone Rash     Reaction Date: 2007;        Social History:  Marital Status: /Civil Union   Occupation:   Patient Pre-hospital Living Situation: Home  Patient Pre-hospital Level of Mobility: walks with walker  Patient Pre-hospital Diet Restrictions:   Substance Use History:   Social History     Substance and Sexual Activity   Alcohol Use Yes   • Alcohol/week: 6 0 standard drinks   • Types: 6 Glasses of wine per week    Comment: 20-states he has not had alcohol since 2021     Social History     Tobacco Use   Smoking Status Former   • Packs/day: 0 50   • Types: Cigarettes   • Quit date: 5/10/2022   • Years since quittin 7   Smokeless Tobacco Never   Tobacco Comments    cigarette nicotine dependence     Social History     Substance and Sexual Activity   Drug Use No       Family History:  Family History   Problem Relation Age of Onset   • Cancer Mother         Colon       Physical Exam:     Vitals:   Blood Pressure: 106/64 (23 010)  Pulse: 68 (23 010)  Temperature: (!) 97 1 °F (36 2 °C) (02/15/23 2301)  Temp Source: Oral (02/15/23 2055)  Respirations: 19 (02/16/23 0101)  Height: 5' 11" (180 3 cm) (02/15/23 2055)  Weight - Scale: 73 2 kg (161 lb 6 4 oz) (02/15/23 2055)  SpO2: 92 % (02/16/23 0101)    Physical Exam  Vitals reviewed  Constitutional:       General: He is not in acute distress  Appearance: He is well-developed  HENT:      Head: Normocephalic and atraumatic  Eyes:      Conjunctiva/sclera: Conjunctivae normal    Cardiovascular:      Rate and Rhythm: Normal rate and regular rhythm  Heart sounds: No murmur heard  Pulmonary:      Effort: Pulmonary effort is normal  No respiratory distress  Breath sounds: Normal breath sounds  Abdominal:      Palpations: Abdomen is soft  Tenderness: There is no abdominal tenderness  Musculoskeletal:         General: No swelling  Comments: Lumbar tenderness, no deformities noted   Skin:     General: Skin is warm and dry  Capillary Refill: Capillary refill takes less than 2 seconds  Neurological:      Mental Status: He is alert and oriented to person, place, and time     Psychiatric:         Mood and Affect: Mood normal           Additional Data:     Lab Results:  Results from last 7 days   Lab Units 02/15/23  1548   WBC Thousand/uL 8 35   HEMOGLOBIN g/dL 14 0   HEMATOCRIT % 39 6   PLATELETS Thousands/uL 156   NEUTROS PCT % 75   LYMPHS PCT % 15   MONOS PCT % 9   EOS PCT % 1     Results from last 7 days   Lab Units 02/15/23  1600   SODIUM mmol/L 135   POTASSIUM mmol/L 3 5   CHLORIDE mmol/L 101   CO2 mmol/L 23   BUN mg/dL 17   CREATININE mg/dL 0 91   ANION GAP mmol/L 11   CALCIUM mg/dL 9 6   ALBUMIN g/dL 4 2   TOTAL BILIRUBIN mg/dL 0 84   ALK PHOS U/L 80   ALT U/L 11   AST U/L 15   GLUCOSE RANDOM mg/dL 87                       Lines/Drains:  Invasive Devices     Peripheral Intravenous Line  Duration           Peripheral IV 02/15/23 Left;Ventral (anterior) Forearm <1 day                    Imaging: Reviewed radiology reports from this admission including: chest CT scan, abdominal/pelvic CT and CT head  CT head without contrast   Final Result by Alex Ashby MD (02/15 1704)      No acute intracranial abnormality  Workstation performed: [id number]         CT cervical spine without contrast   Final Result by Alex Ashby MD (02/15 1707)      No cervical spine fracture or traumatic malalignment  Workstation performed: [id number]         CT recon only thoracolumbar (no charge)   Final Result by Alex Ashby MD (02/15 1717)      No evidence of thoracic or lumbar spine fracture  Workstation performed: [id number]         CT chest abdomen pelvis w contrast   Final Result by Alex Ashby MD (02/15 1715)      No evidence of acute trauma in the chest, abdomen or pelvis  Workstation performed: [id number]             EKG and Other Studies Reviewed on Admission:       ** Please Note: This note has been constructed using a voice recognition system   **

## 2023-02-16 NOTE — CASE MANAGEMENT
Case Management Progress Note    Patient name Enzo Crandall  Location [address] 401/4 [address] [address]-* MRN [id number]  : 1965 Date 2023       LOS (days): 0  Geometric Mean LOS (GMLOS) (days):   Days to [facility]:        OBJECTIVE:      Current admission status: Observation  Preferred Pharmacy:   [address] [facility] Anna Pichardo, [address], [address]   [place] [address] [address]  Phone: [phone] Fax: [phone]    [address] [facility] #[id number], [address]  Phone: [phone] Fax: [phone]    Renetta [address] [facility] [address]  Phone: [phone] Fax: [phone] [address] [facility] [address]   [facility] [address]  Phone: [phone] Fax: [phone]    Primary Care Provider: No primary care provider on file  Primary Insurance: BLUE CROSS  Secondary Insurance:     PROGRESS NOTE:    SIDDHARTHA was notified by CM [address] [place] [address] that authorization request for acute rehabilitation at [facility] has been denied with SNF LOC recommended  SIDDHARTHA spoke with patient and re-opened referral for [facility] to review patient choice list with patient tomorrow and request auth for preferred facility

## 2023-02-16 NOTE — ASSESSMENT & PLAN NOTE
Patient with previous stroke involving posterior right MCA distribution and right posterior cerebral artery distributions, with some left sided residual weakness  · Continue aspirin, lipitor

## 2023-02-16 NOTE — PROGRESS NOTES
-- Patient: Nicole Jones  -- MRN: [id number]  -- Aidin Request ID: [id number]  -- Level of care reserved: Inpatient Rehab Facility  -- Partner Reserved: [facility] MANUEL for [address], [place], [address] [place] [address] [phone]  -- Clinical needs requested:  -- Geography searched: 30 miles around [facility]  -- Start of Service:  -- Request sent: 9:41am EST on 2/16/2023 by Jennie Walker  -- Partner reserved: 12:10pm EST on 2/16/2023 by Tono Joyner  -- Choice list shared: 12:02pm EST on 2/16/2023 by Tono Joyner

## 2023-02-16 NOTE — CASE MANAGEMENT
Case Management Assessment & Discharge Planning Note    Patient name Briana Benites  Location [address] [place] [address] Street-* MRN [id number]  : 1965 Date 2023       Current Admission Date: 2/15/2023  Current Admission Diagnosis:Fall at home   Patient Active Problem List    Diagnosis Date Noted   • Fall at home 02/15/2023   • Lower back pain 02/15/2023   • Tobacco dependence 2022   • Multiple falls 2022   • History of stroke 2022   • CVA [facility]cerebral vascular accident) ([facility] ) 2022   • Weakness 2022   • Hypomagnesemia 2022   • Stroke-like symptoms 2022   • Contusion of right foot    • Right foot pain    • Arthralgia of right foot    • Chronic tophaceous gout of right foot    • Syncopal episodes 2022   • SIRS (systemic inflammatory response syndrome) ([facility] ) 2022   • Acute gout of multiple sites 2021   • Hypertension 2021   • Hyperlipidemia 2021   • Smoking 2021   • GERD (gastroesophageal reflux disease) 2021   • Depression 2021   • Left bundle branch block 2021   • Thrombocytopenia ([facility] ) 2021   • Prolonged Q-T interval on ECG 2021   • Rhabdomyolysis 2021   • Breakthrough seizure ([facility] ) 2021   • Seizure disorder ([facility] ) 2021   • History of cerebral hemorrhage 2021   • History of alcohol abuse 2021   • Hypocalcemia 2017   • Lactic acidosis 2017   • Rhabdomyolysis 2017   • Nicotine dependence 2017   • Dyslipidemia 2017   • Seizure ([facility] ) 2017   • Diarrhea 2016   • Blood in stool 2016   • Hypertension    • Hyperlipidemia    • GERD (gastroesophageal reflux disease)    • Compression neuropathy of lower extremity 02/10/2016      LOS (days): 0  Geometric Mean LOS (GMLOS) (days):   Days to GMLOS:     OBJECTIVE:              Current admission status: Observation       Preferred Pharmacy:   [address] [facility] Anna P[place]ardo, [address], [address]  Phone: [phone] Fax: [phone]    [address] #[id number], [address]  Phone: [phone] Fax: [phone]    Ren[address] [address]  Phone: [phone] Fax: [phone] [place], [place] - [address]  [address] [place] [address]  Phone: [phone] Fax: [phone]    Primary Care Provider: No primary care provider on file  Primary Insurance: BLUE CROSS  Secondary Insurance:     ASSESSMENT:  Active Health Care Proxies     [facility]   Primary Phone: [phone] (Home)            Readmission Root Cause  30 Day Readmission: No    Patient Information  Admitted from[de-identified] Home  Mental Status: Alert  During Assessment patient was accompanied by: Not accompanied during assessment  Assessment information provided by[de-identified] Patient  Primary Caregiver: Self  Support Systems: Self, Children, Family members  [place] of Residence: [address] [place] [address] do you live in?: [address] entry access options   Select all that apply : Stairs  Number of steps to enter home : 5  Do the steps have railings?: Yes  Type of Current Residence: [facility]  In the last 12 months, was there a time when you were not able to pay the mortgage or rent on time?: No  In the last 12 months, how many places have you lived?: 1  In the last 12 months, was there a time when you did not have a steady place to sleep or slept in a shelter (including now)?: No  Homeless/housing insecurity resource given?: No  Living Arrangements: Lives Alone  Is patient a ?: No    Activities of Daily Living Prior to Admission  Functional Status: Independent  Completes ADLs independently?: Yes  Ambulates independently?: Yes  Does patient use assisted devices?: No  Does patient currently own DME?: Yes  What DME does the patient currently own?: Keny Evangelista (reports uses PRN)  Does patient have a history of Outpatient Therapy (PT/OT)?: No  Does the patient have a history of Short-Term Rehab?: Yes (@ [facility])  Does patient have a history of HHC?: No  Does patient currently have [address]?: No    Patient Information Continued  Income Source: Employed  Does patient have prescription coverage?: Yes  Within the past 12 months, you worried that your food would run out before you got the money to buy more : Never true  Within the past 12 months, the food you bought just didn't last and you didn't have money to get more : Never true  Food insecurity resource given?: No  Does patient have a history of substance abuse?: No  Does patient have a history of Mental Health Diagnosis?: No    PHQ 2/9 Screening   Reviewed PHQ 2/9 Depression Screening Score?: No    Means of Transportation  Means of Transport to Appts[de-identified] Drives Self  In the past 12 months, has lack of transportation kept you from medical appointments or from getting medications?: No  In the past 12 months, has lack of transportation kept you from meetings, work, or from getting things needed for daily living?: No  Was application for public transport provided?: No    DISCHARGE DETAILS:    Discharge planning discussed with[de-identified] Patient  Freedom of Choice: Yes     CM contacted family/caregiver?: Yes (VM left for CJ)  Were Treatment Team discharge recommendations reviewed with patient/caregiver?: Yes  Did patient/caregiver verbalize understanding of patient care needs?: N/A- going to facility  Were patient/caregiver advised of the risks associated with not following Treatment Team discharge recommendations?: Yes    Requested [address]         Is the patient interested in [address] at discharge?: No    DME Referral Provided  Referral made for DME?: No    Other Referral/Resources/Interventions Provided:  Interventions: Acute Rehab, Short Term Rehab  Referral Comments: Referrals opened via Aidin to [facility] and STR    Would you like to participate in our [address] service program?  : No - Declined    Treatment Team Recommendation: Acute Rehab, Short Term Rehab  Discharge Destination Plan[de-identified] Acute Rehab, Short Term Rehab  Transport at Discharge : Wheelchair van     Additional Comments:   CM s/w patient bedside regarding DCP  Patient in agreement with rehab recommendation  Patient reports he was completely IPTA without use of AD  Patient reports he has a [facility] which he uses as needed  Referrals pending for [facility] and STR at this time  Patient will need auth prior to transfer to skilled setting  CM to follow for bed offers, patient choice, and authorization      Maggie Laura, LUCÍA, FAHEEMW, ACM, [facility]  02/16/23 9:46 AM

## 2023-02-16 NOTE — PLAN OF CARE
Problem: MOBILITY - ADULT  Goal: Maintain or return to baseline ADL function  Description: INTERVENTIONS:  -  Assess patient's ability to carry out ADLs; assess patient's baseline for ADL function and identify physical deficits which impact ability to perform ADLs (bathing, care of mouth/teeth, toileting, grooming, dressing, etc )  - Assess/evaluate cause of self-care deficits   - Assess range of motion  - Assess patient's mobility; develop plan if impaired  - Assess patient's need for assistive devices and provide as appropriate  - Encourage maximum independence but intervene and supervise when necessary  - Involve family in performance of ADLs  - Assess for home care needs following discharge   - Consider OT consult to assist with ADL evaluation and planning for discharge  - Provide patient education as appropriate  2/16/2023 1303 by Kaylin Ibarra RN  Outcome: Progressing  2/16/2023 1303 by Kaylin Ibarra RN  Outcome: Progressing  Goal: Maintains/Returns to pre admission functional level  Description: INTERVENTIONS:  - Perform BMAT or MOVE assessment daily    - Set and communicate daily mobility goal to care team and patient/family/caregiver  - Collaborate with rehabilitation services on mobility goals if consulted  - Perform Range of Motion 2 times a day  - Reposition patient every 2 hours    - Dangle patient 2 times a day  - Stand patient 2 times a day  - Ambulate patient 2 times a day  - Out of bed to chair 2 times a day   - Out of bed for meals 2 times a day  - Out of bed for toileting  - Record patient progress and toleration of activity level   2/16/2023 1303 by Kaylin Ibarra RN  Outcome: Progressing  2/16/2023 1303 by Kaylin Ibarra RN  Outcome: Progressing     Problem: Prexisting or High Potential for Compromised Skin Integrity  Goal: Skin integrity is maintained or improved  Description: INTERVENTIONS:  - Identify patients at risk for skin breakdown  - Assess and monitor skin integrity  - Assess and monitor nutrition and hydration status  - Monitor labs   - Assess for incontinence   - Turn and reposition patient  - Assist with mobility/ambulation  - Relieve pressure over bony prominences  - Avoid friction and shearing  - Provide appropriate hygiene as needed including keeping skin clean and dry  - Evaluate need for skin moisturizer/barrier cream  - Collaborate with interdisciplinary team   - Patient/family teaching  - Consider wound care consult   2/16/2023 1303 by Demetris De Jesus RN  Outcome: Progressing  2/16/2023 1303 by Demetris De Jesus RN  Outcome: Progressing     Problem: NEUROSENSORY - ADULT  Goal: Achieves stable or improved neurological status  Description: INTERVENTIONS  - Monitor and report changes in neurological status  - Monitor vital signs such as temperature, blood pressure, glucose, and any other labs ordered   - Initiate measures to prevent increased intracranial pressure  - Monitor for seizure activity and implement precautions if appropriate      2/16/2023 1303 by Demetris De Jesus RN  Outcome: Progressing  2/16/2023 1303 by Demetris De Jesus RN  Outcome: Progressing  Goal: Remains free of injury related to seizures activity  Description: INTERVENTIONS  - Maintain airway, patient safety  and administer oxygen as ordered  - Monitor patient for seizure activity, document and report duration and description of seizure to physician/advanced practitioner  - If seizure occurs,  ensure patient safety during seizure  - Reorient patient post seizure  - Seizure pads on all 4 side rails  - Instruct patient/family to notify RN of any seizure activity including if an aura is experienced  - Instruct patient/family to call for assistance with activity based on nursing assessment  - Administer anti-seizure medications if ordered    2/16/2023 1303 by Demetris De Jesus RN  Outcome: Progressing  2/16/2023 1303 by Demetris De Jesus RN  Outcome: Progressing  Goal: Achieves maximal functionality and self care  Description: INTERVENTIONS  - Monitor swallowing and airway patency with patient fatigue and changes in neurological status  - Encourage and assist patient to increase activity and self care     - Encourage visually impaired, hearing impaired and aphasic patients to use assistive/communication devices  2/16/2023 1303 by Tevin Conti RN  Outcome: Progressing  2/16/2023 1303 by Tevin Conti RN  Outcome: Progressing

## 2023-02-17 LAB — SARS-COV-2 RNA RESP QL NAA+PROBE: NEGATIVE

## 2023-02-17 RX ORDER — COLCHICINE 0.6 MG/1
0.6 TABLET ORAL ONCE
Status: COMPLETED | OUTPATIENT
Start: 2023-02-17 | End: 2023-02-17

## 2023-02-17 RX ORDER — KETOROLAC TROMETHAMINE 30 MG/ML
30 INJECTION, SOLUTION INTRAMUSCULAR; INTRAVENOUS EVERY 6 HOURS PRN
Status: DISCONTINUED | OUTPATIENT
Start: 2023-02-17 | End: 2023-02-18 | Stop reason: HOSPADM

## 2023-02-17 RX ORDER — KETOROLAC TROMETHAMINE 30 MG/ML
30 INJECTION, SOLUTION INTRAMUSCULAR; INTRAVENOUS EVERY 6 HOURS PRN
Status: DISCONTINUED | OUTPATIENT
Start: 2023-02-17 | End: 2023-02-17

## 2023-02-17 RX ORDER — COLCHICINE 0.6 MG/1
1.2 TABLET ORAL ONCE
Status: COMPLETED | OUTPATIENT
Start: 2023-02-17 | End: 2023-02-17

## 2023-02-17 RX ADMIN — MELATONIN TAB 3 MG 3 MG: 3 TAB at 21:25

## 2023-02-17 RX ADMIN — LACOSAMIDE 100 MG: 50 TABLET, FILM COATED ORAL at 21:25

## 2023-02-17 RX ADMIN — PANTOPRAZOLE SODIUM 20 MG: 20 TABLET, DELAYED RELEASE ORAL at 05:56

## 2023-02-17 RX ADMIN — KETOROLAC TROMETHAMINE 30 MG: 30 INJECTION, SOLUTION INTRAMUSCULAR at 14:39

## 2023-02-17 RX ADMIN — LIDOCAINE 5% 1 PATCH: 700 PATCH TOPICAL at 08:30

## 2023-02-17 RX ADMIN — ACETAMINOPHEN 975 MG: 325 TABLET, FILM COATED ORAL at 05:56

## 2023-02-17 RX ADMIN — LEVETIRACETAM 1500 MG: 500 TABLET, FILM COATED ORAL at 21:25

## 2023-02-17 RX ADMIN — LEVETIRACETAM 1500 MG: 500 TABLET, FILM COATED ORAL at 08:31

## 2023-02-17 RX ADMIN — OXYCODONE HYDROCHLORIDE 5 MG: 5 TABLET ORAL at 19:43

## 2023-02-17 RX ADMIN — ACETAMINOPHEN 975 MG: 325 TABLET, FILM COATED ORAL at 21:25

## 2023-02-17 RX ADMIN — ASPIRIN 81 MG CHEWABLE TABLET 81 MG: 81 TABLET CHEWABLE at 08:31

## 2023-02-17 RX ADMIN — OXYCODONE HYDROCHLORIDE 5 MG: 5 TABLET ORAL at 05:56

## 2023-02-17 RX ADMIN — MAGNESIUM OXIDE TAB 400 MG (241.3 MG ELEMENTAL MG) 400 MG: 400 (241.3 MG) TAB at 17:02

## 2023-02-17 RX ADMIN — VERAPAMIL HYDROCHLORIDE 120 MG: 120 TABLET, FILM COATED, EXTENDED RELEASE ORAL at 21:25

## 2023-02-17 RX ADMIN — LACOSAMIDE 100 MG: 50 TABLET, FILM COATED ORAL at 08:31

## 2023-02-17 RX ADMIN — METOPROLOL SUCCINATE 100 MG: 100 TABLET, EXTENDED RELEASE ORAL at 08:31

## 2023-02-17 RX ADMIN — COLCHICINE 1.2 MG: 0.6 TABLET ORAL at 12:10

## 2023-02-17 RX ADMIN — ACETAMINOPHEN 975 MG: 325 TABLET, FILM COATED ORAL at 14:41

## 2023-02-17 RX ADMIN — COLCHICINE 0.6 MG: 0.6 TABLET ORAL at 14:44

## 2023-02-17 RX ADMIN — TRAMADOL HYDROCHLORIDE 50 MG: 50 TABLET, COATED ORAL at 12:18

## 2023-02-17 RX ADMIN — ATORVASTATIN CALCIUM 40 MG: 40 TABLET, FILM COATED ORAL at 17:02

## 2023-02-17 RX ADMIN — ENOXAPARIN SODIUM 40 MG: 40 INJECTION SUBCUTANEOUS at 08:31

## 2023-02-17 RX ADMIN — FENOFIBRATE 145 MG: 145 TABLET, FILM COATED ORAL at 08:31

## 2023-02-17 RX ADMIN — MAGNESIUM OXIDE TAB 400 MG (241.3 MG ELEMENTAL MG) 400 MG: 400 (241.3 MG) TAB at 08:31

## 2023-02-17 NOTE — CASE MANAGEMENT
Case Management Discharge Planning Note    Patient name Saranya Timmons  Location [address] [place] [address] Street-* MRN [id number]  : 1965 Date 2023       Current Admission Date: 2/15/2023  Current Admission Diagnosis:Fall at home   Patient Active Problem List    Diagnosis Date Noted   • Fall at home 02/15/2023   • Lower back pain 02/15/2023   • Tobacco dependence 2022   • Multiple falls 2022   • History of stroke 2022   • CVA (cerebral vascular accident) ([facility] [facility] ) 2022   • Weakness 2022   • Hypomagnesemia 2022   • Stroke-like symptoms 2022   • Contusion of right foot    • Right foot pain    • Arthralgia of right foot    • Chronic tophaceous gout of right foot    • Syncopal episodes 2022   • SIRS (systemic inflammatory response syndrome) ([facility] ) 2022   • Acute gout of multiple sites 2021   • Hypertension 2021   • Hyperlipidemia 2021   • Smoking 2021   • GERD (gastroesophageal reflux disease) 2021   • Depression 2021   • Left bundle branch block 2021   • Thrombocytopenia ([facility] ) 2021   • Prolonged Q-T interval on ECG 2021   • Rhabdomyolysis 2021   • Breakthrough seizure ([facility] ) 2021   • [facility] 2021   • History of cerebral hemorrhage 2021   • History of alcohol abuse 2021   • Hypocalcemia 2017   • Lactic acidosis 2017   • Rhabdomyolysis 2017   • Nicotine dependence 2017   • Dyslipidemia 2017   • Seizure ([facility] ) 2017   • Diarrhea 2016   • Blood in stool 2016   • Hypertension    • Hyperlipidemia    • GERD (gastroesophageal reflux disease)    • Compression neuropathy of lower extremity 02/10/2016      LOS (days): 0  Geometric Mean LOS (GMLOS) (days):   Days to GMLOS:     OBJECTIVE:            Current admission status: Observation   Preferred Pharmacy:   [facility], [address], [address]  508 [facility]  Phone: [phone] Fax: [phone]    [address], 1400 [facility], [address]  Phone: [phone] Fax: [phone]    Renetta Jackson [address] [address]  Phone: [phone] Fax: [phone] [place], [place] - [address]  [address] [address]  Phone: [phone] Fax: [phone]    Primary Care Provider: No primary care provider on file  Primary Insurance: BLUE CROSS  Secondary Insurance:     DISCHARGE DETAILS:    Discharge planning discussed with[de-identified] Patient  Freedom of Choice: Yes  Comments - Freedom of Choice: SW reviewed accepting SNF list with patient  Patient choosing #1 [facility] and #2 [address]  due to proximity to sister's home in [place]  CM contacted family/caregiver?: Yes (Voicemail left for CJ)  Were Treatment Team discharge recommendations reviewed with patient/caregiver?: Yes  Did patient/caregiver verbalize understanding of patient care needs?: N/A- going to facility  Were patient/caregiver advised of the risks associated with not following Treatment Team discharge recommendations?: Yes    Contacts  Patient Contacts: Taran Hughes (daughter)  Relationship to Patient[de-identified] Family  Contact Method: Phone  Phone Number: [phone]  Reason/Outcome: Emergency Contact, Discharge Planning, Continuity of Care    Other Referral/Resources/Interventions Provided:  Interventions: Short Term Rehab  Referral Comments: [facility] reserved in [address]  SW confirmed via phone with admissions rep Max that bed is available for admission today  EARC submitted to [place] Portal and is pending  CM support center tasked to submit STR auth  Approval pending       Treatment Team Recommendation: Short Term Rehab  Discharge Destination Plan[de-identified] New Crystal Name, Candace  Receiving Facility/Agency Phone Number: [phone]

## 2023-02-17 NOTE — CASE MANAGEMENT
Case Management Discharge Planning Note    Patient name Michelle Fischer  Location [address] [address]/ [address]-* MRN [id number]  : 1965 Date 2023       Current Admission Date: 2/15/2023  Current Admission Diagnosis:Fall at home   Patient Active Problem List    Diagnosis Date Noted   • Fall at home 02/15/2023   • Lower back pain 02/15/2023   • Tobacco dependence 2022   • Multiple falls 2022   • History of stroke 2022   • CVA (cerebral vascular accident) ([facility] ) 2022   • Weakness 2022   • Hypomagnesemia 2022   • Stroke-like symptoms 2022   • Contusion of right foot    • Right foot pain    • Arthralgia of right foot    • Chronic tophaceous gout of right foot    • Syncopal episodes 2022   • SIRS (systemic inflammatory response syndrome) ([facility] ) 2022   • Acute gout of multiple sites 2021   • Hypertension 2021   • Hyperlipidemia 2021   • Smoking 2021   • GERD (gastroesophageal reflux disease) 2021   • Depression 2021   • Left bundle branch block 2021   • Thrombocytopenia ([facility] ) 2021   • Prolonged Q-T interval on ECG 2021   • Rhabdomyolysis 2021   • Breakthrough seizure ([facility] ) 2021   • [facility]   • History of cerebral hemorrhage 2021   • History of alcohol abuse 2021   • Hypocalcemia 2017   • Lactic acidosis 2017   • Rhabdomyolysis 2017   • Nicotine dependence 2017   • Dyslipidemia 2017   • Seizure ([facility] ) 2017   • Diarrhea 2016   • Blood in stool 2016   • Hypertension    • Hyperlipidemia    • GERD (gastroesophageal reflux disease)    • Compression neuropathy of lower extremity 02/10/2016      LOS (days): 0  Geometric Mean LOS (GMLOS) (days):   Days to GMLOS:     OBJECTIVE:      Current admission status: Observation   Preferred Pharmacy:   1200 [facility] Anna Pichardo, [address], 12[facility]  Phone: [phone] Fax: [phone] [address], [address]  Phone: [phone] Fax: [phone]    Renetta Jackson 83 [address] [address] [address]  Phone: [phone] Fax: [phone] [place], [place] - [address]  2001 [facility] [address]  Phone: [phone] Fax: [phone]    Primary Care Provider: No primary care provider on file  Primary Insurance: BLUE CROSS  Secondary Insurance:     DISCHARGE DETAILS:    Discharge planning discussed with[de-identified] Patient  Freedom of Choice: Yes  Comments - Freedom of Choice: Accepting SNF list provided to patient to review  Patient advised to pick top (2) choices in case of change in bed availability      Treatment Team Recommendation: Short Term Rehab  Discharge Destination Plan[de-identified] Short Term Rehab

## 2023-02-17 NOTE — CASE MANAGEMENT
[facility] [address] received request for authorization from Care Manager  Authorization request for: SNF  Facility Name: [facility]  NPI: [id number]  Facility MD:   Dr Julissa Velásquez    NPI: [id number]  Authorization initiated by contacting insurance: Rkylin Via: Phone  [phone]  Clinicals submitted via: Aidin Fax   [phone]

## 2023-02-17 NOTE — UTILIZATION REVIEW
Continued Stay Review    Date: 2-                      Current Patient Class: observation  Current Level of Care: med/surg  HPI:58 y o  male with etoh abuse initially admitted on 2-15 obs s/p fall with tenderness to thoracic, lumbar spine and b/l ribs    Assessment/Plan: Decreased strength, decreased ROM, decreased endurance, impaired balance, decreased mobility, impaired judgement, decreased safety awareness and pain  PT/OT recommending IP skilled level rehab  SCDs  Cardiac diet  SCDs  Continue supportive care      Vital Signs:   Date/Time Temp Pulse Resp BP MAP (mmHg) SpO2 O2 Device Patient Position - Orthostatic VS   02/17/23 07:58:25 97 2 °F (36 2 °C) Abnormal  56 16 129/74 92 96 % -- Lying   02/16/23 22:28:17 97 3 °F (36 3 °C) Abnormal  67 19 124/72 89 94 % -- --   02/16/23 21:45:33 -- 67 18 122/64 83 93 % -- --       Pertinent Labs/Diagnostic Results:   Results from last 7 days   Lab Units 02/17/23  1100   SARS-COV-2  Negative     Results from last 7 days   Lab Units 02/16/23  0446 02/15/23  1548   WBC Thousand/uL 5 85 8 35   HEMOGLOBIN g/dL 13 8 14 0   HEMATOCRIT % 40 6 39 6   PLATELETS Thousands/uL 160 156   NEUTROS ABS Thousands/µL 3 77 6 25     Results from last 7 days   Lab Units 02/16/23  0446 02/15/23  1600   SODIUM mmol/L 136 135   POTASSIUM mmol/L 3 3* 3 5   CHLORIDE mmol/L 103 101   CO2 mmol/L 23 23   ANION GAP mmol/L 10 11   BUN mg/dL 17 17   CREATININE mg/dL 0 90 0 91   EGFR ml/min/1 73sq m 92 91   CALCIUM mg/dL 8 9 9 6   MAGNESIUM mg/dL 1 9  --      Results from last 7 days   Lab Units 02/16/23  0446 02/15/23  1600   GLUCOSE RANDOM mg/dL 88 87     Results from last 7 days   Lab Units 02/15/23  2139   CLARITY UA  Clear   COLOR UA  Yellow   SPEC GRAV UA  1 010   PH UA  6 0   GLUCOSE UA mg/dl Negative   KETONES UA mg/dl Trace*   BLOOD UA  Negative   PROTEIN UA mg/dl Negative   NITRITE UA  Negative   BILIRUBIN UA  Small*   UROBILINOGEN UA E U /dl 0 2   LEUKOCYTES UA  Negative       Medications: Scheduled Medications:  acetaminophen, 975 mg, Oral, Q8H [address]  aspirin, 81 mg, Oral, Daily  atorvastatin, 40 mg, Oral, Daily With Dinner  colchicine, 0 6 mg, Oral, Once  enoxaparin, 40 mg, Subcutaneous, Daily  fenofibrate, 145 mg, Oral, Daily  influenza vaccine, 0 5 mL, Intramuscular, Once  lacosamide, 100 mg, Oral, Q12H LUCIA  levETIRAcetam, 1,500 mg, Oral, Q12H  lidocaine, 1 patch, Topical, Daily  magnesium Oxide, 400 mg, Oral, BID  melatonin, 3 mg, Oral, HS  metoprolol succinate, 100 mg, Oral, Daily  pantoprazole, 20 mg, Oral, Early Morning  verapamil, 120 mg, Oral, HS    PRN Meds:  ketorolac, 30 mg, Intravenous, Q6H PRN  methocarbamol, 750 mg, Oral, Q6H PRN 2/16 x2  oxyCODONE, 5 mg, Oral, Q4H PRN 2/16 x3, 2/17 x1  traMADol, 50 mg, Oral, Q6H PRN 2/16 x1, 2/17 x1      Discharge Plan: D    Network Utilization Review Department  ATTENTION: Please call with any questions or concerns to [phone] and carefully listen to the prompts so that you are directed to the right person  All voicemails are confidential   [facility] all requests for admission clinical reviews, approved or denied determinations and any other requests to dedicated fax number below belonging to the campus where the patient is receiving treatment   List of dedicated fax numbers for the Facilities:  [address] DENIALS (Administrative/Medical Necessity) [phone]   [address] (Maternity/NICU/Pediatrics) [phone]   [address] [phone]   [facility] [phone]   [facility] [phone]   [address] [place] [address] [address] [phone]

## 2023-02-17 NOTE — OCCUPATIONAL THERAPY NOTE
Occupational Therapy Treatment Note       02/17/23 1110   Note Type   Note Type Treatment   Pain Assessment   Pain Assessment Tool 0-10   Pain Score 8   Pain Location/Orientation Orientation: Left; Location: Back; Location: Shoulder; Location: Arm;Location: Leg   Restrictions/Precautions   Other Precautions Cognitive; Chair Alarm; Bed Alarm; Fall Risk;Pain   ADL   Grooming Assistance 4  Minimal Assistance   Grooming Comments Hand hygiene standing to sink unsupported; Min assist to maintain safe dynamic standing balance   LB Dressing Assistance 3  Moderate Assistance   LB Dressing Deficit Don/doff R sock; Don/doff L sock   Toileting Assistance  3  Moderate Assistance   Toileting Deficit Clothing management up;Clothing management down;Perineal hygiene   Toileting Comments BOwel movement while in bathroom; Min assist for safe dynamic sitting balance while performing hygiene   Bed Mobility   Sit to Supine 4  Minimal assistance   Additional items Assist x 1;LE management  (Limited by pain)   Transfers   Sit to Stand 3  Moderate assistance   Additional items Assist x 1;Verbal cues   Stand to Sit 3  Moderate assistance   Additional items Assist x 1;Verbal cues   Additional Comments STS from recliner, from EOB x several trials; support from RW, mod verbal cues for safe hand placement   Functional Mobility   Functional Mobility 3  Moderate assistance   Additional Comments Patient ambulated short household distance to/from bathroom with RW; mod verbal cues for safe RW management   Toilet Transfers   Toilet Transfer Type To and from   Toilet Transfer to Standard toilet   Toilet Transfer Technique Ambulating   Toilet Transfers Moderate assistance   Toilet Transfers Comments With RW; mod verbal cues for safe transfer technique   Cognition   Overall Cognitive Status Impaired   Arousal/Participation Alert; Cooperative   Attention Attends with cues to redirect   Orientation Level Oriented to person;Oriented to place;Oriented to time Following Commands Follows multistep commands with increased time or repetition   Comments Patient with poor problem solving skills; confused at times; limited insight into current functional limitations; poor safety awareness   Activity Tolerance   Activity Tolerance Patient limited by fatigue; Other (Comment)  (Limited by weakness, limited by cognition)   Assessment   Assessment Patient seen for OT treatment this AM  Patient continues to required mod assist for functional mobility and transfers with RW; mod-max assist for ADLs; Limited by pain to L side of body, limited by generalized weakness and impaired cognition; The patient's raw score on the AM-PAC Daily Activity Inpatient Short Form is 15  A raw score of less than 19 suggests the patient may benefit from discharge to post-acute rehabilitation services  Please refer to the recommendation of the Occupational Therapist for safe discharge planning   At end of session patient supine in bed with all needs met, bed alarm set; continue OT per POC   Plan   OT Frequency 3-5x/wk   Recommendation   OT Discharge Recommendation Post acute rehabilitation services   AM-[facility] Daily Activity Inpatient   Lower Body Dressing 2   Bathing 2   Toileting 3   Upper Body Dressing 2   Grooming 3   Eating 3   Daily Activity Raw Score 15   Daily Activity Standardized Score (Calc for Raw Score >=11) 34 69   AM-PAC Applied Cognition Inpatient   Following a Speech/Presentation 2   Understanding Ordinary Conversation 3   Taking Medications 3   Remembering Where Things Are Placed or Put Away 3   Remembering List of 4-5 Errands 2   Taking Care of Complicated Tasks 2   Applied Cognition Raw Score 15   Applied Cognition Standardized Score 20 10   Licensure   [place] License Number  Tamiko Fall OTR/DAVIS [id number]

## 2023-02-17 NOTE — PHYSICAL THERAPY NOTE
PT TREATMENT       02/17/23 1513   PT Last Visit   PT Visit Date 02/17/23   Note Type   Note Type Treatment   Pain Assessment   Pain Assessment Tool 0-10   Pain Score 9   Pain Location/Orientation Location: Buttocks; Location: Back   Pain Onset/Description Onset: Ongoing   Effect of Pain on Daily Activities limits mobility   Patient's Stated Pain Goal No pain   Hospital Pain Intervention(s) Repositioned   Multiple Pain Sites No   Restrictions/Precautions   Weight Bearing Precautions Per Order No   Other Precautions Bed Alarm; Chair Alarm; Fall Risk;Pain;Cognitive   General   Chart Reviewed Yes   Family/Caregiver Present No   Cognition   Overall Cognitive Status WFL   Arousal/Participation Cooperative   Orientation Level Oriented X4   Following Commands Follows multistep commands with increased time or repetition   Subjective   Subjective "I'm still in a lot of pain"   Bed Mobility   Supine to Sit 4  Minimal assistance   Additional items Assist x 1;Verbal cues   Sit to Supine 5  Supervision   Additional items Verbal cues; Bedrails; Increased time required   Transfers   Sit to Stand 4  Minimal assistance   Additional items Assist x 1;Verbal cues   Stand to Sit 4  Minimal assistance   Additional items Assist x 1;Verbal cues   Stand pivot 4  Minimal assistance   Additional items Assist x 1;Verbal cues   Ambulation/Elevation   Gait pattern Narrow GEOVANNY;Step through pattern; Short stride; Inconsistent ina   Gait Assistance 4  Minimal assist   Additional items Assist x 1;Verbal cues   Assistive Device Large base quad cane   Distance 10 feet + 40 feet   Stair Management Assistance Not tested   Balance   Static Sitting Fair   Dynamic Sitting Fair -   Static Standing Fair -   Dynamic Standing Poor +   Ambulatory Poor +   Activity Tolerance   Activity Tolerance Patient tolerated treatment well;Patient limited by fatigue;Patient limited by pain   Nurse Made Aware yes KARMEN Ritu   Gustavo   Neuro re-ed Pt able to perform supine exercise including ankle pumps, quad/glute sets, heel slides, hip abd x 10 reps bilat   Assessment   Prognosis Good   Problem List Decreased strength;Decreased endurance; Impaired balance;Decreased mobility;Pain   Assessment Pt agreeable to PT session this afternoon  Able to progress to perform bed mobility/transfers with [facility]  Able to progress ambulation x increased distance with Min A + gait deviations as mentioned above  Able to perform exercise as mentioned above with good response, mild fatigue  Repositioned in bed with all needs within reach  The patient's AM-PAC Basic Mobility Inpatient Short Form Raw Score is 15  A Raw score of less than or equal to 16 suggests the patient may benefit from discharge to post-acute rehabilitation services  Please also refer to the recommendation of the Physical Therapist for safe discharge planning  Goals   Patient Goals to get better   Plan   Treatment/Interventions Functional transfer training;LE strengthening/ROM; Therapeutic exercise; Endurance training;Bed mobility;Gait training;Spoke to nursing   Progress Progressing toward goals   PT Frequency Other (Comment)  (5x/week)   Recommendation   PT Discharge Recommendation Post acute rehabilitation services   AM-PAC Basic Mobility Inpatient   Turning in Flat Bed Without Bedrails 3   Lying on Back to Sitting on Edge of Flat Bed Without Bedrails 2   Moving Bed to Chair 3   Standing Up From Chair Using Arms 3   Walk in Room 3   Climb 3-5 Stairs With Railing 1   Basic Mobility Inpatient Raw Score 15   Basic Mobility Standardized Score 36 97   Highest Level Of Mobility   JH-HLM Goal 4: Move to chair/commode   JH-HLM Achieved 7: Walk 25 feet or more   Education   Education Provided Mobility training;Home exercise program;Assistive device   Patient Explanation/teachback used; Reinforcement needed   End of Consult   Patient Position at End of Consult All needs within reach;Bed/Chair alarm activated;Supine   Licensure [facility] Number  Nicci Liang [id number]

## 2023-02-17 NOTE — CASE MANAGEMENT
Case Management Progress Note    Patient name Tiffany Khan  Location [address] [address] [address] MRN [id number]  : 1965 Date 2023       LOS (days): 0  Geometric Mean LOS (GMLOS) (days):   Days to [facility]:        OBJECTIVE:     Current admission status: Observation  Preferred Pharmacy:   [facility] Anna [facility], [address], [address]   [place] [address] [address]  Phone: [phone] Fax: [phone]    320 [facility] [address]310207, [address]  Phone: [phone] Fax: [phone]    Renetta Jackson 83 [address] [address]  Phone: [phone] Fax: [phone] [address]   [facility] [address]  Phone: [phone] Fax: [phone]    Primary Care Provider: No primary care provider on file  Primary Insurance: BLUE CROSS  Secondary Insurance:     PROGRESS NOTE:    [address] approved  Copy uploaded into [employer] and forwarded to [email or web address] test completed with negative result  Prior auth request remains submitted and pending

## 2023-02-18 VITALS
DIASTOLIC BLOOD PRESSURE: 80 MMHG | OXYGEN SATURATION: 96 % | HEIGHT: 71 IN | HEART RATE: 62 BPM | WEIGHT: 161.4 LBS | RESPIRATION RATE: 14 BRPM | BODY MASS INDEX: 22.6 KG/M2 | SYSTOLIC BLOOD PRESSURE: 134 MMHG | TEMPERATURE: 96.8 F

## 2023-02-18 RX ORDER — ACETAMINOPHEN 325 MG/1
975 TABLET ORAL EVERY 8 HOURS SCHEDULED
Refills: 0
Start: 2023-02-18

## 2023-02-18 RX ORDER — OXYCODONE HYDROCHLORIDE 5 MG/1
5 TABLET ORAL EVERY 4 HOURS PRN
Qty: 10 TABLET | Refills: 0 | Status: SHIPPED | OUTPATIENT
Start: 2023-02-18

## 2023-02-18 RX ORDER — ATORVASTATIN CALCIUM 80 MG/1
40 TABLET, FILM COATED ORAL
Qty: 30 TABLET | Refills: 0
Start: 2023-02-18

## 2023-02-18 RX ORDER — COLCHICINE 0.6 MG/1
0.6 TABLET ORAL DAILY
Refills: 0
Start: 2023-02-18

## 2023-02-18 RX ORDER — LIDOCAINE 50 MG/G
1 PATCH TOPICAL DAILY
Refills: 0
Start: 2023-02-19

## 2023-02-18 RX ADMIN — OXYCODONE HYDROCHLORIDE 5 MG: 5 TABLET ORAL at 14:53

## 2023-02-18 RX ADMIN — PANTOPRAZOLE SODIUM 20 MG: 20 TABLET, DELAYED RELEASE ORAL at 05:03

## 2023-02-18 RX ADMIN — FENOFIBRATE 145 MG: 145 TABLET, FILM COATED ORAL at 08:13

## 2023-02-18 RX ADMIN — ASPIRIN 81 MG CHEWABLE TABLET 81 MG: 81 TABLET CHEWABLE at 08:13

## 2023-02-18 RX ADMIN — MAGNESIUM OXIDE TAB 400 MG (241.3 MG ELEMENTAL MG) 400 MG: 400 (241.3 MG) TAB at 08:13

## 2023-02-18 RX ADMIN — ATORVASTATIN CALCIUM 40 MG: 40 TABLET, FILM COATED ORAL at 16:29

## 2023-02-18 RX ADMIN — ENOXAPARIN SODIUM 40 MG: 40 INJECTION SUBCUTANEOUS at 08:13

## 2023-02-18 RX ADMIN — ACETAMINOPHEN 975 MG: 325 TABLET, FILM COATED ORAL at 05:03

## 2023-02-18 RX ADMIN — LIDOCAINE 5% 1 PATCH: 700 PATCH TOPICAL at 08:14

## 2023-02-18 RX ADMIN — METHOCARBAMOL 750 MG: 500 TABLET ORAL at 16:32

## 2023-02-18 RX ADMIN — LEVETIRACETAM 1500 MG: 500 TABLET, FILM COATED ORAL at 08:13

## 2023-02-18 RX ADMIN — KETOROLAC TROMETHAMINE 30 MG: 30 INJECTION, SOLUTION INTRAMUSCULAR at 12:00

## 2023-02-18 RX ADMIN — LACOSAMIDE 100 MG: 50 TABLET, FILM COATED ORAL at 08:13

## 2023-02-18 RX ADMIN — TRAMADOL HYDROCHLORIDE 50 MG: 50 TABLET, COATED ORAL at 16:32

## 2023-02-18 RX ADMIN — ACETAMINOPHEN 975 MG: 325 TABLET, FILM COATED ORAL at 14:54

## 2023-02-18 RX ADMIN — OXYCODONE HYDROCHLORIDE 5 MG: 5 TABLET ORAL at 08:12

## 2023-02-18 RX ADMIN — METOPROLOL SUCCINATE 100 MG: 100 TABLET, EXTENDED RELEASE ORAL at 08:17

## 2023-02-18 NOTE — ASSESSMENT & PLAN NOTE
Complaining of lumbar tenderness  · CT scans negative  · Continue scheduled Tylenol and Lidoderm patch  Pain is still uncontrolled  Patient added on some Toradol along with oxycodone

## 2023-02-18 NOTE — ASSESSMENT & PLAN NOTE
Patient complained of significant pain in the left hand    Patient noted to have swelling of the third finger with inflammation and limited mobility  Patient started on Toradol as needed for severe pain  Patient was also given colchicine

## 2023-02-18 NOTE — ASSESSMENT & PLAN NOTE
Complaining of lumbar tenderness  · CT scans negative  · Continue scheduled Tylenol and Lidoderm patch  Pain is still uncontrolled    Patient can continue oxycodone for short course for pain

## 2023-02-18 NOTE — PROGRESS NOTES
Irina U  64   Progress Note - Marlo Gibson 1965, 59 y o  male MRN: [id number]  Unit/Bed#: [address] [place] [address] Encounter: [id number]  Primary Care Provider: No primary care provider on file  Date and time admitted to hospital: 2/15/2023  2:27 PM    * Fall at home  [address] [place] [address]  Patient presents after fall at home today, history of stroke with L sided weakness, lives alone - was seen by PT/OT and recommended rehab placement  · CT head, c/a/p, spine negative  · Fall precautions  · Continue PT/OT  · No clinical evidence of any infections  · Patient to be discharged to short-term rehabilitation  · Patient complained of left hand and left foot pain and x-rays showed no fractures      Acute gout of hand  Assessment & Plan  Patient complained of significant pain in the left hand  Patient noted to have swelling of the third finger with inflammation and limited mobility  Patient started on Toradol as needed for severe pain  Patient was also given colchicine    Lower back pain  Assessment & Plan  Complaining of lumbar tenderness  · CT scans negative  · Continue scheduled Tylenol and Lidoderm patch  Pain is still uncontrolled  Patient added on some Toradol along with oxycodone  Tobacco dependence  Assessment & Plan  Recommend cessation, nicotine patch ordered    History of stroke  Assessment & Plan  Patient with previous stroke involving posterior right MCA distribution and right posterior cerebral artery distributions, with some left sided residual weakness  · Continue aspirin, lipitor    Hypertension  Assessment & Plan  Continue metoprolol, verapamil    Seizure disorder (HCC)  Assessment & Plan  Continue home medications, vimpat and keppra    Hyperlipidemia  Assessment & Plan  Continue Tricor and Lipitor        VTE Pharmacologic Prophylaxis: VTE Score: 3 Moderate Risk (Score 3-4) - Pharmacological DVT Prophylaxis Ordered: enoxaparin (Lovenox)      Patient Centered Rounds: I performed bedside rounds with nursing staff today  Discussions with Specialists or Other Care Team Provider: No    Education and Discussions with Family / Patient: yes    Total Time Spent on Date of Encounter in care of patient: 54 minutes This time was spent on one or more of the following: performing physical exam; counseling and coordination of care; obtaining or reviewing history; documenting in the medical record; reviewing/ordering tests, medications or procedures; communicating with other healthcare professionals and discussing with patient's family/caregivers  Current Length of Stay: 0 day(s)  Current Patient Status: Observation   Certification Statement: The patient will continue to require additional inpatient hospital stay due to Fall, left hand gout  Discharge Plan: Anticipate discharge in 24-48 hrs to rehab facility  Code Status: Level 1 - Full Code    Subjective:   Patient complaining of significant pain in the left hand and also some pain in the left foot  Also reports some back pain  Objective:     Vitals:   Temp (24hrs), Av 1 °F (36 2 °C), Min:96 9 °F (36 1 °C), Max:97 3 °F (36 3 °C)    Temp:  [96 9 °F (36 1 °C)-97 3 °F (36 3 °C)] 96 9 °F (36 1 °C)  HR:  [56-67] 60  Resp:  [16-19] 19  BP: (122-135)/(64-75) 135/75  SpO2:  [93 %-96 %] 96 %  Body mass index is 22 51 kg/m²  Input and Output Summary (last 24 hours): Intake/Output Summary (Last 24 hours) at 2023  Last data filed at 2023 0601  Gross per 24 hour   Intake --   Output 100 ml   Net -100 ml       Physical Exam:   Physical Exam  Constitutional:       Appearance: Normal appearance  HENT:      Head: Normocephalic and atraumatic  Eyes:      Extraocular Movements: Extraocular movements intact  Pupils: Pupils are equal, round, and reactive to light  Cardiovascular:      Rate and Rhythm: Normal rate and regular rhythm  Heart sounds: No murmur heard  No gallop     Pulmonary:      Effort: Pulmonary effort is normal       Breath sounds: Normal breath sounds  Abdominal:      General: Bowel sounds are normal       Palpations: Abdomen is soft  Tenderness: There is no abdominal tenderness  Musculoskeletal:         General: Swelling present  No deformity  Normal range of motion  Cervical back: Normal range of motion and neck supple  Comments: Left third finger swelling with redness and warmth with decreased mobility   Skin:     General: Skin is warm and dry  Neurological:      General: No focal deficit present  Mental Status: He is alert          Additional Data:     Labs:  Results from last 7 days   Lab Units 02/16/23  0446   WBC Thousand/uL 5 85   HEMOGLOBIN g/dL 13 8   HEMATOCRIT % 40 6   PLATELETS Thousands/uL 160   NEUTROS PCT % 65   LYMPHS PCT % 21   MONOS PCT % 11   EOS PCT % 2     Results from last 7 days   Lab Units 02/16/23  0446 02/15/23  1600   SODIUM mmol/L 136 135   POTASSIUM mmol/L 3 3* 3 5   CHLORIDE mmol/L 103 101   CO2 mmol/L 23 23   BUN mg/dL 17 17   CREATININE mg/dL 0 90 0 91   ANION GAP mmol/L 10 11   CALCIUM mg/dL 8 9 9 6   ALBUMIN g/dL  --  4 2   TOTAL BILIRUBIN mg/dL  --  0 84   ALK PHOS U/L  --  80   ALT U/L  --  11   AST U/L  --  15   GLUCOSE RANDOM mg/dL 88 87                       Lines/Drains:  Invasive Devices     Peripheral Intravenous Line  Duration           Peripheral IV 02/15/23 Left;Ventral (anterior) Forearm 2 days                      Imaging: Reviewed radiology reports from this admission including: xray(s)    Recent Cultures (last 7 days):         Last 24 Hours Medication List:   Current Facility-Administered Medications   Medication Dose Route Frequency Provider Last Rate   • acetaminophen  975 mg Oral Q8H [facility] Sheri White PA-C     • aspirin  81 mg Oral Daily Sheri White PA-C     • atorvastatin  40 mg Oral Daily With TemnosJUAN     • enoxaparin  40 mg Subcutaneous Daily Sheri White PA-C     • fenofibrate  145 mg Oral Daily Alexis Mueller PA-C     • influenza vaccine  0 5 mL Intramuscular Once Alexis Mueller PA-C     • ketorolac  30 mg Intravenous Q6H PRN Jocy Murphy MD     • lacosamide  100 mg Oral Q12H [address] Sheri White PA-C     • levETIRAcetam  1,500 mg Oral Q12H Sheri White PA-C     • lidocaine  1 patch Topical Daily Sheri White PA-C     • magnesium Oxide  400 mg Oral BID Sheri White PA-C     • melatonin  3 mg Oral HS Sheri White PA-C     • methocarbamol  750 mg Oral Q6H PRN Sheri White PA-C     • metoprolol succinate  100 mg Oral Daily Sheri White PA-C     • oxyCODONE  5 mg Oral Q4H PRN Sheri White PA-C     • pantoprazole  20 mg Oral Early Morning Sheri White PA-C     • traMADol  50 mg Oral Q6H PRN Jocy Murphy MD     • verapamil  120 mg Oral HS Alexis Mueller PA-C          Today, Patient Was Seen By: Jocy Murphy MD    **Please Note: This note may have been constructed using a voice recognition system  **

## 2023-02-18 NOTE — PLAN OF CARE
Problem: MOBILITY - ADULT  Goal: Maintain or return to baseline ADL function  Description: INTERVENTIONS:  -  Assess patient's ability to carry out ADLs; assess patient's baseline for ADL function and identify physical deficits which impact ability to perform ADLs (bathing, care of mouth/teeth, toileting, grooming, dressing, etc )  - Assess/evaluate cause of self-care deficits   - Assess range of motion  - Assess patient's mobility; develop plan if impaired  - Assess patient's need for assistive devices and provide as appropriate  - Encourage maximum independence but intervene and supervise when necessary  - Involve family in performance of ADLs  - Assess for home care needs following discharge   - Consider OT consult to assist with ADL evaluation and planning for discharge  - Provide patient education as appropriate  2/17/2023 2235 by Ritu Mendoza RN  Outcome: Progressing  2/17/2023 2216 by Ritu Mendoza RN  Outcome: Progressing  Goal: Maintains/Returns to pre admission functional level  Description: INTERVENTIONS:  - Perform BMAT or MOVE assessment daily    - Set and communicate daily mobility goal to care team and patient/family/caregiver  - Collaborate with rehabilitation services on mobility goals if consulted  - Perform Range of Motion 2 times a day  - Reposition patient every 2 hours    - Dangle patient 2  times a day  - Stand patient 2 times a day  - Ambulate patient 2  times a day  - Out of bed to chair 2  times a day   - Out of bed for meals 2  times a day  - Out of bed for toileting  - Record patient progress and toleration of activity level   2/17/2023 2235 by Ritu Mendoza RN  Outcome: Progressing  2/17/2023 2216 by Ritu Mendoza RN  Outcome: Progressing     Problem: Prexisting or High Potential for Compromised Skin Integrity  Goal: Skin integrity is maintained or improved  Description: INTERVENTIONS:  - Identify patients at risk for skin breakdown  - Assess and monitor skin integrity  - Assess and monitor nutrition and hydration status  - Monitor labs   - Assess for incontinence   - Turn and reposition patient  - Assist with mobility/ambulation  - Relieve pressure over bony prominences  - Avoid friction and shearing  - Provide appropriate hygiene as needed including keeping skin clean and dry  - Evaluate need for skin moisturizer/barrier cream  - Collaborate with interdisciplinary team   - Patient/family teaching  - Consider wound care consult   2/17/2023 2235 by Ritu Bueno RN  Outcome: Progressing  2/17/2023 2216 by Ritu Bueno RN  Outcome: Progressing     Problem: NEUROSENSORY - ADULT  Goal: Achieves stable or improved neurological status  Description: INTERVENTIONS  - Monitor and report changes in neurological status  - Monitor vital signs such as temperature, blood pressure, glucose, and any other labs ordered   - Initiate measures to prevent increased intracranial pressure  - Monitor for seizure activity and implement precautions if appropriate      2/17/2023 2235 by Ritu Bueno RN  Outcome: Progressing  2/17/2023 2216 by Ritu Bueno RN  Outcome: Progressing  Goal: Remains free of injury related to seizures activity  Description: INTERVENTIONS  - Maintain airway, patient safety  and administer oxygen as ordered  - Monitor patient for seizure activity, document and report duration and description of seizure to physician/advanced practitioner  - If seizure occurs,  ensure patient safety during seizure  - Reorient patient post seizure  - Seizure pads on all 4 side rails  - Instruct patient/family to notify RN of any seizure activity including if an aura is experienced  - Instruct patient/family to call for assistance with activity based on nursing assessment  - Administer anti-seizure medications if ordered    2/17/2023 2235 by Ritu Bueno RN  Outcome: Progressing  2/17/2023 2216 by Ritu Bueno RN  Outcome: Progressing  Goal: Achieves maximal functionality and self care  Description: INTERVENTIONS  - Monitor swallowing and airway patency with patient fatigue and changes in neurological status  - Encourage and assist patient to increase activity and self care     - Encourage visually impaired, hearing impaired and aphasic patients to use assistive/communication devices  2/17/2023 2235 by June Raguel Bence, RN  Outcome: Progressing  2/17/2023 2216 by June Raguel Bence, RN  Outcome: Progressing

## 2023-02-18 NOTE — PLAN OF CARE
Problem: MOBILITY - ADULT  Goal: Maintain or return to baseline ADL function  Description: INTERVENTIONS:  -  Assess patient's ability to carry out ADLs; assess patient's baseline for ADL function and identify physical deficits which impact ability to perform ADLs (bathing, care of mouth/teeth, toileting, grooming, dressing, etc )  - Assess/evaluate cause of self-care deficits   - Assess range of motion  - Assess patient's mobility; develop plan if impaired  - Assess patient's need for assistive devices and provide as appropriate  - Encourage maximum independence but intervene and supervise when necessary  - Involve family in performance of ADLs  - Assess for home care needs following discharge   - Consider OT consult to assist with ADL evaluation and planning for discharge  - Provide patient education as appropriate  Outcome: Progressing  Goal: Maintains/Returns to pre admission functional level  Description: INTERVENTIONS:  - Perform BMAT or MOVE assessment daily    - Set and communicate daily mobility goal to care team and patient/family/caregiver  - Collaborate with rehabilitation services on mobility goals if consulted  - Perform Range of Motion  BID times a day  - Reposition patient every  hours    - Dangle patient 2 times a day  - Stand patient 2 times a day  - Ambulate patient  2 times a day  - Out of bed to chair 2 times a day   - Out of bed for meals 2 times a day  - Out of bed for toileting  - Record patient progress and toleration of activity level   Outcome: Progressing     Problem: Prexisting or High Potential for Compromised Skin Integrity  Goal: Skin integrity is maintained or improved  Description: INTERVENTIONS:  - Identify patients at risk for skin breakdown  - Assess and monitor skin integrity  - Assess and monitor nutrition and hydration status  - Monitor labs   - Assess for incontinence   - Turn and reposition patient  - Assist with mobility/ambulation  - Relieve pressure over bony prominences  - Avoid friction and shearing  - Provide appropriate hygiene as needed including keeping skin clean and dry  - Evaluate need for skin moisturizer/barrier cream  - Collaborate with interdisciplinary team   - Patient/family teaching  - Consider wound care consult   Outcome: Progressing     Problem: NEUROSENSORY - ADULT  Goal: Achieves stable or improved neurological status  Description: INTERVENTIONS  - Monitor and report changes in neurological status  - Monitor vital signs such as temperature, blood pressure, glucose, and any other labs ordered   - Initiate measures to prevent increased intracranial pressure  - Monitor for seizure activity and implement precautions if appropriate      Outcome: Progressing  Goal: Remains free of injury related to seizures activity  Description: INTERVENTIONS  - Maintain airway, patient safety  and administer oxygen as ordered  - Monitor patient for seizure activity, document and report duration and description of seizure to physician/advanced practitioner  - If seizure occurs,  ensure patient safety during seizure  - Reorient patient post seizure  - Seizure pads on all 4 side rails  - Instruct patient/family to notify RN of any seizure activity including if an aura is experienced  - Instruct patient/family to call for assistance with activity based on nursing assessment  - Administer anti-seizure medications if ordered    Outcome: Progressing  Goal: Achieves maximal functionality and self care  Description: INTERVENTIONS  - Monitor swallowing and airway patency with patient fatigue and changes in neurological status  - Encourage and assist patient to increase activity and self care     - Encourage visually impaired, hearing impaired and aphasic patients to use assistive/communication devices  Outcome: Progressing

## 2023-02-18 NOTE — CASE MANAGEMENT
Case Management Discharge Planning Note    Patient name Say [facility] [address] [place] [address]-* MRN [id number]  : 1965 Date 2023       Current Admission Date: 2/15/2023  Current Admission Diagnosis:Fall at home   Patient Active Problem List    Diagnosis Date Noted   • Fall at home 02/15/2023   • Lower back pain 02/15/2023   • Tobacco dependence 2022   • Multiple falls 2022   • History of stroke 2022   • CVA (cerebral vascular accident) ([facility] [address] ) 2022   • Weakness 2022   • Hypomagnesemia 2022   • Stroke-like symptoms 2022   • Contusion of right foot    • Right foot pain    • Arthralgia of right foot    • Chronic tophaceous gout of right foot    • Syncopal episodes 2022   • SIRS (systemic inflammatory response syndrome) ([facility] [address] ) 2022   • Acute gout of hand 2021   • Hypertension 2021   • Hyperlipidemia 2021   • Smoking 2021   • GERD (gastroesophageal reflux disease) 2021   • Depression 2021   • Left bundle branch block 2021   • Thrombocytopenia ([facility] [address] ) 2021   • Prolonged Q-T interval on ECG 2021   • Rhabdomyolysis 2021   • Breakthrough seizure ([facility] [address] ) 2021   • [facility] 2021   • History of cerebral hemorrhage 2021   • History of alcohol abuse 2021   • Hypocalcemia 2017   • Lactic acidosis 2017   • Rhabdomyolysis 2017   • Nicotine dependence 2017   • Dyslipidemia 2017   • Seizure ([facility] [address] ) 2017   • Diarrhea 2016   • Blood in stool 2016   • Hypertension    • Hyperlipidemia    • GERD (gastroesophageal reflux disease)    • Compression neuropathy of lower extremity 02/10/2016      LOS (days): 0  Geometric Mean LOS (GMLOS) (days):   Days to GMLOS:     OBJECTIVE:     Current admission status: Observation   Preferred Pharmacy:   [address] [facility] Anna Pichardo, [address], [address]   [address]  [place] [address]  Phone: [phone] Fax: [phone]    [address] [facility] #[id number], [address]  Phone: [phone] Fax: [phone]    Renetta Jackson [address] [address]  Phone: [phone] Fax: [phone] [place], [address] [facility]  [address] [address]  Phone: [phone] Fax: [phone]    Primary Care Provider: No primary care provider on file  Primary Insurance: BLUE CROSS  Secondary Insurance:     DISCHARGE DETAILS:    CM contacted family/caregiver?: Yes    Contacts  Patient Contacts: Sheri Simpler  Relationship to Patient[de-identified] Family (daughter)  Contact Method: Phone  Phone Number: [phone]  Reason/Outcome: Discharge Planning    Other Referral/Resources/Interventions Provided:  Interventions: Short Term Rehab  Referral Comments: Discharge planned  STR placement at [facility] is in place  Authorization for subacute rehab was obtained from [employer]  Pt/daughter, RN and facility aware of plan      Treatment Team Recommendation: Short Term Rehab  Discharge Destination Plan[de-identified] Short Term Rehab  Transport at Discharge : Wheelchair van  Dispatcher Contacted: Yes  Number/Name of Dispatcher: Roundtrip  Transported by [employer] and Unit #):  (pending)  ETA of Transport (Date): 02/18/23  ETA of Transport (Time): 1500 (requested)        Accepting Facility Name, [address] [place] [address]  Receiving Facility/Agency Phone Number: [place] Number: Subacute rehab auth received from [facility] - #[id number] - 2/18-2/25 - follow up with Jose M Kumar [phone], fax [phone]

## 2023-02-18 NOTE — [place] UNIVERSAL TRANSFER FORM
[place] UNIV[place] TRAN[place] FORM  (ALL ITEMS MUST BE COMPLETED)    1  TRANSFER FROM: [address] [place]    2  DATE OF TRANSFER: 2/18/2023                        TIME OF TRANSFER: 1645    3  PATIENT NAME: GEORGE Neff      YOB: 1965                             GENDER: male    4  LANGUAGE:   English    5  PHYSICIAN NAME:  Kameron Forbes MD                   PHONE: [phone]    6  CODE STATUS: Level 1 - Full Code        Out of Hospital DNR Attached: No    7  :                                      :  Extended Emergency Contact Information  Primary Emergency Contact: Lu Velez  Address: [address] [place] [address] Phone: [phone]  Relation: Sister  Secondary Emergency Contact: HANNAH Yuan  Mobile Phone: [phone]  Relation: Daughter           Health Care Representative/Proxy:  Yes           Legal Guardian:  No             NAME OF:           HEALTH CARE REPRESENTATIVE/PROXY:                                         OR           LEGAL GUARDIAN, IF NOT :                                               PHONE:  (Day)           (Night)                        (Cell)    8  REASON FOR TRANSFER: Short term rehab             V/S: /78   Pulse 62   Temp (!) 97 2 °F (36 2 °C)   Resp 18   Ht 5' 11" (1 803 m)   Wt 73 2 kg (161 lb 6 4 oz)   SpO2 95%   BMI 22 51 kg/m²           PAIN: None    9  PRIMARY DIAGNOSIS: Fall at home      Secondary Diagnosis:         Pacemaker: No      Internal Defib: No          Mental Health Diagnosis (if Applicable):    10  RESTRAINTS: No     11  RESPIRATORY NEEDS: None    12  ISOLATION/PRECAUTION: None    13  ALLERGY: Codeine, Penicillins, and Hydrocodone    14  SENSORY:       Vision Glasses    15  SKIN CONDITION: No Wounds    16  DIET: Regular    17  IV ACCESS: None    18   PERSONAL ITEMS SENT WITH PATIENT: Glasses and Other clothing and cigarettes    19  ATTACHED DOCUMENTS: MUST ATTACH CURRENT MEDICATION INFORMATION Face Sheet, MAR, Diagnostic Studies, Labs and Discharge Summary    20  AT RISK ALERTS:Falls        HARM TO: N/A    21  WEIGHT BEARING STATUS:         Left Leg: Full        Right Leg: Full    22  MENTAL STATUS:Alert, Oriented and Forgetful    23  FUNCTION:        Walk: With Help        Transfer: With Help        Toilet: With Help        Feed: Self    24  IMMUNIZATIONS/SCREENING:     Immunization History   Administered Date(s) Administered   • COVID-19 J&J (LearnSprout) vaccine 0 5 mL 09/10/2021   • DTaP 5 08/07/2008   • Tdap 09/04/2015       25  BOWEL: Continent    26  BLADDER: Continent    27   SENDING FACILITY CONTACT: [facility] Tito Frias                  Title: [address] [place] [address]        Unit: [address] [place] [address]        Phone: [phone] [address] (if known):        Title:        Unit:         Phone:         FORM PREFILLED BY (if applicable)       Title:       Unit:        Phone:         Rik White RN      Title: KARMEN      Phone: [phone]

## 2023-02-18 NOTE — CASE MANAGEMENT
Case Management Progress Note    Patient name Jamilah Orosco  Location [address] [address] [address] MRN [id number]  : 1965 Date 2023       LOS (days): 0  Geometric Mean LOS (GMLOS) (days):   Days to [facility]:        OBJECTIVE:    Current admission status: Observation  Preferred Pharmacy:   [address] [facility] Anna Pichardo, [address], [address]   [address] [address]  Phone: [phone] Fax: [phone]    3[address] [facility] #[id number], 1400 [facility], [address]  Phone: [phone] Fax: [phone]    Renetta Jackson 83 [address] [facility] [address]  Phone: [phone] Fax: [phone] [address] [facility] [address]   [address] [address]  Phone: [phone] Fax: [phone]    Primary Care Provider: No primary care provider on file  Primary Insurance: BLUE CROSS  Secondary Insurance:     PROGRESS NOTE:    Call received from Lin[place], [address] [place] [address] On-Call Nurse  Per Linwood she had no authorization request opened for skilled rehab, just the acute rehab denial  SIDDHARTHA explained that skilled authorization was faxed to [address] [place] [address] ([phone]) yesterday morning  Per Hoda she cannot access that fax  Hoda offered to process case if SIDDHARTHA was able to refax clinical and request info (NPI and ICD-10 codes) to her directly at [phone]  Information and clinical faxed as requested  SIDDHARTHA will follow

## 2023-02-18 NOTE — ASSESSMENT & PLAN NOTE
Patient complained of significant pain in the left hand  Patient noted to have swelling of the third finger with inflammation and limited mobility  Patient was given colchicine with significant improvement of symptoms  Continue colchicine 0 6 mg p o  daily     Patient was given Toradol as needed for pain

## 2023-02-18 NOTE — ASSESSMENT & PLAN NOTE
Patient presents after fall at home today, history of stroke with L sided weakness, lives alone - was seen by PT/OT and recommended rehab placement  · CT head, c/a/p, spine negative  · Fall precautions  · Continue PT/OT  · No clinical evidence of any infections  · Patient to be discharged to short-term rehabilitation  · Patient complained of left hand and left foot pain and x-rays showed no fractures

## 2023-02-18 NOTE — DISCHARGE SUMMARY
[address] Yuan 1965, 59 y o  male MRN: [id number]  Unit/Bed#: [address] [place] [address] Encounter: [id number]  Primary Care Provider: No primary care provider on file  Date and time admitted to hospital: 2/15/2023  2:27 PM    * Fall at home  [address] [place] [address]  Patient presents after fall at home today, history of stroke with L sided weakness, lives alone - was seen by PT/OT and recommended rehab placement  · CT head, c/a/p, spine negative  · Fall precautions  · Continue PT/OT  · No clinical evidence of any infections  · Patient to be discharged to short-term rehabilitation  · Patient complained of left hand and left foot pain and x-rays showed no fractures      Acute gout of hand  Assessment & Plan  Patient complained of significant pain in the left hand  Patient noted to have swelling of the third finger with inflammation and limited mobility  Patient was given colchicine with significant improvement of symptoms  Continue colchicine 0 6 mg p o  daily  Patient was given Toradol as needed for pain    Lower back pain  Assessment & Plan  Complaining of lumbar tenderness  · CT scans negative  · Continue scheduled Tylenol and Lidoderm patch  Pain is still uncontrolled    Patient can continue oxycodone for short course for pain    Tobacco dependence  Assessment & Plan  Recommend cessation, nicotine patch ordered    History of stroke  Assessment & Plan  Patient with previous stroke involving posterior right MCA distribution and right posterior cerebral artery distributions, with some left sided residual weakness  · Continue aspirin, lipitor    Hypertension  Assessment & Plan  Continue metoprolol, verapamil    Seizure disorder ([facility] [address] )  Assessment & Plan  Continue home medications, vimpat and keppra    Hyperlipidemia  Assessment & Plan  Continue Tricor and Lipitor        Medical Problems     Resolved Problems  Date Reviewed: 2/18/2023   None       Discharging Physician / Practitioner: Avril Scott MD  PCP: No primary care provider on file  Admission Date:   Admission Orders (From admission, onward)     Ordered        02/15/23 1930  Place in Observation  Once                      Discharge Date: 02/18/23       Outpatient Tests Requested:  · Follow-up with PCP    Complications: None    Reason for Admission: Status post fall    Hospital Course:   Mahendra Euceda is a 59 y o  male patient with past medical history of stroke, seizures, hypertension, alcohol abuse who originally presented to the hospital on 2/15/2023 due to fall  Patient has been complaining of lower back pain and also complained of left foot and left hand pain  Patient's CAT scans and trauma work-up was all negative  Patient was seen by physical therapy and was recommending rehab  Patient also noted to have left ankle done patient received colchicine and Toradol with improved symptoms  Patient continued to remain stable and be discharged to short-term rehabitation for continued PT/OT  Please see above list of diagnoses and relat treatment ed plan for additional information  Condition at Discharge: stable    Discharge Day Visit / Exam:   Subjective: Patient still complaining of left hand pain but able to move better  Also reported some left foot pain and back pain  Vitals: Blood Pressure: 134/80 (02/18/23 1400)  Pulse: 62 (02/18/23 1400)  Temperature: (!) 96 8 °F (36 °C) (02/18/23 1353)  Temp Source: Oral (02/17/23 0758)  Respirations: 14 (02/18/23 1400)  Height: 5' 11" (180 3 cm) (02/15/23 2055)  Weight - Scale: 73 2 kg (161 lb 6 4 oz) (02/15/23 2055)  SpO2: 96 % (02/18/23 1400)  Exam:   Physical Exam  Constitutional:       Appearance: Normal appearance  HENT:      Head: Normocephalic and atraumatic  Eyes:      Extraocular Movements: Extraocular movements intact  Pupils: Pupils are equal, round, and reactive to light  Cardiovascular:      Rate and Rhythm: Normal rate and regular rhythm  Heart sounds: No murmur heard  No gallop  Pulmonary:      Effort: Pulmonary effort is normal       Breath sounds: Normal breath sounds  Abdominal:      General: Bowel sounds are normal       Palpations: Abdomen is soft  Tenderness: There is no abdominal tenderness  Musculoskeletal:         General: No swelling or deformity  Normal range of motion  Cervical back: Normal range of motion and neck supple  Comments: Left third finger redness and swelling have improved  Improved mobility  Skin:     General: Skin is warm and dry  Neurological:      General: No focal deficit present  Mental Status: He is alert  Discussion with Family: Updated  (daughter) via phone  Discharge instructions/Information to patient and family:   See after visit summary for information provided to patient and family  Provisions for Follow-Up Care:  See after visit summary for information related to follow-up care and any pertinent home health orders  Disposition:   [facility]    Planned Readmission: No     Discharge Statement:  I spent 35 minutes discharging the patient  This time was spent on the day of discharge  I had direct contact with the patient on the day of discharge  Greater than 50% of the total time was spent examining patient, answering all patient questions, arranging and discussing plan of care with patient as well as directly providing post-discharge instructions  Additional time then spent on discharge activities  Discharge Medications:  See after visit summary for reconciled discharge medications provided to patient and/or family        **Please Note: This note may have been constructed using a voice recognition system**

## 2023-10-13 ENCOUNTER — TELEPHONE (OUTPATIENT)
Dept: CARDIOLOGY CLINIC | Facility: CLINIC | Age: 58
End: 2023-10-13

## 2023-10-13 NOTE — TELEPHONE ENCOUNTER
Patient called would like to get loop recorder explant needs to have an MRI they will not do with loop

## 2023-10-30 NOTE — PLAN OF CARE
Problem: MOBILITY - ADULT  Goal: Maintain or return to baseline ADL function  Description: INTERVENTIONS:  -  Assess patient's ability to carry out ADLs; assess patient's baseline for ADL function and identify physical deficits which impact ability to perform ADLs (bathing, care of mouth/teeth, toileting, grooming, dressing, etc )  - Assess/evaluate cause of self-care deficits   - Assess range of motion  - Assess patient's mobility; develop plan if impaired  - Assess patient's need for assistive devices and provide as appropriate  - Encourage maximum independence but intervene and supervise when necessary  - Involve family in performance of ADLs  - Assess for home care needs following discharge   - Consider OT consult to assist with ADL evaluation and planning for discharge  - Provide patient education as appropriate  Outcome: Progressing  Goal: Maintains/Returns to pre admission functional level  Description: INTERVENTIONS:  - Perform BMAT or MOVE assessment daily    - Set and communicate daily mobility goal to care team and patient/family/caregiver  - Collaborate with rehabilitation services on mobility goals if consulted  - Perform Range of Motion 4 times a day  - Reposition patient every 2 hours    - Dangle patient 4 times a day  - Stand patient  4 times a day  - Ambulate patient prn  times a day  - Out of bed to chair 3 times a day   - Out of bed for meals 3  Problem: PAIN - ADULT  Goal: Verbalizes/displays adequate comfort level or baseline comfort level  Description: Interventions:  - Encourage patient to monitor pain and request assistance  - Assess pain using appropriate pain scale numeric 0-10  Problem: SAFETY ADULT  Goal: Patient will remain free of falls  Description: INTERVENTIONS:  - Educate patient/family on patient safety including physical limitations  - Instruct patient to call for assistance with activity   - Consult OT/PT to assist with strengthening/mobility   - Keep Call bell within reach  - Keep bed low and locked with side rails adjusted as appropriate  - Keep care items and personal belongings within reach  - Initiate and maintain comfort rounds  - Make Fall Risk Sign visible to staff  - Offer Toileting every 2 Hours, in advance of need  - Initiate/Maintain bed alarm  - Obtain necessary fall risk management equipment:   Problem: SAFETY ADULT  Goal: Patient will remain free of falls  Description: INTERVENTIONS:  - Educate patient/family on patient safety including physical limitations  - Instruct patient to call for assistance with activity   - Consult OT/PT to assist with strengthening/mobility   - Keep Call bell within reach  - Keep bed low and locked with side rails adjusted as appropriate  - Keep care items and personal belongings within reach  - Initiate and maintain comfort rounds  - Make Fall Risk Sign visible to staff  - Offer Toileting every 2 Hours, in advance of need  - Initiate/Maintain bed alarm  - Obtain necessary fall risk management equipment:     Problem: DISCHARGE PLANNING  Goal: Discharge to home or other facility with appropriate resources  Description: INTERVENTIONS:  - Identify barriers to discharge w/patient and caregiver  - Arrange for needed discharge resources and transportation as appropriate  - Identify discharge learning needs (meds, wound care, etc )  - Arrange for interpretive services to assist at discharge as needed  - Refer to Case Management Department for coordinating discharge planning if the patient needs post-hospital services based on physician/advanced practitioner order or complex needs related to functional status, cognitive ability, or social support system  Outcome: Progressing     Problem: Knowledge Deficit  Goal: Patient/family/caregiver demonstrates understanding of disease process, treatment plan, medications, and discharge instructions  Description: Complete learning assessment and assess knowledge base    Interventions:  - Provide teaching at level of understanding  - Provide teaching via preferred learning methods  Outcome: Progressing   - Apply yellow socks and bracelet for high fall risk patients  - Consider moving patient to room near nurses station  Outcome: Progressing     - Apply yellow socks and bracelet for high fall risk patients  - Consider moving patient to room near nurses station  Outcome: Progressing     - Administer analgesics based on type and severity of pain and evaluate response  - Implement non-pharmacological measures as appropriate and evaluate response  - Consider cultural and social influences on pain and pain management  - Notify physician/advanced practitioner if interventions unsuccessful or patient reports new pain  Outcome: Progressing    times a day  - Out of bed for toileting  - Record patient progress and toleration of activity level   Outcome: Progressing Complex Repair And Ftsg Text: The defect edges were debeveled with a #15 scalpel blade.  The primary defect was closed partially with a complex linear closure.  Given the location of the defect, shape of the defect and the proximity to free margins a full thickness skin graft was deemed most appropriate to repair the remaining defect.  The graft was trimmed to fit the size of the remaining defect.  The graft was then placed in the primary defect, oriented appropriately, and sutured into place.

## 2023-11-01 ENCOUNTER — TELEPHONE (OUTPATIENT)
Dept: NEUROLOGY | Facility: CLINIC | Age: 58
End: 2023-11-01

## 2023-11-01 NOTE — TELEPHONE ENCOUNTER
Patient has called in a few times to get an appointment     Patient states they need to see Carlotta Roca and they were scheduled with Cade Mckeon back in 2022 but that was cancelled by the sister of the patient       Patient was to follow up after a Stroke back in 2022 but never did see the provider    A new referral was placed and the patient was triaged on 10/26/23 for HX os stroke     Reminded the patient that we are waiting on the provider response and once we have that we will call the patient to get them scheduled     Patient expressed understanding and thanked me for my time
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
## 2023-11-27 ENCOUNTER — TELEPHONE (OUTPATIENT)
Dept: NEUROLOGY | Facility: CLINIC | Age: 58
End: 2023-11-27

## 2023-11-27 NOTE — TELEPHONE ENCOUNTER
2nd attempt to schedule from triage. No answer. Left message on machine. Letter sent. Can be scheduled as general and routine with attending or AP as hospital follow up .

## 2024-02-27 NOTE — PROGRESS NOTES
Progress Note - Cardiology Office  [facility]    Carlos Manuel Yuan 59 y.o. male MRN: [id number]  : 1965  Encounter: [id number]      ASSESSMENT:   S/p BEBO and loop recorder implantation for AF surveillance in patient with CVA in 2022  Loop recorder implantation site without any complications     Subacute right PCA territory CVA   Right MCA [facility]     Neurology requested BEBO and ILR       Seizure disorder   History of alcohol abuse   Hypertension   Dyslipidemia   DORA   Noncompliance           RECOMMENDATIONS:   Lipid profile and LFTs which were last done in May 2022 and patient is no longer on aspirin, atorvastatin or Tricor as was suggested at the time of discharge from the hospital after his stroke.  I would strongly recommend neurology follow-up for them to reassess patient's continued need for aspirin and lipid-lowering medications which seem to have been stopped.  Advised patient to call the loop recorder company and activate the device since there have been no device reports since it was implanted  Will review labs when completed and device report to make further recommendations      Please call [phone] if any questions.    HPI :     Carlos Manuel Yuan is a 59 y.o. year old male who came for follow up.  Patient had a CVA in May 2022 and had a loop recorder implanted.  It appears he has not set up/activated the device as there are no recordings/reports.  He has also not come for a follow-up since May 2022 and has been seeing PCPs at [facility].  Currently he is not on aspirin or any lipid-lowering medications as advised during his prior hospitalization with CVA.  Patient will benefit from a neurology follow-up to assess continued need for the above-mentioned medications.  I have advised him to get repeat labs and to activate his device.    REVIEW OF SYSTEMS:  Denies any cardiac symptoms today.  Denies chest pain, dyspnea, palpitations or syncope      Historical  Information   Past Medical History:   Diagnosis Date    Biceps tendonitis, unspecified laterality 2007    Bone cyst 2011    Bronchiectasis (HCC) 10/02/2006    Bursitis 2005    Chest pain 2009    Chronic renal failure 2011    Diverticulitis of colon 10/09/2007    Generalized anxiety disorder 2006    GERD (gastroesophageal reflux disease)     Gout     Hyperlipidemia     Hypertension     Lateral epicondylitis, unspecified elbow     Last Assessed: 2013    Low magnesium levels     Lyme disease 2009    Pneumonia     Last Assessed: 2013    Stroke (HCC)      Past Surgical History:   Procedure Laterality Date    CARDIAC ELECTROPHYSIOLOGY PROCEDURE N/A 2022    Procedure: Cardiac loop recorder implant;  Surgeon: Marley Banks MD;  Location: [facility];  Service: Cardiology    KNEE ARTHROSCOPY      Therapeutic    OLECRANON BURSA EXCISION       Social History     Substance and Sexual Activity   Alcohol Use Yes    Alcohol/week: 6.0 standard drinks of alcohol    Types: 6 Glasses of wine per week    Comment: 22-states he has not had alcohol since 2021     Social History     Substance and Sexual Activity   Drug Use No     Social History     Tobacco Use   Smoking Status Every Day    Current packs/day: 0.00    Types: Cigarettes    Last attempt to quit: 5/10/2022    Years since quittin.8   Smokeless Tobacco Never   Tobacco Comments    cigarette nicotine dependence     Family History:   Family History   Problem Relation Age of Onset    Cancer Mother         Colon       Meds/Allergies     Allergies   Allergen Reactions    Codeine Hives     Tolerates morphine    Penicillins     Hydrocodone Rash     Reaction Date: 2007;        Current Outpatient Medications:     acetaminophen (TYLENOL) 325 mg tablet, Take 3 tablets (975 mg total) by mouth every 8 (eight) hours, Disp: , Rfl: 0    amLODIPine (NORVASC) 10 mg tablet, Take 10 mg by mouth daily, Disp: , Rfl:      levETIRAcetam (KEPPRA) 750 mg tablet, Take 2 tablets (1,500 mg total) by mouth every 12 (twelve) hours for 10 days, Disp: 40 tablet, Rfl: 0    LORazepam (ATIVAN) 0.5 mg tablet, TAKE 1 TABLET BY ORAL ROUTE AS NEEDED PRIOR TO MRI, Disp: , Rfl:     pantoprazole (PROTONIX) 40 mg tablet, Take 40 mg by mouth daily, Disp: , Rfl:     aspirin 81 mg chewable tablet, Chew 1 tablet (81 mg total)  in the morning. (Patient not taking: Reported on 2/28/2024), Disp: 30 tablet, Rfl: 0    atorvastatin (LIPITOR) 80 mg tablet, Take 0.5 tablets (40 mg total) by mouth daily with dinner (Patient not taking: Reported on 2/28/2024), Disp: 30 tablet, Rfl: 0    colchicine (COLCRYS) 0.6 mg tablet, Take 1 tablet (0.6 mg total) by mouth daily (Patient not taking: Reported on 2/28/2024), Disp: , Rfl: 0    fenofibrate (TRICOR) 145 mg tablet, Take 1 tablet (145 mg total) by mouth daily (Patient not taking: Reported on 2/28/2024), Disp: 30 tablet, Rfl: 0    lacosamide (VIMPAT) 100 mg tablet, Take 1 tablet (100 mg total) by mouth every 12 (twelve) hours for 10 days (Patient not taking: Reported on 2/28/2024), Disp: 20 tablet, Rfl: 0    lidocaine (LIDODERM) 5 %, Apply 1 patch topically over 12 hours daily Remove & Discard patch within 12 hours or as directed by MD Do not start before February 19, 2023. (Patient not taking: Reported on 2/28/2024), Disp: , Rfl: 0    magnesium oxide (MAG-OX) 400 mg, Take 1 tablet (400 mg total) by mouth in the morning and 1 tablet (400 mg total) in the evening. (Patient not taking: Reported on 2/28/2024), Disp: 60 tablet, Rfl: 0    melatonin 3 mg, Take 1 tablet (3 mg total) by mouth daily at bedtime (Patient not taking: Reported on 2/28/2024), Disp: 30 tablet, Rfl: 0    meloxicam (MOBIC) 7.5 mg tablet, , Disp: , Rfl:     methocarbamol (ROBAXIN) 750 mg tablet, Take 1 tablet (750 mg total) by mouth every 6 (six) hours (Patient not taking: Reported on 2/28/2024), Disp: 60 tablet, Rfl: 0    metoprolol succinate  (TOPROL-XL) 100 mg 24 hr tablet, Take 1 tablet (100 mg total) by mouth daily (Patient not taking: Reported on 2/28/2024), Disp: 30 tablet, Rfl: 0    omeprazole (PriLOSEC) 20 mg delayed release capsule, Take 20 mg by mouth daily. (Patient not taking: Reported on 2/28/2024), Disp: , Rfl:     oxyCODONE (ROXICODONE) 5 immediate release tablet, Take 1 tablet (5 mg total) by mouth every 4 (four) hours as needed for moderate pain for up to 10 doses Take 2 tablets every 6 hours as needed for severe pain Max Daily Amount: 30 mg (Patient not taking: Reported on 2/28/2024), Disp: 10 tablet, Rfl: 0    thiamine 100 MG tablet, Take 1 tablet (100 mg total) by mouth in the morning. (Patient not taking: Reported on 2/28/2024), Disp: 30 tablet, Rfl: 0    verapamil (CALAN-SR) 120 mg CR tablet, Take 1 tablet (120 mg total) by mouth daily at bedtime (Patient not taking: Reported on 2/28/2024), Disp: 30 tablet, Rfl: 0    Vitals: Blood pressure 106/60, pulse 103, weight 75.8 kg (167 lb), SpO2 99%.    Body mass index is 23.29 kg/m².  Vitals:    02/28/24 1345   Weight: 75.8 kg (167 lb)     BP Readings from Last 3 Encounters:   02/28/24 106/60   02/18/23 134/80   09/07/22 158/80       Physical Exam:  Physical Exam    Neurologic:  Alert & oriented x 3, no new focal deficits, Not in any acute distress,  Constitutional:  Well developed, well nourished, non-toxic appearance   Eyes:  Pupil equal and reacting to light, conjunctiva normal,   HENT:  Atraumatic, oropharynx moist, Neck- normal range of motion, no tenderness,  Neck supple, No JVP, No LNP   Respiratory:  Bilateral air entry, mostly clear to auscultation  Cardiovascular: S1-S2 regular with a I/VI systolic murmur   GI:  Soft, nondistended, normal bowel sounds, nontender, no hepatosplenomegaly appreciated.  Musculoskeletal: no tenderness, no deformities.   Skin:  Well hydrated, no rash   Lymphatic:  No lymphadenopathy noted   Extremities:  No edema        Diagnostic Studies Review  Cardio:      EKG: Sinus tachycardia at 103/min, LBBB    Cardiac testing:   Results for orders placed during the hospital encounter of 21    Echo complete with contrast if indicated    Narrative  [address] [address] [phone]    Transthoracic Echocardiogram  2D, M-mode, Doppler, and Color Doppler    Study date:  05-Aug-2021    Patient: GABRIELLE DAY  MR number: [id number]  Account number: [id number]  : 1965  Age: 56 years  Gender: Male  Status: Inpatient  Location: Bedside  Height: 73 in  Weight: 171.6 lb  BP: 114/ 69 mmHg    Indications: Prolonged Q-T Interval    Diagnoses: R94.31 - Abnormal electrocardiogram [ECG] [EKG]    Sonographer:  Ozzie Concepcion RDCS  Referring Physician:  Rio Mae DO  Group:  [facility]  Cardiology Fellow:  Antoni Mejía MD  Interpreting Physician:  Edin Garcia DO    SUMMARY    SUMMARY:  Asymmetric hypertrophy of septum consider cardiac MRI to exclude HCM.    LEFT VENTRICLE:  Systolic function was normal. Ejection fraction was estimated to be 65 %.  There was moderate hypokinesis of the basal-mid anteroseptal, basal-mid inferoseptal, and basal-mid inferior wall(s).  Wall thickness was moderately increased.  There was moderate assymetrical hypertrophy of the septum.  Doppler parameters were consistent with abnormal left ventricular relaxation (grade 1 diastolic dysfunction).    VENTRICULAR SEPTUM:  There was mild dyssynergic motion. These changes are consistent with LBBB.    TRICUSPID VALVE:  There was trace regurgitation.  Pulmonary artery systolic pressure was within the normal range (26 mmHg).    HISTORY: PRIOR HISTORY: Alcohol Abuse, Seizures    PROCEDURE: The procedure was performed at the bedside. This was a routine study. The transthoracic approach was used. The study included complete 2D imaging, M-mode, complete spectral Doppler, and color Doppler. The heart  rate was 90 bpm,  at the start of the study. Images were obtained from the parasternal, apical, subcostal, and suprasternal notch acoustic windows. Echocardiographic views were limited due to lung interference. Image quality was adequate.    LEFT VENTRICLE: Size was normal. Systolic function was normal. Ejection fraction was estimated to be 65 %. There was moderate hypokinesis of the basal-mid anteroseptal, basal-mid inferoseptal, and basal-mid inferior wall(s). Wall thickness  was moderately increased. There was moderate assymetrical hypertrophy of the septum. DOPPLER: Doppler parameters were consistent with abnormal left ventricular relaxation (grade 1 diastolic dysfunction).    VENTRICULAR SEPTUM: There was mild dyssynergic motion. These changes are consistent with LBBB.    RIGHT VENTRICLE: The size was normal. Systolic function was normal.    LEFT ATRIUM: Size was normal.    RIGHT ATRIUM: Size was normal.    MITRAL VALVE: Valve structure was normal. There was normal leaflet separation. DOPPLER: The transmitral velocity was within the normal range. There was no evidence for stenosis. There was trace regurgitation.    AORTIC VALVE: The valve was trileaflet. Leaflets exhibited normal thickness, mild calcification, and normal cuspal separation. DOPPLER: Transaortic velocity was within the normal range. There was no evidence for stenosis. There was no  regurgitation.    TRICUSPID VALVE: The valve structure was normal. There was normal leaflet separation. DOPPLER: The transtricuspid velocity was within the normal range. There was no evidence for stenosis. There was trace regurgitation. Pulmonary artery  systolic pressure was within the normal range (26 mmHg). Estimated peak PA pressure was 26 mmHg.    PULMONIC VALVE: Leaflets exhibited normal thickness, no calcification, and normal cuspal separation. DOPPLER: The transpulmonic velocity was within the normal range. There was no regurgitation.    PERICARDIUM: There was  no pericardial effusion.    AORTA: The root exhibited normal size (3.3 cm). The ascending aorta was normal in size (3.6 cm).    SYSTEMIC VEINS: IVC: The inferior vena cava was not well visualized.    SYSTEM MEASUREMENT TABLES    2D  %FS: 40.09 %  Ao Diam: 3.28 cm  Ao asc: 3.58 cm  EDV(Teich): 59.59 ml  EF(Teich): 71.55 %  ESV(Teich): 16.95 ml  IVSd: 1.95 cm  LA Diam: 3.94 cm  LAAs A4C: 10.78 cm2  LAESV A-L A4C: 22.53 ml  LAESV MOD A4C: 20.27 ml  LALs A4C: 4.37 cm  LVEDV MOD A4C: 69.11 ml  LVEF MOD A4C: 66.94 %  LVESV MOD A4C: 22.85 ml  LVIDd: 3.74 cm  LVIDs: 2.24 cm  LVLd A4C: 8.66 cm  LVLs A4C: 6.91 cm  LVPWd: 0.96 cm  RAEDV A-L: 26.33 ml  RAEDV MOD: 24.78 ml  RALd: 4.15 cm  RVIDd: 3.28 cm  SV MOD A4C: 46.27 ml  SV(Teich): 42.64 ml    CW  AV Env.Ti: 268.7 ms  AV VTI: 27.83 cm  AV Vmax: 1.37 m/s  AV Vmean: 1.04 m/s  AV maxP.56 mmHg  AV meanP.77 mmHg  TR Vmax: 2.29 m/s  TR maxP.02 mmHg    MM  TAPSE: 2.28 cm    PW  LVOT Env.Ti: 269.46 ms  LVOT VTI: 21.75 cm  LVOT Vmax: 1.18 m/s  LVOT Vmean: 0.81 m/s  LVOT maxP.56 mmHg  LVOT meanPG: 3.01 mmHg    [employer] Accredited Echocardiography Laboratory    Prepared and electronically signed by    Edin Garcia DO  Signed 06-Aug-2021 10:26:07      Results for orders placed during the hospital encounter of 22    Echo complete w/ contrast if indicated    Interpretation Summary    Left Ventricle: Left ventricular cavity size is normal. Wall thickness is moderately increased. The left ventricular ejection fraction is 65% by visual estimation. Systolic function is normal. Wall motion is normal. Diastolic function is mildly abnormal, consistent with grade I (abnormal) relaxation. There is concentric remodeling.    IVS: There is abnormal septal motion consistent with left bundle branch block.    Aortic Valve: The valve appears sclerotic.    Tricuspid Valve: There is mild regurgitation. The right ventricular systolic pressure is normal. The  "estimated right ventricular systolic pressure is 27.00 mmHg.    Results for orders placed during the hospital encounter of 05/17/22    BEBO    Interpretation Summary    Left Ventricle: Systolic function is normal.    Left Atrium: There is no thrombus.    Right Atrium: No thrombus seen    Atrial Septum: No interatrial shunt seen with agitated saline/bubble study    Left Atrial Appendage: There is no thrombus.    Mitral Valve: There is no evidence of regurgitation.      Imaging:  Chest X-Ray:   No Chest XR results available for this patient.    CT-scan of the chest:     No CTA results available for this patient.  Lab Review   Lab Results   Component Value Date    WBC 5.85 02/16/2023    HGB 13.8 02/16/2023    HCT 40.6 02/16/2023    MCV 84 02/16/2023    RDW 12.6 02/16/2023     02/16/2023     BMP:  Lab Results   Component Value Date    SODIUM 136 02/16/2023    K 3.3 (L) 02/16/2023     02/16/2023    CO2 23 02/16/2023    BUN 17 02/16/2023    CREATININE 0.90 02/16/2023    GLUC 88 02/16/2023    GLUF 88 02/16/2023    CALCIUM 8.9 02/16/2023    CORRECTEDCA 9.4 05/20/2022    EGFR 92 02/16/2023    MG 1.9 02/16/2023     LFT:  Lab Results   Component Value Date    AST 15 02/15/2023    ALT 11 02/15/2023    ALKPHOS 80 02/15/2023    TP 6.7 02/15/2023    ALB 4.2 02/15/2023      No components found for: \"TSH3\"  Lab Results   Component Value Date    IMQ7ESZXYIVL 1.766 09/02/2022     Lab Results   Component Value Date    HGBA1C 5.3 05/17/2022     Lipid Profile:   Lab Results   Component Value Date    CHOLESTEROL 179 05/17/2022    HDL 35 (L) 05/17/2022    LDLCALC 107 (H) 05/17/2022    TRIG 184 (H) 05/17/2022     Lab Results   Component Value Date    CHOLESTEROL 179 05/17/2022    CHOLESTEROL 177 08/06/2021     Lab Results   Component Value Date    CKTOTAL 295 05/17/2022    CKMB 1.8 05/17/2022    CKMBINDEX <1.0 05/17/2022    TROPONINI 0.09 (H) 08/06/2021     Lab Results   Component Value Date    NTBNP 1,086 (H) 09/02/2022      No " "results found for this or any previous visit (from the past 672 hour(s)).          Dr. Marley Banks MD, [facility]      \"This note has been constructed using a voice recognition system.Therefore there may be syntax, spelling, and/or grammatical errors. Please call if you have any questions. \"  "

## 2024-02-28 ENCOUNTER — TELEPHONE (OUTPATIENT)
Dept: CARDIOLOGY CLINIC | Facility: CLINIC | Age: 59
End: 2024-02-28

## 2024-02-28 ENCOUNTER — OFFICE VISIT (OUTPATIENT)
Dept: CARDIOLOGY CLINIC | Facility: CLINIC | Age: 59
End: 2024-02-28
Payer: COMMERCIAL

## 2024-02-28 VITALS
OXYGEN SATURATION: 99 % | WEIGHT: 167 LBS | DIASTOLIC BLOOD PRESSURE: 60 MMHG | SYSTOLIC BLOOD PRESSURE: 106 MMHG | BODY MASS INDEX: 23.29 KG/M2 | HEART RATE: 103 BPM

## 2024-02-28 DIAGNOSIS — R94.31 PROLONGED Q-T INTERVAL ON ECG: Primary | ICD-10-CM

## 2024-02-28 DIAGNOSIS — E78.5 DYSLIPIDEMIA: ICD-10-CM

## 2024-02-28 DIAGNOSIS — I10 HYPERTENSION, UNSPECIFIED TYPE: ICD-10-CM

## 2024-02-28 PROCEDURE — 93000 ELECTROCARDIOGRAM COMPLETE: CPT | Performed by: INTERNAL MEDICINE

## 2024-02-28 PROCEDURE — 99213 OFFICE O/P EST LOW 20 MIN: CPT | Performed by: INTERNAL MEDICINE

## 2024-02-28 RX ORDER — PANTOPRAZOLE SODIUM 40 MG/1
40 TABLET, DELAYED RELEASE ORAL DAILY
COMMUNITY

## 2024-02-28 RX ORDER — LORAZEPAM 0.5 MG/1
TABLET ORAL
COMMUNITY
Start: 2024-02-19

## 2024-02-28 RX ORDER — MELOXICAM 7.5 MG/1
TABLET ORAL
COMMUNITY
Start: 2024-02-24

## 2024-02-28 RX ORDER — AMLODIPINE BESYLATE 10 MG/1
10 TABLET ORAL DAILY
COMMUNITY
Start: 2024-02-08

## 2024-02-28 NOTE — TELEPHONE ENCOUNTER
Dr. Banks had asked for loop recorder interrogation results.   I sent a message to device to check into it.

## 2024-05-23 NOTE — CASE MANAGEMENT
Case Management Progress Note    Patient name Daysi Chery  Location [address] 401/4 [address] [address]-* MRN [id number]  : 1965 Date 2023       LOS (days): 0  Geometric Mean LOS (GMLOS) (days):   Days to [facility]:        OBJECTIVE:    Current admission status: Observation  Preferred Pharmacy:   [address] [facility] [place], [address], [address]   [place] [address]  Phone: [phone] Fax: [phone]    [address] [facility] #[id number], [facility], [address]  Phone: [phone] Fax: [phone]    Renetta [address] [facility] [address]  Phone: [phone] Fax: [phone] [address]   [facility] [address]  Phone: [phone] Fax: [phone]    Primary Care Provider: No primary care provider on file  Primary Insurance: BLUE CROSS  Secondary Insurance:     PROGRESS NOTE:    SW following to assist with DCP  STR placement at [facility] is planned pending insurance authorization  Authorization request was submitted yesterday  SW followed up with Connor Hurley ([phone]) to inquire about progress with approval   Waiting for call back from [facility]  Will follow  Show Aperture Variable?: Yes Detail Level: Detailed Duration Of Freeze Thaw-Cycle (Seconds): 10-15 Post-Care Instructions: I reviewed with the patient in detail post-care instructions. Patient is to wear sunprotection, and avoid picking at any of the treated lesions. Pt may apply Vaseline to crusted or scabbing areas. Render Post-Care Instructions In Note?: no Number Of Freeze-Thaw Cycles: 2 freeze-thaw cycles Medical Necessity Clause: This procedure was medically necessary because the lesions that were treated were: Spray Paint Text: The liquid nitrogen was applied to the skin utilizing a spray paint frosting technique. Medical Necessity Information: It is in your best interest to select a reason for this procedure from the list below. All of these items fulfill various CMS LCD requirements except the new and changing color options. Consent: The patient's consent was obtained including but not limited to risks of crusting, scabbing, blistering, scarring, darker or lighter pigmentary change, recurrence, incomplete removal and infection. Duration Of Freeze Thaw-Cycle (Seconds): 15 Post-Care Instructions: I reviewed with the patient in detail post-care instructions. The spot(s) may becomes red today and scab or even blister. May apply vaseline daily until healed.

## 2024-08-05 ENCOUNTER — TELEPHONE (OUTPATIENT)
Age: 59
End: 2024-08-05

## 2024-08-05 NOTE — TELEPHONE ENCOUNTER
Patient`s sister, Lu called in to attempt to schedule an appt for patient. I check the communication consent form and there was non on the system. Told her that I will need verbal consent from the patient in order to move forward with her for scheduling or any other medical needs.     She will call back to schedule patient when she is with him. Provided our call back number.

## 2024-08-21 NOTE — PROGRESS NOTES
1  Progress Note - Cardiology Office  [facility]    Carlos Manuel Yuan 59 y.o. male MRN: [id number]  : 1965  Encounter: [id number]      ASSESSMENT:   Subacute right PCA territory CVA   Right MCA IPH     S/p BEBO and loop recorder implantation for AF surveillance in patient with CVA in 2022Neurology requested BEBO and ILR       Seizure disorder   History of alcohol abuse, says that he has quit  Chronic smoker, continues to smoke    Hypertension, BP is well-controlled    LBBB  Dyslipidemia  2022: , , HDL 35, normal AST and ALT  Still did not get repeat labs !  Reordered and advised patient    DORA   Noncompliance with treatment    CARDIAC TESTING:  TTE: 2022:  EF 65%, wall thickness moderately increased, G1 DD  Abnormal septal motion due to LBBB  Mild TR          RECOMMENDATIONS:   Lipid profile and LFTs which were last done in May 2022 and patient is no longer on aspirin, atorvastatin or Tricor as was suggested at the time of discharge from the hospital after his stroke.  I again recommend neurology follow-up for them to reassess patient's continued need for aspirin and lipid-lowering medications which seem to have been stopped.  Advised patient to call the loop recorder company and activate the device since there have been no device reports since it was implanted  Reordered lipid profile and hepatic function panel.  Will review labs when completed and device report to make further recommendations      Please call [phone] if any questions.    HPI :     Carlos Manuel uYan is a 59 y.o. year old male who came for follow up.  He denies any cardiac symptoms.  He has left-sided hemiparesis and issues with his memory.  He is noncompliant and still has not got any lipid or hepatic function panel for more than 2 years.  He is also not set up his loop recorder for monitoring    REVIEW OF SYSTEMS:  Left-sided hemiparesis  Memory issues  Denies chest pain, dyspnea,  syncope      Historical Information   Past Medical History:   Diagnosis Date    Biceps tendonitis, unspecified laterality 2007    Bone cyst 2011    Bronchiectasis (HCC) 10/02/2006    Bursitis 2005    Chest pain 2009    Chronic renal failure 2011    Diverticulitis of colon 10/09/2007    Generalized anxiety disorder 2006    GERD (gastroesophageal reflux disease)     Gout     Hyperlipidemia     Hypertension     Lateral epicondylitis, unspecified elbow     Last Assessed: 2013    Low magnesium levels     Lyme disease 2009    Pneumonia     Last Assessed: 2013    Stroke (HCC)      Past Surgical History:   Procedure Laterality Date    CARDIAC ELECTROPHYSIOLOGY PROCEDURE N/A 2022    Procedure: Cardiac loop recorder implant;  Surgeon: Marley Banks MD;  Location: [facility];  Service: Cardiology    KNEE ARTHROSCOPY      Therapeutic    OLECRANON BURSA EXCISION       Social History     Substance and Sexual Activity   Alcohol Use Yes    Alcohol/week: 6.0 standard drinks of alcohol    Types: 6 Glasses of wine per week    Comment: 22-states he has not had alcohol since 2021     Social History     Substance and Sexual Activity   Drug Use No     Social History     Tobacco Use   Smoking Status Every Day    Current packs/day: 0.00    Types: Cigarettes    Last attempt to quit: 5/10/2022    Years since quittin.2   Smokeless Tobacco Never   Tobacco Comments    cigarette nicotine dependence     Family History:   Family History   Problem Relation Age of Onset    Cancer Mother         Colon       Meds/Allergies     Allergies   Allergen Reactions    Codeine Hives     Tolerates morphine    Penicillins     Hydrocodone Rash     Reaction Date: 2007;        Current Outpatient Medications:     acetaminophen (TYLENOL) 325 mg tablet, Take 3 tablets (975 mg total) by mouth every 8 (eight) hours, Disp: , Rfl: 0    amLODIPine (NORVASC) 10 mg tablet, Take 10 mg by  mouth daily, Disp: , Rfl:     pantoprazole (PROTONIX) 40 mg tablet, Take 40 mg by mouth daily, Disp: , Rfl:     aspirin 81 mg chewable tablet, Chew 1 tablet (81 mg total)  in the morning. (Patient not taking: Reported on 2/28/2024), Disp: 30 tablet, Rfl: 0    atorvastatin (LIPITOR) 80 mg tablet, Take 0.5 tablets (40 mg total) by mouth daily with dinner (Patient not taking: Reported on 2/28/2024), Disp: 30 tablet, Rfl: 0    colchicine (COLCRYS) 0.6 mg tablet, Take 1 tablet (0.6 mg total) by mouth daily (Patient not taking: Reported on 2/28/2024), Disp: , Rfl: 0    fenofibrate (TRICOR) 145 mg tablet, Take 1 tablet (145 mg total) by mouth daily (Patient not taking: Reported on 2/28/2024), Disp: 30 tablet, Rfl: 0    lacosamide (VIMPAT) 100 mg tablet, Take 1 tablet (100 mg total) by mouth every 12 (twelve) hours for 10 days (Patient not taking: Reported on 2/28/2024), Disp: 20 tablet, Rfl: 0    levETIRAcetam (KEPPRA) 750 mg tablet, Take 2 tablets (1,500 mg total) by mouth every 12 (twelve) hours for 10 days, Disp: 40 tablet, Rfl: 0    lidocaine (LIDODERM) 5 %, Apply 1 patch topically over 12 hours daily Remove & Discard patch within 12 hours or as directed by MD Do not start before February 19, 2023. (Patient not taking: Reported on 2/28/2024), Disp: , Rfl: 0    LORazepam (ATIVAN) 0.5 mg tablet, TAKE 1 TABLET BY ORAL ROUTE AS NEEDED PRIOR TO MRI (Patient not taking: Reported on 8/23/2024), Disp: , Rfl:     magnesium oxide (MAG-OX) 400 mg, Take 1 tablet (400 mg total) by mouth in the morning and 1 tablet (400 mg total) in the evening. (Patient not taking: Reported on 2/28/2024), Disp: 60 tablet, Rfl: 0    melatonin 3 mg, Take 1 tablet (3 mg total) by mouth daily at bedtime (Patient not taking: Reported on 2/28/2024), Disp: 30 tablet, Rfl: 0    meloxicam (MOBIC) 7.5 mg tablet, , Disp: , Rfl:     methocarbamol (ROBAXIN) 750 mg tablet, Take 1 tablet (750 mg total) by mouth every 6 (six) hours (Patient not taking: Reported on  "2/28/2024), Disp: 60 tablet, Rfl: 0    metoprolol succinate (TOPROL-XL) 100 mg 24 hr tablet, Take 1 tablet (100 mg total) by mouth daily (Patient not taking: Reported on 2/28/2024), Disp: 30 tablet, Rfl: 0    omeprazole (PriLOSEC) 20 mg delayed release capsule, Take 20 mg by mouth daily. (Patient not taking: Reported on 2/28/2024), Disp: , Rfl:     oxyCODONE (ROXICODONE) 5 immediate release tablet, Take 1 tablet (5 mg total) by mouth every 4 (four) hours as needed for moderate pain for up to 10 doses Take 2 tablets every 6 hours as needed for severe pain Max Daily Amount: 30 mg (Patient not taking: Reported on 2/28/2024), Disp: 10 tablet, Rfl: 0    thiamine 100 MG tablet, Take 1 tablet (100 mg total) by mouth in the morning. (Patient not taking: Reported on 2/28/2024), Disp: 30 tablet, Rfl: 0    verapamil (CALAN-SR) 120 mg CR tablet, Take 1 tablet (120 mg total) by mouth daily at bedtime (Patient not taking: Reported on 2/28/2024), Disp: 30 tablet, Rfl: 0    Vitals: Blood pressure 124/80, pulse 85, height 5' 11\" (1.803 m), weight 69.9 kg (154 lb), SpO2 97%.    Body mass index is 21.48 kg/m².  Vitals:    08/23/24 1154   Weight: 69.9 kg (154 lb)     BP Readings from Last 3 Encounters:   08/23/24 124/80   02/28/24 106/60   02/18/23 134/80       Physical Exam:  Physical Exam    Neurologic:  Alert & oriented x 3, no new focal deficits, has left-sided hemiparesis not in any acute distress,  Constitutional:  Well developed, well nourished, non-toxic appearance   Eyes:  Pupil equal and reacting to light, conjunctiva normal,   HENT:  Atraumatic, oropharynx moist, Neck- normal range of motion, no tenderness,  Neck supple, No JVP, No LNP   Respiratory:  Bilateral air entry, mostly clear to auscultation  Cardiovascular: S1-S2 regular with a I/VI systolic murmur   GI:  Soft, nondistended, normal bowel sounds, nontender, no hepatosplenomegaly appreciated.  Musculoskeletal:  No tenderness, no deformities.   Skin:  Well hydrated, " no rash   Lymphatic:  No lymphadenopathy noted   Extremities:  No edema and distal pulses are present        Diagnostic Studies Review Cardio:      EKG: Normal sinus rhythm, heart rate 85/min, LBBB    Cardiac testing:   Results for orders placed during the hospital encounter of 21    Echo complete with contrast if indicated    Narrative  [address] [id number] [phone]    Transthoracic Echocardiogram  2D, M-mode, Doppler, and Color Doppler    Study date:  05-Aug-2021    Patient: GABRIELLE DAY  MR number: [id number]  Account number: [id number]  : 1965  Age: 56 years  Gender: Male  Status: Inpatient  Location: Bedside  Height: 73 in  Weight: 171.6 lb  BP: 114/ 69 mmHg    Indications: Prolonged Q-T Interval    Diagnoses: R94.31 - Abnormal electrocardiogram [ECG] [EKG]    Sonographer:  Ozzie Concepcion RDCS  Referring Physician:  Roi Mae DO  Group:  [facility]  Cardiology Fellow:  Antoni Mejía MD  Interpreting Physician:  Edin Garcia DO    SUMMARY    SUMMARY:  Asymmetric hypertrophy of septum consider cardiac MRI to exclude HCM.    LEFT VENTRICLE:  Systolic function was normal. Ejection fraction was estimated to be 65 %.  There was moderate hypokinesis of the basal-mid anteroseptal, basal-mid inferoseptal, and basal-mid inferior wall(s).  Wall thickness was moderately increased.  There was moderate assymetrical hypertrophy of the septum.  Doppler parameters were consistent with abnormal left ventricular relaxation (grade 1 diastolic dysfunction).    VENTRICULAR SEPTUM:  There was mild dyssynergic motion. These changes are consistent with LBBB.    TRICUSPID VALVE:  There was trace regurgitation.  Pulmonary artery systolic pressure was within the normal range (26 mmHg).    HISTORY: PRIOR HISTORY: Alcohol Abuse, Seizures    PROCEDURE: The procedure was performed at the bedside. This was a routine  study. The transthoracic approach was used. The study included complete 2D imaging, M-mode, complete spectral Doppler, and color Doppler. The heart rate was 90 bpm,  at the start of the study. Images were obtained from the parasternal, apical, subcostal, and suprasternal notch acoustic windows. Echocardiographic views were limited due to lung interference. Image quality was adequate.    LEFT VENTRICLE: Size was normal. Systolic function was normal. Ejection fraction was estimated to be 65 %. There was moderate hypokinesis of the basal-mid anteroseptal, basal-mid inferoseptal, and basal-mid inferior wall(s). Wall thickness  was moderately increased. There was moderate assymetrical hypertrophy of the septum. DOPPLER: Doppler parameters were consistent with abnormal left ventricular relaxation (grade 1 diastolic dysfunction).    VENTRICULAR SEPTUM: There was mild dyssynergic motion. These changes are consistent with LBBB.    RIGHT VENTRICLE: The size was normal. Systolic function was normal.    LEFT ATRIUM: Size was normal.    RIGHT ATRIUM: Size was normal.    MITRAL VALVE: Valve structure was normal. There was normal leaflet separation. DOPPLER: The transmitral velocity was within the normal range. There was no evidence for stenosis. There was trace regurgitation.    AORTIC VALVE: The valve was trileaflet. Leaflets exhibited normal thickness, mild calcification, and normal cuspal separation. DOPPLER: Transaortic velocity was within the normal range. There was no evidence for stenosis. There was no  regurgitation.    TRICUSPID VALVE: The valve structure was normal. There was normal leaflet separation. DOPPLER: The transtricuspid velocity was within the normal range. There was no evidence for stenosis. There was trace regurgitation. Pulmonary artery  systolic pressure was within the normal range (26 mmHg). Estimated peak PA pressure was 26 mmHg.    PULMONIC VALVE: Leaflets exhibited normal thickness, no calcification,  and normal cuspal separation. DOPPLER: The transpulmonic velocity was within the normal range. There was no regurgitation.    PERICARDIUM: There was no pericardial effusion.    AORTA: The root exhibited normal size (3.3 cm). The ascending aorta was normal in size (3.6 cm).    SYSTEMIC VEINS: IVC: The inferior vena cava was not well visualized.    SYSTEM MEASUREMENT TABLES    2D  %FS: 40.09 %  Ao Diam: 3.28 cm  Ao asc: 3.58 cm  EDV(Teich): 59.59 ml  EF(Teich): 71.55 %  ESV(Teich): 16.95 ml  IVSd: 1.95 cm  LA Diam: 3.94 cm  LAAs A4C: 10.78 cm2  LAESV A-L A4C: 22.53 ml  LAESV MOD A4C: 20.27 ml  LALs A4C: 4.37 cm  LVEDV MOD A4C: 69.11 ml  LVEF MOD A4C: 66.94 %  LVESV MOD A4C: 22.85 ml  LVIDd: 3.74 cm  LVIDs: 2.24 cm  LVLd A4C: 8.66 cm  LVLs A4C: 6.91 cm  LVPWd: 0.96 cm  RAEDV A-L: 26.33 ml  RAEDV MOD: 24.78 ml  RALd: 4.15 cm  RVIDd: 3.28 cm  SV MOD A4C: 46.27 ml  SV(Teich): 42.64 ml    CW  AV Env.Ti: 268.7 ms  AV VTI: 27.83 cm  AV Vmax: 1.37 m/s  AV Vmean: 1.04 m/s  AV maxP.56 mmHg  AV meanP.77 mmHg  TR Vmax: 2.29 m/s  TR maxP.02 mmHg    MM  TAPSE: 2.28 cm    PW  LVOT Env.Ti: 269.46 ms  LVOT VTI: 21.75 cm  LVOT Vmax: 1.18 m/s  LVOT Vmean: 0.81 m/s  LVOT maxP.56 mmHg  LVOT meanPG: 3.01 mmHg    [employer] Accredited Echocardiography Laboratory    Prepared and electronically signed by    Edin Garcia DO  Signed 06-Aug-2021 10:26:07      Results for orders placed during the hospital encounter of 05/07/22    Echo complete w/ contrast if indicated    Interpretation Summary    Left Ventricle: Left ventricular cavity size is normal. Wall thickness is moderately increased. The left ventricular ejection fraction is 65% by visual estimation. Systolic function is normal. Wall motion is normal. Diastolic function is mildly abnormal, consistent with grade I (abnormal) relaxation. There is concentric remodeling.    IVS: There is abnormal septal motion consistent with left bundle branch block.     "Aortic Valve: The valve appears sclerotic.    Tricuspid Valve: There is mild regurgitation. The right ventricular systolic pressure is normal. The estimated right ventricular systolic pressure is 27.00 mmHg.    Results for orders placed during the hospital encounter of 05/17/22    BEBO    Interpretation Summary    Left Ventricle: Systolic function is normal.    Left Atrium: There is no thrombus.    Right Atrium: No thrombus seen    Atrial Septum: No interatrial shunt seen with agitated saline/bubble study    Left Atrial Appendage: There is no thrombus.    Mitral Valve: There is no evidence of regurgitation.      Imaging:  Chest X-Ray:   No Chest XR results available for this patient.    CT-scan of the chest:     No CTA results available for this patient.  Lab Review   Lab Results   Component Value Date    WBC 5.85 02/16/2023    HGB 13.8 02/16/2023    HCT 40.6 02/16/2023    MCV 84 02/16/2023    RDW 12.6 02/16/2023     02/16/2023     BMP:  Lab Results   Component Value Date    SODIUM 136 02/16/2023    K 3.3 (L) 02/16/2023     02/16/2023    CO2 23 02/16/2023    BUN 17 02/16/2023    CREATININE 0.90 02/16/2023    GLUC 88 02/16/2023    GLUF 88 02/16/2023    CALCIUM 8.9 02/16/2023    CORRECTEDCA 9.4 05/20/2022    EGFR 92 02/16/2023    MG 1.9 02/16/2023     LFT:  Lab Results   Component Value Date    AST 15 02/15/2023    ALT 11 02/15/2023    ALKPHOS 80 02/15/2023    TP 6.7 02/15/2023    ALB 4.2 02/15/2023      No components found for: \"TSH3\"  Lab Results   Component Value Date    KOS9XBUAISJE 1.766 09/02/2022     Lab Results   Component Value Date    HGBA1C 5.3 05/17/2022     Lipid Profile:   Lab Results   Component Value Date    CHOLESTEROL 179 05/17/2022    HDL 35 (L) 05/17/2022    LDLCALC 107 (H) 05/17/2022    TRIG 184 (H) 05/17/2022     Lab Results   Component Value Date    CHOLESTEROL 179 05/17/2022    CHOLESTEROL 177 08/06/2021     Lab Results   Component Value Date    CKTOTAL 295 05/17/2022    CKMB 1.8 " "05/17/2022    CKMBINDEX <1.0 05/17/2022    TROPONINI 0.09 (H) 08/06/2021     Lab Results   Component Value Date    NTBNP 1,086 (H) 09/02/2022      No results found for this or any previous visit (from the past 672 hour(s)).          Dr. Marley Banks MD, [facility]      \"This note has been constructed using a voice recognition system.Therefore there may be syntax, spelling, and/or grammatical errors. Please call if you have any questions. \"  "

## 2024-08-23 ENCOUNTER — OFFICE VISIT (OUTPATIENT)
Dept: CARDIOLOGY CLINIC | Facility: CLINIC | Age: 59
End: 2024-08-23
Payer: COMMERCIAL

## 2024-08-23 ENCOUNTER — TELEPHONE (OUTPATIENT)
Dept: CARDIOLOGY CLINIC | Facility: CLINIC | Age: 59
End: 2024-08-23

## 2024-08-23 VITALS
HEIGHT: 71 IN | SYSTOLIC BLOOD PRESSURE: 124 MMHG | WEIGHT: 154 LBS | BODY MASS INDEX: 21.56 KG/M2 | OXYGEN SATURATION: 97 % | HEART RATE: 85 BPM | DIASTOLIC BLOOD PRESSURE: 80 MMHG

## 2024-08-23 DIAGNOSIS — I10 HYPERTENSION, UNSPECIFIED TYPE: ICD-10-CM

## 2024-08-23 DIAGNOSIS — I63.9 CEREBROVASCULAR ACCIDENT (CVA), UNSPECIFIED MECHANISM (HCC): ICD-10-CM

## 2024-08-23 DIAGNOSIS — R94.31 PROLONGED Q-T INTERVAL ON ECG: Primary | ICD-10-CM

## 2024-08-23 DIAGNOSIS — E78.5 HYPERLIPIDEMIA, UNSPECIFIED HYPERLIPIDEMIA TYPE: ICD-10-CM

## 2024-08-23 PROCEDURE — 93000 ELECTROCARDIOGRAM COMPLETE: CPT | Performed by: INTERNAL MEDICINE

## 2024-08-23 PROCEDURE — 99214 OFFICE O/P EST MOD 30 MIN: CPT | Performed by: INTERNAL MEDICINE

## 2024-08-23 NOTE — TELEPHONE ENCOUNTER
Patient called wanting to know when he can schedule an appointment to have his loop recorder taken out.  Patient stated that it's been in for a few months and wants it taken out.

## 2024-08-26 NOTE — TELEPHONE ENCOUNTER
Called and spoke with patient.  Informed patient that he should speak with Neurologist, Dr. Gutierrez.  Patient verbalized understanding.

## 2024-09-24 ENCOUNTER — TELEPHONE (OUTPATIENT)
Dept: NEUROLOGY | Facility: CLINIC | Age: 59
End: 2024-09-24

## 2024-10-08 ENCOUNTER — OFFICE VISIT (OUTPATIENT)
Dept: NEUROLOGY | Facility: CLINIC | Age: 59
End: 2024-10-08
Payer: COMMERCIAL

## 2024-10-08 VITALS
BODY MASS INDEX: 22.54 KG/M2 | DIASTOLIC BLOOD PRESSURE: 80 MMHG | HEIGHT: 71 IN | HEART RATE: 86 BPM | WEIGHT: 161 LBS | SYSTOLIC BLOOD PRESSURE: 110 MMHG

## 2024-10-08 DIAGNOSIS — R56.9 SEIZURE (HCC): ICD-10-CM

## 2024-10-08 DIAGNOSIS — R53.1 WEAKNESS: ICD-10-CM

## 2024-10-08 DIAGNOSIS — I63.9 CEREBROVASCULAR ACCIDENT (CVA), UNSPECIFIED MECHANISM (HCC): Primary | ICD-10-CM

## 2024-10-08 DIAGNOSIS — H53.462 HOMONYMOUS BILATERAL FIELD DEFECTS IN VISUAL FIELD, LEFT: ICD-10-CM

## 2024-10-08 DIAGNOSIS — G31.84 MILD COGNITIVE IMPAIRMENT: ICD-10-CM

## 2024-10-08 DIAGNOSIS — G89.4 CHRONIC PAIN SYNDROME: ICD-10-CM

## 2024-10-08 DIAGNOSIS — E78.5 HYPERLIPIDEMIA: ICD-10-CM

## 2024-10-08 PROCEDURE — 99215 OFFICE O/P EST HI 40 MIN: CPT | Performed by: NURSE PRACTITIONER

## 2024-10-08 PROCEDURE — 99417 PROLNG OP E/M EACH 15 MIN: CPT | Performed by: NURSE PRACTITIONER

## 2024-10-08 RX ORDER — MAGNESIUM OXIDE 400 MG/1
400 TABLET ORAL DAILY
Qty: 30 TABLET | Refills: 4 | Status: SHIPPED | OUTPATIENT
Start: 2024-10-08

## 2024-10-08 RX ORDER — ATORVASTATIN CALCIUM 40 MG/1
40 TABLET, FILM COATED ORAL
Qty: 30 TABLET | Refills: 4 | Status: SHIPPED | OUTPATIENT
Start: 2024-10-08

## 2024-10-08 NOTE — PROGRESS NOTES
Patient ID: Carlos Manuel Yuan is a 59 y.o. male.    Assessment/Plan:       Diagnoses and all orders for this visit:    Cerebrovascular accident (CVA), unspecified mechanism (HCC)  Comments:  Continue baby aspirin 81 mg daily  Restart Lipitor 40 mg daily  Lipid profile with next blood work  PT/OT  Ophthalmology evaluation  Orders:  -     atorvastatin (LIPITOR) 40 mg tablet; Take 1 tablet (40 mg total) by mouth daily with dinner  -     Ambulatory Referral to Physical Therapy; Future  -     Cancel: Ambulatory Referral to Occupational Therapy; Future  -     Hemoglobin A1C With EAG; Future    Weakness  Comments:  Referral for PT re: gait pattern, ergonomics and poss need for orthotics  Referral for OT re: hand therapy and cognitive therapy. Also eval for vision therapy  Orders:  -     Vitamin D 25 hydroxy; Future  -     Vitamin B12/Folate, Serum Panel; Future  -     Vitamin B1, whole blood; Future  -     Vitamin E; Future  -     Ambulatory Referral to Physical Therapy; Future  -     Cancel: Ambulatory Referral to Occupational Therapy; Future  -     magnesium oxide (MAG-OX) 400 mg tablet; Take 1 tablet (400 mg total) by mouth daily  -     Ambulatory Referral to Orthopedic Surgery; Future  -     Ambulatory Referral to Occupational Therapy; Future    Hyperlipidemia  Comments:  Atorvastatin 40 mg daily  Lipid profile with next blood work  Orders:  -     atorvastatin (LIPITOR) 40 mg tablet; Take 1 tablet (40 mg total) by mouth daily with dinner    Seizure (HCC)  Comments:  Continue Keppra 1500 mg twice a day per PCP  Keppra level with next blood work  Seizures being managed by PCP, Vimpat has not been continued per PCP  Orders:  -     Levetiracetam level; Future    Chronic pain syndrome  Comments:  Can trial gabapentin 100 mg 3 times a day  Magnesium 400 mg daily  Orthopedic evaluation, lower extremity joints  PT/OT referrals  Orders:  -     Vitamin D 25 hydroxy; Future  -     Vitamin B12/Folate, Serum Panel; Future  -      Vitamin B1, whole blood; Future  -     Vitamin E; Future  -     Ambulatory Referral to Orthopedic Surgery; Future    Mild cognitive impairment  Comments:  Referral to OT for cognitive therapy  Orders:  -     Cancel: Ambulatory Referral to Occupational Therapy; Future  -     Ambulatory Referral to Occupational Therapy; Future    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, left  Comments:  Baby aspirin 81 mg daily  Atorvastatin 40 mg daily  OT for evaluation of visual therapy  Follow-up with eye doctor appointment  Orders:  -     Ambulatory Referral to Occupational Therapy; Future         Continue with Keppra 1500mg twice a day (taking 2 tabs 2x a day)  Will get Keppra level with bloodwork  Continue with baby aspirin 81mg daily  Restart on atorvastatin 40mg daily for vascular risk /stroke risk management  Get Lipid profile, Vitamin levels, A1C  Continue with magnesium 400mg daily   Ambulatory referral for PT re: gait pattern, ergonomics and poss need for orthotics  Ambulatory referral for OT re: hand therapy and cognitive therapy. Also eval for vision therapy   Can try gabapentin 100mg 3x a day for stabbing joint pains  Ambulatory referral for orthopedic re: Lt foot/knee/hip joint pain post stroke  Follow up with Eye Dr for vision changes and needed  Follow up with Neurology office in 4 months or sooner if needed.       Subjective/HPI:  Carlos Manuel Yuan is a 60yo male with PMH of CKD, CHATO, HLD, HTN, EtOH use and right posterior MCA/PCA distribution infarcts with seizures who presents to the neurology office today as follow-up from prior consultations in 2022.  Patient was last seen in the hospital at the hospital consult after being found on the floor at home with confusion and urinary incontinence.  Patient reported at that time an increase in his seizure activity over the course of a few days.  He is not sure if he was taking his medications or not.  Patient's sister has had to call for well checks,  reporting that he had not been himself.  The police arrived and noted the seizure activity.  On arrival to the hospital he was found to have weakness with ataxia, left-sided numbness and shaking.  He was reporting pain in his low back and his left hip.  He had left-sided sensory changes with his ataxia.  He had reported blurred vision on exam have left HH.  EOM was intact although has some delayed with rightward gaze into the nasal view.  Follow-up exam patient had more pronounced left-sided neglect with seated positioning.  CTA of the head and neck with concern for vascular occlusi diseaseveAs his prior stroke, seizure focus and current strokes involve the same area in the right hemisphere.  Found to have chronic thrombosis in the right transverse and sigmoid sinus, stable from prior studies.  Had a BEBO with no findings of thrombus, loop recorder was placed for further monitoring.  He was continued on Keppra 1500 mg twice daily with Vimpat 100 mg twice daily.  Started on DAPT therapy with baby aspirin and Plavix for 3 months, followed by monotherapy with baby aspirin.  He was to follow with cardiology regarding loop recorder readings and possible arrhythmias.  Patient was complaining of migrainous type headache, suspected to be vascular migraine, he was on Norvasc therefore discontinued this and added verapamil 120 mg nightly with Fioricet as abortive option.  Patient was to have an MRI in 2 months to evaluate for evolution of the CVA.  Since his hospitalization multiple attempts have been made to schedule patient with follow-up exam.  He has been reminded on a couple of occasions to have his follow-up MRI completed.  Ultimately patient did have an MRI repeated in September 2022 showing resolution of diffuse hyperintensity in the right temporal occipital and parietal region with the development in the right occipital and temporal region with ex vacuo dilatation of the right occipital horn encephalomalacia.  No  further acute infarct seen.  Small chronic microbleed's noted in the right temporal region, not seen on prior studies in May.  Patient also had a CT of the head since that time on 2/15/2023, noted no intracranial abnormalities.  Notes chronic lacunar infarcts in left basal ganglia, small chronic lacunar infarcts in the right lentiform nuclei.  Periventricular subcortical hypoattenuating foci partially confluent consistent of advanced micro angiopathic disease.  Patient is also had a CT of the cervical spine with no cervical spine fracture or malalignment.  Notes disc, facet and uncovertebral osteophytosis at C3-4 and C6-7 resulting in mid central canal stenosis and mild to moderate neural foraminal narrowing.  CT of the thoracolumbar spine noting a small Schmorl's node at L3, L4-5 and S1 moderate to severe bilateral neuroforaminal narrowing  Pt reports that he is having knife stabbing pain in the left hand and foot and the hip.  He noted that it is the left side.  He noted that he does not see as well from the left eye either.   Regarding cardiac status, noted patient quit alcohol, continues to smoke.  LDL as of 5/17/2022 was 107.  Noted nonadherence to treatment.  Cardiology notes patient had not registered his loop recorder device, recommended he call the company and activate the device since there has been no reports since its implant.  Reviewed notation from hospitalization on February 2023 after he suffered a fall.  Reports he lives alone, has continued left-sided weakness.  Was recommended discharge to short-term rehabilitation.  Had complaints of left hand, left foot and hip pains.  Had diagnosis of gout of the hand with third finger inflammation and swelling.  He was given colchicine with some improvement.  Recommended continue colchicine daily.  Reported low back pain CT scans were negative.  Continued on baby aspirin and atorvastatin from stroke perspective.  Was continued on Vimpat and Keppra as of his  last hospitalization in February 2023.    Patient reports to neurology office today at the behest of his PCP due to ongoing issues with pain in his left hand, foot and hip.  Patient has left-sided symptoms with his stroke, question if pain is stroke related in nature.  Patient reports that he has had ongoing issues with stabbing type pain in his left foot, knee, hip as well as his left hand.  Patient states the pain started in his left foot, had progressively increased to the knee, then progressed up into the hip.  Patient has pain in his hand however notes some aching in the hand at rest however the sharp stabbing pain does not occur unless he is gripping something.  Patient does walk with a limp, he uses a cane for stability.  Also reports using a rolling walker at home.  He reports going to rehab after his stroke but has not had any therapy since that time.  Suspect patient with weakness and need for repeat rehabilitation.  Will give him referral for PT/OT regarding gait training, strengthening and ergonomics, possible need for orthotics.  Recommending OT for visual therapies due to left HH post CVA, hand therapy as well as cognitive therapy.  Patient notes that since his stroke he has had difficulty with his memory, his cognition has declined.  Will also provide referral for orthopedics, patient has inflammation and/or arthritis within the joint, possible alleviation of pain with cortisone injection if appropriate.  Will need to be evaluated by orthopedist for this treatment modality.  Advised patient he can start on gabapentin 100 mg 3 times a day, will use at low-dose to see how he responds.  Also advised patient can trial magnesium 400 mg daily for muscles and decreased muscle spasming.  Regarding patient's CVA, he reports that he does take his baby aspirin daily however notes that he does not have poor cholesterol and has not been taking his atorvastatin.  Patient stated that his PCP is not prescribing this  for him at this point in time.  Explained to patient vascular risk factors related to CVA.  Risk factors include hyperlipidemia with the use of statin in order to decrease the vascular risk by assist the baby aspirin and maintaining flow/circulation and reducing inflammation within the blood vessels.  Patient stated he was unaware, willing to resume the use of his atorvastatin, he has been restarted at 40 mg daily.  Is unclear when his last lipid profile had been completed, he states when he is at his PCP they do draw blood however notes that he has not had blood work drawn recently.  Also reviewed additional causes for vascular strokes including diabetes, HTN, HLD, smoking, sleep apnea and, alcohol abuse.  Patient is indicated previously that he does continue smoking however states that he has been sober for a long time.  Reviewed patient's medical chart, noted ER visit on 7/10/2024 to the [facility] emergency department.  Patient had been texting vague SI threats to his wife, please were contacted and patient was brought to the ER.  Patient was texting under the influence of alcohol, his EtOH level was 157.  Thus, patient has not been sober for extended periods of time.  Patient has seen his cardiologist, is aware he needs to contact the company for his loop recorder in order to have his device is set up and read.  Patient is aware cardiologist wrote lab works for lipid profile and hepatic profile.  Patient does not have the requisitions, reprinted for him to have drawn with his next blood work.  Will also have A1c drawn for management of vascular risk factors.  With regards to patient's seizures, he does not recall the last time he had a seizure.  He is on Keppra 1500 mg twice a day.  Patient states that he does take the Keppra regularly, he gets these prescribed to him through his PCP.  Patient indicated he does not recall being on Vimpat, notes that his PCP has not ordered this for him and he does  not have this medication available to him.  Patient denies having any recent seizure-like activity.  Will get a Keppra level drawn with his next blood work, can remain off of Vimpat at this point in time as he appears to have been seizure-free over the past couple of years.  Patient indicates that he continues to have visual deficits, he has not followed up with ophthalmology nor has he had visual therapies.  Recommend patient have outpatient ophthalmology evaluation for further treatment recommendations and options.  Referral was provided, lab work provided including labs for vitamin levels, A1c, lipid profile and Keppra level.  Prescription sent to his pharmacy for atorvastatin 40 mg daily gabapentin 100 mg 3 times a day and magnesium 400 mg daily.  Patient encouraged to follow-up with ophthalmology regarding visual changes post CVA 2 years ago.  Patient will follow-up in the outpatient neurology office in 4 months or sooner.  Patient encouraged to continue follow-up on a regular basis to remain adherent and assist with his stroke risk management and reduction.    The following portions of the patient's history were reviewed and updated as appropriate: allergies, current medications, past family history, past medical history, past social history, past surgical history, and problem list.        Past Medical History:   Diagnosis Date    Biceps tendonitis, unspecified laterality 09/17/2007    Bone cyst 11/22/2011    Bronchiectasis (HCC) 10/02/2006    Bursitis 11/17/2005    Chest pain 04/29/2009    Chronic renal failure 11/22/2011    Diverticulitis of colon 10/09/2007    Generalized anxiety disorder 06/08/2006    GERD (gastroesophageal reflux disease)     Gout     Hyperlipidemia     Hypertension     Lateral epicondylitis, unspecified elbow     Last Assessed: 11/29/2013    Low magnesium levels     Lyme disease 11/09/2009    Pneumonia     Last Assessed: 1/7/2013    Stroke (HCC)        Past Surgical History:   Procedure  Laterality Date    CARDIAC ELECTROPHYSIOLOGY PROCEDURE N/A 2022    Procedure: Cardiac loop recorder implant;  Surgeon: Marley Banks MD;  Location: [facility];  Service: Cardiology    KNEE ARTHROSCOPY      Therapeutic    OLECRANON BURSA EXCISION         Social History     Socioeconomic History    Marital status: /Civil Union     Spouse name: None    Number of children: None    Years of education: None    Highest education level: None   Occupational History    None   Tobacco Use    Smoking status: Every Day     Current packs/day: 0.00     Types: Cigarettes     Last attempt to quit: 5/10/2022     Years since quittin.4    Smokeless tobacco: Never    Tobacco comments:     cigarette nicotine dependence   Vaping Use    Vaping status: Never Used   Substance and Sexual Activity    Alcohol use: Yes     Alcohol/week: 6.0 standard drinks of alcohol     Types: 6 Glasses of wine per week     Comment: 20-states he has not had alcohol since 2021    Drug use: No    Sexual activity: Yes     Partners: Female   Other Topics Concern    None   Social History Narrative    None     Social Determinants of Health     Financial Resource Strain: Not on file   Food Insecurity: No Food Insecurity (2023)    Hunger Vital Sign     Worried About Running Out of Food in the Last Year: Never true     Ran Out of Food in the Last Year: Never true   Transportation Needs: No Transportation Needs (2023)    PRAPARE - Transportation     Lack of Transportation (Medical): No     Lack of Transportation (Non-Medical): No   Physical Activity: Not on file   Stress: Not on file   Social Connections: Not on file   Intimate Partner Violence: Not on file   Housing Stability: Low Risk  (2023)    Housing Stability Vital Sign     Unable to Pay for Housing in the Last Year: No     Number of Places Lived in the Last Year: 1     Unstable Housing in the Last Year: No       Family History   Problem Relation Age of Onset     Cancer Mother         Colon         Current Outpatient Medications:     acetaminophen (TYLENOL) 325 mg tablet, Take 3 tablets (975 mg total) by mouth every 8 (eight) hours, Disp: , Rfl: 0    amLODIPine (NORVASC) 10 mg tablet, Take 10 mg by mouth daily, Disp: , Rfl:     aspirin 81 mg chewable tablet, Chew 1 tablet (81 mg total)  in the morning., Disp: 30 tablet, Rfl: 0    atorvastatin (LIPITOR) 40 mg tablet, Take 1 tablet (40 mg total) by mouth daily with dinner, Disp: 30 tablet, Rfl: 4    levETIRAcetam (KEPPRA) 750 mg tablet, Take 2 tablets (1,500 mg total) by mouth every 12 (twelve) hours for 10 days, Disp: 40 tablet, Rfl: 0    magnesium oxide (MAG-OX) 400 mg tablet, Take 1 tablet (400 mg total) by mouth daily, Disp: 30 tablet, Rfl: 4    omeprazole (PriLOSEC) 20 mg delayed release capsule, Take 20 mg by mouth daily, Disp: , Rfl:     colchicine (COLCRYS) 0.6 mg tablet, Take 1 tablet (0.6 mg total) by mouth daily (Patient not taking: Reported on 2/28/2024), Disp: , Rfl: 0    fenofibrate (TRICOR) 145 mg tablet, Take 1 tablet (145 mg total) by mouth daily (Patient not taking: Reported on 2/28/2024), Disp: 30 tablet, Rfl: 0    lacosamide (VIMPAT) 100 mg tablet, Take 1 tablet (100 mg total) by mouth every 12 (twelve) hours for 10 days (Patient not taking: Reported on 2/28/2024), Disp: 20 tablet, Rfl: 0    lidocaine (LIDODERM) 5 %, Apply 1 patch topically over 12 hours daily Remove & Discard patch within 12 hours or as directed by MD Do not start before February 19, 2023. (Patient not taking: Reported on 2/28/2024), Disp: , Rfl: 0    LORazepam (ATIVAN) 0.5 mg tablet, TAKE 1 TABLET BY ORAL ROUTE AS NEEDED PRIOR TO MRI (Patient not taking: Reported on 8/23/2024), Disp: , Rfl:     melatonin 3 mg, Take 1 tablet (3 mg total) by mouth daily at bedtime (Patient not taking: Reported on 2/28/2024), Disp: 30 tablet, Rfl: 0    meloxicam (MOBIC) 7.5 mg tablet, , Disp: , Rfl:     methocarbamol (ROBAXIN) 750 mg tablet, Take 1 tablet  "(750 mg total) by mouth every 6 (six) hours (Patient not taking: Reported on 2/28/2024), Disp: 60 tablet, Rfl: 0    metoprolol succinate (TOPROL-XL) 100 mg 24 hr tablet, Take 1 tablet (100 mg total) by mouth daily (Patient not taking: Reported on 2/28/2024), Disp: 30 tablet, Rfl: 0    oxyCODONE (ROXICODONE) 5 immediate release tablet, Take 1 tablet (5 mg total) by mouth every 4 (four) hours as needed for moderate pain for up to 10 doses Take 2 tablets every 6 hours as needed for severe pain Max Daily Amount: 30 mg (Patient not taking: Reported on 2/28/2024), Disp: 10 tablet, Rfl: 0    pantoprazole (PROTONIX) 40 mg tablet, Take 40 mg by mouth daily (Patient not taking: Reported on 10/8/2024), Disp: , Rfl:     thiamine 100 MG tablet, Take 1 tablet (100 mg total) by mouth in the morning. (Patient not taking: Reported on 2/28/2024), Disp: 30 tablet, Rfl: 0    Allergies   Allergen Reactions    Codeine Hives     Tolerates morphine    Penicillins     Hydrocodone Rash     Reaction Date: 17Sep2007;         Blood pressure 110/80, pulse 86, height 5' 11\" (1.803 m), weight 73 kg (161 lb).       Objective:    Blood pressure 110/80, pulse 86, height 5' 11\" (1.803 m), weight 73 kg (161 lb).    Physical Exam  Vitals reviewed.   Constitutional:       Appearance: Normal appearance. He is cachectic.   HENT:      Head: Normocephalic.      Right Ear: Hearing normal.      Left Ear: Hearing normal.      Nose: Nose normal.      Mouth/Throat:      Mouth: Mucous membranes are moist.   Eyes:      General: Lids are normal.      Extraocular Movements: Extraocular movements intact.      Conjunctiva/sclera: Conjunctivae normal.      Pupils: Pupils are equal, round, and reactive to light.   Pulmonary:      Effort: Pulmonary effort is normal. No respiratory distress.   Abdominal:      Palpations: Abdomen is soft.      Tenderness: There is no abdominal tenderness.   Musculoskeletal:         General: Normal range of motion.      Cervical back: Normal " range of motion.   Skin:     General: Skin is warm and dry.   Neurological:      Mental Status: He is alert.      Coordination: Romberg sign negative.      Deep Tendon Reflexes:      Reflex Scores:       Achilles reflexes are 1+ on the right side and 1+ on the left side.  Psychiatric:         Attention and Perception: Attention and perception normal.         Mood and Affect: Mood and affect normal.         Speech: Speech normal.         Behavior: Behavior is slowed. Behavior is cooperative.         Thought Content: Thought content normal.         Cognition and Memory: Memory normal. Cognition is impaired.         Judgment: Judgment normal.         Neurological Exam  Mental Status  Alert. Oriented to person, place, time and situation. Memory is normal. Recent and remote memory are intact. Speech is normal. Language is fluent with no aphasia. Attention and concentration are normal. Fund of knowledge is appropriate for level of education.    Cranial Nerves  CN II: Visual acuity is normal. Right homonymous hemianopsia. + Left lateral HH.  Bilaterally. Left homonymous hemianopsia.  CN III, IV, VI: Extraocular movements intact bilaterally. Normal lids and orbits bilaterally. Pupils equal round and reactive to light bilaterally.  CN V: Facial sensation is normal.  CN VII: Full and symmetric facial movement.  CN VIII:  Right: Hearing is normal.  Left: Hearing is normal.  CN IX, X: Palate elevates symmetrically. Normal gag reflex.  CN XI: Shoulder shrug strength is normal.  CN XII: Tongue midline without atrophy or fasciculations.    Motor  Normal muscle bulk throughout. Normal muscle tone. The following abnormal movements were seen: Mild action tremor with outstretched arms.   Strength is 5/5 in all four extremities except as noted. No pronator drift.                                             Right                     Left  Deltoid                                                            4   Biceps                                                             4  Brachioradialis                                               4   Triceps                                                           4-   Iliopsoas                                                        4-   Quadriceps                                                    4-   Hamstring                                                      4-    Sensory  Light touch is normal in upper and lower extremities. Temperature abnormality: Vibration abnormality: Proprioception is normal in upper and lower extremities. Sensation: Hypersensitivity noted to the left lower extremity.     Reflexes  Deep tendon reflexes are 2+ and symmetric except as noted.                                            Right                      Left  Achilles                                1+                         1+    Right pathological reflexes: Kami's absent. Ankle clonus absent.  Left pathological reflexes: Kami's absent. Ankle clonus absent.    Coordination  Right: Finger-to-nose normal. Rapid alternating movement normal. Heel-to-shin normal.Left: Finger-to-nose normal. Rapid alternating movement normal. Heel-to-shin normal.    Gait  Casual gait: Wide stance. Reduced right arm swing. Reduced left arm swing. Romberg is absent. Unable to rise from chair without using arms.  Patient ambulating with use of a cane for stability.  Some instability with rising from chair.        ROS:    Review of Systems   Constitutional:  Negative for chills and fever.   HENT:  Negative for ear pain and sore throat.    Eyes:  Negative for pain and visual disturbance.   Respiratory:  Negative for cough and shortness of breath.    Cardiovascular:  Negative for chest pain and palpitations.   Gastrointestinal:  Negative for abdominal pain and vomiting.   Genitourinary:  Negative for dysuria and hematuria.   Musculoskeletal:  Positive for arthralgias and gait problem. Negative for back pain.   Skin:  Negative for color change  and rash.   Neurological:  Positive for weakness. Negative for seizures and syncope.   Psychiatric/Behavioral:  Positive for decreased concentration. The patient is nervous/anxious.    All other systems reviewed and are negative.        ROS reviewed and discussed with the patient.    I have spent a total time of 60 minutes in caring for this patient on the day of the visit/encounter including Risks and benefits of tx options, Instructions for management, Patient and family education, Importance of tx compliance, Risk factor reductions, Counseling / Coordination of care, Documenting in the medical record, Reviewing / ordering tests, medicine, procedures  , and Obtaining or reviewing history  .

## 2024-10-08 NOTE — PATIENT INSTRUCTIONS
Continue with Keppra 1500mg twice a day (taking 2 tabs 2x a day)  Will get Keppra level with bloodwork  Continue with baby aspirin 81mg daily  Restart on atorvastatin 40mg daily for vascular risk /stroke risk management  Get Lipid profile, Vitamin levels, A1C  Continue with magnesium 400mg daily   Ambulatory referral for PT re: gait pattern, ergonomics and poss need for orthotics  Ambulatory referral for OT re: hand therapy and cognitive therapy. Also eval for vision therapy   Can try gabapentin 100mg 3x a day for stabbing joint pains  Ambulatory referral for orthopedic re: Lt foot/knee/hip joint pain post stroke  Follow up with Eye Dr for vision changes and needed  Follow up with Neurology office in 4 months or sooner if needed.

## 2024-10-09 ENCOUNTER — NURSE TRIAGE (OUTPATIENT)
Age: 59
End: 2024-10-09

## 2024-10-09 DIAGNOSIS — G89.4 CHRONIC PAIN SYNDROME: Primary | ICD-10-CM

## 2024-10-09 DIAGNOSIS — M54.50 LOWER BACK PAIN: ICD-10-CM

## 2024-10-09 RX ORDER — GABAPENTIN 100 MG/1
100 CAPSULE ORAL 2 TIMES DAILY
Qty: 60 CAPSULE | Refills: 2 | Status: SHIPPED | OUTPATIENT
Start: 2024-10-09 | End: 2024-10-15 | Stop reason: SDUPTHER

## 2024-10-09 NOTE — TELEPHONE ENCOUNTER
"Inbound call received from Patient stating a prescription is missing at his pharmacy. Wilfredo states the provider said they would call in a script for Gabapentin yesterday but it is not there.     Per Nathan SANTACRUZ's note from 10/08/2024: \"Prescription sent to his pharmacy for atorvastatin 40 mg daily gabapentin 100 mg 3 times a day and magnesium 400 mg daily.\"    Patient would like this sent over as soon as possible as he only has a home aid to assist him for a short time.         Nathan SANTACRUZ please send script if agreeable, thank you!    Pharmacy confirmed as:   [facility]/pharmacy #[id number] - [place], [place] - [address]  [address] [address]  Phone: [phone]  Fax: [phone]  MARTIN #: [id number]       Reason for Disposition   Caller has URGENT medicine question about med that PCP or specialist prescribed and triager unable to answer question    Answer Assessment - Initial Assessment Questions  1. NAME of MEDICATION: \"What medicine are you calling about?\"      Gabapentin    2. QUESTION: \"What is your question?\" (e.g., medication refill, side effect)      Wants Gabapentin prescribed.     3. PRESCRIBING HCP: \"Who prescribed it?\" Reason: if prescribed by specialist, call should be referred to that group.      N/A    Protocols used: Medication Question Call-ADULT-[place]    "

## 2024-10-10 NOTE — TELEPHONE ENCOUNTER
NOAM Rivers   to Me     10/9/24  6:13 PM   Ok, I sent it over. Was my error.    Can you also let him know that if he has any additional vision changes once starting the gabapentin to let me know. It can be a side effective but usually a blurring if it happens at all.    Thanks  Nathan  ------------------------------------------    Outbound call made to Patient and NOAM's advisement was provided, Wilfredo verbalized understanding.     Patient wanted to clarify with provider that he is no longer taking colchicine for a long time. Wilfredo is also not taking omeprazole, melatonin, methocarbamol, lidocaine patch, thiamine, and lacosamide.These medications he said were listed on his AVS.     Patient said he never took magnesium before and pharmacy did not have it.     Outbound call made to Pharmacy and they explained the Magnesium script was received but his insurance does not cover it. Pharmacist said he can get it over the counter. Pharmacist clarified they would be able to assist Patient to find it if he asks.     Outbound call made to Patient and he was made aware of information Pharmacist provided. Patient verbalized understanding and said his home health aid will help him.     Nathan SANTACRUZ: Please be aware patient is not taking colchicine, omeprazole, melatonin, methocarbamol, lidocaine patch, thiamine, and lacosamide.These medications he said were listed on his AVS.

## 2024-10-11 ENCOUNTER — TELEPHONE (OUTPATIENT)
Age: 59
End: 2024-10-11

## 2024-10-11 NOTE — TELEPHONE ENCOUNTER
Pt called to request rx for Verampamil.    Per 10/08/24 LOV note:   added verapamil 120 mg nightly with Fioricet as abortive option.     [facility]    *unable to choose refill for encounter    Nathan - can you please enter rx for Verapamil? Thank you.

## 2024-10-12 ENCOUNTER — TELEPHONE (OUTPATIENT)
Dept: OTHER | Facility: OTHER | Age: 59
End: 2024-10-12

## 2024-10-12 NOTE — TELEPHONE ENCOUNTER
"Pt stated, \" I called a few days ago in regard to Verapamil and no one has gotten back to me.\"    Please follow up with pt, thank you!   "

## 2024-10-15 DIAGNOSIS — M54.50 LOWER BACK PAIN: ICD-10-CM

## 2024-10-15 DIAGNOSIS — G89.4 CHRONIC PAIN SYNDROME: ICD-10-CM

## 2024-10-15 RX ORDER — GABAPENTIN 100 MG/1
200 CAPSULE ORAL 3 TIMES DAILY
Qty: 180 CAPSULE | Refills: 2 | Status: SHIPPED | OUTPATIENT
Start: 2024-10-15

## 2024-10-15 NOTE — TELEPHONE ENCOUNTER
Pt called to follow up on this message. Made pt aware that we are waiting on the provider's response. Pt states that the gabapentin does nothing for his pain, and he is unable to sleep because of the pain on his entire left side. Made him aware that he would receive a return call with the provider's recommendations.

## 2024-10-15 NOTE — TELEPHONE ENCOUNTER
Nathan SANTACRUZ: Please see Patient call in from earlier it appears it was routed to Dr. Lozano in error. Patient reported he is not getting relief from gabapentin. Please advise, Thank you!

## 2024-10-15 NOTE — TELEPHONE ENCOUNTER
Pt called to provide updated status. He states he is not getting any relief from gabapentin 100mg - 1 cap TID He confirms pain is same as reported at [facility]. Pt is asking if gabapentin dose can be increased or an alternative med rx'd. Pt states he is taking Magnesium 400mg daily as recommended at [facility].     Pt has Consult w/Ortho on 10/17/24     Per 10/8/24 LOV note:  Patient reports to neurology office today at the behest of his PCP due to ongoing issues with pain in his left hand, foot and hip.  Patient has left-sided symptoms with his stroke, question if pain is stroke related in nature.  Patient reports that he has had ongoing issues with stabbing type pain in his left foot, knee, hip as well as his left hand.    [phone] -  okay to leave detailed msg.     Dr. Lozano - please advise. Thank you.

## 2024-10-16 NOTE — TELEPHONE ENCOUNTER
Called and advised of the below.  New script was sent to [facility] pharmacy.He verbalized understanding.  Pt states that he picked it up yesterday.    Pt states that he as ortho appt tmrw    States that he will schedule his PT/OT. It would be at the same building as his PCP office.     ti

## 2024-10-16 NOTE — TELEPHONE ENCOUNTER
Inbound call received from patient.    Patient stated that he just received a call from [facility] stating that Nathan Gutierrez called in Gabapentin 300 mg capsules TID to [facility] for pt. RN reviewed chart and saw documentation for Gabapentin 200 mg TID. RN informed pt that a message would be sent to the provider to review and call the pt back with an update.    Pt stated that the Gabapentin Rx needs to be sent to [facility] on [address] in [place]     Outbound call placed to [facility].    Pharm tech stated that the Gabapentin Rx was on file from Dr. Matthews at [facility] from  2/5/24. Stated that pt picked up the Gabapentin 300 mg capsules Rx from [facility]. Pharm tech stated that they asked the pt which dosage pt was currently on and pt informed [facility] that he was currently on Gabapentin 300 mg capsules TID.     Nathan: please review and advise.

## 2024-10-16 NOTE — TELEPHONE ENCOUNTER
NOAM Rivers   to Valencia Christopher RN       10/15/24  7:41 PM   We can continue to titrate the medication to effect.  We can increase to 200mg 3x a day for now.  Also, I referred him to ortho for his joint pains and PT/OT.  Please reiterate this request for follow up.      Nathan

## 2024-10-16 NOTE — TELEPHONE ENCOUNTER
Per below, pt was taking gabapentin 100 mg tid. Nathan increased it to 100 mg 2 caps tid. Pt already picked this up yesterday.     Called pt and advised of the below and above. He confirmed that he was taking 100 mg tid and Nathan increased it to 100 mg 2 caps tid. He already picked this med yesterday. Pt states that [facility] is [facility] messed [facility]. Pt does not need any script right now.    Also, he just wanted to let Nathan know that ortho rescheduled his appt for Friday.

## 2024-10-21 ENCOUNTER — HOSPITAL ENCOUNTER (EMERGENCY)
Facility: HOSPITAL | Age: 59
Discharge: HOME | End: 2024-10-21
Attending: EMERGENCY MEDICINE
Payer: COMMERCIAL

## 2024-10-21 ENCOUNTER — APPOINTMENT (EMERGENCY)
Dept: RADIOLOGY | Facility: HOSPITAL | Age: 59
End: 2024-10-21
Payer: COMMERCIAL

## 2024-10-21 VITALS
BODY MASS INDEX: 23.28 KG/M2 | HEART RATE: 86 BPM | OXYGEN SATURATION: 97 % | TEMPERATURE: 97.7 F | WEIGHT: 166.89 LBS | RESPIRATION RATE: 18 BRPM | DIASTOLIC BLOOD PRESSURE: 55 MMHG | SYSTOLIC BLOOD PRESSURE: 125 MMHG

## 2024-10-21 DIAGNOSIS — M25.512 ACUTE PAIN OF LEFT SHOULDER: ICD-10-CM
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
## 2024-10-21 DIAGNOSIS — M10.9 GOUT: Primary | ICD-10-CM

## 2024-10-21 PROCEDURE — 73110 X-RAY EXAM OF WRIST: CPT

## 2024-10-21 PROCEDURE — 99284 EMERGENCY DEPT VISIT MOD MDM: CPT

## 2024-10-21 PROCEDURE — 73080 X-RAY EXAM OF ELBOW: CPT

## 2024-10-21 PROCEDURE — 73502 X-RAY EXAM HIP UNI 2-3 VIEWS: CPT

## 2024-10-21 PROCEDURE — 73030 X-RAY EXAM OF SHOULDER: CPT

## 2024-10-21 PROCEDURE — 99284 EMERGENCY DEPT VISIT MOD MDM: CPT | Performed by: EMERGENCY MEDICINE

## 2024-10-21 RX ORDER — IBUPROFEN 400 MG/1
800 TABLET, FILM COATED ORAL ONCE
Status: COMPLETED | OUTPATIENT
Start: 2024-10-21 | End: 2024-10-21

## 2024-10-21 RX ORDER — IBUPROFEN 800 MG/1
800 TABLET, FILM COATED ORAL 3 TIMES DAILY
Qty: 12 TABLET | Refills: 0 | Status: SHIPPED | OUTPATIENT
Start: 2024-10-21 | End: 2024-10-25

## 2024-10-21 RX ADMIN — IBUPROFEN 800 MG: 400 TABLET, FILM COATED ORAL at 05:39

## 2024-10-21 NOTE — DISCHARGE INSTRUCTIONS
You will take ibuprofen 800 mg Three times a day for 4 days for gout.  I sent a prescription for this to the pharmacy.     Please follow-up with your primary care doctor.  No fracture seen on your x-rays.

## 2024-10-21 NOTE — ED PROVIDER NOTES
Time reflects when diagnosis was documented in both MDM as applicable and the Disposition within this note       Time User Action Codes Description Comment    10/21/2024  5:03 AM Vasu Chase [M10.9] Gout     10/21/2024  5:46 AM Vasu Chase [W19.XXXA] Fall, initial encounter     10/21/2024  5:46 AM Vasu Chase [M25.512] Acute pain of left shoulder           ED Disposition       ED Disposition   Discharge    Condition   Stable    Date/Time   Mon Oct 21, 2024  5:46 AM    Comment   Carlos Manuel Yuna discharge to home/self care.                   Assessment & Plan       Medical Decision Making  59-year-old male presenting to the ED today for fall with left shoulder pain.  Differential at this time includes soft tissue injury versus fracture of the left shoulder or elbow or wrist.  Will do x-rays of the left shoulder, left wrist, left elbow.  In terms of the gout since he does not want be on colchicine secondary to giving him diarrhea we will place him on ibuprofen 800 mg 3 times daily for the next 4 days.    X-rays were negative.  Encouraged outpatient follow-up with primary care bradycardia.  Prescription sent for 800 mg tabs of ibuprofen.  Return precautions discussed and patient was discharged home.    Amount and/or Complexity of Data Reviewed  Radiology: ordered and independent interpretation performed.    Risk  Prescription drug management.             Medications   ibuprofen (MOTRIN) tablet 800 mg (800 mg Oral Given 10/21/24 0539)       ED Risk Strat Scores                           SBIRT 22yo+      Flowsheet Row Most Recent Value   REECE: How many times in the past year have you...    Used an illegal drug or used a prescription medication for non-medical reasons? Never Filed at: 10/21/2024 0459                            History of Present Illness       Chief Complaint   Patient presents with    Fall     Pt fell landing on left shoulder after tripping on a cord. Pt reports left arm and shoulder  pain. Remembers fall, denies loc, denies hitting head, and denies being on thinners. Pt reports r foot gout pain as well    Gout Pain       Past Medical History:   Diagnosis Date    Biceps tendonitis, unspecified laterality 2007    Bone cyst 2011    Bronchiectasis (HCC) 10/02/2006    Bursitis 2005    Chest pain 2009    Chronic renal failure 2011    Diverticulitis of colon 10/09/2007    Generalized anxiety disorder 2006    GERD (gastroesophageal reflux disease)     Gout     Hyperlipidemia     Hypertension     Lateral epicondylitis, unspecified elbow     Last Assessed: 2013    Low magnesium levels     Lyme disease 2009    Pneumonia     Last Assessed: 2013    Stroke (HCC)       Past Surgical History:   Procedure Laterality Date    CARDIAC ELECTROPHYSIOLOGY PROCEDURE N/A 2022    Procedure: Cardiac loop recorder implant;  Surgeon: Marley Banks MD;  Location: [facility];  Service: Cardiology    KNEE ARTHROSCOPY      Therapeutic    OLECRANON BURSA EXCISION        Family History   Problem Relation Age of Onset    Cancer Mother         Colon      Social History     Tobacco Use    Smoking status: Every Day     Current packs/day: 0.00     Types: Cigarettes     Last attempt to quit: 5/10/2022     Years since quittin.4    Smokeless tobacco: Never    Tobacco comments:     cigarette nicotine dependence   Vaping Use    Vaping status: Never Used   Substance Use Topics    Alcohol use: Yes     Alcohol/week: 6.0 standard drinks of alcohol     Types: 6 Glasses of wine per week     Comment: 20-states he has not had alcohol since 2021    Drug use: No      E-Cigarette/Vaping    E-Cigarette Use Never User       E-Cigarette/Vaping Substances    Nicotine No     THC No     CBD No     Flavoring No     Other No     Unknown No       I have reviewed and agree with the history as documented.     59-year-old male presenting to ED today for fall from standing.   Patient was ambulating in his home to go use the bathroom and went to go reach for his walker but tripped over the electrocortical space heater.  It was on when he fell onto it on his left shoulder however he says that it does not really get that out so he did not get any burns.  Complaining of pain in the left shoulder, left elbow and left wrist.  Denies hitting his head.  No LOC.    Patient also noted to have a red right toe.  Tells me that he has a history of gout and has been having a flare for the last 3 to 4 days.  Did not want to take colchicine because he says it gives him diarrhea.        Review of Systems   Constitutional:  Negative for chills and fever.   HENT:  Negative for hearing loss.    Eyes:  Negative for visual disturbance.   Respiratory:  Negative for shortness of breath.    Cardiovascular:  Negative for chest pain.   Gastrointestinal:  Negative for abdominal pain, constipation, diarrhea, nausea and vomiting.   Genitourinary:  Negative for difficulty urinating.   Musculoskeletal:  Negative for myalgias.   Skin:  Negative for rash.   Neurological:  Negative for dizziness.   Psychiatric/Behavioral:  Negative for agitation.    All other systems reviewed and are negative.          Objective       ED Triage Vitals   Temperature Pulse Blood Pressure Respirations SpO2 Patient Position - Orthostatic VS   10/21/24 0458 10/21/24 0458 10/21/24 0458 10/21/24 0458 10/21/24 0458 --   97.7 °F (36.5 °C) 86 125/55 18 97 %       Temp Source Heart Rate Source BP Location FiO2 (%) Pain Score    10/21/24 0458 10/21/24 0458 -- -- 10/21/24 0539    Oral Monitor   10 - Worst Possible Pain      Vitals      Date and Time Temp Pulse SpO2 Resp BP Pain Score FACES Pain Rating User   10/21/24 0539 -- -- -- -- -- 10 - Worst Possible Pain -- SW   10/21/24 0458 97.7 °F (36.5 °C) 86 97 % 18 125/55 -- -- SG            Physical Exam  Vitals and nursing note reviewed.   Constitutional:       General: He is not in acute distress.      Appearance: Normal appearance. He is not ill-appearing.   HENT:      Head: Normocephalic and atraumatic.      Right Ear: External ear normal.      Left Ear: External ear normal.      Nose: Nose normal. No congestion.      Mouth/Throat:      Mouth: Mucous membranes are moist.      Pharynx: Oropharynx is clear. No oropharyngeal exudate.   Eyes:      General:         Right eye: No discharge.         Left eye: No discharge.      Extraocular Movements: Extraocular movements intact.      Conjunctiva/sclera: Conjunctivae normal.      Pupils: Pupils are equal, round, and reactive to light.   Cardiovascular:      Rate and Rhythm: Normal rate and regular rhythm.      Pulses: Normal pulses.      Heart sounds: Normal heart sounds.   Pulmonary:      Effort: Pulmonary effort is normal. No respiratory distress.      Breath sounds: Normal breath sounds. No wheezing.   Abdominal:      General: Abdomen is flat. Bowel sounds are normal. There is no distension.      Palpations: Abdomen is soft.      Tenderness: There is no abdominal tenderness.   Musculoskeletal:         General: No swelling or deformity. Normal range of motion.      Cervical back: Normal range of motion. No rigidity.      Comments: Tenderness to palpation of left shoulder left elbow and left wrist.  No snuffbox tenderness.  2+ radial pulses bilaterally.  Sensation intact bilaterally.    No C, T, L-spine tenderness.  Pelvis stable.   Skin:     General: Skin is warm and dry.      Capillary Refill: Capillary refill takes less than 2 seconds.   Neurological:      General: No focal deficit present.      Mental Status: He is alert and oriented to person, place, and time. Mental status is at baseline.   Psychiatric:         Mood and Affect: Mood normal.         Behavior: Behavior normal.         Results Reviewed       None            XR shoulder 2+ views LEFT   ED Interpretation by Vasu Chase MD (10/21 3666)   I interpret this x-ray as no acute osseous abnormality         XR elbow 3+ vw LEFT   ED Interpretation by Vasu Chase MD (10/21 0544)   I interpret this x-ray as no acute osseous abnormality        XR wrist 3+ views LEFT   ED Interpretation by Vasu Chase MD (10/21 0544)   I interpret this x-ray as no acute osseous abnormality        XR hip/pelv 2-3 vws left if performed   ED Interpretation by Vasu Chase MD (10/21 0544)   I interpret this x-ray as no acute osseous abnormality            Procedures    ED Medication and Procedure Management   Prior to Admission Medications   Prescriptions Last Dose Informant Patient Reported? Taking?   LORazepam (ATIVAN) 0.5 mg tablet  Self, Spouse/Significant Other Yes No   Sig: TAKE 1 TABLET BY ORAL ROUTE AS NEEDED PRIOR TO MRI   Patient not taking: Reported on 8/23/2024   acetaminophen (TYLENOL) 325 mg tablet  Self, Spouse/Significant Other No No   Sig: Take 3 tablets (975 mg total) by mouth every 8 (eight) hours   amLODIPine (NORVASC) 10 mg tablet  Self, Spouse/Significant Other Yes No   Sig: Take 10 mg by mouth daily   aspirin 81 mg chewable tablet  Self, Spouse/Significant Other No No   Sig: Chew 1 tablet (81 mg total)  in the morning.   atorvastatin (LIPITOR) 40 mg tablet   No No   Sig: Take 1 tablet (40 mg total) by mouth daily with dinner   colchicine (COLCRYS) 0.6 mg tablet  Self, Spouse/Significant Other No No   Sig: Take 1 tablet (0.6 mg total) by mouth daily   Patient not taking: Reported on 2/28/2024   fenofibrate (TRICOR) 145 mg tablet  Self, Spouse/Significant Other No No   Sig: Take 1 tablet (145 mg total) by mouth daily   Patient not taking: Reported on 2/28/2024   gabapentin (Neurontin) 100 mg capsule   No No   Sig: Take 2 capsules (200 mg total) by mouth 3 (three) times a day   lacosamide (VIMPAT) 100 mg tablet   No No   Sig: Take 1 tablet (100 mg total) by mouth every 12 (twelve) hours for 10 days   Patient not taking: Reported on 2/28/2024   levETIRAcetam (KEPPRA) 750 mg tablet  Self No No   Sig: Take 2 tablets  (1,500 mg total) by mouth every 12 (twelve) hours for 10 days   lidocaine (LIDODERM) 5 %  Self, Spouse/Significant Other No No   Sig: Apply 1 patch topically over 12 hours daily Remove & Discard patch within 12 hours or as directed by MD Do not start before February 19, 2023.   Patient not taking: Reported on 2/28/2024   magnesium oxide (MAG-OX) 400 mg tablet   No No   Sig: Take 1 tablet (400 mg total) by mouth daily   melatonin 3 mg  Self, Spouse/Significant Other No No   Sig: Take 1 tablet (3 mg total) by mouth daily at bedtime   Patient not taking: Reported on 2/28/2024   meloxicam (MOBIC) 7.5 mg tablet  Self, Spouse/Significant Other Yes No   Patient not taking: Reported on 2/28/2024   methocarbamol (ROBAXIN) 750 mg tablet  Self, Spouse/Significant Other No No   Sig: Take 1 tablet (750 mg total) by mouth every 6 (six) hours   Patient not taking: Reported on 2/28/2024   metoprolol succinate (TOPROL-XL) 100 mg 24 hr tablet  Self, Spouse/Significant Other No No   Sig: Take 1 tablet (100 mg total) by mouth daily   Patient not taking: Reported on 2/28/2024   omeprazole (PriLOSEC) 20 mg delayed release capsule  Self, Spouse/Significant Other Yes No   Sig: Take 20 mg by mouth daily   oxyCODONE (ROXICODONE) 5 immediate release tablet  Self, Spouse/Significant Other No No   Sig: Take 1 tablet (5 mg total) by mouth every 4 (four) hours as needed for moderate pain for up to 10 doses Take 2 tablets every 6 hours as needed for severe pain Max Daily Amount: 30 mg   Patient not taking: Reported on 2/28/2024   pantoprazole (PROTONIX) 40 mg tablet  Self, Spouse/Significant Other Yes No   Sig: Take 40 mg by mouth daily   Patient not taking: Reported on 10/8/2024   thiamine 100 MG tablet   No No   Sig: Take 1 tablet (100 mg total) by mouth in the morning.   Patient not taking: Reported on 2/28/2024      Facility-Administered Medications: None     Current Discharge Medication List        START taking these medications    Details    ibuprofen (MOTRIN) 800 mg tablet Take 1 tablet (800 mg total) by mouth 3 (three) times a day for 4 days  Qty: 12 tablet, Refills: 0    Associated Diagnoses: Gout           CONTINUE these medications which have NOT CHANGED    Details   acetaminophen (TYLENOL) 325 mg tablet Take 3 tablets (975 mg total) by mouth every 8 (eight) hours  Refills: 0    Associated Diagnoses: Lower back pain      amLODIPine (NORVASC) 10 mg tablet Take 10 mg by mouth daily      aspirin 81 mg chewable tablet Chew 1 tablet (81 mg total)  in the morning.  Qty: 30 tablet, Refills: 0    Associated Diagnoses: Weakness      atorvastatin (LIPITOR) 40 mg tablet Take 1 tablet (40 mg total) by mouth daily with dinner  Qty: 30 tablet, Refills: 4    Associated Diagnoses: Cerebrovascular accident (CVA), unspecified mechanism (HCC); Hyperlipidemia      colchicine (COLCRYS) 0.6 mg tablet Take 1 tablet (0.6 mg total) by mouth daily  Refills: 0    Associated Diagnoses: Acute gout of hand      fenofibrate (TRICOR) 145 mg tablet Take 1 tablet (145 mg total) by mouth daily  Qty: 30 tablet, Refills: 0    Associated Diagnoses: Dyslipidemia      gabapentin (Neurontin) 100 mg capsule Take 2 capsules (200 mg total) by mouth 3 (three) times a day  Qty: 180 capsule, Refills: 2    Associated Diagnoses: Chronic pain syndrome; Lower back pain      lacosamide (VIMPAT) 100 mg tablet Take 1 tablet (100 mg total) by mouth every 12 (twelve) hours for 10 days  Qty: 20 tablet, Refills: 0    Associated Diagnoses: Weakness      levETIRAcetam (KEPPRA) 750 mg tablet Take 2 tablets (1,500 mg total) by mouth every 12 (twelve) hours for 10 days  Qty: 40 tablet, Refills: 0    Associated Diagnoses: Seizure disorder (HCC)      lidocaine (LIDODERM) 5 % Apply 1 patch topically over 12 hours daily Remove & Discard patch within 12 hours or as directed by MD Do not start before February 19, 2023.  Refills: 0    Associated Diagnoses: Lower back pain      LORazepam (ATIVAN) 0.5 mg tablet TAKE  1 TABLET BY ORAL ROUTE AS NEEDED PRIOR TO MRI      magnesium oxide (MAG-OX) 400 mg tablet Take 1 tablet (400 mg total) by mouth daily  Qty: 30 tablet, Refills: 4    Associated Diagnoses: Weakness      melatonin 3 mg Take 1 tablet (3 mg total) by mouth daily at bedtime  Qty: 30 tablet, Refills: 0    Associated Diagnoses: Weakness      meloxicam (MOBIC) 7.5 mg tablet       methocarbamol (ROBAXIN) 750 mg tablet Take 1 tablet (750 mg total) by mouth every 6 (six) hours  Qty: 60 tablet, Refills: 0    Associated Diagnoses: Weakness      metoprolol succinate (TOPROL-XL) 100 mg 24 hr tablet Take 1 tablet (100 mg total) by mouth daily  Qty: 30 tablet, Refills: 0    Associated Diagnoses: Essential hypertension      omeprazole (PriLOSEC) 20 mg delayed release capsule Take 20 mg by mouth daily      oxyCODONE (ROXICODONE) 5 immediate release tablet Take 1 tablet (5 mg total) by mouth every 4 (four) hours as needed for moderate pain for up to 10 doses Take 2 tablets every 6 hours as needed for severe pain Max Daily Amount: 30 mg  Qty: 10 tablet, Refills: 0    Associated Diagnoses: Lower back pain      pantoprazole (PROTONIX) 40 mg tablet Take 40 mg by mouth daily      thiamine 100 MG tablet Take 1 tablet (100 mg total) by mouth in the morning.  Qty: 30 tablet, Refills: 0    Associated Diagnoses: Seizure disorder (HCC)           No discharge procedures on file.  ED SEPSIS DOCUMENTATION   Time reflects when diagnosis was documented in both MDM as applicable and the Disposition within this note       Time User Action Codes Description Comment    10/21/2024  5:03 AM Vasu Chase [M10.9] Gout     10/21/2024  5:46 AM Vasu Chase [W19.XXXA] Fall, initial encounter     10/21/2024  5:46 AM Vasu Chase [M25.512] Acute pain of left shoulder                  Vasu Chase MD  10/21/24 0554

## 2024-10-21 NOTE — ED NOTES
Patient noted to be walking in room,coming out to doorway,assisted back to a chair in the room,awaiting transport home.     Veronica Lafleur RN  10/21/24 0034

## 2024-10-21 NOTE — ED NOTES
Patient is discharged and awaiting transport home, rang bell and asked nurse to ask doctor for pain medication and ER MD informed, but is not going to order anything due to discharge status.Patient noted to be freely moving his legs without difficulty or pain.     Veronica Lafleur RN  10/21/24 [id number]

## 2024-10-21 NOTE — ED NOTES
Patient noted to open room door, be standing straight in doorway, no difficulty noted,asked if he can get pain medication, informed will ask Er MD,and that he is to be picked up at 10:40 am to be transported home.     Veronica Lafleur RN  10/21/24 [id number]

## 2024-10-24 ENCOUNTER — HOSPITAL ENCOUNTER (EMERGENCY)
Facility: HOSPITAL | Age: 59
Discharge: HOME | End: 2024-10-24
Attending: STUDENT IN AN ORGANIZED HEALTH CARE EDUCATION/TRAINING PROGRAM
Payer: COMMERCIAL

## 2024-10-24 VITALS
OXYGEN SATURATION: 98 % | SYSTOLIC BLOOD PRESSURE: 129 MMHG | HEART RATE: 64 BPM | WEIGHT: 156.09 LBS | RESPIRATION RATE: 20 BRPM | TEMPERATURE: 97.9 F | BODY MASS INDEX: 21.77 KG/M2 | DIASTOLIC BLOOD PRESSURE: 70 MMHG

## 2024-10-24 DIAGNOSIS — M10.9 GOUT: Primary | ICD-10-CM

## 2024-10-24 PROCEDURE — 99284 EMERGENCY DEPT VISIT MOD MDM: CPT | Performed by: STUDENT IN AN ORGANIZED HEALTH CARE EDUCATION/TRAINING PROGRAM

## 2024-10-24 PROCEDURE — 96372 THER/PROPH/DIAG INJ SC/IM: CPT

## 2024-10-24 PROCEDURE — 99283 EMERGENCY DEPT VISIT LOW MDM: CPT

## 2024-10-24 RX ORDER — PREDNISONE 20 MG/1
40 TABLET ORAL DAILY
Qty: 8 TABLET | Refills: 0 | Status: SHIPPED | OUTPATIENT
Start: 2024-10-25 | End: 2024-10-29

## 2024-10-24 RX ORDER — PREDNISONE 20 MG/1
60 TABLET ORAL ONCE
Status: COMPLETED | OUTPATIENT
Start: 2024-10-24 | End: 2024-10-24

## 2024-10-24 RX ORDER — KETOROLAC TROMETHAMINE 30 MG/ML
15 INJECTION, SOLUTION INTRAMUSCULAR; INTRAVENOUS ONCE
Status: COMPLETED | OUTPATIENT
Start: 2024-10-24 | End: 2024-10-24

## 2024-10-24 RX ORDER — OXYCODONE HYDROCHLORIDE 5 MG/1
5 TABLET ORAL ONCE
Status: COMPLETED | OUTPATIENT
Start: 2024-10-24 | End: 2024-10-24

## 2024-10-24 RX ADMIN — PREDNISONE 60 MG: 20 TABLET ORAL at 12:15

## 2024-10-24 RX ADMIN — OXYCODONE HYDROCHLORIDE 5 MG: 5 TABLET ORAL at 11:39

## 2024-10-24 RX ADMIN — KETOROLAC TROMETHAMINE 15 MG: 30 INJECTION, SOLUTION INTRAMUSCULAR at 11:35

## 2024-10-24 NOTE — ED NOTES
Pt requested medication to help with sleep, per MD to follow with PCP. He verbalized understanding. [facility] at bedside, verbal report given to the transport team. All belongings are sent with the pt.      Ree Padron RN  10/24/24 [id number]

## 2024-10-24 NOTE — DISCHARGE INSTRUCTIONS
As discussed please follow up with rheumatology. Call to schedule an appointment.     Return to the ED for any fevers, worsening pain, redness, or any other new or concerning symptoms.

## 2024-10-24 NOTE — ED NOTES
Pt made aware that transport eta 1430.  Meal offers he declined, given ginger ale by Alonso PERAZA per pt request. Call bell within reach.      Ree Padron RN  10/24/24 [id number]

## 2024-10-24 NOTE — ED NOTES
Reported that he doesn't have any allergy to codeine/hydrocodone.     Ree Padron, RN  10/24/24 2117

## 2024-10-25 NOTE — ED PROVIDER NOTES
Time reflects when diagnosis was documented in both MDM as applicable and the Disposition within this note       Time User Action Codes Description Comment    10/24/2024 12:32 PM Paul Puga Add [M10.9] Gout           ED Disposition       ED Disposition   Discharge    Condition   Stable    Date/Time   Thu Oct 24, 2024 12:08 PM    Comment   Carlos Manuel Binghamaspenlamar discharge to home/self care.                   Assessment & Plan       Medical Decision Making  Patient is a 59 y.o. male who presents to the ED for gout pain.  Patient is nontoxic, well-appearing.  Vitals are stable.    Differential includes but is not limited to: Gout/podagra.  Presentation not consistent with septic arthritis.    Plan: Again offered colchicine but patient declined.  Will discharge with steroids.  Discussed rheumatology follow-up.  Return precautions discussed.  Patient verbalized understanding and agreed to plan of care.                   Risk  Prescription drug management.             Medications   oxyCODONE (ROXICODONE) IR tablet 5 mg (5 mg Oral Given 10/24/24 1139)   ketorolac (TORADOL) injection 15 mg (15 mg Intramuscular Given 10/24/24 1135)   predniSONE tablet 60 mg (60 mg Oral Given 10/24/24 1215)       ED Risk Strat Scores                                               History of Present Illness       Chief Complaint   Patient presents with    Gout Pain     Arrives BLS reported that grout to R foot has been flare up. Has not taken any OTC for pain.       Past Medical History:   Diagnosis Date    Biceps tendonitis, unspecified laterality 09/17/2007    Bone cyst 11/22/2011    Bronchiectasis (HCC) 10/02/2006    Bursitis 11/17/2005    Chest pain 04/29/2009    Chronic renal failure 11/22/2011    Diverticulitis of colon 10/09/2007    Generalized anxiety disorder 06/08/2006    GERD (gastroesophageal reflux disease)     Gout     Hyperlipidemia     Hypertension     Lateral epicondylitis, unspecified elbow     Last Assessed: 11/29/2013    Low  magnesium levels     Lyme disease 2009    Pneumonia     Last Assessed: 2013    Stroke (HCC)       Past Surgical History:   Procedure Laterality Date    CARDIAC ELECTROPHYSIOLOGY PROCEDURE N/A 2022    Procedure: Cardiac loop recorder implant;  Surgeon: Marley Banks MD;  Location: [facility];  Service: Cardiology    KNEE ARTHROSCOPY      Therapeutic    OLECRANON BURSA EXCISION        Family History   Problem Relation Age of Onset    Cancer Mother         Colon      Social History     Tobacco Use    Smoking status: Every Day     Current packs/day: 0.00     Types: Cigarettes     Last attempt to quit: 5/10/2022     Years since quittin.4    Smokeless tobacco: Never    Tobacco comments:     cigarette nicotine dependence   Vaping Use    Vaping status: Never Used   Substance Use Topics    Alcohol use: Not Currently     Alcohol/week: 6.0 standard drinks of alcohol     Types: 6 Glasses of wine per week     Comment: 20-states he has not had alcohol since 2021    Drug use: No      E-Cigarette/Vaping    E-Cigarette Use Never User       E-Cigarette/Vaping Substances    Nicotine No     THC No     CBD No     Flavoring No     Other No     Unknown No       I have reviewed and agree with the history as documented.     Patient is a 59-year-old male, past medical history including gout, who presents emergency room for right foot pain.  Patient has had pain in his right foot for couple days.  Went to the emergency department where he was treated with NSAIDs.  Refused colchicine due to the fact he gets diarrhea with it.  Ultimately he was discharged.  Returns today for persistent pain.  Identical to prior presentation.  No fevers.  Continues to refuse colchicine.  States he was prescribed steroids by his PCP but he has not started them yet.  No fevers or chills.  No other complaints or concerns.          Review of Systems   Constitutional:  Negative for chills and fever.   Musculoskeletal:          Right foot/toe pain   All other systems reviewed and are negative.          Objective       ED Triage Vitals   Temperature Pulse Blood Pressure Respirations SpO2 Patient Position - Orthostatic VS   10/24/24 1118 10/24/24 1118 10/24/24 1118 10/24/24 1118 10/24/24 1118 10/24/24 1118   97.9 °F (36.6 °C) 86 120/74 18 98 % Lying      Temp Source Heart Rate Source BP Location FiO2 (%) Pain Score    10/24/24 1118 10/24/24 1118 10/24/24 1118 -- 10/24/24 1122    Temporal Monitor Right arm  10 - Worst Possible Pain      Vitals      Date and Time Temp Pulse SpO2 Resp BP Pain Score FACES Pain Rating User   10/24/24 1426 -- -- -- -- -- 8 -- SF   10/24/24 1421 97.9 °F (36.6 °C) 64 98 % 20 129/70 -- -- LG   10/24/24 1201 -- -- -- -- -- 10 - Worst Possible Pain -- SF   10/24/24 1139 -- -- -- -- -- 10 - Worst Possible Pain -- SF   10/24/24 1135 -- -- -- -- -- 10 - Worst Possible Pain -- SF   10/24/24 1122 -- -- -- -- -- 10 - Worst Possible Pain -- SF   10/24/24 1118 97.9 °F (36.6 °C) 86 98 % 18 120/74 -- -- NIRAV            Physical Exam  Vitals and nursing note reviewed.   Constitutional:       General: He is not in acute distress.     Appearance: He is well-developed. He is not ill-appearing, toxic-appearing or diaphoretic.   HENT:      Head: Normocephalic and atraumatic.      Right Ear: External ear normal.      Left Ear: External ear normal.      Nose: Nose normal.   Eyes:      General: Lids are normal. No scleral icterus.  Cardiovascular:      Rate and Rhythm: Normal rate and regular rhythm.   Pulmonary:      Effort: Pulmonary effort is normal. No respiratory distress.   Musculoskeletal:         General: No deformity. Normal range of motion.      Cervical back: Normal range of motion and neck supple.        Feet:    Skin:     General: Skin is warm and dry.   Neurological:      General: No focal deficit present.      Mental Status: He is alert.   Psychiatric:         Mood and Affect: Mood normal.         Behavior: Behavior normal.          Results Reviewed       None            No orders to display       Procedures    ED Medication and Procedure Management   Prior to Admission Medications   Prescriptions Last Dose Informant Patient Reported? Taking?   LORazepam (ATIVAN) 0.5 mg tablet  Self, Spouse/Significant Other Yes No   Sig: TAKE 1 TABLET BY ORAL ROUTE AS NEEDED PRIOR TO MRI   Patient not taking: Reported on 8/23/2024   acetaminophen (TYLENOL) 325 mg tablet  Self, Spouse/Significant Other No No   Sig: Take 3 tablets (975 mg total) by mouth every 8 (eight) hours   amLODIPine (NORVASC) 10 mg tablet  Self, Spouse/Significant Other Yes No   Sig: Take 10 mg by mouth daily   aspirin 81 mg chewable tablet  Self, Spouse/Significant Other No No   Sig: Chew 1 tablet (81 mg total)  in the morning.   atorvastatin (LIPITOR) 40 mg tablet   No No   Sig: Take 1 tablet (40 mg total) by mouth daily with dinner   colchicine (COLCRYS) 0.6 mg tablet Not Taking Self, Spouse/Significant Other No No   Sig: Take 1 tablet (0.6 mg total) by mouth daily   Patient not taking: Reported on 2/28/2024   fenofibrate (TRICOR) 145 mg tablet  Self, Spouse/Significant Other No No   Sig: Take 1 tablet (145 mg total) by mouth daily   Patient not taking: Reported on 2/28/2024   gabapentin (Neurontin) 100 mg capsule   No No   Sig: Take 2 capsules (200 mg total) by mouth 3 (three) times a day   ibuprofen (MOTRIN) 800 mg tablet   No No   Sig: Take 1 tablet (800 mg total) by mouth 3 (three) times a day for 4 days   lacosamide (VIMPAT) 100 mg tablet   No No   Sig: Take 1 tablet (100 mg total) by mouth every 12 (twelve) hours for 10 days   Patient not taking: Reported on 2/28/2024   levETIRAcetam (KEPPRA) 750 mg tablet  Self No No   Sig: Take 2 tablets (1,500 mg total) by mouth every 12 (twelve) hours for 10 days   lidocaine (LIDODERM) 5 %  Self, Spouse/Significant Other No No   Sig: Apply 1 patch topically over 12 hours daily Remove & Discard patch within 12 hours or as directed by MD  Do not start before February 19, 2023.   Patient not taking: Reported on 2/28/2024   magnesium oxide (MAG-OX) 400 mg tablet   No No   Sig: Take 1 tablet (400 mg total) by mouth daily   melatonin 3 mg  Self, Spouse/Significant Other No No   Sig: Take 1 tablet (3 mg total) by mouth daily at bedtime   Patient not taking: Reported on 2/28/2024   meloxicam (MOBIC) 7.5 mg tablet  Self, Spouse/Significant Other Yes No   Patient not taking: Reported on 2/28/2024   methocarbamol (ROBAXIN) 750 mg tablet  Self, Spouse/Significant Other No No   Sig: Take 1 tablet (750 mg total) by mouth every 6 (six) hours   Patient not taking: Reported on 2/28/2024   metoprolol succinate (TOPROL-XL) 100 mg 24 hr tablet  Self, Spouse/Significant Other No No   Sig: Take 1 tablet (100 mg total) by mouth daily   Patient not taking: Reported on 2/28/2024   omeprazole (PriLOSEC) 20 mg delayed release capsule  Self, Spouse/Significant Other Yes No   Sig: Take 20 mg by mouth daily   oxyCODONE (ROXICODONE) 5 immediate release tablet  Self, Spouse/Significant Other No No   Sig: Take 1 tablet (5 mg total) by mouth every 4 (four) hours as needed for moderate pain for up to 10 doses Take 2 tablets every 6 hours as needed for severe pain Max Daily Amount: 30 mg   Patient not taking: Reported on 2/28/2024   pantoprazole (PROTONIX) 40 mg tablet  Self, Spouse/Significant Other Yes No   Sig: Take 40 mg by mouth daily   Patient not taking: Reported on 10/8/2024   thiamine 100 MG tablet   No No   Sig: Take 1 tablet (100 mg total) by mouth in the morning.   Patient not taking: Reported on 2/28/2024      Facility-Administered Medications: None     Discharge Medication List as of 10/24/2024 12:33 PM        START taking these medications    Details   predniSONE 20 mg tablet Take 2 tablets (40 mg total) by mouth daily for 4 days Do not start before October 25, 2024., Starting Fri 10/25/2024, Until Tue 10/29/2024, Normal           CONTINUE these medications which have  NOT CHANGED    Details   acetaminophen (TYLENOL) 325 mg tablet Take 3 tablets (975 mg total) by mouth every 8 (eight) hours, Starting Sat 2/18/2023, No Print      amLODIPine (NORVASC) 10 mg tablet Take 10 mg by mouth daily, Starting Thu 2/8/2024, Historical Med      aspirin 81 mg chewable tablet Chew 1 tablet (81 mg total)  in the morning., Starting Tue 5/24/2022, Normal      atorvastatin (LIPITOR) 40 mg tablet Take 1 tablet (40 mg total) by mouth daily with dinner, Starting Tue 10/8/2024, Normal      colchicine (COLCRYS) 0.6 mg tablet Take 1 tablet (0.6 mg total) by mouth daily, Starting Sat 2/18/2023, No Print      fenofibrate (TRICOR) 145 mg tablet Take 1 tablet (145 mg total) by mouth daily, Starting Mon 3/5/2018, Normal      gabapentin (Neurontin) 100 mg capsule Take 2 capsules (200 mg total) by mouth 3 (three) times a day, Starting Tue 10/15/2024, Normal      ibuprofen (MOTRIN) 800 mg tablet Take 1 tablet (800 mg total) by mouth 3 (three) times a day for 4 days, Starting Mon 10/21/2024, Until Fri 10/25/2024, Normal      lacosamide (VIMPAT) 100 mg tablet Take 1 tablet (100 mg total) by mouth every 12 (twelve) hours for 10 days, Starting Wed 9/7/2022, Until Sat 9/17/2022, Normal      levETIRAcetam (KEPPRA) 750 mg tablet Take 2 tablets (1,500 mg total) by mouth every 12 (twelve) hours for 10 days, Starting Wed 9/7/2022, Until Tue 10/8/2024, Normal      lidocaine (LIDODERM) 5 % Apply 1 patch topically over 12 hours daily Remove & Discard patch within 12 hours or as directed by MD Do not start before February 19, 2023., Starting Sun 2/19/2023, No Print      LORazepam (ATIVAN) 0.5 mg tablet TAKE 1 TABLET BY ORAL ROUTE AS NEEDED PRIOR TO MRI, Historical Med      magnesium oxide (MAG-OX) 400 mg tablet Take 1 tablet (400 mg total) by mouth daily, Starting Tue 10/8/2024, Normal      melatonin 3 mg Take 1 tablet (3 mg total) by mouth daily at bedtime, Starting Mon 5/23/2022, Normal      meloxicam (MOBIC) 7.5 mg tablet  Historical Med      methocarbamol (ROBAXIN) 750 mg tablet Take 1 tablet (750 mg total) by mouth every 6 (six) hours, Starting Mon 5/23/2022, Normal      metoprolol succinate (TOPROL-XL) 100 mg 24 hr tablet Take 1 tablet (100 mg total) by mouth daily, Starting Mon 3/5/2018, Normal      omeprazole (PriLOSEC) 20 mg delayed release capsule Take 20 mg by mouth daily, Historical Med      oxyCODONE (ROXICODONE) 5 immediate release tablet Take 1 tablet (5 mg total) by mouth every 4 (four) hours as needed for moderate pain for up to 10 doses Take 2 tablets every 6 hours as needed for severe pain Max Daily Amount: 30 mg, Starting Sat 2/18/2023, Print      pantoprazole (PROTONIX) 40 mg tablet Take 40 mg by mouth daily, Historical Med      thiamine 100 MG tablet Take 1 tablet (100 mg total) by mouth in the morning., Starting Thu 5/12/2022, Until Sat 6/11/2022, Normal           No discharge procedures on file.  ED SEPSIS DOCUMENTATION   Time reflects when diagnosis was documented in both MDM as applicable and the Disposition within this note       Time User Action Codes Description Comment    10/24/2024 12:32 PM Paul Puga Add [M10.9] Jonathan Puga DO  10/25/24 1153

## 2025-01-27 ENCOUNTER — TELEPHONE (OUTPATIENT)
Age: 60
End: 2025-01-27

## 2025-02-07 ENCOUNTER — TELEPHONE (OUTPATIENT)
Age: 60
End: 2025-02-07

## 2025-05-12 ENCOUNTER — TELEPHONE (OUTPATIENT)
Dept: CARDIOLOGY CLINIC | Facility: CLINIC | Age: 60
End: 2025-05-12

## 2025-05-12 NOTE — TELEPHONE ENCOUNTER
Pt is requesting Loop be explanted however pt wasn't set up with our device clinic.     2 appts were made with [facility] after loop implant however pt NS to them.     I left message see if device clinic can get a download before his appt tomorrow w/Dr. TERRAZAS.

## (undated) DEVICE — STERILE ICS MINOR PACK: Brand: CARDINAL HEALTH

## (undated) DEVICE — PACK CUSTOM C V DRAPE SCV11CDSLA